# Patient Record
Sex: FEMALE | Race: WHITE | NOT HISPANIC OR LATINO | Employment: OTHER | URBAN - NONMETROPOLITAN AREA
[De-identification: names, ages, dates, MRNs, and addresses within clinical notes are randomized per-mention and may not be internally consistent; named-entity substitution may affect disease eponyms.]

---

## 2021-01-15 ENCOUNTER — OFFICE VISIT (OUTPATIENT)
Dept: CARDIOLOGY | Facility: CLINIC | Age: 76
End: 2021-01-15

## 2021-01-15 VITALS
OXYGEN SATURATION: 97 % | SYSTOLIC BLOOD PRESSURE: 110 MMHG | DIASTOLIC BLOOD PRESSURE: 70 MMHG | WEIGHT: 224 LBS | HEIGHT: 64 IN | BODY MASS INDEX: 38.24 KG/M2 | HEART RATE: 68 BPM | TEMPERATURE: 97.5 F

## 2021-01-15 DIAGNOSIS — I10 ESSENTIAL HYPERTENSION: ICD-10-CM

## 2021-01-15 DIAGNOSIS — E78.5 HYPERLIPIDEMIA LDL GOAL <100: ICD-10-CM

## 2021-01-15 DIAGNOSIS — I48.0 PAROXYSMAL ATRIAL FIBRILLATION (HCC): Primary | ICD-10-CM

## 2021-01-15 PROBLEM — J43.9 COPD WITH EMPHYSEMA: Status: ACTIVE | Noted: 2021-01-15

## 2021-01-15 PROCEDURE — 99214 OFFICE O/P EST MOD 30 MIN: CPT | Performed by: INTERNAL MEDICINE

## 2021-01-15 PROCEDURE — 93000 ELECTROCARDIOGRAM COMPLETE: CPT | Performed by: INTERNAL MEDICINE

## 2021-01-15 NOTE — PROGRESS NOTES
[facility] [place]  [facility] CARDIOLOGY  [address] [place] [address]  Dept: [phone]  Dept Fax: [phone]    Marianela Ngo  1945    Follow Up Office Visit Note    History of Present Illness:  Marianela Ngo is a 75 y.o. female who presents to the clinic for PAF- she seems doing well on Sotalol 80 mg bid and warfarin, EKG today sinus with HR 65% and incomplete RBBB. Will keep same meds    The following portions of the patient's history were reviewed and updated as appropriate: allergies, current medications, past family history, past medical history, past social history, past surgical history and problem list.    Medications:  albuterol  albuterol sulfate HFA  amLODIPine  atorvastatin  fluticasone  levocetirizine  lisinopril-hydrochlorothiazide  sotalol  warfarin    Subjective  No Active Allergies     Past Medical History:   Diagnosis Date   • Allergies    • Bradycardia with 51-60 beats per minute    • Chronic atrial fibrillation (CMS/HCC)    • Current use of long term anticoagulation    • DJD (degenerative joint disease)    • Epistaxis    • Essential hypertension    • High risk medication use    • Mild persistent asthma, uncomplicated    • Mixed hyperlipidemia    • Obstructive sleep apnea on CPAP    • Other fatigue    • Paroxysmal atrial fibrillation (CMS/HCC)    • Pneumonia 08/2015   • Shortness of breath    • Vitamin deficiency        Past Surgical History:   Procedure Laterality Date   • CATARACT EXTRACTION, BILATERAL     • HYSTERECTOMY      PARTIAL   • REPLACEMENT TOTAL KNEE BILATERAL  2013,2014       Family History   Problem Relation Age of Onset   • Cancer Father    • Heart disease Sister    • Cancer Brother    • Diabetes Brother    • Hypertension Other         Social History     Socioeconomic History   • Marital status:      Spouse name: Not on file   • Number of children: Not on file   • Years of education: Not on file   • Highest education level: Not on file  "  Tobacco Use   • Smoking status: Former Smoker   • Smokeless tobacco: Never Used   Substance and Sexual Activity   • Alcohol use: Never     Frequency: Never   • Drug use: Never   • Sexual activity: Defer       Review of Systems   Constitutional: Negative.    HENT: Negative.    Respiratory: Positive for shortness of breath.    Cardiovascular: Negative.    Endocrine: Negative.    Genitourinary: Negative.    Musculoskeletal: Negative.    Skin: Negative.    Allergic/Immunologic: Negative.    Neurological: Negative.    Hematological: Negative.    Psychiatric/Behavioral: Negative.        Cardiovascular Procedures    ECHO/MUGA: 08/2015 Ef normal Mild Tr RVSP 35 Mild aortic sclerosis  STRESS TESTS:   CARDIAC CATH: CARDIAC CATHETERIZATION - SCAN - CARDIAC CATH-10- (01/13/2021)    DEVICES:   HOLTER:   CT/MRI:   VASCULAR:   CARDIOTHORACIC:     Objective  Vitals:    01/15/21 1132   Temp: 97.5 °F (36.4 °C)   TempSrc: Infrared   Weight: 102 kg (224 lb)   Height: 162.6 cm (64\")     Body mass index is 38.45 kg/m².     Physical Exam  Vitals signs reviewed.   Constitutional:       Appearance: Healthy appearance. Not in distress.   Neck:      Vascular: No JVR. JVD normal.   Pulmonary:      Effort: Pulmonary effort is normal.      Breath sounds: Normal breath sounds. No wheezing. No rhonchi. No rales.   Chest:      Chest wall: Not tender to palpatation.   Cardiovascular:      PMI at left midclavicular line. Normal rate. Regular rhythm. Normal S1. Normal S2.      Murmurs: There is no murmur.      No gallop. No click. No rub.   Pulses:     Intact distal pulses.   Edema:     Peripheral edema absent.   Abdominal:      General: Bowel sounds are normal.      Palpations: Abdomen is soft.      Tenderness: There is no abdominal tenderness.   Musculoskeletal: Normal range of motion.         General: No tenderness.   Skin:     General: Skin is warm and dry.   Neurological:      General: No focal deficit present.      Mental Status: Alert " and oriented to person, place and time.          Diagnostic Data    ECG 12 Lead    Date/Time: 1/15/2021 11:59 AM  Performed by: Amos Davis MD  Authorized by: [facility]   Comparison: compared with previous ECG from 1/8/2021  Similar to previous ECG  Rhythm: sinus rhythm  Rate: normal  QRS axis: normal              Assessment and Plan  Diagnoses and all orders for this visit:    Paroxysmal atrial fibrillation (CMS/HCC)= She seems doing well, in sinus rhythm on sotalol 80 mg bid and also Warfarin, HR 65    Essential hypertension- BP is good 125/80  On Amlodipine 5mg, Lisinopril 20.12.5    Hyperlipidemia LDL goal <100- On Lipitor 20 mg         No follow-ups on file.    Amos Davis MD  01/15/2021

## 2021-02-01 DIAGNOSIS — I48.0 PAROXYSMAL ATRIAL FIBRILLATION (HCC): Primary | ICD-10-CM

## 2021-02-01 RX ORDER — WARFARIN SODIUM 5 MG/1
5 TABLET ORAL
Qty: 180 TABLET | Refills: 3 | Status: SHIPPED | OUTPATIENT
Start: 2021-02-01 | End: 2022-01-21 | Stop reason: SDUPTHER

## 2021-02-01 NOTE — TELEPHONE ENCOUNTER
Fax # [phone]  [facility]    Pt is stating that her pharmacy contacted her to let her know that she is out of refills.    Warfarin 5mg.   3 month supply

## 2021-03-19 ENCOUNTER — TELEPHONE (OUTPATIENT)
Dept: CARDIOLOGY | Facility: CLINIC | Age: 76
End: 2021-03-19

## 2021-03-19 NOTE — TELEPHONE ENCOUNTER
PT'S HR HAS BEEN RUNNING 53-58, /82, 120/78. HR HAD BEEN STAYING IN 70'S BEFORE NOW. SHE'S BEEN FEELING LIGHT HEADED, SLUGGISH, DOESN'T FEEL RIGHT. PLEASE CALL [phone]

## 2021-03-19 NOTE — TELEPHONE ENCOUNTER
Called pt and she said she was feeling better, I told her to come Monday morning for a bp & hr check

## 2021-03-22 ENCOUNTER — TELEPHONE (OUTPATIENT)
Dept: CARDIOLOGY | Facility: CLINIC | Age: 76
End: 2021-03-22

## 2021-05-14 ENCOUNTER — TELEPHONE (OUTPATIENT)
Dept: CARDIOLOGY | Facility: CLINIC | Age: 76
End: 2021-05-14

## 2021-05-18 ENCOUNTER — CLINICAL SUPPORT (OUTPATIENT)
Dept: CARDIOLOGY | Facility: CLINIC | Age: 76
End: 2021-05-18

## 2021-05-18 DIAGNOSIS — I48.0 PAROXYSMAL ATRIAL FIBRILLATION (HCC): Primary | ICD-10-CM

## 2021-05-18 LAB — INR PPP: 1.9 (ref 0.9–1.1)

## 2021-05-19 DIAGNOSIS — I48.0 PAROXYSMAL ATRIAL FIBRILLATION (HCC): Primary | ICD-10-CM

## 2021-05-20 ENCOUNTER — APPOINTMENT (OUTPATIENT)
Dept: PHARMACY | Facility: HOSPITAL | Age: 76
End: 2021-05-20

## 2021-05-20 ENCOUNTER — ANTICOAGULATION VISIT (OUTPATIENT)
Dept: PHARMACY | Facility: HOSPITAL | Age: 76
End: 2021-05-20

## 2021-05-20 DIAGNOSIS — I48.0 PAROXYSMAL ATRIAL FIBRILLATION (HCC): Primary | ICD-10-CM

## 2021-05-20 RX ORDER — BUDESONIDE 0.5 MG/2ML
0.5 INHALANT ORAL DAILY
COMMUNITY
Start: 1970-01-01 | End: 2022-10-25

## 2021-05-25 ENCOUNTER — CLINICAL SUPPORT (OUTPATIENT)
Dept: CARDIOLOGY | Facility: CLINIC | Age: 76
End: 2021-05-25

## 2021-05-25 ENCOUNTER — ANTICOAGULATION VISIT (OUTPATIENT)
Dept: PHARMACY | Facility: HOSPITAL | Age: 76
End: 2021-05-25

## 2021-05-25 DIAGNOSIS — I48.0 PAROXYSMAL ATRIAL FIBRILLATION (HCC): ICD-10-CM

## 2021-05-25 DIAGNOSIS — I10 ESSENTIAL HYPERTENSION: Primary | ICD-10-CM

## 2021-05-25 DIAGNOSIS — I48.0 PAROXYSMAL ATRIAL FIBRILLATION (HCC): Primary | ICD-10-CM

## 2021-05-25 LAB — INR PPP: 2.3 (ref 0.9–1.1)

## 2021-05-25 NOTE — PROGRESS NOTES
Anticoagulation Clinic - Remote Progress Note  Remote Lab    Indication: paroxysmal afib  Referring Provider: Susan  Initial Warfarin Start Date: 2020? 2019?  Goal INR: 2.0-3.0  Current Drug Interactions:   [id number]: 4 (Age ?74, Sex, HTN) [estimated 5/20/21]  Bleed Risk: self reports negative history for GI bleed  Other: DOAC too expensive    Diet: peas or green beans 1x/week  5/25/21  Alcohol: none  Tobacco: none  OTC Pain Medication: APAP only    INR History:  Date 5/14 5/18 5/25             Total WeeklyDose  22.5mg 25 mg             INR 3.5 1.9 2.3             Notes [facility] 1x hold; incr GLV                Phone Interview:  Tablet Strength: 5mg tablet  Patient Contact Info: [phone] (home); [phone] (cell)  Estimated OOP cost: [please send to registration if not already done]  Verbal Release Auth: mailed 5/21/21  Lab Contact Info: [facility]    Patient Findings:  Positives:  Change in diet/appetite   Negatives:  Signs/symptoms of thrombosis, Signs/symptoms of bleeding, Laboratory test error suspected, Change in health, Change in alcohol use, Change in activity, Upcoming invasive procedure, Emergency department visit, Upcoming dental procedure, Missed doses, Extra doses, Change in medications, Hospital admission, Bruising, Other complaints   Comments:  She has been babysitting her small grandchildren and was not able to cook any GLV this week. She plans on resuming normal diet. Denies any other changes.     Plan:  1. INR is back WNL today. Instructed Ms. Ngo to continue warfarin 2.5 mg daily except for 5 mg on SunTuesThurs until recheck.  2. Repeat INR in one week to ensure WNL.  3. Verbal information provided over the phone. Marianela Ngo RBV dosing instructions, expresses understanding by teach back, and has no further questions at this time.    Sarah Alejandre CPhT  5/25/2021  11:14 EDT     I, Divya Jaffe, PharmD, have reviewed the note in full and agree with the  assessment and plan.  05/25/21  15:21 EDT

## 2021-06-01 ENCOUNTER — CLINICAL SUPPORT (OUTPATIENT)
Dept: CARDIOLOGY | Facility: CLINIC | Age: 76
End: 2021-06-01

## 2021-06-01 ENCOUNTER — ANTICOAGULATION VISIT (OUTPATIENT)
Dept: PHARMACY | Facility: HOSPITAL | Age: 76
End: 2021-06-01

## 2021-06-01 DIAGNOSIS — I48.0 PAROXYSMAL ATRIAL FIBRILLATION (HCC): Primary | ICD-10-CM

## 2021-06-01 LAB — INR PPP: 2.3 (ref 0.9–1.1)

## 2021-06-01 NOTE — PROGRESS NOTES
Anticoagulation Clinic - Remote Progress Note  Remote Lab    Indication: paroxysmal afib  Referring Provider: Susan  Initial Warfarin Start Date: 2020? 2019?  Goal INR: 2.0-3.0  Current Drug Interactions:   [id number]: 4 (Age ?74, Sex, HTN) [estimated 5/20/21]  Bleed Risk: self reports negative history for GI bleed  Other: DOAC too expensive    Diet: peas or green beans 1x/week  5/25/21  Alcohol: none  Tobacco: none  OTC Pain Medication: APAP only    INR History:  Date 5/14 5/18 5/25 6/1            Total WeeklyDose  22.5mg 25 mg 25mg            INR 3.5 1.9 2.3 2.3            Notes [facility] Cards 1x hold; incr GLV                Phone Interview:  Tablet Strength: 5mg tablet  Patient Contact Info: [phone] (home) preferred; [phone] (cell)  Estimated OOP cost: [please send to registration if not already done]  Verbal Release Auth: May speak w/ Femi Ngo, ; May leave voicemail on home phone  Lab Contact Info: [facility]    Patient Findings:  Negatives:  Signs/symptoms of thrombosis, Signs/symptoms of bleeding, Laboratory test error suspected, Change in health, Change in alcohol use, Change in activity, Upcoming invasive procedure, Emergency department visit, Upcoming dental procedure, Missed doses, Extra doses, Change in medications, Change in diet/appetite, Hospital admission, Bruising, Other complaints   Comments:  Patient reports eating a little bit of gardened lettuce last night. One small serving of peas or green beans per week is her normal. She denies all other findings.     Plan:  1. INR is therapeutic again today at 2.3. Instructed Ms. Ngo to continue warfarin 2.5 mg daily except for 5 mg on SunTuesThurs until recheck.  2. Repeat INR in 2 weeks to ensure WNL, 6/15.  3. Verbal information provided over the phone. Marianela Ngo RBV dosing instructions, expresses understanding by teach back, and has no further questions at this time.    Lu Grimm, PharmD.  Pharmacy  Resident  6/1/2021 12:27 EDT     Verbal release document returned 6/3. Patient rights & responsibilities document not returned. Please ask patient about R&R document with next encounter.    Ina EncarnacionD.  Pharmacy Resident  6/3/2021 12:21 EDT

## 2021-06-15 ENCOUNTER — ANTICOAGULATION VISIT (OUTPATIENT)
Dept: PHARMACY | Facility: HOSPITAL | Age: 76
End: 2021-06-15

## 2021-06-15 ENCOUNTER — CLINICAL SUPPORT (OUTPATIENT)
Dept: CARDIOLOGY | Facility: CLINIC | Age: 76
End: 2021-06-15

## 2021-06-15 DIAGNOSIS — I48.0 PAROXYSMAL ATRIAL FIBRILLATION (HCC): Primary | ICD-10-CM

## 2021-06-15 LAB — INR PPP: 2.8 (ref 0.9–1.1)

## 2021-06-15 PROCEDURE — 85610 PROTHROMBIN TIME: CPT | Performed by: INTERNAL MEDICINE

## 2021-06-15 PROCEDURE — 36416 COLLJ CAPILLARY BLOOD SPEC: CPT | Performed by: INTERNAL MEDICINE

## 2021-06-15 NOTE — PROGRESS NOTES
Anticoagulation Clinic - Remote Progress Note  Remote Lab    Indication: paroxysmal afib  Referring Provider: Susan  Initial Warfarin Start Date: 2020? 2019?  Goal INR: 2.0-3.0  Current Drug Interactions:   [id number]: 4 (Age ?74, Sex, HTN) [estimated 5/20/21]  Bleed Risk: self reports negative history for GI bleed  Other: DOAC too expensive    Diet: peas or green beans 1x/week  5/25/21  Alcohol: none  Tobacco: none  OTC Pain Medication: APAP only    INR History:  Date 5/14 5/18 5/25 6/1 6/15           Total WeeklyDose  22.5mg 25mg 25mg 25mg           INR 3.5 1.9 2.3 2.3 2.8           Notes [facility] Cards 1x hold; incr GLV                Phone Interview:  Tablet Strength: 5mg tablet  Patient Contact Info: [phone] (home) preferred; [phone] (cell)  Estimated OOP cost: [please send to registration if not already done]  Verbal Release Auth: May speak w/ Femi Ngo, ; May leave voicemail on home phone  Lab Contact Info: [facility]      Patient Findings  Negatives:  Signs/symptoms of thrombosis, Signs/symptoms of bleeding, Laboratory test error suspected, Change in health, Change in alcohol use, Change in activity, Upcoming invasive procedure, Emergency department visit, Upcoming dental procedure, Missed doses, Extra doses, Change in medications, Change in diet/appetite, Hospital admission, Bruising, Other complaints     Plan:  1. INR is therapeutic again today at 2.8. Instructed Ms. Ngo to continue warfarin 2.5mg oral daily except for 5mg on SunTuesThurs until recheck.  2. Repeat INR in 3 weeks, 7/6  3. Verbal information provided over the phone. Marianela Ngo RBV dosing instructions, expresses understanding by teach back, and has no further questions at this time.  4. Re-mailed R&R paperwork to patient -- 6/15/21    Saravanan Mccarthy CPhT  6/15/2021  13:57 EDT    I, Maryjane Amezquita, PharmD, have reviewed the note in full and agree with the assessment and plan.  06/15/21  15:03  EDT

## 2021-07-06 ENCOUNTER — ANTICOAGULATION VISIT (OUTPATIENT)
Dept: PHARMACY | Facility: HOSPITAL | Age: 76
End: 2021-07-06

## 2021-07-06 ENCOUNTER — CLINICAL SUPPORT (OUTPATIENT)
Dept: CARDIOLOGY | Facility: CLINIC | Age: 76
End: 2021-07-06

## 2021-07-06 DIAGNOSIS — I48.0 PAROXYSMAL ATRIAL FIBRILLATION (HCC): Primary | ICD-10-CM

## 2021-07-06 LAB — INR PPP: 2.7 (ref 0.9–1.1)

## 2021-07-06 PROCEDURE — 85610 PROTHROMBIN TIME: CPT | Performed by: INTERNAL MEDICINE

## 2021-07-06 PROCEDURE — 36416 COLLJ CAPILLARY BLOOD SPEC: CPT | Performed by: INTERNAL MEDICINE

## 2021-07-06 NOTE — PROGRESS NOTES
Anticoagulation Clinic - Remote Progress Note  Remote Lab    Indication: paroxysmal afib  Referring Provider: Susan  Initial Warfarin Start Date: 2020? 2019?  Goal INR: 2.0-3.0  Current Drug Interactions:   [id number]: 4 (Age ?74, Sex, HTN) [estimated 5/20/21]  Bleed Risk: self reports negative history for GI bleed  Other: DOAC too expensive    Diet: peas or green beans 1x/week  5/25/21  Alcohol: none  Tobacco: none  OTC Pain Medication: APAP only    INR History:  Date 5/14 5/18 5/25 6/1 6/15 7/6          Total WeeklyDose  22.5mg 25mg 25mg 25mg 25 mg          INR 3.5 1.9 2.3 2.3 2.8 2.7          Notes [facility] Cards 1x hold; incr GLV                Phone Interview:  Tablet Strength: 5mg tablet  Patient Contact Info: [phone] (home) preferred; [phone] (cell)  Estimated OOP cost: [please send to registration if not already done]  Verbal Release Auth: May speak w/ Femi Ngo, ; May leave voicemail on home phone  Lab Contact Info: [facility]      Patient Findings:  Positives:  Change in diet/appetite   Negatives:  Signs/symptoms of thrombosis, Signs/symptoms of bleeding, Laboratory test error suspected, Change in health, Change in alcohol use, Change in activity, Upcoming invasive procedure, Emergency department visit, Upcoming dental procedure, Missed doses, Extra doses, Change in medications, Hospital admission, Bruising, Other complaints   Comments:  Patient states that she has not eaten any green vegetables this past week.     Plan:  1. INR is therapeutic today at 2.7. Instructed Ms. Ngo to continue warfarin 2.5mg oral daily except for 5mg on SunTuesThurs until recheck.  2. Repeat INR in 4 weeks, 08/03/2021  3. Verbal information provided over the phone. Marianela Ngo RBV dosing instructions, expresses understanding by teach back, and has no further questions at this time.  4. Re-mailed R&R paperwork to patient -- 6/15/21       Robert Lambert, Pharmacy Intern  07/06/21  11:06  EDT     I, Radha Orlando, PharmD, have reviewed the note in full and agree with the assessment and plan.  07/06/21  16:59 EDT

## 2021-07-12 ENCOUNTER — ANTICOAGULATION VISIT (OUTPATIENT)
Dept: PHARMACY | Facility: HOSPITAL | Age: 76
End: 2021-07-12

## 2021-07-12 DIAGNOSIS — I48.0 PAROXYSMAL ATRIAL FIBRILLATION (HCC): Primary | ICD-10-CM

## 2021-07-12 LAB — INR PPP: 2.1

## 2021-07-12 NOTE — PROGRESS NOTES
Anticoagulation Clinic - Remote Progress Note  mdINR Home Monitor  Testing frequency: weekly    Indication: paroxysmal afib  Referring Provider: Susan  Initial Warfarin Start Date: 2020? 2019?  Goal INR: 2.0-3.0  Current Drug Interactions:   [id number]: 4 (Age ?74, Sex, HTN) [estimated 5/20/21]  Bleed Risk: self reports negative history for GI bleed  Other: DOAC too expensive    Diet: peas or green beans 1x/week  5/25/21  Alcohol: none  Tobacco: none  OTC Pain Medication: APAP only    INR History:  Date 5/14 5/18 5/25 6/1 6/15 7/6 7/12         Total WeeklyDose  22.5mg 25mg 25mg 25mg 25mg 25mg         INR 3.5 1.9 2.3 2.3 2.8 2.7 2.1         Notes [facility] Cards 1x hold; incr GLV     HM           Phone Interview:  Tablet Strength: 5mg tablet  Patient Contact Info: [phone] (home) preferred; [phone] (cell)  Estimated OOP cost: [please send to registration if not already done]  Verbal Release Auth: May speak w/ Femi Ngo, ; May leave voicemail on home phone  Lab Contact Info: [facility]      Patient Findings    Negatives:  Signs/symptoms of thrombosis, Signs/symptoms of bleeding, Laboratory test error suspected, Change in health, Change in alcohol use, Change in activity, Upcoming invasive procedure, Emergency department visit, Upcoming dental procedure, Missed doses, Extra doses, Change in medications, Change in diet/appetite, Hospital admission, Bruising, Other complaints   Comments:  Patient has been avoiding GLV recently. Asked to let us know if that changes.       Plan:  1. INR is therapeutic today at 2.1. Patient self tested on new home monitor. Instructed Ms. Ngo to continue warfarin 2.5mg oral daily except for 5mg on SunTuesThurs until recheck.  2. Repeat INR in 1 week, 7/19, as required by mdINR  3. Verbal information provided over the phone. Marianela Huber RBV dosing instructions, expresses understanding by teach back, and has no further questions at this time.    Thank  you,    Fady You PharmD, KARENA  7/13/2021  10:18 EDT

## 2021-07-16 ENCOUNTER — OFFICE VISIT (OUTPATIENT)
Dept: CARDIOLOGY | Facility: CLINIC | Age: 76
End: 2021-07-16

## 2021-07-16 VITALS
DIASTOLIC BLOOD PRESSURE: 88 MMHG | RESPIRATION RATE: 11 BRPM | BODY MASS INDEX: 36.32 KG/M2 | SYSTOLIC BLOOD PRESSURE: 136 MMHG | TEMPERATURE: 97 F | HEIGHT: 65 IN | HEART RATE: 76 BPM | WEIGHT: 218 LBS | OXYGEN SATURATION: 96 %

## 2021-07-16 DIAGNOSIS — E78.5 HYPERLIPIDEMIA LDL GOAL <100: ICD-10-CM

## 2021-07-16 DIAGNOSIS — I48.0 PAROXYSMAL ATRIAL FIBRILLATION (HCC): Primary | ICD-10-CM

## 2021-07-16 DIAGNOSIS — J43.2 CENTRILOBULAR EMPHYSEMA (HCC): ICD-10-CM

## 2021-07-16 DIAGNOSIS — I10 ESSENTIAL HYPERTENSION: ICD-10-CM

## 2021-07-16 PROCEDURE — 99214 OFFICE O/P EST MOD 30 MIN: CPT | Performed by: INTERNAL MEDICINE

## 2021-07-16 PROCEDURE — 93000 ELECTROCARDIOGRAM COMPLETE: CPT | Performed by: INTERNAL MEDICINE

## 2021-07-16 RX ORDER — NYSTATIN 100000 U/G
CREAM TOPICAL
COMMUNITY
Start: 2021-06-07 | End: 2022-01-21

## 2021-07-16 NOTE — PROGRESS NOTES
[facility]  [facility] CARDIOLOGY  [address] [place] [address]  Dept: [phone]  Dept Fax: [phone]    Marianela Ngo  1945    Follow Up Office Visit Note    History of Present Illness:  Marianela Ngo is a 75 y.o. female who presents to the clinic for Follow-up.PAF- She seems doing well, no complaints, On warfarin and also Sotalol 80 mg bid QT is 490, Hr is 55, will keep same meds     The following portions of the patient's history were reviewed and updated as appropriate: allergies, current medications, past family history, past medical history, past social history, past surgical history and problem list.    Medications:  albuterol  albuterol sulfate HFA  amLODIPine  atorvastatin  budesonide  diphenhydrAMINE-PE-APAP tablet  lisinopril-hydrochlorothiazide  nystatin cream  sotalol  warfarin    Subjective  No Known Allergies     Past Medical History:   Diagnosis Date   • Allergies    • Bradycardia with 51-60 beats per minute    • Chronic atrial fibrillation (CMS/HCC)    • Current use of long term anticoagulation    • DJD (degenerative joint disease)    • Epistaxis    • Essential hypertension    • High risk medication use    • Mild persistent asthma, uncomplicated    • Mixed hyperlipidemia    • Obstructive sleep apnea on CPAP    • Other fatigue    • Paroxysmal atrial fibrillation (CMS/HCC)    • Pneumonia 08/2015   • Shortness of breath    • Vitamin deficiency        Past Surgical History:   Procedure Laterality Date   • CATARACT EXTRACTION, BILATERAL     • HYSTERECTOMY      PARTIAL   • REPLACEMENT TOTAL KNEE BILATERAL  2013,2014       Family History   Problem Relation Age of Onset   • Cancer Father    • Heart disease Sister    • Cancer Brother    • Diabetes Brother    • Hypertension Other         Social History     Socioeconomic History   • Marital status:      Spouse name: Not on file   • Number of children: Not on file   • Years of education: Not on file   •  "Highest education level: Not on file   Tobacco Use   • Smoking status: Former Smoker     Packs/day: 1.00     Types: Cigarettes     Quit date: 1974     Years since quittin.5   • Smokeless tobacco: Never Used   Vaping Use   • Vaping Use: Never used   Substance and Sexual Activity   • Alcohol use: Never   • Drug use: Never   • Sexual activity: Defer       Review of Systems   Constitutional: Negative.    HENT: Negative.    Respiratory: Negative.    Cardiovascular: Negative.    Endocrine: Negative.    Genitourinary: Negative.    Musculoskeletal: Negative.    Skin: Negative.    Allergic/Immunologic: Negative.    Neurological: Negative.    Hematological: Negative.    Psychiatric/Behavioral: Negative.    All other systems reviewed and are negative.      Cardiovascular Procedures    ECHO/MUGA:   STRESS TESTS:   CARDIAC CATH:   DEVICES:   HOLTER:   CT/MRI:   VASCULAR:   CARDIOTHORACIC:     Objective  Vitals:    21 1042   BP: 136/88   BP Location: Right arm   Patient Position: Sitting   Cuff Size: Adult   Pulse: 76   Resp: 11   Temp: 97 °F (36.1 °C)   SpO2: 96%   Weight: 98.9 kg (218 lb)   Height: 165.1 cm (65\")   PainSc: 0-No pain     Body mass index is 36.28 kg/m².     Physical Exam  Constitutional:       Appearance: Healthy appearance. Not in distress.   Neck:      Vascular: No JVR. JVD normal.   Pulmonary:      Effort: Pulmonary effort is normal.      Breath sounds: Normal breath sounds. No wheezing. No rhonchi. No rales.   Chest:      Chest wall: Not tender to palpatation.   Cardiovascular:      PMI at left midclavicular line. Normal rate. Regular rhythm. Normal S1. Normal S2.      Murmurs: There is no murmur.      No gallop. No click. No rub.   Pulses:     Intact distal pulses.   Edema:     Peripheral edema absent.   Abdominal:      General: Bowel sounds are normal.      Palpations: Abdomen is soft.      Tenderness: There is no abdominal tenderness.   Musculoskeletal: Normal range of motion.         General: " No tenderness. Skin:     General: Skin is warm and dry.   Neurological:      General: No focal deficit present.      Mental Status: Alert and oriented to person, place and time.          Diagnostic Data    ECG 12 Lead    Date/Time: 7/16/2021 12:07 PM  Performed by: Amos Davis MD  Authorized by: Amos Davis MD   Comparison: compared with previous ECG   Similar to previous ECG  Rhythm: sinus bradycardia  Rate: normal  BPM: 55  Conduction: right bundle branch block  QRS axis: normal  Other findings: non-specific ST-T wave changes    Clinical impression: abnormal EKG            Assessment and Plan  Diagnoses and all orders for this visit:    Paroxysmal atrial fibrillation (CMS/HCC)-Will keep sotalol and also Warfarin    Essential hypertension- The BP is fine, will keep same meds     Hyperlipidemia LDL goal <100- On Lipitor     Centrilobular emphysema (CMS/HCC)    Other orders  -     nystatin (MYCOSTATIN) 322328 UNIT/GM cream         Return in about 6 months (around 1/16/2022) for Recheck.    Amos Davis MD  07/16/2021

## 2021-07-22 ENCOUNTER — ANTICOAGULATION VISIT (OUTPATIENT)
Dept: PHARMACY | Facility: HOSPITAL | Age: 76
End: 2021-07-22

## 2021-07-22 DIAGNOSIS — I48.0 PAROXYSMAL ATRIAL FIBRILLATION (HCC): Primary | ICD-10-CM

## 2021-07-22 LAB — INR PPP: 2.2

## 2021-07-22 NOTE — PROGRESS NOTES
Anticoagulation Clinic - Remote Progress Note  mdINR Home Monitor  Testing frequency: weekly    Indication: paroxysmal afib  Referring Provider: Susan  Initial Warfarin Start Date: 2020? 2019?  Goal INR: 2.0-3.0  Current Drug Interactions:   [id number]: 4 (Age ?74, Sex, HTN) [estimated 5/20/21]  Bleed Risk: self reports negative history for GI bleed  Other: DOAC too expensive    Diet: peas or green beans 1x/week  7/22/21  Alcohol: none  Tobacco: none  OTC Pain Medication: APAP only    INR History:  Date 5/14 5/18 5/25 6/1 6/15 7/6 7/12 7/22        Total WeeklyDose  22.5mg 25mg 25mg 25mg 25mg 25mg 25 mg        INR 3.5 1.9 2.3 2.3 2.8 2.7 2.1 2.2        Notes [facility] Cards 1x hold; incr GLV     HM           Phone Interview:  Tablet Strength: 5mg tablet  Patient Contact Info: [phone] (home) preferred; [phone] (cell)  Estimated OOP cost: [please send to registration if not already done]  Verbal Release Auth: May speak w/ Femi Ngo, ; May leave voicemail on home phone  Lab Contact Info: [facility]    Patient Findings:  Negatives:  Signs/symptoms of thrombosis, Signs/symptoms of bleeding, Laboratory test error suspected, Change in health, Change in alcohol use, Change in activity, Upcoming invasive procedure, Emergency department visit, Upcoming dental procedure, Missed doses, Extra doses, Change in medications, Change in diet/appetite, Hospital admission, Bruising, Other complaints     Plan:  1. INR is therapeutic today at 2.2. Instructed Ms. Ngo to continue warfarin 2.5mg oral daily except for 5mg on SunTuesThurs until recheck.  2. Repeat INR in one week, 7/29, as required by mdINR.  3. Verbal information provided over the phone. Marianela Nog RBV dosing instructions, expresses understanding by teach back, and has no further questions at this time.    Sarah Alejandre, Daljit  7/22/2021  15:32 EDT     I, Fady You, PharmD, have reviewed the note in full and agree with the  assessment and plan.  07/22/21  16:46 EDT

## 2021-07-23 ENCOUNTER — LAB (OUTPATIENT)
Dept: CARDIOLOGY | Facility: CLINIC | Age: 76
End: 2021-07-23

## 2021-07-23 DIAGNOSIS — I10 ESSENTIAL HYPERTENSION: Primary | ICD-10-CM

## 2021-07-23 DIAGNOSIS — I48.0 PAROXYSMAL ATRIAL FIBRILLATION (HCC): ICD-10-CM

## 2021-07-23 DIAGNOSIS — E78.5 HYPERLIPIDEMIA LDL GOAL <100: ICD-10-CM

## 2021-07-24 LAB
ALBUMIN SERPL-MCNC: 4.2 G/DL (ref 3.7–4.7)
ALBUMIN/GLOB SERPL: 1.6 {RATIO} (ref 1.2–2.2)
ALP SERPL-CCNC: 82 IU/L (ref 48–121)
ALT SERPL-CCNC: 13 IU/L (ref 0–32)
AST SERPL-CCNC: 16 IU/L (ref 0–40)
BASOPHILS # BLD AUTO: 0 X10E3/UL (ref 0–0.2)
BASOPHILS NFR BLD AUTO: 0 %
BILIRUB SERPL-MCNC: 0.5 MG/DL (ref 0–1.2)
BUN SERPL-MCNC: 17 MG/DL (ref 8–27)
BUN/CREAT SERPL: 20 (ref 12–28)
CALCIUM SERPL-MCNC: 9.2 MG/DL (ref 8.7–10.3)
CHLORIDE SERPL-SCNC: 105 MMOL/L (ref 96–106)
CHOLEST SERPL-MCNC: 123 MG/DL (ref 100–199)
CO2 SERPL-SCNC: 24 MMOL/L (ref 20–29)
CREAT SERPL-MCNC: 0.86 MG/DL (ref 0.57–1)
EOSINOPHIL # BLD AUTO: 0.3 X10E3/UL (ref 0–0.4)
EOSINOPHIL NFR BLD AUTO: 5 %
ERYTHROCYTE [DISTWIDTH] IN BLOOD BY AUTOMATED COUNT: 12.8 % (ref 11.7–15.4)
GLOBULIN SER CALC-MCNC: 2.6 G/DL (ref 1.5–4.5)
GLUCOSE SERPL-MCNC: 100 MG/DL (ref 65–99)
HCT VFR BLD AUTO: 39.4 % (ref 34–46.6)
HDLC SERPL-MCNC: 46 MG/DL
HGB BLD-MCNC: 13.5 G/DL (ref 11.1–15.9)
IMM GRANULOCYTES # BLD AUTO: 0 X10E3/UL (ref 0–0.1)
IMM GRANULOCYTES NFR BLD AUTO: 0 %
LDLC SERPL CALC-MCNC: 61 MG/DL (ref 0–99)
LYMPHOCYTES # BLD AUTO: 1.3 X10E3/UL (ref 0.7–3.1)
LYMPHOCYTES NFR BLD AUTO: 27 %
MCH RBC QN AUTO: 30.6 PG (ref 26.6–33)
MCHC RBC AUTO-ENTMCNC: 34.3 G/DL (ref 31.5–35.7)
MCV RBC AUTO: 89 FL (ref 79–97)
MONOCYTES # BLD AUTO: 0.6 X10E3/UL (ref 0.1–0.9)
MONOCYTES NFR BLD AUTO: 11 %
NEUTROPHILS # BLD AUTO: 2.8 X10E3/UL (ref 1.4–7)
NEUTROPHILS NFR BLD AUTO: 57 %
PLATELET # BLD AUTO: 237 X10E3/UL (ref 150–450)
POTASSIUM SERPL-SCNC: 4.3 MMOL/L (ref 3.5–5.2)
PROT SERPL-MCNC: 6.8 G/DL (ref 6–8.5)
RBC # BLD AUTO: 4.41 X10E6/UL (ref 3.77–5.28)
SODIUM SERPL-SCNC: 143 MMOL/L (ref 134–144)
TRIGL SERPL-MCNC: 80 MG/DL (ref 0–149)
VLDLC SERPL CALC-MCNC: 16 MG/DL (ref 5–40)
WBC # BLD AUTO: 4.9 X10E3/UL (ref 3.4–10.8)

## 2021-07-27 ENCOUNTER — TELEPHONE (OUTPATIENT)
Dept: CARDIOLOGY | Facility: CLINIC | Age: 76
End: 2021-07-27

## 2021-07-28 ENCOUNTER — ANTICOAGULATION VISIT (OUTPATIENT)
Dept: PHARMACY | Facility: HOSPITAL | Age: 76
End: 2021-07-28

## 2021-07-28 DIAGNOSIS — I48.0 PAROXYSMAL ATRIAL FIBRILLATION (HCC): Primary | ICD-10-CM

## 2021-07-28 LAB — INR PPP: 1.9

## 2021-07-28 NOTE — PROGRESS NOTES
Anticoagulation Clinic - Remote Progress Note  mdINR Home Monitor  Testing frequency: weekly    Indication: paroxysmal afib  Referring Provider: Susan  Initial Warfarin Start Date: 2020? 2019?  Goal INR: 2.0-3.0  Current Drug Interactions:   [id number]: 4 (Age ?74, Sex, HTN) [estimated 5/20/21]  Bleed Risk: self reports negative history for GI bleed  Other: DOAC too expensive    Diet: peas or green beans 1x/week  7/22/21  Alcohol: none  Tobacco: none  OTC Pain Medication: APAP only    INR History:  Date 5/14 5/18 5/25 6/1 6/15 7/6 7/12 7/22 7/28       Total WeeklyDose  22.5mg 25mg 25mg 25mg 25mg 25mg 25mg 25mg       INR 3.5 1.9 2.3 2.3 2.8 2.7 2.1 2.2 1.9       Notes [facility] 1x hold; incr GLV     HM  incr GLV         Phone Interview:  Tablet Strength: 5mg tablet  Patient Contact Info: [phone] (home) preferred; [phone] (cell)  Estimated OOP cost: [please send to registration if not already done]  Verbal Release Auth: May speak w/ Femi Ngo, ; May leave voicemail on home phone  Lab Contact Info: [facility]    Patient Findings  Positives:  Change in diet/appetite   Negatives:  Signs/symptoms of thrombosis, Signs/symptoms of bleeding, Laboratory test error suspected, Change in health, Change in alcohol use, Change in activity, Upcoming invasive procedure, Emergency department visit, Upcoming dental procedure, Missed doses, Extra doses, Change in medications, Hospital admission, Bruising, Other complaints   Comments:  Patient reports slight increase in GLV intake. She is agreeable to resume usual intake going forward. Otherwise denies findings.      Plan:  1. INR is SUBtherapeutic today at 1.9. Instructed Ms. Ngo to resume usual GLV intake and continue warfarin 2.5mg oral daily except for 5mg on SunTuesThurs until recheck.  2. Repeat INR in one week, 8/4, as required by mdINR.  3. Verbal information provided over the phone. Marianela Ngo RBV dosing instructions, expresses  understanding by teach back, and has no further questions at this time.    Saravanan Mccarthy, [facility]  7/28/2021  14:11 EDT    I, Pablo Taylor, PharmD, have reviewed the note in full and agree with the assessment and plan.   07/28/21  14:52 EDT

## 2021-08-04 ENCOUNTER — ANTICOAGULATION VISIT (OUTPATIENT)
Dept: PHARMACY | Facility: HOSPITAL | Age: 76
End: 2021-08-04

## 2021-08-04 DIAGNOSIS — I48.0 PAROXYSMAL ATRIAL FIBRILLATION (HCC): Primary | ICD-10-CM

## 2021-08-04 LAB — INR PPP: 2

## 2021-08-04 NOTE — PROGRESS NOTES
Anticoagulation Clinic - Remote Progress Note  mdINR Home Monitor  Testing frequency: weekly    Indication: paroxysmal afib  Referring Provider: Susan  Initial Warfarin Start Date: 2020? 2019?  Goal INR: 2.0-3.0  Current Drug Interactions:   [id number]: 4 (Age ?74, Sex, HTN) [estimated 5/20/21]  Bleed Risk: self reports negative history for GI bleed  Other: DOAC too expensive    Diet: peas or green beans 1x/week  7/22/21  Alcohol: none  Tobacco: none  OTC Pain Medication: APAP only    INR History:  Date 5/14 5/18 5/25 6/1 6/15 7/6 7/12 7/22 7/28 8/4      Total WeeklyDose  22.5mg 25mg 25mg 25mg 25mg 25mg 25mg 25mg 25mg      INR 3.5 1.9 2.3 2.3 2.8 2.7 2.1 2.2 1.9 2.0      Notes [place] Cards 1x hold; incr GLV     HM  incr GLV         Phone Interview:  Tablet Strength: 5mg tablet  Patient Contact Info: [phone] (home) preferred; [phone] (cell)  Estimated OOP cost: [please send to registration if not already done]  Verbal Release Auth: signed 5/25/21 -- May speak w/ Femi Ngo, ; May leave voicemail on home phone  Lab Contact Info: [facility]    Patient Findings  Negatives:  Signs/symptoms of thrombosis, Signs/symptoms of bleeding, Laboratory test error suspected, Change in health, Change in alcohol use, Change in activity, Upcoming invasive procedure, Emergency department visit, Upcoming dental procedure, Missed doses, Extra doses, Change in medications, Change in diet/appetite, Hospital admission, Bruising, Other complaints       Plan:  1. INR is therapeutic and back WNL today at 2.0, though this is LLN. Instructed Ms. Ngo to continue warfarin 2.5mg oral daily except for 5mg on SunTuesThurs until recheck.  2. Repeat INR in one week, 8/11, as required by mdINR. May need to consider targeting 27.5mg/week, but this would be a 10% increase from current 25mg/week dosing.  3. Verbal information provided over the phone. Marianela Ngo RBV dosing instructions, expresses understanding by  teach back, and has no further questions at this time.    Saravanan Mccarthy, [facility]  8/4/2021  13:41 EDT    I, Radha Orlando, PharmD, have reviewed the note in full and agree with the assessment and plan.  She may benefit from change in tablet strength to 2.5 mg to allow for smaller dose adjustments.  08/04/21  15:09 EDT

## 2021-08-11 ENCOUNTER — ANTICOAGULATION VISIT (OUTPATIENT)
Dept: PHARMACY | Facility: HOSPITAL | Age: 76
End: 2021-08-11

## 2021-08-11 DIAGNOSIS — I48.0 PAROXYSMAL ATRIAL FIBRILLATION (HCC): Primary | ICD-10-CM

## 2021-08-11 LAB — INR PPP: 1.8

## 2021-08-11 NOTE — PROGRESS NOTES
Anticoagulation Clinic - Remote Progress Note  mdINR Home Monitor  Testing frequency: weekly    Indication: paroxysmal afib  Referring Provider: Susan  Initial Warfarin Start Date: 2020? 2019?  Goal INR: 2.0-3.0  Current Drug Interactions:   [id number]: 4 (Age ?74, Sex, HTN) [estimated 5/20/21]  Bleed Risk: self reports negative history for GI bleed  Other: DOAC too expensive    Diet: green beans, peas, zucchini, or priyanka salads daily  8/11/21  Alcohol: none  Tobacco: none  OTC Pain Medication: APAP only    INR History:  Date 5/14 5/18 5/25 6/1 6/15 7/6 7/12 7/22 7/28 8/4 8/11     Total WeeklyDose  22.5mg 25mg 25mg 25mg 25mg 25mg 25mg 25mg 25mg 25mg     INR 3.5 1.9 2.3 2.3 2.8 2.7 2.1 2.2 1.9 2.0 1.8     Notes [facility] 1x hold; incr GLV     HM  incr GLV  No GLV Inc GLV      Phone Interview:  Tablet Strength: 5mg tablet  Patient Contact Info: [phone] (home) preferred; [phone] (cell)  Estimated OOP cost: [please send to registration if not already done]  Verbal Release Auth: signed 5/25/21 -- May speak w/ Femi Ngo, ; May leave voicemail on home phone  Lab Contact Info: [facility]    Patient Findings  Positives:  Change in diet/appetite   Negatives:  Signs/symptoms of thrombosis, Signs/symptoms of bleeding, Laboratory test error suspected, Change in health, Change in alcohol use, Change in activity, Upcoming invasive procedure, Emergency department visit, Upcoming dental procedure, Missed doses, Extra doses, Change in medications, Hospital admission, Bruising, Other complaints   Comments:  Ms. Ngo reports that she didn't eat GLV. She would like to incorporate her GLV back into diet. She will stay away from broccoli and brussels sprouts and only eat green beans, peas, zucchini, priyanka salads     Plan:  1. INR is slightly SUBtherapeutic today 1.8. Instructed Ms. Ngo to increase dose warfarin 5mg oral daily except for 2.5mg on MonWedFri. If patient becomes  supratherapeutic on 27.5mg/week may need to consider smaller tablet size 2.5mg to allow for smaller dose adjustment.   2. Repeat INR in one week, 8/18, as required by mdINR.   3. Verbal information provided over the phone. aMrianela Ngo RBV dosing instructions, expresses understanding by teach back, and has no further questions at this time.    Maryjane Amezquita, PharmD  8/11/2021  12:20 EDT

## 2021-08-18 ENCOUNTER — ANTICOAGULATION VISIT (OUTPATIENT)
Dept: PHARMACY | Facility: HOSPITAL | Age: 76
End: 2021-08-18

## 2021-08-18 DIAGNOSIS — I48.0 PAROXYSMAL ATRIAL FIBRILLATION (HCC): Primary | ICD-10-CM

## 2021-08-18 LAB — INR PPP: 2.3

## 2021-08-18 NOTE — PROGRESS NOTES
Patient called back to report she has picked up rx for cefdinir 300 mg BID x 10 days. She will start this evening.     Have requested she repeat INR on Monday, 8/23, and continue current maintenance dose. She was agreeable.    Sarah Alejandre CPhT  8/18/2021  16:26 EDT

## 2021-08-18 NOTE — PROGRESS NOTES
Anticoagulation Clinic - Remote Progress Note  mdINR Home Monitor  Testing frequency: weekly    Indication: paroxysmal afib  Referring Provider: Susan  Initial Warfarin Start Date: 2020? 2019?  Goal INR: 2.0-3.0  Current Drug Interactions:   [id number]: 4 (Age ?74, Sex, HTN) [estimated 5/20/21]  Bleed Risk: self reports negative history for GI bleed  Other: DOAC too expensive    Diet: green beans, peas, zucchini, or priyanka salads 2-3x/week (8/18/21)  Alcohol: none  Tobacco: none  OTC Pain Medication: APAP only    INR History:  Date 5/14 5/18 5/25 6/1 6/15 7/6 7/12 7/22 7/28 8/4 8/11 8/18    Total WeeklyDose  22.5mg 25mg 25mg 25mg 25mg 25mg 25mg 25mg 25mg 25mg 27.5mg    INR 3.5 1.9 2.3 2.3 2.8 2.7 2.1 2.2 1.9 2.0 1.8 2.3    Notes [place] Cards 1x hold; incr GLV     HM  incr GLV  no GLV incr GLV      Phone Interview:  Tablet Strength: 5mg tablet  Patient Contact Info: [phone] (home) preferred; [phone] (cell)  Estimated OOP cost: [please send to registration if not already done]  Verbal Release Auth: signed 5/25/21 -- May speak w/ Femi Ngo, ; May leave voicemail on home phone  Lab Contact Info: [facility]    Patient Findings  Positives:  Change in medications, Change in diet/appetite   Negatives:  Signs/symptoms of thrombosis, Signs/symptoms of bleeding, Laboratory test error suspected, Change in health, Change in alcohol use, Change in activity, Upcoming invasive procedure, Emergency department visit, Upcoming dental procedure, Missed doses, Extra doses, Hospital admission, Bruising, Other complaints   Comments:  Patient reports she had tele-medicine encounter with PCP today because she fears she is getting bronchitis. Patient feels PCP may call in abx but she does not know at this time. Patient is agreeable to contact clinic when/if she does receive new medication.     Patient confirms she has increased GLV intake as discussed in previous encounter. She feels she will likely have  2-3x servings of GLV (green beans / peas / salad) going forward. Have discussed the need to keep GLV intake as consistent as possible. She is agreeable. Otherwise denies findings.       Plan:  1. INR is therapeutic and back WNL today at 2.3. Instructed Ms. Ngo to continue recently increased dose of warfarin 5mg oral daily except for 2.5mg on MonWedFri until recheck (27.5mg/week)    ---If patient becomes supratherapeutic on 27.5mg/week may need to consider smaller tablet size 2.5mg to allow for smaller dose adjustment.   2. Repeat INR in one week, 8/25, as required by mdINR. Patient may need to recheck sooner given possible abx RX (see pt findings)  3. Verbal information provided over the phone. Marianela Ngo RBV dosing instructions, expresses understanding by teach back, and has no further questions at this time.    Saravanan Mccarthy, [facility]  8/18/2021  11:49 EDT     I, Fady You, PharmD, have reviewed the note in full and agree with the assessment and plan.  08/18/21  12:22 EDT

## 2021-08-23 ENCOUNTER — ANTICOAGULATION VISIT (OUTPATIENT)
Dept: PHARMACY | Facility: HOSPITAL | Age: 76
End: 2021-08-23

## 2021-08-23 DIAGNOSIS — I48.0 PAROXYSMAL ATRIAL FIBRILLATION (HCC): Primary | ICD-10-CM

## 2021-08-23 LAB — INR PPP: 2.2

## 2021-08-23 RX ORDER — CEFDINIR 300 MG/1
300 CAPSULE ORAL 2 TIMES DAILY
COMMUNITY
Start: 2021-08-18 | End: 2021-08-28

## 2021-08-23 NOTE — PROGRESS NOTES
Anticoagulation Clinic - Remote Progress Note  mdINR Home Monitor  Testing frequency: weekly    Indication: paroxysmal afib  Referring Provider: Susan  Initial Warfarin Start Date: 2020? 2019?  Goal INR: 2.0-3.0  Current Drug Interactions:   [id number]: 4 (Age ?74, Sex, HTN) [estimated 5/20/21]  Bleed Risk: self reports negative history for GI bleed  Other: DOAC too expensive    Diet: green beans, peas, zucchini, or priyanka salads 2-3x/week (8/18/21)  Alcohol: none  Tobacco: none  OTC Pain Medication: APAP only    INR History:  Date 5/14 5/18 5/25 6/1 6/15 7/6 7/12 7/22 7/28 8/4 8/11 8/18 8/23    Total WeeklyDose  22.5mg 25mg 25mg 25mg 25mg 25mg 25mg 25mg 25mg 25mg 27.5mg 27.5mg    INR 3.5 1.9 2.3 2.3 2.8 2.7 2.1 2.2 1.9 2.0 1.8 2.3 2.2    Notes [facility] 1x hold; incr GLV     HM  incr GLV  no GLV incr GLV cefdinir      Phone Interview:  Tablet Strength: 5mg tablet  Patient Contact Info: [phone] (home) preferred; [phone] (cell)  Estimated OOP cost: [please send to registration if not already done]  Verbal Release Auth: signed 5/25/21 -- May speak w/ Femi Ngo, ; May leave voicemail on home phone  Lab Contact Info: [facility]    Patient Findings  Positives:  Change in medications, Change in diet/appetite   Negatives:  Signs/symptoms of thrombosis, Signs/symptoms of bleeding, Laboratory test error suspected, Change in health, Change in alcohol use, Change in activity, Upcoming invasive procedure, Emergency department visit, Upcoming dental procedure, Missed doses, Extra doses, Hospital admission, Bruising, Other complaints   Comments:  Patient started taking Cefdinir 300mg BID x10 days on Wed, 8/18. Should be due to take last dose or about Sat, 8/28. Reports her diet/appetite has been decreased since taking abx but is making herself keep with usual diet / GLV intake. Otherwise denies findings.        Plan:  1. INR is therapeutic today at 2.2 with recent abx init. Instructed Ms.  Huber to continue warfarin 5mg oral daily except for 2.5mg on MonWedFri until recheck (27.5mg/week)  2. Repeat INR on Fri, 8/27, to ensure WNL with recent abx init. Patient prefers to check INR on Wednesdays  3. Verbal information provided over the phone. Marianela Huber RBV dosing instructions, expresses understanding by teach back, and has no further questions at this time.    Saravanan Mccarthy CPhT  8/23/2021  10:36 EDT     I, Fady You, PharmD, have reviewed the note in full and agree with the assessment and plan.  08/23/21  14:47 EDT

## 2021-08-27 ENCOUNTER — ANTICOAGULATION VISIT (OUTPATIENT)
Dept: PHARMACY | Facility: HOSPITAL | Age: 76
End: 2021-08-27

## 2021-08-27 DIAGNOSIS — I48.0 PAROXYSMAL ATRIAL FIBRILLATION (HCC): Primary | ICD-10-CM

## 2021-08-27 LAB — INR PPP: 1.8

## 2021-08-27 NOTE — PROGRESS NOTES
Anticoagulation Clinic - Remote Progress Note  mdINR Home Monitor  Testing frequency: weekly    Indication: paroxysmal afib  Referring Provider: Susan  Initial Warfarin Start Date: 2020? 2019?  Goal INR: 2.0-3.0  Current Drug Interactions:   [id number]: 4 (Age ?74, Sex, HTN) [estimated 5/20/21]  Bleed Risk: self reports negative history for GI bleed  Other: DOAC too expensive    Diet: green beans, peas, zucchini, or priyanka salads 2-3x/week (8/18/21)  Alcohol: none  Tobacco: none  OTC Pain Medication: APAP only    INR History:  Date 5/14 5/18 5/25 6/1 6/15 7/6 7/12 7/22 7/28 8/4 8/11 8/18 8/23 8/27   Total WeeklyDose  22.5mg 25mg 25mg 25mg 25mg 25mg 25mg 25mg 25mg 25mg 27.5mg 27.5mg 27.5mg   INR 3.5 1.9 2.3 2.3 2.8 2.7 2.1 2.2 1.9 2.0 1.8 2.3 2.2 1.8   Notes [place] Cards 1x hold; incr GLV     HM  incr GLV  no GLV incr GLV cefdinir cefdinir     Date               Total WeeklyDose 27.5mg              INR               Notes                   Phone Interview:  Tablet Strength: 5mg tablet  Patient Contact Info: [phone] (home) preferred; [phone] (cell)  Estimated OOP cost: [please send to registration if not already done]  Verbal Release Auth: signed 5/25/21 -- May speak w/ Femi Ngo, ; May leave voicemail on home phone  Lab Contact Info: [facility]    Patient Findings  Negatives:  Signs/symptoms of thrombosis, Signs/symptoms of bleeding, Laboratory test error suspected, Change in health, Change in alcohol use, Change in activity, Upcoming invasive procedure, Emergency department visit, Upcoming dental procedure, Missed doses, Extra doses, Change in medications, Change in diet/appetite, Hospital admission, Bruising, Other complaints   Comments:  Patient continue to take cefdinir as discussed in previous encounter. Confirms she will take last dose tomorrow. Otherwise feels she has been consistent with GLV intake and denies other findings.       Plan:  1. INR is SUBtherapeutic today at  1.8. After consulting with Fady You, PharmD, instructed Ms. Ngo to continue warfarin 5mg oral daily except for 2.5mg on MonWedFri until recheck (27.5mg/week)  2. Repeat INR on Wed, 9/1, to enure WNL Patient prefers to check INR on Wednesdays  3. Verbal information provided over the phone. Marianela Ngo RBV dosing instructions, expresses understanding by teach back, and has no further questions at this time.    Saravanan Mccarthy CPhT  8/27/2021  13:33 EDT     I, Fady You, PharmD, have reviewed the note in full and agree with the assessment and plan.  08/27/21  16:12 EDT

## 2021-08-28 DIAGNOSIS — I10 ESSENTIAL HYPERTENSION: Primary | ICD-10-CM

## 2021-08-30 RX ORDER — AMLODIPINE BESYLATE 5 MG/1
TABLET ORAL
Qty: 90 TABLET | Refills: 1 | Status: SHIPPED | OUTPATIENT
Start: 2021-08-30 | End: 2022-01-21 | Stop reason: SDUPTHER

## 2021-09-01 ENCOUNTER — ANTICOAGULATION VISIT (OUTPATIENT)
Dept: PHARMACY | Facility: HOSPITAL | Age: 76
End: 2021-09-01

## 2021-09-01 DIAGNOSIS — I48.0 PAROXYSMAL ATRIAL FIBRILLATION (HCC): Primary | ICD-10-CM

## 2021-09-01 LAB — INR PPP: 2.1

## 2021-09-01 NOTE — PROGRESS NOTES
Anticoagulation Clinic - Remote Progress Note  mdINR Home Monitor  Testing frequency: weekly    Indication: paroxysmal afib  Referring Provider: Susan  Initial Warfarin Start Date: 2020? 2019?  Goal INR: 2.0-3.0  Current Drug Interactions:   [id number]: 4 (Age ?74, Sex, HTN) [estimated 5/20/21]  Bleed Risk: self reports negative history for GI bleed  Other: DOAC too expensive    Diet: green beans, peas, zucchini, or priyanka salads 2-3x/week (8/18/21)  Alcohol: none  Tobacco: none  OTC Pain Medication: APAP only    INR History:  Date 5/14 5/18 5/25 6/1 6/15 7/6 7/12 7/22 7/28 8/4 8/11 8/18 8/23 8/27   Total WeeklyDose  22.5mg 25mg 25mg 25mg 25mg 25mg 25mg 25mg 25mg 25mg 27.5mg 27.5mg 27.5mg   INR 3.5 1.9 2.3 2.3 2.8 2.7 2.1 2.2 1.9 2.0 1.8 2.3 2.2 1.8   Notes [place] Cards 1x hold; incr GLV     HM  incr GLV  no GLV incr GLV cefdinir cefdinir     Date 9/1              Total WeeklyDose 27.5mg              INR  2.1              Notes  cefdinir                  Phone Interview:  Tablet Strength: 5mg tablet  Patient Contact Info: [phone] (home) preferred; [phone] (cell)  Estimated OOP cost: [please send to registration if not already done]  Verbal Release Auth: signed 5/25/21 -- May speak w/ Femi Ngo, ; May leave voicemail on home phone  Lab Contact Info: [facility]    Patient Findings    Negatives:  Signs/symptoms of thrombosis, Signs/symptoms of bleeding, Laboratory test error suspected, Change in health, Change in alcohol use, Change in activity, Upcoming invasive procedure, Emergency department visit, Upcoming dental procedure, Missed doses, Extra doses, Change in medications, Change in diet/appetite, Hospital admission, Bruising, Other complaints   Comments:  She took her last dose of cefdinir on Saturday morning.   She stated that she bled quite a bid after checking her INR today.   She has been recording her intake of foods that contain vitamin K and working on being consistent.    Otherwise, above findings negative     Plan:  1. INR is therapeutic today at 2.1. Instructed Ms. Ngo to continue warfarin 5mg oral daily except for 2.5mg on MonWedFri until recheck (27.5mg/week)  2. Repeat INR on Wed, 9/8, to enure WNL Patient prefers to check INR on Wednesdays  3. Verbal information provided over the phone. Marianela Ngo RBV dosing instructions, expresses understanding by teach back, and has no further questions at this time.    Radha Orlando, PharmD  9/1/2021  12:20 EDT

## 2021-09-08 ENCOUNTER — ANTICOAGULATION VISIT (OUTPATIENT)
Dept: PHARMACY | Facility: HOSPITAL | Age: 76
End: 2021-09-08

## 2021-09-08 DIAGNOSIS — I48.0 PAROXYSMAL ATRIAL FIBRILLATION (HCC): Primary | ICD-10-CM

## 2021-09-08 LAB — INR PPP: 2.3

## 2021-09-08 NOTE — PROGRESS NOTES
Anticoagulation Clinic - Remote Progress Note  mdINR Home Monitor  Testing frequency: weekly    Indication: paroxysmal afib  Referring Provider: Susan  Initial Warfarin Start Date: 2020? 2019?  Goal INR: 2.0-3.0  Current Drug Interactions:   [id number]: 4 (Age ?74, Sex, HTN) [estimated 5/20/21]  Bleed Risk: self reports negative history for GI bleed  Other: DOAC too expensive    Diet: green beans, peas, zucchini, or priyanka salads 2-3x/week (8/18/21)  Alcohol: none  Tobacco: none  OTC Pain Medication: APAP only    INR History:  Date 5/14 5/18 5/25 6/1 6/15 7/6 7/12 7/22 7/28 8/4 8/11 8/18 8/23 8/27   Total WeeklyDose  22.5mg 25mg 25mg 25mg 25mg 25mg 25mg 25mg 25mg 25mg 27.5mg 27.5mg 27.5mg   INR 3.5 1.9 2.3 2.3 2.8 2.7 2.1 2.2 1.9 2.0 1.8 2.3 2.2 1.8   Notes [place] Cards 1x hold; incr GLV     HM  incr GLV  no GLV incr GLV cefdinir cefdinir     Date 9/1 9/8             Total WeeklyDose 27.5mg              INR 2.1 2.3             Notes cefdinir                  Phone Interview:  Tablet Strength: 5mg tablet  Patient Contact Info: [phone] (home) preferred; [phone] (cell)  Estimated OOP cost: [please send to registration if not already done]  Verbal Release Auth: signed 5/25/21 -- May speak w/ Femi Ngo, ; May leave voicemail on home phone  Lab Contact Info: [facility]    Patient Findings  Negatives:  Signs/symptoms of thrombosis, Signs/symptoms of bleeding, Laboratory test error suspected, Change in health, Change in alcohol use, Change in activity, Upcoming invasive procedure, Emergency department visit, Upcoming dental procedure, Missed doses, Extra doses, Change in medications, Change in diet/appetite, Hospital admission, Bruising, Other complaints     Plan:  1. INR is therapeutic today at 2.3. Instructed Ms. Ngo to continue warfarin 5mg oral daily except for 2.5mg on MonWedFri until recheck (27.5mg/week)  2. Repeat INR in 1 week, 9/15. Patient prefers to check INR on  Wednesdays  3. Verbal information provided over the phone. Marianela Ngo RBV dosing instructions, expresses understanding by teach back, and has no further questions at this time.    Saravanan Mccarthy, [facility]  9/8/2021  13:55 EDT      I, Divya Jaffe, PharmD, have reviewed the note in full and agree with the assessment and plan.  09/08/21  14:37 EDT

## 2021-09-15 ENCOUNTER — ANTICOAGULATION VISIT (OUTPATIENT)
Dept: PHARMACY | Facility: HOSPITAL | Age: 76
End: 2021-09-15

## 2021-09-15 DIAGNOSIS — I48.0 PAROXYSMAL ATRIAL FIBRILLATION (HCC): Primary | ICD-10-CM

## 2021-09-15 LAB — INR PPP: 3.3

## 2021-09-15 NOTE — PROGRESS NOTES
Anticoagulation Clinic - Remote Progress Note  mdINR Home Monitor  Testing frequency: weekly    Indication: paroxysmal afib  Referring Provider: Susan  Initial Warfarin Start Date: 2020? 2019?  Goal INR: 2.0-3.0  Current Drug Interactions:   [id number]: 4 (Age ?74, Sex, HTN) [estimated 5/20/21]  Bleed Risk: self reports negative history for GI bleed  Other: DOAC too expensive    Diet: green beans, peas, zucchini, or priyanka salads 2-3x/week (8/18/21)  Alcohol: none  Tobacco: none  OTC Pain Medication: APAP only    INR History:  Date 5/14 5/18 5/25 6/1 6/15 7/6 7/12 7/22 7/28 8/4 8/11 8/18 8/23 8/27   Total WeeklyDose  22.5mg 25mg 25mg 25mg 25mg 25mg 25mg 25mg 25mg 25mg 27.5mg 27.5mg 27.5mg   INR 3.5 1.9 2.3 2.3 2.8 2.7 2.1 2.2 1.9 2.0 1.8 2.3 2.2 1.8   Notes [facility] 1x hold; incr GLV     HM  incr GLV  no GLV incr GLV cefdinir cefdinir     Date 9/1 9/8 9/15            Total WeeklyDose 27.5mg 27.5mg 27.5mg            INR 2.1 2.3 3.3            Notes cefdinir  decr GLV?                Phone Interview:  Tablet Strength: 5mg tablet  Patient Contact Info: [phone] (home) preferred; [phone] (cell)  Estimated OOP cost: [please send to registration if not already done]  Verbal Release Auth: signed 5/25/21 -- May speak w/ Femi Ngo, ; May leave voicemail on home phone  Lab Contact Info: [facility]    Patient Findings  Positives:  Change in diet/appetite   Negatives:  Signs/symptoms of thrombosis, Signs/symptoms of bleeding, Laboratory test error suspected, Change in health, Change in alcohol use, Change in activity, Upcoming invasive procedure, Emergency department visit, Upcoming dental procedure, Missed doses, Extra doses, Change in medications, Hospital admission, Bruising, Other complaints   Comments:  Patient reports she might have had decrease in GLV intake. She reports only having 1x serving of GLV this week (peas on Sunday) when usual patient averages 2x servings per week. She  is agreeable to have good serving of GLV tonight and then resume usual intake going forward. Otherwise denies findings.        Plan:  1. INR is SUPRAtherapeutic today at 3.3. Significant increase from previous encounter. As patient is due for lower dose of warfarin tonight, instructed Ms. Ngo to have good serving of GLV tonight and then continue warfarin 5mg oral daily except for 2.5mg on MonWedFri until recheck (27.5mg/week). Would consider 1x decreased dose tomorrow, but would be 9.1% decrease.   2. Repeat INR in 1 week, 9/22, to ensure WNL Patient prefers to check INR on Wednesdays  3. Verbal information provided over the phone. Marianela Ngo RBV dosing instructions, expresses understanding by teach back, and has no further questions at this time.    Saravanan Mccarthy, Daljit  9/15/2021  11:42 EDT       Usually @ LLN on this dose, would decrease if still elevated at next encounter  I, Divya Jaffe, PharmD, have reviewed the note in full and agree with the assessment and plan.  09/15/21  11:55 EDT

## 2021-09-22 ENCOUNTER — ANTICOAGULATION VISIT (OUTPATIENT)
Dept: PHARMACY | Facility: HOSPITAL | Age: 76
End: 2021-09-22

## 2021-09-22 DIAGNOSIS — I48.0 PAROXYSMAL ATRIAL FIBRILLATION (HCC): Primary | ICD-10-CM

## 2021-09-22 LAB — INR PPP: 2.3

## 2021-09-22 RX ORDER — CEPHALEXIN 500 MG/1
500 CAPSULE ORAL 3 TIMES DAILY
COMMUNITY
Start: 2021-09-25 | End: 2021-10-04

## 2021-09-22 RX ORDER — HYDROCORTISONE 1 %
SOLUTION, NON-ORAL TOPICAL
COMMUNITY
End: 2022-01-21

## 2021-09-22 NOTE — PROGRESS NOTES
Anticoagulation Clinic - Remote Progress Note  mdINR Home Monitor  Testing frequency: weekly    Indication: paroxysmal afib  Referring Provider: Susan  Initial Warfarin Start Date: 2020? 2019?  Goal INR: 2.0-3.0  Current Drug Interactions:   [id number]: 4 (Age ?74, Sex, HTN) [estimated 5/20/21]  Bleed Risk: self reports negative history for GI bleed  Other: DOAC too expensive    Diet: green beans, peas, zucchini, or priyanka salads 2-3x/week (8/18/21)  Alcohol: none  Tobacco: none  OTC Pain Medication: APAP only    INR History:  Date 5/14 5/18 5/25 6/1 6/15 7/6 7/12 7/22 7/28 8/4 8/11 8/18 8/23 8/27   Total WeeklyDose  22.5mg 25mg 25mg 25mg 25mg 25mg 25mg 25mg 25mg 25mg 27.5mg 27.5mg 27.5mg   INR 3.5 1.9 2.3 2.3 2.8 2.7 2.1 2.2 1.9 2.0 1.8 2.3 2.2 1.8   Notes [facility] Cards 1x hold; incr GLV     HM  incr GLV  no GLV incr GLV cefdinir cefdinir     Date 9/1 9/8 9/15 9/22           Total WeeklyDose 27.5mg 27.5mg 27.5mg 27.5mg 27.5mg          INR 2.1 2.3 3.3 2.3           Notes cefdinir  decr GLV?  keflex              Phone Interview:  Tablet Strength: 5mg tablet  Patient Contact Info: [phone] (home) preferred; [phone] (cell)  Estimated OOP cost: [please send to registration if not already done]  Verbal Release Auth: signed 5/25/21 -- May speak w/ Femi Ngo, ; May leave voicemail on home phone  Lab Contact Info: [facility]    Patient Findings  Positives:  Change in medications   Negatives:  Signs/symptoms of thrombosis, Signs/symptoms of bleeding, Laboratory test error suspected, Change in health, Change in alcohol use, Change in activity, Upcoming invasive procedure, Emergency department visit, Upcoming dental procedure, Missed doses, Extra doses, Change in diet/appetite, Hospital admission, Bruising, Other complaints   Comments:  Patient reports that on Sat, 9/25, she will start taking Keflex 500mg TID x10 days. Should be due to take last dose on or about 10/4. She also reports on  Saturday she should receive in mail / start using Salicylic Acid 3% shampoo for same reason as abx. Have discussed warfarin-cephalexin interaction and s/sx to be aware of. Otherwise feels she has been consistent with GLV intake and denies other findings.        Plan:  1. INR is therapeutic and back WNL today at 2.3. Instructed Ms. Ngo to be consistent with GLV intake and continue warfarin 5mg oral daily except for 2.5mg on MonWedFri until recheck (27.5mg/week)  2. Repeat INR in 1 week, 9/29, to ensure WNL with abx init starting Sat, 9/25  3. Verbal information provided over the phone. Marianela Ngo RBV dosing instructions, expresses understanding by teach back, and has no further questions at this time.    Saravanan Mccarthy, Daljit  9/22/2021  14:52 EDT      I, Divya Jaffe, PharmD, have reviewed the note in full and agree with the assessment and plan.  09/22/21  15:38 EDT

## 2021-09-29 ENCOUNTER — ANTICOAGULATION VISIT (OUTPATIENT)
Dept: PHARMACY | Facility: HOSPITAL | Age: 76
End: 2021-09-29

## 2021-09-29 DIAGNOSIS — I48.0 PAROXYSMAL ATRIAL FIBRILLATION (HCC): Primary | ICD-10-CM

## 2021-09-29 LAB — INR PPP: 2.2

## 2021-09-29 NOTE — PROGRESS NOTES
Anticoagulation Clinic - Remote Progress Note  mdINR Home Monitor  Testing frequency: weekly    Indication: paroxysmal afib  Referring Provider: Susan  Initial Warfarin Start Date: 2020? 2019?  Goal INR: 2.0-3.0  Current Drug Interactions:   [id number]: 4 (Age ?74, Sex, HTN) [estimated 5/20/21]  Bleed Risk: self reports negative history for GI bleed  Other: DOAC too expensive    Diet: green beans, peas, zucchini, or priyanka salads 2-3x/week (8/18/21)  Alcohol: none  Tobacco: none  OTC Pain Medication: APAP only    INR History:  Date 5/14 5/18 5/25 6/1 6/15 7/6 7/12 7/22 7/28 8/4 8/11 8/18 8/23 8/27   Total WeeklyDose  22.5mg 25mg 25mg 25mg 25mg 25mg 25mg 25mg 25mg 25mg 27.5mg 27.5mg 27.5mg   INR 3.5 1.9 2.3 2.3 2.8 2.7 2.1 2.2 1.9 2.0 1.8 2.3 2.2 1.8   Notes [place] Cards 1x hold; incr GLV     HM  incr GLV  no GLV incr GLV cefdinir cefdinir     Date 9/1 9/8 9/15 9/22 9/29          Total WeeklyDose 27.5mg 27.5mg 27.5mg 27.5mg 27.5mg          INR 2.1 2.3 3.3 2.3 2.2          Notes cefdinir  decr GLV?  keflex              Phone Interview:  Tablet Strength: 5mg tablet  Patient Contact Info: [phone] (home) preferred; [phone] (cell)  Estimated OOP cost: [please send to registration if not already done]  Verbal Release Auth: signed 5/25/21 -- May speak w/ Femi Ngo, ; May leave voicemail on home phone  Lab Contact Info: [facility]    Patient Findings  Negatives:  Signs/symptoms of thrombosis, Signs/symptoms of bleeding, Laboratory test error suspected, Change in health, Change in alcohol use, Change in activity, Upcoming invasive procedure, Emergency department visit, Upcoming dental procedure, Missed doses, Extra doses, Change in medications, Change in diet/appetite, Hospital admission, Bruising, Other complaints   Comments:  Patient continues to take Keflex as discussed in previous encounter. Should be due to take last dose on or about 10/4. Otherwise denies findings.      Plan:  1.  INR is therapeutic today at 2.2. Instructed Ms. Ngo to continue warfarin 5mg oral daily except for 2.5mg on MonWedFri until recheck (27.5mg/week)  2. Repeat INR in 1 week, 10/6  3. Verbal information provided over the phone. Marianela Ngo RBV dosing instructions, expresses understanding by teach back, and has no further questions at this time.    Saravanan Mccarthy CPhT  9/29/2021  11:21 EDT    I, Sarahi Contreras, PharmD, have reviewed the note in full and agree with the assessment and plan.  09/29/21  12:02 EDT

## 2021-10-06 ENCOUNTER — ANTICOAGULATION VISIT (OUTPATIENT)
Dept: PHARMACY | Facility: HOSPITAL | Age: 76
End: 2021-10-06

## 2021-10-06 DIAGNOSIS — I48.0 PAROXYSMAL ATRIAL FIBRILLATION (HCC): Primary | ICD-10-CM

## 2021-10-06 LAB — INR PPP: 2

## 2021-10-06 NOTE — PROGRESS NOTES
Anticoagulation Clinic - Remote Progress Note  mdINR Home Monitor  Testing frequency: weekly    Indication: paroxysmal afib  Referring Provider: Susan  Initial Warfarin Start Date: 2020? 2019?  Goal INR: 2.0-3.0  Current Drug Interactions:   [id number]: 4 (Age ?74, Sex, HTN) [estimated 5/20/21]  Bleed Risk: self reports negative history for GI bleed  Other: DOAC too expensive    Diet: green beans, peas, zucchini, or priyanka salads 2-3x/week (8/18/21)  Alcohol: none  Tobacco: none  OTC Pain Medication: APAP only    INR History:  Date 5/14 5/18 5/25 6/1 6/15 7/6 7/12 7/22 7/28 8/4 8/11 8/18 8/23 8/27   Total WeeklyDose  22.5mg 25mg 25mg 25mg 25mg 25mg 25mg 25mg 25mg 25mg 27.5mg 27.5mg 27.5mg   INR 3.5 1.9 2.3 2.3 2.8 2.7 2.1 2.2 1.9 2.0 1.8 2.3 2.2 1.8   Notes [place] Cards 1x hold; incr GLV     HM  incr GLV  no GLV incr GLV cefdinir cefdinir     Date 9/1 9/8 9/15 9/22 9/29 10/6         Total WeeklyDose 27.5mg 27.5mg 27.5mg 27.5mg 27.5mg 27.5mg         INR 2.1 2.3 3.3 2.3 2.2 2.0         Notes cefdinir  decr GLV?  keflex              Phone Interview:  Tablet Strength: 5mg tablet  Patient Contact Info: [phone] (home) preferred; [phone] (cell)  Estimated OOP cost: [please send to registration if not already done]  Verbal Release Auth: signed 5/25/21 -- May speak w/ Femi Ngo, ; May leave voicemail on home phone  Lab Contact Info: [facility]    Patient Findings  Negatives:  Signs/symptoms of thrombosis, Signs/symptoms of bleeding, Laboratory test error suspected, Change in health, Change in alcohol use, Change in activity, Upcoming invasive procedure, Emergency department visit, Upcoming dental procedure, Missed doses, Extra doses, Change in medications, Change in diet/appetite, Hospital admission, Bruising, Other complaints   Comments:  Patient confirms she has finished keflex as discussed in previous encounter. Otherwise denies findings.      Plan:  1. INR is therapeutic today at  2.0, though this is LLN. Instructed Ms. Ngo to continue warfarin 5mg oral daily except for 2.5mg on MonWedFri until recheck (27.5mg/week)  2. Repeat INR in 1 week, 10/13  3. Verbal information provided over the phone. Marianela Ngo RBV dosing instructions, expresses understanding by teach back, and has no further questions at this time.    Saravanan Mccarthy CPhT  10/6/2021  10:38 EDT     I, Jean Paul Chatman, PharmD, have reviewed the note in full and agree with the assessment and plan.  10/06/21  14:36 EDT

## 2021-10-13 ENCOUNTER — ANTICOAGULATION VISIT (OUTPATIENT)
Dept: PHARMACY | Facility: HOSPITAL | Age: 76
End: 2021-10-13

## 2021-10-13 DIAGNOSIS — I48.0 PAROXYSMAL ATRIAL FIBRILLATION (HCC): Primary | ICD-10-CM

## 2021-10-13 LAB — INR PPP: 1.9

## 2021-10-13 NOTE — PROGRESS NOTES
Anticoagulation Clinic - Remote Progress Note  mdINR Home Monitor  Testing frequency: weekly    Indication: paroxysmal afib  Referring Provider: Susan  Initial Warfarin Start Date: 2020? 2019?  Goal INR: 2.0-3.0  Current Drug Interactions:   [id number]: 4 (Age ?74, Sex, HTN) [estimated 5/20/21]  Bleed Risk: self reports negative history for GI bleed  Other: DOAC too expensive    Diet: green beans, peas, zucchini, or priyanka salads 2-3x/week (8/18/21)  Alcohol: none  Tobacco: none  OTC Pain Medication: APAP only    INR History:  Date 5/14 5/18 5/25 6/1 6/15 7/6 7/12 7/22 7/28 8/4 8/11 8/18 8/23 8/27   Total WeeklyDose  22.5mg 25mg 25mg 25mg 25mg 25mg 25mg 25mg 25mg 25mg 27.5mg 27.5mg 27.5mg   INR 3.5 1.9 2.3 2.3 2.8 2.7 2.1 2.2 1.9 2.0 1.8 2.3 2.2 1.8   Notes [place] Cards 1x hold; incr GLV     HM  incr GLV  no GLV incr GLV cefdinir cefdinir     Date 9/1 9/8 9/15 9/22 9/29 10/6 10/13        Total WeeklyDose 27.5mg 27.5mg 27.5mg 27.5mg 27.5mg 27.5mg 27.5mg 30mg       INR 2.1 2.3 3.3 2.3 2.2 2.0 1.9        Notes cefdinir  decr GLV?  keflex   1x incr dose           Phone Interview:  Tablet Strength: 5mg tablet  Patient Contact Info: [phone] (home) preferred; [phone] (cell)  Estimated OOP cost: [please send to registration if not already done]  Verbal Release Auth: signed 5/25/21 -- May speak w/ Femi Ngo, ; May leave voicemail on home phone  Lab Contact Info: [facility]    Patient Findings  Negatives:  Signs/symptoms of thrombosis, Signs/symptoms of bleeding, Laboratory test error suspected, Change in health, Change in alcohol use, Change in activity, Upcoming invasive procedure, Emergency department visit, Upcoming dental procedure, Missed doses, Extra doses, Change in medications, Change in diet/appetite, Hospital admission, Bruising, Other complaints     Plan:  1. INR is SUBtherapeutic today at 1.9. INR had been trending downwards since 9/15/21 encounter. Instructed Ms. Ngo to  INCREASE tonight's dose to 5mg and then continue warfarin 5mg oral daily except for 2.5mg on MonWedFri until recheck   2. Repeat INR in 1 week, 10/20, to ensure WNL  3. Verbal information provided over the phone. Marianela Ngo RBV dosing instructions, expresses understanding by teach back, and has no further questions at this time.    Saravanan Mccarthy CPhT  10/13/2021  16:21 EDT       I, Divya Jaffe, PharmD, have reviewed the note in full and agree with the assessment and plan.  10/14/21  08:52 EDT

## 2021-10-20 ENCOUNTER — ANTICOAGULATION VISIT (OUTPATIENT)
Dept: PHARMACY | Facility: HOSPITAL | Age: 76
End: 2021-10-20

## 2021-10-20 DIAGNOSIS — I48.0 PAROXYSMAL ATRIAL FIBRILLATION (HCC): Primary | ICD-10-CM

## 2021-10-20 LAB — INR PPP: 2.6

## 2021-10-20 NOTE — PROGRESS NOTES
Anticoagulation Clinic - Remote Progress Note  mdINR Home Monitor  Testing frequency: weekly    Indication: paroxysmal afib  Referring Provider: Susan  Initial Warfarin Start Date: 2020? 2019?  Goal INR: 2.0-3.0  Current Drug Interactions:   [id number]: 4 (Age ?74, Sex, HTN) [estimated 5/20/21]  Bleed Risk: self reports negative history for GI bleed  Other: DOAC too expensive    Diet: green beans, peas, zucchini, or priyanka salads 2-3x/week (8/18/21)  Alcohol: none  Tobacco: none  OTC Pain Medication: APAP only    INR History:  Date 5/14 5/18 5/25 6/1 6/15 7/6 7/12 7/22 7/28 8/4 8/11 8/18 8/23 8/27   Total WeeklyDose  22.5mg 25mg 25mg 25mg 25mg 25mg 25mg 25mg 25mg 25mg 27.5mg 27.5mg 27.5mg   INR 3.5 1.9 2.3 2.3 2.8 2.7 2.1 2.2 1.9 2.0 1.8 2.3 2.2 1.8   Notes [place] Cards 1x hold; incr GLV     HM  incr GLV  no GLV incr GLV cefdinir cefdinir     Date 9/1 9/8 9/15 9/22 9/29 10/6 10/13 10/20       Total WeeklyDose 27.5mg 27.5mg 27.5mg 27.5mg 27.5mg 27.5mg 27.5mg 30mg 27.5mg      INR 2.1 2.3 3.3 2.3 2.2 2.0 1.9 2.6       Notes cefdinir  decr GLV?  keflex   1x incr dose           Phone Interview:  Tablet Strength: 5mg tablet  Patient Contact Info: [phone] (home) preferred; [phone] (cell)  Estimated OOP cost: [please send to registration if not already done]  Verbal Release Auth: signed 5/25/21 -- May speak w/ Femi Ngo, ; May leave voicemail on home phone  Lab Contact Info: [facility]    Patient Findings  Negatives:  Signs/symptoms of thrombosis, Signs/symptoms of bleeding, Laboratory test error suspected, Change in health, Change in alcohol use, Change in activity, Upcoming invasive procedure, Emergency department visit, Upcoming dental procedure, Missed doses, Extra doses, Change in medications, Change in diet/appetite, Hospital admission, Bruising, Other complaints     Plan:  1. INR is therapeutic and back WNL today at 2.6. Instructed Ms. Ngo to continue warfarin 5mg oral daily  except for 2.5mg on MonWedFri until recheck   2. Repeat INR in 1 week, 10/27  3. Verbal information provided over the phone. Marianela Ngo RBV dosing instructions, expresses understanding by teach back, and has no further questions at this time.    Saravanan Mccarthy, Daljit  10/20/2021  13:41 EDT     I, Fady You, PharmD, have reviewed the note in full and agree with the assessment and plan.  10/20/21  14:20 EDT

## 2021-10-27 ENCOUNTER — ANTICOAGULATION VISIT (OUTPATIENT)
Dept: PHARMACY | Facility: HOSPITAL | Age: 76
End: 2021-10-27

## 2021-10-27 DIAGNOSIS — I48.0 PAROXYSMAL ATRIAL FIBRILLATION (HCC): Primary | ICD-10-CM

## 2021-10-27 LAB — INR PPP: 3.2

## 2021-10-27 NOTE — PROGRESS NOTES
Anticoagulation Clinic - Remote Progress Note  mdINR Home Monitor  Testing frequency: weekly    Indication: paroxysmal afib  Referring Provider: Susan  Initial Warfarin Start Date: 2020? 2019?  Goal INR: 2.0-3.0  Current Drug Interactions:   [id number]: 4 (Age ?74, Sex, HTN) [estimated 5/20/21]  Bleed Risk: self reports negative history for GI bleed  Other: DOAC too expensive    Diet: green beans, peas, zucchini, or priyanka salads 2-3x/week (8/18/21)  Alcohol: none  Tobacco: none  OTC Pain Medication: APAP only    INR History:  Date 5/14 5/18 5/25 6/1 6/15 7/6 7/12 7/22 7/28 8/4 8/11 8/18 8/23 8/27   Total WeeklyDose  22.5mg 25mg 25mg 25mg 25mg 25mg 25mg 25mg 25mg 25mg 27.5mg 27.5mg 27.5mg   INR 3.5 1.9 2.3 2.3 2.8 2.7 2.1 2.2 1.9 2.0 1.8 2.3 2.2 1.8   Notes [place] Cards 1x hold; incr GLV     HM  incr GLV  no GLV incr GLV cefdinir cefdinir     Date 9/1 9/8 9/15 9/22 9/29 10/6 10/13 10/20 10/27        Total WeeklyDose 27.5mg 27.5mg 27.5mg 27.5mg 27.5mg 27.5mg 27.5mg 30mg 27.5mg        INR 2.1 2.3 3.3 2.3 2.2 2.0 1.9 2.6 3.2        Notes cefdinir  decr GLV?  keflex   1x incr dose             Phone Interview:  Tablet Strength: 5mg tablet  Patient Contact Info: [phone] (home) preferred; [phone] (cell)  Estimated OOP cost: [please send to registration if not already done]  Verbal Release Auth: signed 5/25/21 -- May speak w/ Femi Ngo, ; May leave voicemail on home phone  Lab Contact Info: [facility]    Patient Findings  Negatives:  Signs/symptoms of thrombosis, Signs/symptoms of bleeding, Laboratory test error suspected, Change in health, Change in alcohol use, Change in activity, Upcoming invasive procedure, Emergency department visit, Upcoming dental procedure, Missed doses, Extra doses, Change in medications, Change in diet/appetite, Hospital admission, Bruising, Other complaints   Comments:  Patient denies all findings; feels she has been consistent with GLV intake. She is  agreeable to have good serving of green beans or peas tonight.       Plan:  1. INR is SUPRAtherapeutic today at 3.2. As patient is due for lower dose of warfarin tonight, instructed Ms. Ngo to have a good serving of GLV tonight and then continue warfarin 5mg oral daily except for 2.5mg on MonWedFri until recheck   2. Repeat INR in 1 week, 11/3  3. Verbal information provided over the phone. Marianela Ngo RBV dosing instructions, expresses understanding by teach back, and has no further questions at this time.    Saravanan Mccarthy, Daljit  10/27/2021  14:18 EDT     I, Divya Jaffe, PharmD, have reviewed the note in full and agree with the assessment and plan.  10/27/21  16:15 EDT

## 2021-11-01 RX ORDER — LISINOPRIL AND HYDROCHLOROTHIAZIDE 20; 12.5 MG/1; MG/1
TABLET ORAL
Qty: 90 TABLET | Refills: 0 | Status: SHIPPED | OUTPATIENT
Start: 2021-11-01 | End: 2022-01-21 | Stop reason: SDUPTHER

## 2021-11-01 RX ORDER — ATORVASTATIN CALCIUM 20 MG/1
TABLET, FILM COATED ORAL
Qty: 90 TABLET | Refills: 0 | Status: SHIPPED | OUTPATIENT
Start: 2021-11-01 | End: 2022-01-21 | Stop reason: SDUPTHER

## 2021-11-03 ENCOUNTER — ANTICOAGULATION VISIT (OUTPATIENT)
Dept: PHARMACY | Facility: HOSPITAL | Age: 76
End: 2021-11-03

## 2021-11-03 DIAGNOSIS — I48.0 PAROXYSMAL ATRIAL FIBRILLATION (HCC): Primary | ICD-10-CM

## 2021-11-03 LAB — INR PPP: 2.4

## 2021-11-03 NOTE — PROGRESS NOTES
Anticoagulation Clinic - Remote Progress Note  mdINR Home Monitor  Testing frequency: weekly    Indication: paroxysmal afib  Referring Provider: Susan  Initial Warfarin Start Date: 2020? 2019?  Goal INR: 2.0-3.0  Current Drug Interactions:   [id number]: 4 (Age ?74, Sex, HTN) [estimated 5/20/21]  Bleed Risk: self reports negative history for GI bleed  Other: DOAC too expensive    Diet: green beans, peas, zucchini, or priyanka salads 2-3x/week (8/18/21)  Alcohol: none  Tobacco: none  OTC Pain Medication: APAP only    INR History:  Date 5/14 5/18 5/25 6/1 6/15 7/6 7/12 7/22 7/28 8/4 8/11 8/18 8/23 8/27   Total WeeklyDose  22.5mg 25mg 25mg 25mg 25mg 25mg 25mg 25mg 25mg 25mg 27.5mg 27.5mg 27.5mg   INR 3.5 1.9 2.3 2.3 2.8 2.7 2.1 2.2 1.9 2.0 1.8 2.3 2.2 1.8   Notes [place] Cards 1x hold; incr GLV     HM  incr GLV  no GLV incr GLV cefdinir cefdinir     Date 9/1 9/8 9/15 9/22 9/29 10/6 10/13 10/20 10/27 11/3       Total WeeklyDose 27.5mg 27.5mg 27.5mg 27.5mg 27.5mg 27.5mg 27.5mg 30mg 27.5mg 27.5mg       INR 2.1 2.3 3.3 2.3 2.2 2.0 1.9 2.6 3.2 2.4       Notes cefdinir  decr GLV?  keflex   1x incr dose             Phone Interview:  Tablet Strength: 5mg tablet  Patient Contact Info: [phone] (home) preferred; [phone] (cell)  Estimated OOP cost: [please send to registration if not already done]  Verbal Release Auth: signed 5/25/21 -- May speak w/ Femi Ngo, ; May leave voicemail on home phone  Lab Contact Info: [facility]    Patient Findings  Negatives:  Signs/symptoms of thrombosis, Signs/symptoms of bleeding, Laboratory test error suspected, Change in health, Change in alcohol use, Change in activity, Upcoming invasive procedure, Emergency department visit, Upcoming dental procedure, Missed doses, Extra doses, Change in medications, Change in diet/appetite, Hospital admission, Bruising, Other complaints     Plan:  1. INR is therapeutic and back WNL today at 2.4. Instructed Ms. Ngo to  continue warfarin 5mg oral daily except for 2.5mg on MonWedFri until recheck   2. Repeat INR in 1 week, 11/10  3. Verbal information provided over the phone. Marianela Ngo RBV dosing instructions, expresses understanding by teach back, and has no further questions at this time.    Saravanan Mccarthy, Daljit  11/3/2021  14:08 EDT    I, Pablo Taylor, PharmD, have reviewed the note in full and agree with the assessment and plan.  11/03/21  14:31 EDT

## 2021-11-10 ENCOUNTER — ANTICOAGULATION VISIT (OUTPATIENT)
Dept: PHARMACY | Facility: HOSPITAL | Age: 76
End: 2021-11-10

## 2021-11-10 DIAGNOSIS — I48.0 PAROXYSMAL ATRIAL FIBRILLATION (HCC): Primary | ICD-10-CM

## 2021-11-10 LAB — INR PPP: 3.4

## 2021-11-10 NOTE — PROGRESS NOTES
Anticoagulation Clinic - Remote Progress Note  mdINR Home Monitor  Testing frequency: weekly    Indication: paroxysmal afib  Referring Provider: Susan  Initial Warfarin Start Date: 2020? 2019?  Goal INR: 2.0-3.0  Current Drug Interactions:   [id number]: 4 (Age ?74, Sex, HTN) [estimated 5/20/21]  Bleed Risk: self reports negative history for GI bleed  Other: DOAC too expensive    Diet: green beans, peas, zucchini, or priyanka salads 2-3x/week (8/18/21)  Alcohol: none  Tobacco: none  OTC Pain Medication: APAP only    INR History:  Date 5/14 5/18 5/25 6/1 6/15 7/6 7/12 7/22 7/28 8/4 8/11 8/18 8/23 8/27   Total WeeklyDose  22.5mg 25mg 25mg 25mg 25mg 25mg 25mg 25mg 25mg 25mg 27.5mg 27.5mg 27.5mg   INR 3.5 1.9 2.3 2.3 2.8 2.7 2.1 2.2 1.9 2.0 1.8 2.3 2.2 1.8   Notes [facility] Cards 1x hold; incr GLV     HM  incr GLV  no GLV incr GLV cefdinir cefdinir     Date 9/1 9/8 9/15 9/22 9/29 10/6 10/13 10/20 10/27 11/3 11/10      Total WeeklyDose 27.5mg 27.5mg 27.5mg 27.5mg 27.5mg 27.5mg 27.5mg 30mg 27.5mg 27.5mg 27.5mg      INR 2.1 2.3 3.3 2.3 2.2 2.0 1.9 2.6 3.2 2.4 3.4      Notes cefdinir  decr GLV?  keflex   1x incr dose   Dec GLV?          Phone Interview:  Tablet Strength: 5mg tablet  Patient Contact Info: [phone] (home) preferred; [phone] (cell)  Estimated OOP cost: [please send to registration if not already done]  Verbal Release Auth: signed 5/25/21 -- May speak w/ Femi Ngo, ; May leave voicemail on home phone  Lab Contact Info: [facility]      Patient Findings    Positives:  Change in diet/appetite   Negatives:  Signs/symptoms of thrombosis, Signs/symptoms of bleeding, Laboratory test error suspected, Change in health, Change in alcohol use, Change in activity, Upcoming invasive procedure, Emergency department visit, Upcoming dental procedure, Missed doses, Extra doses, Change in medications, Hospital admission, Bruising, Other complaints   Comments:  Patient says she eats her GLV on Wed,  Sat, and Mon. Says she wasn't at home for a couple of those days and may have at slightly less GLV this week compared to normal. No bleeding/bruising noted and no changes in any medications.        Plan:  1. INR is SUPRAtherapeutic today at 3.4. Instructed Ms. Ngo to decrease dose to warfarin 5mg oral daily except for 2.5mg on MonWedFri and Saturday until recheck in 1 week where we can determine if dose adjustment should be continued.   2. Repeat INR in 1 week, 11/17  3. Verbal information provided over the phone. Marianela Ngo RBV dosing instructions, expresses understanding by teach back, and has no further questions at this time.    Theo Mccann,  PharmD Candidate 2022  11/10/21  14:18 JOSE ANTONIO HAQUE, Divya Jaffe, PharmD, have reviewed the note in full and agree with the assessment and plan.  11/11/21  09:22 EST

## 2021-11-17 ENCOUNTER — ANTICOAGULATION VISIT (OUTPATIENT)
Dept: PHARMACY | Facility: HOSPITAL | Age: 76
End: 2021-11-17

## 2021-11-17 ENCOUNTER — TELEPHONE (OUTPATIENT)
Dept: CARDIOLOGY | Facility: CLINIC | Age: 76
End: 2021-11-17

## 2021-11-17 DIAGNOSIS — I48.0 PAROXYSMAL ATRIAL FIBRILLATION (HCC): ICD-10-CM

## 2021-11-17 DIAGNOSIS — I48.0 PAROXYSMAL ATRIAL FIBRILLATION (HCC): Primary | ICD-10-CM

## 2021-11-17 LAB — INR PPP: 1.6

## 2021-11-17 RX ORDER — SOTALOL HYDROCHLORIDE 80 MG/1
80 TABLET ORAL 2 TIMES DAILY
Qty: 60 TABLET | Refills: 3 | Status: SHIPPED | OUTPATIENT
Start: 2021-11-17 | End: 2021-11-17 | Stop reason: SDUPTHER

## 2021-11-17 RX ORDER — SOTALOL HYDROCHLORIDE 80 MG/1
80 TABLET ORAL 2 TIMES DAILY
Qty: 60 TABLET | Refills: 3 | Status: SHIPPED | OUTPATIENT
Start: 2021-11-17 | End: 2022-01-21 | Stop reason: SDUPTHER

## 2021-11-17 NOTE — PROGRESS NOTES
Anticoagulation Clinic - Remote Progress Note  mdINR Home Monitor  Testing frequency: weekly    Indication: paroxysmal afib  Referring Provider: Susan  Initial Warfarin Start Date: 2020? 2019?  Goal INR: 2.0-3.0  Current Drug Interactions:   [id number]: 4 (Age ?74, Sex, HTN) [estimated 5/20/21]  Bleed Risk: self reports negative history for GI bleed  Other: DOAC too expensive    Diet: green beans, peas, zucchini, or priyanka salads 2-3x/week (8/18/21)  Alcohol: none  Tobacco: none  OTC Pain Medication: APAP only    INR History:  Date 5/14 5/18 5/25 6/1 6/15 7/6 7/12 7/22 7/28 8/4 8/11 8/18 8/23 8/27   Total WeeklyDose  22.5mg 25mg 25mg 25mg 25mg 25mg 25mg 25mg 25mg 25mg 27.5mg 27.5mg 27.5mg   INR 3.5 1.9 2.3 2.3 2.8 2.7 2.1 2.2 1.9 2.0 1.8 2.3 2.2 1.8   Notes [place] Cards 1x hold; incr GLV     HM  incr GLV  no GLV incr GLV cefdinir cefdinir     Date 9/1 9/8 9/15 9/22 9/29 10/6 10/13 10/20 10/27 11/3 11/10 11/17     Total WeeklyDose 27.5mg 27.5mg 27.5mg 27.5mg 27.5mg 27.5mg 27.5mg 30mg 27.5mg 27.5mg 27.5mg 25mg     INR 2.1 2.3 3.3 2.3 2.2 2.0 1.9 2.6 3.2 2.4 3.4 1.6     Notes cefdinir  decr GLV?  keflex   1x incr dose   Dec GLV? Inc GLV?         Phone Interview:  Tablet Strength: 5mg tablet  Patient Contact Info: [phone] (home) preferred; [phone] (cell)  Estimated OOP cost: [please send to registration if not already done]  Verbal Release Auth: signed 5/25/21 -- May speak w/ Femi Ngo, ; May leave voicemail on home phone  Lab Contact Info: [facility]      Patient Findings    Negatives:  Signs/symptoms of thrombosis, Signs/symptoms of bleeding, Laboratory test error suspected, Change in health, Change in alcohol use, Change in activity, Upcoming invasive procedure, Emergency department visit, Upcoming dental procedure, Missed doses, Extra doses, Change in medications, Change in diet/appetite, Hospital admission, Bruising, Other complaints   Comments:   did have one additional salad  this week on saturday         Plan:  1. INR is SUBtherapeutic today at 1.6, significant decrease from previous encounter. Instructed Ms. Ngo to resume warfarin 5mg oral daily except for 2.5mg on MonWedFri  until recheck. Switched today and tomorrow dose so she can get higher dose tonight.   2. Repeat INR in 1 week, 11/23  3. Verbal information provided over the phone. Marianela Ngo RBV dosing instructions, expresses understanding by teach back, and has no further questions at this time.      Divya Jaffe, PharmD.  11/17/21   13:56 EST    '

## 2021-11-17 NOTE — TELEPHONE ENCOUNTER
SHE NEEDS REFILLS ON SOTALOL 80MG BID, [facility]. SHE'S ALMOST OUT. I TOLD HER TO HAVE PHARMACY UPDATE PHONE NUMBERS BECAUSE WE HAVEN'T REC'ED ANYTHING FROM THEM

## 2021-11-24 ENCOUNTER — TELEPHONE (OUTPATIENT)
Dept: CARDIOLOGY | Facility: CLINIC | Age: 76
End: 2021-11-24

## 2021-11-24 ENCOUNTER — ANTICOAGULATION VISIT (OUTPATIENT)
Dept: PHARMACY | Facility: HOSPITAL | Age: 76
End: 2021-11-24

## 2021-11-24 DIAGNOSIS — I48.0 PAROXYSMAL ATRIAL FIBRILLATION (HCC): Primary | ICD-10-CM

## 2021-11-24 LAB — INR PPP: 2.4

## 2021-11-24 NOTE — PROGRESS NOTES
Anticoagulation Clinic - Remote Progress Note  mdINR Home Monitor  Testing frequency: weekly    Indication: paroxysmal afib  Referring Provider: Susan  Initial Warfarin Start Date: 2020? 2019?  Goal INR: 2.0-3.0  Current Drug Interactions:   [id number]: 4 (Age ?74, Sex, HTN) [estimated 5/20/21]  Bleed Risk: self reports negative history for GI bleed  Other: DOAC too expensive    Diet: green beans, peas, zucchini, or priyanka salads 2-3x/week (8/18/21)  Alcohol: none  Tobacco: none  OTC Pain Medication: APAP only    INR History:  Date 5/14 5/18 5/25 6/1 6/15 7/6 7/12 7/22 7/28 8/4 8/11 8/18 8/23 8/27   Total WeeklyDose  22.5mg 25mg 25mg 25mg 25mg 25mg 25mg 25mg 25mg 25mg 27.5mg 27.5mg 27.5mg   INR 3.5 1.9 2.3 2.3 2.8 2.7 2.1 2.2 1.9 2.0 1.8 2.3 2.2 1.8   Notes [place] Cards 1x hold; incr GLV     HM  incr GLV  no GLV incr GLV cefdinir cefdinir     Date 9/1 9/8 9/15 9/22 9/29 10/6 10/13 10/20 10/27 11/3 11/10 11/17 11/24    Total WeeklyDose 27.5mg 27.5mg 27.5mg 27.5mg 27.5mg 27.5mg 27.5mg 30mg 27.5mg 27.5mg 27.5mg 25mg 27.5mg 27.5mg   INR 2.1 2.3 3.3 2.3 2.2 2.0 1.9 2.6 3.2 2.4 3.4 1.6 2.4    Notes cefdinir  decr GLV?  keflex   1x incr dose   decr GLV? 1x decr dose;   incr GLV?         Phone Interview:  Tablet Strength: 5mg tablet  Patient Contact Info: [phone] (home) preferred; [phone] (cell)  Estimated OOP cost: [please send to registration if not already done]  Verbal Release Auth: signed 5/25/21 -- May speak w/ Femi Ngo, ; May leave voicemail on home phone  Lab Contact Info: [facility]    Patient Findings  Negatives:  Signs/symptoms of thrombosis, Signs/symptoms of bleeding, Laboratory test error suspected, Change in health, Change in alcohol use, Change in activity, Upcoming invasive procedure, Emergency department visit, Upcoming dental procedure, Missed doses, Extra doses, Change in medications, Change in diet/appetite, Hospital admission, Bruising, Other complaints          Plan:  1. INR is therapeutic and back WNL today at 2.4. Instructed Ms. Ngo to continue warfarin 5mg oral daily except for 2.5mg on MonWedFri  until recheck.  2. Repeat INR in 1 week, 12/1  3. Verbal information provided over the phone. Marianela Ngo RBV dosing instructions, expresses understanding by teach back, and has no further questions at this time.    Saravanan Mccarthy CPhT  11/24/2021  15:05 EST    I, Saravanan Greenberg, PharmD, have reviewed the note in full and agree with the assessment and plan.  11/24/21  16:05 EST

## 2021-12-01 ENCOUNTER — ANTICOAGULATION VISIT (OUTPATIENT)
Dept: PHARMACY | Facility: HOSPITAL | Age: 76
End: 2021-12-01

## 2021-12-01 DIAGNOSIS — I48.0 PAROXYSMAL ATRIAL FIBRILLATION (HCC): Primary | ICD-10-CM

## 2021-12-01 LAB — INR PPP: 2.3

## 2021-12-01 NOTE — PROGRESS NOTES
Anticoagulation Clinic - Remote Progress Note  mdINR Home Monitor  Testing frequency: weekly    Indication: paroxysmal afib  Referring Provider: Susan  Initial Warfarin Start Date: 2020? 2019?  Goal INR: 2.0-3.0  Current Drug Interactions:   [id number]: 4 (Age ?74, Sex, HTN) [estimated 5/20/21]  Bleed Risk: self reports negative history for GI bleed  Other: DOAC too expensive    Diet: green beans, peas, zucchini, or priyanka salads 2-3x/week (8/18/21)  Alcohol: none  Tobacco: none  OTC Pain Medication: APAP only    INR History:  Date 5/14 5/18 5/25 6/1 6/15 7/6 7/12 7/22 7/28 8/4 8/11 8/18 8/23 8/27   Total WeeklyDose  22.5mg 25mg 25mg 25mg 25mg 25mg 25mg 25mg 25mg 25mg 27.5mg 27.5mg 27.5mg   INR 3.5 1.9 2.3 2.3 2.8 2.7 2.1 2.2 1.9 2.0 1.8 2.3 2.2 1.8   Notes [place] Cards 1x hold; incr GLV     HM  incr GLV  no GLV incr GLV cefdinir cefdinir     Date 9/1 9/8 9/15 9/22 9/29 10/6 10/13 10/20 10/27 11/3 11/10 11/17 11/24 12/1   Total WeeklyDose 27.5mg 27.5mg 27.5mg 27.5mg 27.5mg 27.5mg 27.5mg 30mg 27.5mg 27.5mg 27.5mg 25mg 27.5mg 27.5mg   INR 2.1 2.3 3.3 2.3 2.2 2.0 1.9 2.6 3.2 2.4 3.4 1.6 2.4 2.3   Notes cefdinir  decr GLV?  keflex   1x incr dose   decr GLV? 1x decr dose;   incr GLV?       Date               Total WeeklyDose               INR               Notes                 Phone Interview:  Tablet Strength: 5mg tablet  Patient Contact Info: [phone] (home) preferred; [phone] (cell)  Estimated OOP cost: [please send to registration if not already done]  Verbal Release Auth: signed 5/25/21 -- May speak w/ Femi Ngo, ; May leave voicemail on home phone  Lab Contact Info: [facility]    Patient Findings  Negatives:  Signs/symptoms of thrombosis, Signs/symptoms of bleeding, Laboratory test error suspected, Change in health, Change in alcohol use, Change in activity, Upcoming invasive procedure, Emergency department visit, Upcoming dental procedure, Missed doses, Extra doses, Change in  medications, Change in diet/appetite, Hospital admission, Bruising, Other complaints     Plan:  1. INR is therapeutic today at 2.3. Instructed Ms. Ngo to continue warfarin 5mg oral daily except for 2.5mg on MonWedFri until recheck.  2. Repeat INR in 1 week, 12/8  3. Verbal information provided over the phone. Marianela Ngo RBV dosing instructions, expresses understanding by teach back, and has no further questions at this time.    Saravanan Mccarthy, Daljit  12/1/2021  13:12 JOSE ANTONIO HAQUE, Divya Jaffe, PharmD, have reviewed the note in full and agree with the assessment and plan.  12/01/21  14:41 EST

## 2021-12-09 ENCOUNTER — ANTICOAGULATION VISIT (OUTPATIENT)
Dept: PHARMACY | Facility: HOSPITAL | Age: 76
End: 2021-12-09

## 2021-12-09 DIAGNOSIS — I48.0 PAROXYSMAL ATRIAL FIBRILLATION (HCC): Primary | ICD-10-CM

## 2021-12-09 LAB — INR PPP: 2.5

## 2021-12-09 NOTE — PROGRESS NOTES
Anticoagulation Clinic - Remote Progress Note  mdINR Home Monitor  Testing frequency: weekly    Indication: paroxysmal afib  Referring Provider: Susan  Initial Warfarin Start Date: 2020? 2019?  Goal INR: 2.0-3.0  Current Drug Interactions:   [id number]: 4 (Age ?74, Sex, HTN) [estimated 5/20/21]  Bleed Risk: self reports negative history for GI bleed  Other: DOAC too expensive    Diet: green beans, peas, zucchini, or priyanka salads 2-3x/week (12/9/21)  Alcohol: none  Tobacco: none  OTC Pain Medication: APAP only    INR History:  Date 5/14 5/18 5/25 6/1 6/15 7/6 7/12 7/22 7/28 8/4 8/11 8/18 8/23 8/27   Total WeeklyDose  22.5mg 25mg 25mg 25mg 25mg 25mg 25mg 25mg 25mg 25mg 27.5mg 27.5mg 27.5mg   INR 3.5 1.9 2.3 2.3 2.8 2.7 2.1 2.2 1.9 2.0 1.8 2.3 2.2 1.8   Notes [place] Cards 1x hold; incr GLV     HM  incr GLV  no GLV incr GLV cefdinir cefdinir     Date 9/1 9/8 9/15 9/22 9/29 10/6 10/13 10/20 10/27 11/3 11/10 11/17 11/24 12/1   Total WeeklyDose 27.5mg 27.5mg 27.5mg 27.5mg 27.5mg 27.5mg 27.5mg 30mg 27.5mg 27.5mg 27.5mg 25mg 27.5mg 27.5mg   INR 2.1 2.3 3.3 2.3 2.2 2.0 1.9 2.6 3.2 2.4 3.4 1.6 2.4 2.3   Notes cefdinir  decr GLV?  keflex   1x incr dose   decr GLV? 1x decr dose;   incr GLV?       Date 12/9              Total WeeklyDose 27.5 mg              INR 2.5              Notes call                Phone Interview:  Tablet Strength: 5mg tablet  Patient Contact Info: [phone] (home) preferred; [phone] (cell)  Estimated OOP cost: [please send to registration if not already done]  Verbal Release Auth: signed 5/25/21 -- May speak w/ Femi Ngo, ; May leave voicemail on home phone  Lab Contact Info: [facility]  *Will call once monthly or if INR out of range*    Patient Findings:  Negatives:  Signs/symptoms of thrombosis, Signs/symptoms of bleeding, Laboratory test error suspected, Change in health, Change in alcohol use, Change in activity, Upcoming invasive procedure, Emergency department  visit, Upcoming dental procedure, Missed doses, Extra doses, Change in medications, Change in diet/appetite, Hospital admission, Bruising, Other complaints   Comments:  She usually checks on Wednesdays but was babysitting her grandchildren and forgot. She denies any changes.     Plan:  1. INR is therapeutic today at 2.5. Instructed Ms. Ngo to continue warfarin 5 mg oral daily except for 2.5 mg on MonWedFri until recheck.  2. Repeat INR in one week, 12/15.  3. Verbal information provided over the phone. Marianela Ngo RBV dosing instructions, expresses understanding by teach back, and has no further questions at this time.  4. Marianela Ngo understands the importance of calling the [facility] if she notices any s/sx of bleeding, stroke, or abnormal bruising, if any changes are made to her medications or medication doses (Rx, OTC, herbal), or if any upcoming procedures are scheduled. Marianela Ngo will likewise let us know if any other changes, questions, or concerns arise regarding anticoagulation therapy. she understands the importance of seeking medical attention immediately if she experiences any falls, vehicle accidents, or abnormal bleeding or bruising. Marianela Ngo voiced understanding of this information and confirms that she has the [facility]'s contact information. Otherwise, we will plan to contact the patient once monthly or if her INR is out of range.    Sarah Alejandre, Daljit  12/9/2021  09:45 Divya MORAES, PharmD, have reviewed the note in full and agree with the assessment and plan.  12/09/21  13:22 EST

## 2021-12-15 ENCOUNTER — ANTICOAGULATION VISIT (OUTPATIENT)
Dept: PHARMACY | Facility: HOSPITAL | Age: 76
End: 2021-12-15

## 2021-12-15 DIAGNOSIS — I48.0 PAROXYSMAL ATRIAL FIBRILLATION (HCC): Primary | ICD-10-CM

## 2021-12-15 LAB — INR PPP: 2.4

## 2021-12-15 NOTE — PROGRESS NOTES
Anticoagulation Clinic - Remote Progress Note  mdINR Home Monitor  Testing frequency: weekly    Indication: paroxysmal afib  Referring Provider: Susan  Initial Warfarin Start Date: 2020? 2019?  Goal INR: 2.0-3.0  Current Drug Interactions:   [id number]: 4 (Age ?74, Sex, HTN) [estimated 5/20/21]  Bleed Risk: self reports negative history for GI bleed  Other: DOAC too expensive    Diet: green beans, peas, zucchini, or priyanka salads 2-3x/week (12/9/21)  Alcohol: none  Tobacco: none  OTC Pain Medication: APAP only    INR History:  Date 5/14 5/18 5/25 6/1 6/15 7/6 7/12 7/22 7/28 8/4 8/11 8/18 8/23 8/27   Total WeeklyDose  22.5mg 25mg 25mg 25mg 25mg 25mg 25mg 25mg 25mg 25mg 27.5mg 27.5mg 27.5mg   INR 3.5 1.9 2.3 2.3 2.8 2.7 2.1 2.2 1.9 2.0 1.8 2.3 2.2 1.8   Notes [place] Cards 1x hold; incr GLV     HM  incr GLV  no GLV incr GLV cefdinir cefdinir     Date 9/1 9/8 9/15 9/22 9/29 10/6 10/13 10/20 10/27 11/3 11/10 11/17 11/24 12/1   Total WeeklyDose 27.5mg 27.5mg 27.5mg 27.5mg 27.5mg 27.5mg 27.5mg 30mg 27.5mg 27.5mg 27.5mg 25mg 27.5mg 27.5mg   INR 2.1 2.3 3.3 2.3 2.2 2.0 1.9 2.6 3.2 2.4 3.4 1.6 2.4 2.3   Notes cefdinir  decr GLV?  keflex   1x incr dose   decr GLV? 1x decr dose;   incr GLV?       Date 12/9 12/15             Total WeeklyDose 27.5mg 27.5mg             INR 2.5 2.4             Notes call                Phone Interview:  Tablet Strength: 5mg tablet  Patient Contact Info: [phone] (home) preferred; [phone] (cell)  Estimated OOP cost: [please send to registration if not already done]  Verbal Release Auth: signed 5/25/21 -- May speak w/ Femi Ngo, ; May leave voicemail on home phone  Lab Contact Info: [facility]  *Will call once monthly or if INR out of range*      Patient Findings  Comments:  Patient was not contacted at this encounter       Plan:  1. INR is therapeutic today at 2.4. Ms. Ngo will continue warfarin 5mg oral daily except for 2.5mg on MonWedFri until recheck.  2.  Repeat INR in one week, 12/22  3. Marianela Ngo understands the importance of calling the [facility] if she notices any s/sx of bleeding, stroke, or abnormal bruising, if any changes are made to her medications or medication doses (Rx, OTC, herbal), or if any upcoming procedures are scheduled. Marianela Ngo will likewise let us know if any other changes, questions, or concerns arise regarding anticoagulation therapy. she understands the importance of seeking medical attention immediately if she experiences any falls, vehicle accidents, or abnormal bleeding or bruising. Marianela Ngo voiced understanding of this information and confirms that she has the [facility]'s contact information. Otherwise, we will plan to contact the patient once monthly or if her INR is out of range.    Saravanan Mccarthy, Daljit  12/15/2021  14:41 JOSE ANTONIO HAQUE, Pablo Taylor, PharmD, have reviewed the note in full and agree with the assessment and plan.  12/15/21  16:10 EST

## 2021-12-22 ENCOUNTER — ANTICOAGULATION VISIT (OUTPATIENT)
Dept: PHARMACY | Facility: HOSPITAL | Age: 76
End: 2021-12-22

## 2021-12-22 DIAGNOSIS — I48.0 PAROXYSMAL ATRIAL FIBRILLATION (HCC): Primary | ICD-10-CM

## 2021-12-22 LAB — INR PPP: 2.7

## 2021-12-22 NOTE — PROGRESS NOTES
Anticoagulation Clinic - Remote Progress Note  mdINR Home Monitor  Testing frequency: weekly    Indication: paroxysmal afib  Referring Provider: Susan  Initial Warfarin Start Date: 2020? 2019?  Goal INR: 2.0-3.0  Current Drug Interactions:   [id number]: 4 (Age ?74, Sex, HTN) [estimated 5/20/21]  Bleed Risk: self reports negative history for GI bleed  Other: DOAC too expensive    Diet: green beans, peas, zucchini, or priyanka salads 2-3x/week (12/9/21)  Alcohol: none  Tobacco: none  OTC Pain Medication: APAP only    INR History:  Date 5/14 5/18 5/25 6/1 6/15 7/6 7/12 7/22 7/28 8/4 8/11 8/18 8/23 8/27   Total WeeklyDose  22.5mg 25mg 25mg 25mg 25mg 25mg 25mg 25mg 25mg 25mg 27.5mg 27.5mg 27.5mg   INR 3.5 1.9 2.3 2.3 2.8 2.7 2.1 2.2 1.9 2.0 1.8 2.3 2.2 1.8   Notes [place] Cards 1x hold; incr GLV     HM  incr GLV  no GLV incr GLV cefdinir cefdinir     Date 9/1 9/8 9/15 9/22 9/29 10/6 10/13 10/20 10/27 11/3 11/10 11/17 11/24 12/1   Total WeeklyDose 27.5mg 27.5mg 27.5mg 27.5mg 27.5mg 27.5mg 27.5mg 30mg 27.5mg 27.5mg 27.5mg 25mg 27.5mg 27.5mg   INR 2.1 2.3 3.3 2.3 2.2 2.0 1.9 2.6 3.2 2.4 3.4 1.6 2.4 2.3   Notes cefdinir  decr GLV?  keflex   1x incr dose   decr GLV? 1x decr dose;   incr GLV?       Date 12/9 12/15 12/22            Total WeeklyDose 27.5mg 27.5mg 27.5mg            INR 2.5 2.4 2.7            Notes call                Phone Interview:  Tablet Strength: 5mg tablet  Patient Contact Info: [phone] (home) preferred; [phone] (cell)  Estimated OOP cost: [please send to registration if not already done]  Verbal Release Auth: signed 5/25/21 -- May speak w/ Femi Ngo, ; May leave voicemail on home phone  Lab Contact Info: [facility]  *Will call once monthly or if INR out of range*      Patient Findings  Comments:  Patient was not contacted at this encounter       Plan:  1. INR is therapeutic today at 2.7. Ms. Ngo will continue warfarin 5mg oral daily except for 2.5mg on MonWedFri until  recheck.  2. Repeat INR in one week, 12/29  3. Marianela Ngo understands the importance of calling the [facility] if she notices any s/sx of bleeding, stroke, or abnormal bruising, if any changes are made to her medications or medication doses (Rx, OTC, herbal), or if any upcoming procedures are scheduled. Marianela Ngo will likewise let us know if any other changes, questions, or concerns arise regarding anticoagulation therapy. she understands the importance of seeking medical attention immediately if she experiences any falls, vehicle accidents, or abnormal bleeding or bruising. Marianela Ngo voiced understanding of this information and confirms that she has the [facility]'s contact information. Otherwise, we will plan to contact the patient once monthly or if her INR is out of range.    Saravanan Mccarthy, Daljit  12/22/2021  11:41 Jayla MORAES, PharmD, have reviewed the note in full and agree with the assessment and plan.  12/22/21  12:21 EST

## 2021-12-29 ENCOUNTER — ANTICOAGULATION VISIT (OUTPATIENT)
Dept: PHARMACY | Facility: HOSPITAL | Age: 76
End: 2021-12-29

## 2021-12-29 DIAGNOSIS — I48.0 PAROXYSMAL ATRIAL FIBRILLATION (HCC): Primary | ICD-10-CM

## 2021-12-29 LAB — INR PPP: 2.5

## 2021-12-29 NOTE — PROGRESS NOTES
Anticoagulation Clinic - Remote Progress Note  mdINR Home Monitor  Testing frequency: weekly    Indication: paroxysmal afib  Referring Provider: Susan  Initial Warfarin Start Date: 2020? 2019?  Goal INR: 2.0-3.0  Current Drug Interactions:   [id number]: 4 (Age ?74, Sex, HTN) [estimated 5/20/21]  Bleed Risk: self reports negative history for GI bleed  Other: DOAC too expensive    Diet: green beans, peas, zucchini, or priyanka salads 2-3x/week (12/9/21)  Alcohol: none  Tobacco: none  OTC Pain Medication: APAP only    INR History:  Date 5/14 5/18 5/25 6/1 6/15 7/6 7/12 7/22 7/28 8/4 8/11 8/18 8/23 8/27   Total WeeklyDose  22.5mg 25mg 25mg 25mg 25mg 25mg 25mg 25mg 25mg 25mg 27.5mg 27.5mg 27.5mg   INR 3.5 1.9 2.3 2.3 2.8 2.7 2.1 2.2 1.9 2.0 1.8 2.3 2.2 1.8   Notes [place] Cards 1x hold; incr GLV     HM  incr GLV  no GLV incr GLV cefdinir cefdinir     Date 9/1 9/8 9/15 9/22 9/29 10/6 10/13 10/20 10/27 11/3 11/10 11/17 11/24 12/1   Total WeeklyDose 27.5mg 27.5mg 27.5mg 27.5mg 27.5mg 27.5mg 27.5mg 30mg 27.5mg 27.5mg 27.5mg 25mg 27.5mg 27.5mg   INR 2.1 2.3 3.3 2.3 2.2 2.0 1.9 2.6 3.2 2.4 3.4 1.6 2.4 2.3   Notes cefdinir  decr GLV?  keflex   1x incr dose   decr GLV? 1x decr dose;   incr GLV?       Date 12/9 12/15 12/22 12/29           Total WeeklyDose 27.5mg 27.5mg 27.5mg 27.5mg           INR 2.5 2.4 2.7 2.5           Notes call                Phone Interview:  Tablet Strength: 5mg tablet  Patient Contact Info: [phone] (home) preferred; [phone] (cell)  Estimated OOP cost: [please send to registration if not already done]  Verbal Release Auth: signed 5/25/21 -- May speak w/ Femi Ngo, ; May leave voicemail on home phone  Lab Contact Info: [facility]  *Will call once monthly or if INR out of range*    Patient Findings  Comments:  Patient was not contacted at this encounter     Plan:  1. INR is therapeutic today at 2.5. Ms. Ngo will continue warfarin 5mg oral daily except for 2.5mg on  [place] until recheck.  2. Repeat INR in one week, 1/4/22  3. Marianela Ngo understands the importance of calling the [facility] if she notices any s/sx of bleeding, stroke, or abnormal bruising, if any changes are made to her medications or medication doses (Rx, OTC, herbal), or if any upcoming procedures are scheduled. Marianela Ngo will likewise let us know if any other changes, questions, or concerns arise regarding anticoagulation therapy. she understands the importance of seeking medical attention immediately if she experiences any falls, vehicle accidents, or abnormal bleeding or bruising. Marianela Ngo voiced understanding of this information and confirms that she has the [facility]'s contact information. Otherwise, we will plan to contact the patient once monthly or if her INR is out of range.    Saravanan Mccarthy, Daljit  12/29/2021  13:58 Maryjane MORAES, PharmD, have reviewed the note in full and agree with the assessment and plan.  12/29/21  16:29 EST

## 2022-01-05 ENCOUNTER — ANTICOAGULATION VISIT (OUTPATIENT)
Dept: PHARMACY | Facility: HOSPITAL | Age: 77
End: 2022-01-05

## 2022-01-05 DIAGNOSIS — I48.0 PAROXYSMAL ATRIAL FIBRILLATION: Primary | ICD-10-CM

## 2022-01-05 LAB — INR PPP: 2.6

## 2022-01-05 NOTE — PROGRESS NOTES
Anticoagulation Clinic - Remote Progress Note  mdINR Home Monitor  Testing frequency: weekly    Indication: paroxysmal afib  Referring Provider: Susan  Initial Warfarin Start Date: 2020? 2019?  Goal INR: 2.0-3.0  Current Drug Interactions:   [id number]: 4 (Age ?74, Sex, HTN) [estimated 5/20/21]  Bleed Risk: self reports negative history for GI bleed  Other: DOAC too expensive    Diet: green beans, peas, zucchini, or priyanka salads 2-3x/week (12/9/21)  Alcohol: none  Tobacco: none  OTC Pain Medication: APAP only    INR History:  Date 5/14 5/18 5/25 6/1 6/15 7/6 7/12 7/22 7/28 8/4 8/11 8/18 8/23 8/27   Total WeeklyDose  22.5mg 25mg 25mg 25mg 25mg 25mg 25mg 25mg 25mg 25mg 27.5mg 27.5mg 27.5mg   INR 3.5 1.9 2.3 2.3 2.8 2.7 2.1 2.2 1.9 2.0 1.8 2.3 2.2 1.8   Notes [place] Cards 1x hold; incr GLV     HM  incr GLV  no GLV incr GLV cefdinir cefdinir     Date 9/1 9/8 9/15 9/22 9/29 10/6 10/13 10/20 10/27 11/3 11/10 11/17 11/24 12/1   Total WeeklyDose 27.5mg 27.5mg 27.5mg 27.5mg 27.5mg 27.5mg 27.5mg 30mg 27.5mg 27.5mg 27.5mg 25mg 27.5mg 27.5mg   INR 2.1 2.3 3.3 2.3 2.2 2.0 1.9 2.6 3.2 2.4 3.4 1.6 2.4 2.3   Notes cefdinir  decr GLV?  keflex   1x incr dose   decr GLV? 1x decr dose;   incr GLV?       Date 12/9 12/15 12/22 12/29 1/5/22          Total WeeklyDose 27.5mg 27.5mg 27.5mg 27.5mg 27.5mg          INR 2.5 2.4 2.7 2.5 2.6          Notes call    call            Phone Interview:  Tablet Strength: 5mg (1/5/22)  Patient Contact Info: [phone] (home) preferred; [phone] (cell)  Estimated OOP cost: [please send to registration if not already done]  Verbal Release Auth: signed 5/25/21 -- May speak w/ Femi Ngo, ; May leave voicemail on home phone  Lab Contact Info: [facility]  *Will call once monthly or if INR out of range*    Patient Findings  Negatives:  Signs/symptoms of thrombosis, Signs/symptoms of bleeding, Laboratory test error suspected, Change in health, Change in alcohol use, Change in  activity, Upcoming invasive procedure, Emergency department visit, Upcoming dental procedure, Missed doses, Extra doses, Change in medications, Change in diet/appetite, Hospital admission, Bruising, Other complaints     Plan:  1. INR is therapeutic today at 2.6 (goal 2.0-3.0). Instructed Ms. Ngo to continue warfarin 5mg oral daily except for 2.5mg on MonWedFri until recheck (27.5mg/week)  2. Repeat INR in one week, 1/12  3. Verbal information provided over the phone. Marianela Ngo RBV dosing instructions, expresses understanding by teach back, and has no further questions at this time.  4. Marianela Ngo understands the importance of calling the [facility] if she notices any s/sx of bleeding, stroke, or abnormal bruising, if any changes are made to her medications or medication doses (Rx, OTC, herbal), or if any upcoming procedures are scheduled. Marianela Ngo will likewise let us know if any other changes, questions, or concerns arise regarding anticoagulation therapy. she understands the importance of seeking medical attention immediately if she experiences any falls, vehicle accidents, or abnormal bleeding or bruising. Marianela Ngo voiced understanding of this information and confirms that she has the [facility]'s contact information. Otherwise, we will plan to contact the patient once monthly or if her INR is out of range.    Saravanan Mccarthy, Daljit  1/5/2022  16:04 Divya MORAES, PharmD, have reviewed the note in full and agree with the assessment and plan.  01/06/22  10:22 EST

## 2022-01-12 ENCOUNTER — ANTICOAGULATION VISIT (OUTPATIENT)
Dept: PHARMACY | Facility: HOSPITAL | Age: 77
End: 2022-01-12

## 2022-01-12 DIAGNOSIS — I48.0 PAROXYSMAL ATRIAL FIBRILLATION: Primary | ICD-10-CM

## 2022-01-12 LAB — INR PPP: 2.2

## 2022-01-12 NOTE — PROGRESS NOTES
Anticoagulation Clinic - Remote Progress Note  mdINR Home Monitor  Testing frequency: weekly    Indication: paroxysmal afib  Referring Provider: Susan  Initial Warfarin Start Date: 2020? 2019?  Goal INR: 2.0-3.0  Current Drug Interactions:   [id number]: 4 (Age ?75, Sex, HTN) [estimated 5/20/21]  Bleed Risk: self reports negative history for GI bleed  Other: DOAC too expensive    Diet: green beans, peas, zucchini, or priyanka salads 2-3x/week (12/9/21)  Alcohol: none  Tobacco: none  OTC Pain Medication: APAP only    INR History:  Date 5/14 5/18 5/25 6/1 6/15 7/6 7/12 7/22 7/28 8/4 8/11 8/18 8/23 8/27   Total WeeklyDose  22.5mg 25mg 25mg 25mg 25mg 25mg 25mg 25mg 25mg 25mg 27.5mg 27.5mg 27.5mg   INR 3.5 1.9 2.3 2.3 2.8 2.7 2.1 2.2 1.9 2.0 1.8 2.3 2.2 1.8   Notes [place] Cards 1x hold; incr GLV     HM  incr GLV  no GLV incr GLV cefdinir cefdinir     Date 9/1 9/8 9/15 9/22 9/29 10/6 10/13 10/20 10/27 11/3 11/10 11/17 11/24 12/1   Total WeeklyDose 27.5mg 27.5mg 27.5mg 27.5mg 27.5mg 27.5mg 27.5mg 30mg 27.5mg 27.5mg 27.5mg 25mg 27.5mg 27.5mg   INR 2.1 2.3 3.3 2.3 2.2 2.0 1.9 2.6 3.2 2.4 3.4 1.6 2.4 2.3   Notes cefdinir  decr GLV?  keflex   1x incr dose   decr GLV? 1x decr dose;   incr GLV?       Date 12/9 12/15 12/22 12/29 1/5/22 1/12         Total WeeklyDose 27.5mg 27.5mg 27.5mg 27.5mg 27.5mg 27.5 mg         INR 2.5 2.4 2.7 2.5 2.6 2.2         Notes call    call            Phone Interview:  Tablet Strength: 5mg (1/5/22)  Patient Contact Info: [phone] (home) preferred; [phone] (cell)  Estimated OOP cost: [please send to registration if not already done]  Verbal Release Auth: signed 5/25/21 -- May speak w/ Femi Ngo, ; May leave voicemail on home phone  Lab Contact Info: [facility]  *Will call once monthly or if INR out of range*    Patient Findings:  Comments:  Patient was not contacted at this encounter.     Plan:  1. INR is therapeutic today at 2.2. Instructed MsIvan Ngo to continue  warfarin 5 mg oral daily except for 2.5 mg on MonWedFri until recheck (27.5 mg/week)  2. Repeat INR in one week, 1/19/22.  3. Marianela Ngo understands the importance of calling the [facility] if she notices any s/sx of bleeding, stroke, or abnormal bruising, if any changes are made to her medications or medication doses (Rx, OTC, herbal), or if any upcoming procedures are scheduled. Marianela Ngo will likewise let us know if any other changes, questions, or concerns arise regarding anticoagulation therapy. she understands the importance of seeking medical attention immediately if she experiences any falls, vehicle accidents, or abnormal bleeding or bruising. Marianela Ngo voiced understanding of this information and confirms that she has the [facility]'s contact information. Otherwise, we will plan to contact the patient once monthly or if her INR is out of range.    Sarah Alejandre CPhT  1/12/2022  14:06 EST

## 2022-01-20 ENCOUNTER — ANTICOAGULATION VISIT (OUTPATIENT)
Dept: PHARMACY | Facility: HOSPITAL | Age: 77
End: 2022-01-20

## 2022-01-20 DIAGNOSIS — I48.0 PAROXYSMAL ATRIAL FIBRILLATION: Primary | ICD-10-CM

## 2022-01-20 LAB — INR PPP: 2.5

## 2022-01-20 NOTE — PROGRESS NOTES
Anticoagulation Clinic - Remote Progress Note  mdINR Home Monitor  Testing frequency: weekly    Indication: paroxysmal afib  Referring Provider: Susan  Initial Warfarin Start Date: 2020? 2019?  Goal INR: 2.0-3.0  Current Drug Interactions:   [id number]: 4 (Age ?74, Sex, HTN) [estimated 5/20/21]  Bleed Risk: self reports negative history for GI bleed  Other: DOAC too expensive    Diet: green beans, peas, zucchini, or priyanka salads 2-3x/week (12/9/21)  Alcohol: none  Tobacco: none  OTC Pain Medication: APAP only    INR History:  Date 5/14 5/18 5/25 6/1 6/15 7/6 7/12 7/22 7/28 8/4 8/11 8/18 8/23 8/27   Total WeeklyDose  22.5mg 25mg 25mg 25mg 25mg 25mg 25mg 25mg 25mg 25mg 27.5mg 27.5mg 27.5mg   INR 3.5 1.9 2.3 2.3 2.8 2.7 2.1 2.2 1.9 2.0 1.8 2.3 2.2 1.8   Notes [place] Cards 1x hold; incr GLV     HM  incr GLV  no GLV incr GLV cefdinir cefdinir     Date 9/1 9/8 9/15 9/22 9/29 10/6 10/13 10/20 10/27 11/3 11/10 11/17 11/24 12/1   Total WeeklyDose 27.5mg 27.5mg 27.5mg 27.5mg 27.5mg 27.5mg 27.5mg 30mg 27.5mg 27.5mg 27.5mg 25mg 27.5mg 27.5mg   INR 2.1 2.3 3.3 2.3 2.2 2.0 1.9 2.6 3.2 2.4 3.4 1.6 2.4 2.3   Notes cefdinir  decr GLV?  keflex   1x incr dose   decr GLV? 1x decr dose;   incr GLV?       Date 12/9 12/15 12/22 12/29 1/5/22 1/12 1/20        Total WeeklyDose 27.5mg 27.5mg 27.5mg 27.5mg 27.5mg 27.5 mg 27.5 mg        INR 2.5 2.4 2.7 2.5 2.6 2.2 2.5        Notes call    call            Phone Interview:  Tablet Strength: 5mg (1/5/22)  Patient Contact Info: [phone] (home) preferred; [phone] (cell)  Estimated OOP cost: [please send to registration if not already done]  Verbal Release Auth: signed 5/25/21 -- May speak w/ Femi Huber, ; May leave voicemail on home phone  Lab Contact Info: [facility]  *Will call once monthly or if INR out of range*    Patient Findings:  Comments:  Patient was not contacted at this encounter.     Plan:  1. INR is therapeutic today at 2.5. Instructed Ms. Ngo to  continue warfarin 5 mg oral daily except for 2.5 mg on MonWedFri until recheck (27.5 mg/week)  2. Repeat INR in one week, 1/26/22.  3. Marianela Ngo understands the importance of calling the [facility] if she notices any s/sx of bleeding, stroke, or abnormal bruising, if any changes are made to her medications or medication doses (Rx, OTC, herbal), or if any upcoming procedures are scheduled. Marianela Ngo will likewise let us know if any other changes, questions, or concerns arise regarding anticoagulation therapy. she understands the importance of seeking medical attention immediately if she experiences any falls, vehicle accidents, or abnormal bleeding or bruising. Marianela Ngo voiced understanding of this information and confirms that she has the [facility]'s contact information. Otherwise, we will plan to contact the patient once monthly or if her INR is out of range.    Sarah Alejandre CPhT  1/20/2022  09:34 EST    I, Saravanan Greenberg, PharmD, have reviewed the note in full and agree with the assessment and plan.  01/20/22  15:11 EST

## 2022-01-21 ENCOUNTER — OFFICE VISIT (OUTPATIENT)
Dept: CARDIOLOGY | Facility: CLINIC | Age: 77
End: 2022-01-21

## 2022-01-21 VITALS
HEART RATE: 86 BPM | RESPIRATION RATE: 20 BRPM | OXYGEN SATURATION: 95 % | SYSTOLIC BLOOD PRESSURE: 124 MMHG | BODY MASS INDEX: 35.75 KG/M2 | HEIGHT: 65 IN | WEIGHT: 214.6 LBS | DIASTOLIC BLOOD PRESSURE: 80 MMHG | TEMPERATURE: 96.6 F

## 2022-01-21 DIAGNOSIS — J43.2 CENTRILOBULAR EMPHYSEMA: ICD-10-CM

## 2022-01-21 DIAGNOSIS — I10 ESSENTIAL HYPERTENSION: ICD-10-CM

## 2022-01-21 DIAGNOSIS — I48.0 PAROXYSMAL ATRIAL FIBRILLATION: Primary | ICD-10-CM

## 2022-01-21 DIAGNOSIS — E78.5 HYPERLIPIDEMIA LDL GOAL <100: ICD-10-CM

## 2022-01-21 PROCEDURE — 99214 OFFICE O/P EST MOD 30 MIN: CPT | Performed by: INTERNAL MEDICINE

## 2022-01-21 PROCEDURE — 93000 ELECTROCARDIOGRAM COMPLETE: CPT | Performed by: INTERNAL MEDICINE

## 2022-01-21 RX ORDER — SOTALOL HYDROCHLORIDE 80 MG/1
TABLET ORAL
Qty: 180 TABLET | Refills: 3 | Status: SHIPPED | OUTPATIENT
Start: 2022-01-21 | End: 2022-10-19 | Stop reason: SDUPTHER

## 2022-01-21 RX ORDER — WARFARIN SODIUM 5 MG/1
5 TABLET ORAL
Qty: 180 TABLET | Refills: 3 | Status: SHIPPED | OUTPATIENT
Start: 2022-01-21 | End: 2022-10-19 | Stop reason: SDUPTHER

## 2022-01-21 RX ORDER — ATORVASTATIN CALCIUM 20 MG/1
20 TABLET, FILM COATED ORAL DAILY
Qty: 90 TABLET | Refills: 3 | Status: SHIPPED | OUTPATIENT
Start: 2022-01-21 | End: 2022-10-19 | Stop reason: SDUPTHER

## 2022-01-21 RX ORDER — AMLODIPINE BESYLATE 5 MG/1
5 TABLET ORAL DAILY
Qty: 90 TABLET | Refills: 3 | Status: SHIPPED | OUTPATIENT
Start: 2022-01-21 | End: 2022-10-19 | Stop reason: SDUPTHER

## 2022-01-21 RX ORDER — LISINOPRIL AND HYDROCHLOROTHIAZIDE 20; 12.5 MG/1; MG/1
1 TABLET ORAL DAILY
Qty: 90 TABLET | Refills: 3 | Status: SHIPPED | OUTPATIENT
Start: 2022-01-21 | End: 2022-10-19 | Stop reason: SDUPTHER

## 2022-01-21 NOTE — PROGRESS NOTES
[facility]  [facility]IOLOG[facility]  [address] [place] [address]  Dept: [phone]  Dept Fax: [phone]    Marianela Ngo  1945    Follow Up Office Visit Note    History of Present Illness:  Marianela Ngo is a 76 y.o. female who presents to the clinic for Follow-up. PAF- She seems doing well. No complaints in sinus rhythm HR 53 and  ,. On Sotalol 40.80 and also warfarin INR are good around 2,5     The following portions of the patient's history were reviewed and updated as appropriate: allergies, current medications, past family history, past medical history, past social history, past surgical history and problem list.    Medications:  albuterol  albuterol sulfate HFA  amLODIPine  atorvastatin  budesonide  diphenhydrAMINE-PE-APAP tablet  lisinopril-hydrochlorothiazide  Neutrogena T/Sal shampoo  nystatin cream  sotalol  warfarin    Subjective  No Known Allergies     Past Medical History:   Diagnosis Date   • Allergies    • Bradycardia with 51-60 beats per minute    • Chronic atrial fibrillation (HCC)    • Current use of long term anticoagulation    • DJD (degenerative joint disease)    • Epistaxis    • Essential hypertension    • High risk medication use    • Mild persistent asthma, uncomplicated    • Mixed hyperlipidemia    • Obstructive sleep apnea on CPAP    • Other fatigue    • Paroxysmal atrial fibrillation (HCC)    • Pneumonia 08/2015   • Shortness of breath    • Vitamin deficiency        Past Surgical History:   Procedure Laterality Date   • CATARACT EXTRACTION, BILATERAL     • HYSTERECTOMY      PARTIAL   • REPLACEMENT TOTAL KNEE BILATERAL  2013,2014       Family History   Problem Relation Age of Onset   • Cancer Father    • Heart disease Sister    • Cancer Brother    • Diabetes Brother    • Hypertension Other         Social History     Socioeconomic History   • Marital status:    Tobacco Use   • Smoking status: Former Smoker     Packs/day: 1.00      "Types: Cigarettes     Quit date: 1974     Years since quittin.0   • Smokeless tobacco: Never Used   Vaping Use   • Vaping Use: Never used   Substance and Sexual Activity   • Alcohol use: Never   • Drug use: Never   • Sexual activity: Defer       Review of Systems   Constitutional: Negative.    HENT: Negative.    Respiratory: Negative.    Cardiovascular: Negative.    Endocrine: Negative.    Genitourinary: Negative.    Musculoskeletal: Negative.    Skin: Negative.    Allergic/Immunologic: Negative.    Neurological: Negative.    Hematological: Negative.    Psychiatric/Behavioral: Negative.    All other systems reviewed and are negative.      Cardiovascular Procedures    ECHO/MUGA:   STRESS TESTS:   CARDIAC CATH:   DEVICES:   HOLTER:   CT/MRI:   VASCULAR:   CARDIOTHORACIC:     Objective  Vitals:    22 1026   BP: 124/80   BP Location: Left arm   Patient Position: Lying   Cuff Size: Large Adult   Pulse: 86   Resp: 20   Temp: 96.6 °F (35.9 °C)   TempSrc: Temporal   SpO2: 95%   Weight: 97.3 kg (214 lb 9.6 oz)   Height: 165.1 cm (65\")   PainSc: 0-No pain     Body mass index is 35.71 kg/m².     Physical Exam  Constitutional:       Appearance: Healthy appearance. Not in distress.   Neck:      Vascular: No JVR. JVD normal.   Pulmonary:      Effort: Pulmonary effort is normal.      Breath sounds: Normal breath sounds. No wheezing. No rhonchi. No rales.   Chest:      Chest wall: Not tender to palpatation.   Cardiovascular:      PMI at left midclavicular line. Normal rate. Regular rhythm. Normal S1. Normal S2.      Murmurs: There is no murmur.      No gallop. No click. No rub.   Pulses:     Intact distal pulses.   Edema:     Peripheral edema absent.   Abdominal:      General: Bowel sounds are normal.      Palpations: Abdomen is soft.      Tenderness: There is no abdominal tenderness.   Musculoskeletal: Normal range of motion.         General: No tenderness. Skin:     General: Skin is warm and dry.   Neurological:      " General: No focal deficit present.      Mental Status: Alert and oriented to person, place and time.          Diagnostic Data    ECG 12 Lead    Date/Time: 1/21/2022 10:53 AM  Performed by: Amos Davis MD  Authorized by: Amos Davis MD   Comparison: compared with previous ECG from 7/16/2021  Comparison to previous ECG:  msc  Rhythm: sinus rhythm  Rate: normal  BPM: 53  QRS axis: normal    Clinical impression: normal ECG            Assessment and Plan  Diagnoses and all orders for this visit:    Paroxysmal atrial fibrillation (HCC)-In sinus doing well, will keep Sotalol and warfarin     Essential hypertension- The BP is 140.80 on Amlodipine 5mg and also Lisinopril 201.2.5    Hyperlipidemia LDL goal <100- On Lipitor 20 mg, she will come next week for a Lipid     Centrilobular emphysema (HCC)- Steady         No follow-ups on file.    Amos Davis MD  01/21/2022

## 2022-01-21 NOTE — PROGRESS NOTES
[facility] [place]  [facility]ARDIOLOGY  [address] [place] [address]  Dept: [phone]  Dept Fax: [phone]    Marianela Ngo  1945    Follow Up Office Visit Note    History of Present Illness:  Marianela Ngo is a 76 y.o. female who presents to the clinic for Follow-up.  Paroxysmal atrial fibrillation    The following portions of the patient's history were reviewed and updated as appropriate: {history reviewed:::\"allergies\",\"current medications\",\"past family history\",\"past medical history\",\"past social history\",\"past surgical history\",\"problem list\"}.    Medications:  albuterol  albuterol sulfate HFA  amLODIPine  atorvastatin  budesonide  diphenhydrAMINE-PE-APAP tablet  lisinopril-hydrochlorothiazide  Neutrogena T/Sal shampoo  nystatin cream  sotalol  warfarin    Subjective  No Known Allergies     Past Medical History:   Diagnosis Date   • Allergies    • Bradycardia with 51-60 beats per minute    • Chronic atrial fibrillation (HCC)    • Current use of long term anticoagulation    • DJD (degenerative joint disease)    • Epistaxis    • Essential hypertension    • High risk medication use    • Mild persistent asthma, uncomplicated    • Mixed hyperlipidemia    • Obstructive sleep apnea on CPAP    • Other fatigue    • Paroxysmal atrial fibrillation (HCC)    • Pneumonia 2015   • Shortness of breath    • Vitamin deficiency        Past Surgical History:   Procedure Laterality Date   • CATARACT EXTRACTION, BILATERAL     • HYSTERECTOMY      PARTIAL   • REPLACEMENT TOTAL KNEE BILATERAL  ,       Family History   Problem Relation Age of Onset   • Cancer Father    • Heart disease Sister    • Cancer Brother    • Diabetes Brother    • Hypertension Other         Social History     Socioeconomic History   • Marital status:    Tobacco Use   • Smoking status: Former Smoker     Packs/day: 1.00     Types: Cigarettes     Quit date:      Years since quittin.0   • " "Smokeless tobacco: Never Used   Vaping Use   • Vaping Use: Never used   Substance and Sexual Activity   • Alcohol use: Never   • Drug use: Never   • Sexual activity: Defer       Review of Systems    Cardiovascular Procedures    ECHO/MUGA: ***  STRESS TESTS:   CARDIAC CATH:   DEVICES:   HOLTER:   CT/MRI:   VASCULAR:   CARDIOTHORACIC:     Objective  Vitals:    01/21/22 1026   BP: 124/80   BP Location: Left arm   Patient Position: Lying   Cuff Size: Large Adult   Pulse: 86   Resp: 20   Temp: 96.6 °F (35.9 °C)   TempSrc: Temporal   SpO2: 95%   Weight: 97.3 kg (214 lb 9.6 oz)   Height: 165.1 cm (65\")   PainSc: 0-No pain     Body mass index is 35.71 kg/m².     Physical Exam  Physical Exam     Diagnostic Data  Procedures    Assessment and Plan  Diagnoses and all orders for this visit:    Paroxysmal atrial fibrillation (HCC)    Essential hypertension    Hyperlipidemia LDL goal <100    Centrilobular emphysema (HCC)         No follow-ups on file.    Amos Davis MD  01/21/2022  "

## 2022-01-26 ENCOUNTER — ANTICOAGULATION VISIT (OUTPATIENT)
Dept: PHARMACY | Facility: HOSPITAL | Age: 77
End: 2022-01-26

## 2022-01-26 DIAGNOSIS — I48.0 PAROXYSMAL ATRIAL FIBRILLATION: Primary | ICD-10-CM

## 2022-01-26 LAB — INR PPP: 2.1

## 2022-01-26 RX ORDER — AZITHROMYCIN 250 MG/1
250 TABLET, FILM COATED ORAL DAILY
COMMUNITY
Start: 2022-01-25 | End: 2022-01-29

## 2022-01-26 NOTE — PROGRESS NOTES
Anticoagulation Clinic - Remote Progress Note  mdINR Home Monitor  Testing frequency: weekly    Indication: paroxysmal afib  Referring Provider: Susan  Initial Warfarin Start Date: 2020? 2019?  Goal INR: 2.0-3.0  Current Drug Interactions:   [id number]: 4 (Age ?75, Sex, HTN) [estimated 5/20/21]  Bleed Risk: self reports negative history for GI bleed  Other: DOAC too expensive    Diet: green beans, peas, zucchini, or priyanka salads 2-3x/week (12/9/21)  Alcohol: none  Tobacco: none  OTC Pain Medication: APAP only    INR History:  Date 5/14 5/18 5/25 6/1 6/15 7/6 7/12 7/22 7/28 8/4 8/11 8/18 8/23 8/27   Total WeeklyDose  22.5mg 25mg 25mg 25mg 25mg 25mg 25mg 25mg 25mg 25mg 27.5mg 27.5mg 27.5mg   INR 3.5 1.9 2.3 2.3 2.8 2.7 2.1 2.2 1.9 2.0 1.8 2.3 2.2 1.8   Notes [place] Cards 1x hold; incr GLV     HM  incr GLV  no GLV incr GLV cefdinir cefdinir     Date 9/1 9/8 9/15 9/22 9/29 10/6 10/13 10/20 10/27 11/3 11/10 11/17 11/24 12/1   Total WeeklyDose 27.5mg 27.5mg 27.5mg 27.5mg 27.5mg 27.5mg 27.5mg 30mg 27.5mg 27.5mg 27.5mg 25mg 27.5mg 27.5mg   INR 2.1 2.3 3.3 2.3 2.2 2.0 1.9 2.6 3.2 2.4 3.4 1.6 2.4 2.3   Notes cefdinir  decr GLV?  keflex   1x incr dose   decr GLV? 1x decr dose;   incr GLV?       Date 12/9 12/15 12/22 12/29 1/5/22 1/12 1/20 1/26        Total WeeklyDose 27.5mg 27.5mg 27.5mg 27.5mg 27.5mg 27.5 mg 27.5 mg 27.5 mg        INR 2.5 2.4 2.7 2.5 2.6 2.2 2.5 2.1        Notes call    call             Phone Interview:  Tablet Strength: 5mg (1/5/22)  Patient Contact Info: [phone] (home) preferred; [phone] (cell)  Estimated OOP cost: [please send to registration if not already done]  Verbal Release Auth: signed 5/25/21 -- May speak w/ Femi Ngo, ; May leave voicemail on home phone  Lab Contact Info: [facility]  *Will call once monthly or if INR out of range*    Patient Findings  Positives:  Change in health, Change in medications   Negatives:  Signs/symptoms of thrombosis, Signs/symptoms  of bleeding, Laboratory test error suspected, Change in alcohol use, Change in activity, Upcoming invasive procedure, Emergency department visit, Upcoming dental procedure, Missed doses, Extra doses, Change in diet/appetite, Hospital admission, Bruising, Other complaints   Comments:  Ms. Ngo tested positive for Covid19 today.   She stated that she feels fine though her chest feels tight.  She has asthma and her PCP started her on azithromycin x 5 days and dexamethasone 6 mg oral daily x 7 days.   Reviewed potential DDI with patient - azithromycin: may result in an increased INR (delayed effect).  Dexamethasone: may result in an increased INR or diminished effects of warfarin.  As her INR is close to LLN, will not empirically adjust warfarin dosing.    Follow up with Dr. Davis 1/21/22.  No changes noted  Otherwise, denies changes     Plan:    1. INR is therapeutic today at 2.1. Instructed Ms. Ngo to continue warfarin 5 mg oral daily except for 2.5 mg on MonWedFri until recheck (27.5 mg/week)  2. Repeat INR on Monday, 1/31 to ensure wnl  3. Verbal information provided over the phone. Patient RBV dosing instructions, expresses understanding by teach back, and has no further questions at this time.  4. Marianela Ngo understands the importance of calling the [facility] if she notices any s/sx of bleeding, stroke, or abnormal bruising, if any changes are made to her medications or medication doses (Rx, OTC, herbal), or if any upcoming procedures are scheduled. Marianela Ngo will likewise let us know if any other changes, questions, or concerns arise regarding anticoagulation therapy. she understands the importance of seeking medical attention immediately if she experiences any falls, vehicle accidents, or abnormal bleeding or bruising. Marianela Ngo voiced understanding of this information and confirms that she has the [facility]'s contact information. Otherwise, we will plan to  contact the patient once monthly or if her INR is out of range.    Radha Orlando, PharmD  1/26/2022  12:27 EST

## 2022-01-31 ENCOUNTER — ANTICOAGULATION VISIT (OUTPATIENT)
Dept: PHARMACY | Facility: HOSPITAL | Age: 77
End: 2022-01-31

## 2022-01-31 DIAGNOSIS — I48.0 PAROXYSMAL ATRIAL FIBRILLATION: Primary | ICD-10-CM

## 2022-01-31 LAB — INR PPP: 3.8

## 2022-01-31 NOTE — PROGRESS NOTES
Anticoagulation Clinic - Remote Progress Note  mdINR Home Monitor  Testing frequency: weekly    Indication: paroxysmal afib  Referring Provider: Susan  Initial Warfarin Start Date: 2020? 2019?  Goal INR: 2.0-3.0  Current Drug Interactions:   [id number]: 4 (Age ?74, Sex, HTN) [estimated 5/20/21]  Bleed Risk: self reports negative history for GI bleed  Other: DOAC too expensive    Diet: green beans, peas, zucchini, or priyanka salads 2-3x/week (12/9/21)  Alcohol: none  Tobacco: none  OTC Pain Medication: APAP only    INR History:  Date 5/14 5/18 5/25 6/1 6/15 7/6 7/12 7/22 7/28 8/4 8/11 8/18 8/23 8/27   Total WeeklyDose  22.5mg 25mg 25mg 25mg 25mg 25mg 25mg 25mg 25mg 25mg 27.5mg 27.5mg 27.5mg   INR 3.5 1.9 2.3 2.3 2.8 2.7 2.1 2.2 1.9 2.0 1.8 2.3 2.2 1.8   Notes [place] Cards 1x hold; incr GLV     HM  incr GLV  no GLV incr GLV cefdinir cefdinir     Date 9/1 9/8 9/15 9/22 9/29 10/6 10/13 10/20 10/27 11/3 11/10 11/17 11/24 12/1   Total WeeklyDose 27.5mg 27.5mg 27.5mg 27.5mg 27.5mg 27.5mg 27.5mg 30mg 27.5mg 27.5mg 27.5mg 25mg 27.5mg 27.5mg   INR 2.1 2.3 3.3 2.3 2.2 2.0 1.9 2.6 3.2 2.4 3.4 1.6 2.4 2.3   Notes cefdinir  decr GLV?  keflex   1x incr dose   decr GLV? 1x decr dose;   incr GLV?       Date 12/9 12/15 12/22 12/29 1/5/22 1/12 1/20 1/26 1/31       Total WeeklyDose 27.5mg 27.5mg 27.5mg 27.5mg 27.5mg 27.5 mg 27.5 mg 27.5 mg 27.5mg       INR 2.5 2.4 2.7 2.5 2.6 2.2 2.5 2.1 3.8       Notes call    call   COVID+ Dex/azith         Phone Interview:  Tablet Strength: 5mg (1/5/22)  Patient Contact Info: [phone] (home) preferred; [phone] (cell)  Estimated OOP cost: [please send to registration if not already done]  Verbal Release Auth: signed 5/25/21 -- May speak w/ Femi Ngo, ; May leave voicemail on home phone  Lab Contact Info: [facility]  *Will call once monthly or if INR out of range*    Patient Findings  Positives:  Change in health, Change in medications   Negatives:  Signs/symptoms  of thrombosis, Signs/symptoms of bleeding, Laboratory test error suspected, Change in alcohol use, Change in activity, Upcoming invasive procedure, Emergency department visit, Upcoming dental procedure, Missed doses, Extra doses, Change in diet/appetite, Hospital admission, Bruising, Other complaints   Comments:  Ms. Ngo tested positive for Covid19 1/25   PCP started her on azithromycin x 5 days and dexamethasone 6 mg oral daily x 7 days.   Reviewed potential DDI with patient - azithromycin: may result in an increased INR (delayed effect).  Dexamethasone: may result in an increased INR or diminished effects of warfarin.     Report she still doesn't feel well, fatigue. Eating normally. Finished abx/steroid         Plan:    1. INR is SUPRAtherapeutic today at 3.8. Instructed Ms. Ngo to HOLD tonight's dose then continue warfarin 5 mg oral daily except for 2.5 mg on MonWedFri until recheck (27.5 mg/week)  2. Repeat INR on  Thursday to see if additional decrease needed  3. Verbal information provided over the phone. Patient RBV dosing instructions, expresses understanding by teach back, and has no further questions at this time.  4. Marianela Ngo understands the importance of calling the [facility] if she notices any s/sx of bleeding, stroke, or abnormal bruising, if any changes are made to her medications or medication doses (Rx, OTC, herbal), or if any upcoming procedures are scheduled. Marianela Ngo will likewise let us know if any other changes, questions, or concerns arise regarding anticoagulation therapy. she understands the importance of seeking medical attention immediately if she experiences any falls, vehicle accidents, or abnormal bleeding or bruising. Marianela Ngo voiced understanding of this information and confirms that she has the [facility]'s contact information. Otherwise, we will plan to contact the patient once monthly or if her INR is out of range.      Divya Jaffe, PharmD.  01/31/22   08:54 EST

## 2022-02-03 ENCOUNTER — ANTICOAGULATION VISIT (OUTPATIENT)
Dept: PHARMACY | Facility: HOSPITAL | Age: 77
End: 2022-02-03

## 2022-02-03 DIAGNOSIS — I48.0 PAROXYSMAL ATRIAL FIBRILLATION: Primary | ICD-10-CM

## 2022-02-03 LAB — INR PPP: 2.6

## 2022-02-03 NOTE — PROGRESS NOTES
Anticoagulation Clinic - Remote Progress Note  mdINR Home Monitor  Testing frequency: weekly    Indication: paroxysmal afib  Referring Provider: Susan  Initial Warfarin Start Date: 2020? 2019?  Goal INR: 2.0-3.0  Current Drug Interactions:   [id number]: 4 (Age ?74, Sex, HTN) [estimated 5/20/21]  Bleed Risk: self reports negative history for GI bleed  Other: DOAC too expensive    Diet: green beans, peas, zucchini, or priyanka salads 2-3x/week (12/9/21)  Alcohol: none  Tobacco: none  OTC Pain Medication: APAP only    INR History:  Date 5/14 5/18 5/25 6/1 6/15 7/6 7/12 7/22 7/28 8/4 8/11 8/18 8/23 8/27   Total WeeklyDose  22.5mg 25mg 25mg 25mg 25mg 25mg 25mg 25mg 25mg 25mg 27.5mg 27.5mg 27.5mg   INR 3.5 1.9 2.3 2.3 2.8 2.7 2.1 2.2 1.9 2.0 1.8 2.3 2.2 1.8   Notes [place] Cards 1x hold; incr GLV     HM  incr GLV  no GLV incr GLV cefdinir cefdinir     Date 9/1 9/8 9/15 9/22 9/29 10/6 10/13 10/20 10/27 11/3 11/10 11/17 11/24 12/1   Total WeeklyDose 27.5mg 27.5mg 27.5mg 27.5mg 27.5mg 27.5mg 27.5mg 30mg 27.5mg 27.5mg 27.5mg 25mg 27.5mg 27.5mg   INR 2.1 2.3 3.3 2.3 2.2 2.0 1.9 2.6 3.2 2.4 3.4 1.6 2.4 2.3   Notes cefdinir  decr GLV?  keflex   1x incr dose   decr GLV? 1x decr dose;   incr GLV?       Date 12/9 12/15 12/22 12/29 1/5/22 1/12 1/20 1/26 1/31 2/3      Total WeeklyDose 27.5mg 27.5mg 27.5mg 27.5mg 27.5mg 27.5 mg 27.5 mg 27.5 mg 27.5mg 25mg      INR 2.5 2.4 2.7 2.5 2.6 2.2 2.5 2.1 3.8 2.6      Notes call    call   COVID+ Dex/azith 1x hold        Phone Interview:  Tablet Strength: 5mg (1/5/22)  Patient Contact Info: [phone] (home) preferred; [phone] (cell)  Estimated OOP cost: [please send to registration if not already done]  Verbal Release Auth: signed 5/25/21 -- May speak w/ Femi Ngo, ; May leave voicemail on home phone  Lab Contact Info: [facility]  *Will call once monthly or if INR out of range*    Patient Findings      Negatives:  Signs/symptoms of thrombosis, Signs/symptoms of  bleeding, Laboratory test error suspected, Change in health, Change in alcohol use, Change in activity, Upcoming invasive procedure, Emergency department visit, Upcoming dental procedure, Missed doses, Extra doses, Change in medications, Change in diet/appetite, Hospital admission, Bruising, Other complaints    Might take a dose of Equate allergy/sinus       Plan:    1. INR is therapeutic today at 2.6. Instructed Ms. Ngo to continue warfarin 5 mg oral daily except for 2.5 mg on MonWedFri until recheck (27.5 mg/week)  2. Repeat INR in 1 week  3. Verbal information provided over the phone. Patient RBV dosing instructions, expresses understanding by teach back, and has no further questions at this time.  4. Marianela Ngo understands the importance of calling the [facility] if she notices any s/sx of bleeding, stroke, or abnormal bruising, if any changes are made to her medications or medication doses (Rx, OTC, herbal), or if any upcoming procedures are scheduled. Marianela Ngo will likewise let us know if any other changes, questions, or concerns arise regarding anticoagulation therapy. she understands the importance of seeking medical attention immediately if she experiences any falls, vehicle accidents, or abnormal bleeding or bruising. Marianela Ngo voiced understanding of this information and confirms that she has the [facility]'s contact information. Otherwise, we will plan to contact the patient once monthly or if her INR is out of range.    Divya Jaffe, InaD.  02/03/22   09:08 EST

## 2022-02-07 ENCOUNTER — ANTICOAGULATION VISIT (OUTPATIENT)
Dept: PHARMACY | Facility: HOSPITAL | Age: 77
End: 2022-02-07

## 2022-02-07 DIAGNOSIS — I48.0 PAROXYSMAL ATRIAL FIBRILLATION: Primary | ICD-10-CM

## 2022-02-07 LAB — INR PPP: 4.1

## 2022-02-07 NOTE — PROGRESS NOTES
Anticoagulation Clinic - Remote Progress Note  mdINR Home Monitor  Testing frequency: weekly    Indication: paroxysmal afib  Referring Provider: Susan  Initial Warfarin Start Date: 2020? 2019?  Goal INR: 2.0-3.0  Current Drug Interactions:   [id number]: 4 (Age ?74, Sex, HTN) [estimated 5/20/21]  Bleed Risk: self reports negative history for GI bleed  Other: DOAC too expensive    Diet: green beans, peas, zucchini, or priyanka salads 2-3x/week (12/9/21)  Alcohol: none  Tobacco: none  OTC Pain Medication: APAP only    INR History:  Date 5/14 5/18 5/25 6/1 6/15 7/6 7/12 7/22 7/28 8/4 8/11 8/18 8/23 8/27   Total WeeklyDose  22.5mg 25mg 25mg 25mg 25mg 25mg 25mg 25mg 25mg 25mg 27.5mg 27.5mg 27.5mg   INR 3.5 1.9 2.3 2.3 2.8 2.7 2.1 2.2 1.9 2.0 1.8 2.3 2.2 1.8   Notes [place] Cards 1x hold; incr GLV     HM  incr GLV  no GLV incr GLV cefdinir cefdinir     Date 9/1 9/8 9/15 9/22 9/29 10/6 10/13 10/20 10/27 11/3 11/10 11/17 11/24 12/1   Total WeeklyDose 27.5mg 27.5mg 27.5mg 27.5mg 27.5mg 27.5mg 27.5mg 30mg 27.5mg 27.5mg 27.5mg 25mg 27.5mg 27.5mg   INR 2.1 2.3 3.3 2.3 2.2 2.0 1.9 2.6 3.2 2.4 3.4 1.6 2.4 2.3   Notes cefdinir  decr GLV?  keflex   1x incr dose   decr GLV? 1x decr dose;   incr GLV?       Date 12/9 12/15 12/22 12/29 1/5/22 1/12 1/20 1/26 1/31 2/3 2/7     Total WeeklyDose 27.5mg 27.5mg 27.5mg 27.5mg 27.5mg 27.5 mg 27.5 mg 27.5 mg 27.5mg 25mg 25 mg     INR 2.5 2.4 2.7 2.5 2.6 2.2 2.5 2.1 3.8 2.6 4.1     Notes call    call   COVID+ dex / azith 1x hold pred 20 BID x5d        Phone Interview:  Tablet Strength: 5mg (1/5/22)  Patient Contact Info: [phone] (home) preferred; [phone] (cell)  Estimated OOP cost: [please send to registration if not already done]  Verbal Release Auth: signed 5/25/21 -- May speak w/ Femi Ngo, ; May leave voicemail on home phone  Lab Contact Info: [facility]  *Will call once monthly or if INR out of range*    Patient Findings  Positives:  Change in health,  Emergency department visit, Change in medications   Negatives:  Signs/symptoms of thrombosis, Signs/symptoms of bleeding, Laboratory test error suspected, Change in alcohol use, Change in activity, Upcoming invasive procedure, Upcoming dental procedure, Missed doses, Extra doses, Change in diet/appetite, Hospital admission, Bruising, Other complaints   Comments:  Patient reports [place] [facility] [place] c/o afib and breathing issues. She reports she was given Prednisone 20mg BID x5 days. Should be due to take last dose tomorrow, 2/8. Otherwise feels she has been consistent with GLV intake and denies other findings.     [place] [facility]       Plan:  1. INR is SUPRAtherapeutic today at 4.1. Per Divya Jaffe, PharmD, instructed Ms. Ngo to HOLD tonight's dose, DECREASE tomorrow, 2/8, to 2.5mg, and then continue warfarin 5mg oral daily except for 2.5 mg on MonWedFri until recheck (27.5mg/week). She will continue with usual GLV intake  2. Repeat INR in Thurs, 2/10, to ensure WNL  3. Verbal information provided over the phone. Patient RBV dosing instructions, expresses understanding by teach back, and has no further questions at this time.  4. Marianela Ngo understands the importance of calling the [facility] if she notices any s/sx of bleeding, stroke, or abnormal bruising, if any changes are made to her medications or medication doses (Rx, OTC, herbal), or if any upcoming procedures are scheduled. Marianela Ngo will likewise let us know if any other changes, questions, or concerns arise regarding anticoagulation therapy. she understands the importance of seeking medical attention immediately if she experiences any falls, vehicle accidents, or abnormal bleeding or bruising. Marianela Ngo voiced understanding of this information and confirms that she has the [facility]'s contact information. Otherwise, we will plan to contact the patient once  monthly or if her INR is out of range.    Saravanan Mccarthy, Daljit  2/7/2022  10:59 EST    I, Saravanan Greenberg, PharmD, have reviewed the note in full and agree with the assessment and plan.  02/07/22  14:15 EST

## 2022-02-10 ENCOUNTER — ANTICOAGULATION VISIT (OUTPATIENT)
Dept: PHARMACY | Facility: HOSPITAL | Age: 77
End: 2022-02-10

## 2022-02-10 DIAGNOSIS — I48.0 PAROXYSMAL ATRIAL FIBRILLATION: Primary | ICD-10-CM

## 2022-02-10 LAB — INR PPP: 2.5

## 2022-02-10 NOTE — PROGRESS NOTES
Anticoagulation Clinic - Remote Progress Note  mdINR Home Monitor  Testing frequency: weekly    Indication: paroxysmal afib  Referring Provider: Susan  Initial Warfarin Start Date: 2020? 2019?  Goal INR: 2.0-3.0  Current Drug Interactions:   [id number]: 4 (Age ?74, Sex, HTN) [estimated 5/20/21]  Bleed Risk: self reports negative history for GI bleed  Other: DOAC too expensive    Diet: green beans, peas, zucchini, or priyanka salads 2-3x/week (2/10/22)  Alcohol: none  Tobacco: none  OTC Pain Medication: APAP only    INR History:  Date 5/14 5/18 5/25 6/1 6/15 7/6 7/12 7/22 7/28 8/4 8/11 8/18 8/23 8/27   Total WeeklyDose  22.5mg 25mg 25mg 25mg 25mg 25mg 25mg 25mg 25mg 25mg 27.5mg 27.5mg 27.5mg   INR 3.5 1.9 2.3 2.3 2.8 2.7 2.1 2.2 1.9 2.0 1.8 2.3 2.2 1.8   Notes [place] Cards 1x hold; incr GLV     HM  incr GLV  no GLV incr GLV cefdinir cefdinir     Date 9/1 9/8 9/15 9/22 9/29 10/6 10/13 10/20 10/27 11/3 11/10 11/17 11/24 12/1   Total WeeklyDose 27.5mg 27.5mg 27.5mg 27.5mg 27.5mg 27.5mg 27.5mg 30mg 27.5mg 27.5mg 27.5mg 25mg 27.5mg 27.5mg   INR 2.1 2.3 3.3 2.3 2.2 2.0 1.9 2.6 3.2 2.4 3.4 1.6 2.4 2.3   Notes cefdinir  decr GLV?  keflex   1x incr dose   decr GLV? 1x decr dose;   incr GLV?       Date 12/9 12/15 12/22 12/29 1/5/22 1/12 1/20 1/26 1/31 2/3 2/7 2/10    Total WeeklyDose 27.5mg 27.5mg 27.5mg 27.5mg 27.5mg 27.5 mg 27.5 mg 27.5 mg 27.5mg 25mg 25 mg 22.5 mg    INR 2.5 2.4 2.7 2.5 2.6 2.2 2.5 2.1 3.8 2.6 4.1 2.5    Notes call    call   COVID+ dex / azith 1x hold pred 20 BID x5d        Phone Interview:  Tablet Strength: 5mg (1/5/22)  Patient Contact Info: [phone] (home) preferred; [phone] (cell)  Estimated OOP cost: [please send to registration if not already done]  Verbal Release Auth: signed 5/25/21 -- May speak w/ Femi Ngo, ; May leave voicemail on home phone  Lab Contact Info: [facility]  *Will call once monthly or if INR out of range*    Patient Findings:  Negatives:   Signs/symptoms of thrombosis, Signs/symptoms of bleeding, Laboratory test error suspected, Change in health, Change in alcohol use, Change in activity, Upcoming invasive procedure, Emergency department visit, Upcoming dental procedure, Missed doses, Extra doses, Change in medications, Change in diet/appetite, Hospital admission, Bruising, Other complaints   Comments:  Ms. Ngo verified she has finished prednisone. She denies any other changes. States she is feeling much better compared to last week.     Plan:  1. INR is back WNL today at 2.5. Per Divya Jaffe, PharmD, instructed Ms. Ngo to resume warfarin 5 mg oral daily except for 2.5 mg on MonWedFri until recheck (27.5 mg/week).   2. Repeat INR in one week, 2/17/22.  3. Verbal information provided over the phone. Patient RBV dosing instructions, expresses understanding by teach back, and has no further questions at this time.  4. Marianela Ngo understands the importance of calling the [facility] if she notices any s/sx of bleeding, stroke, or abnormal bruising, if any changes are made to her medications or medication doses (Rx, OTC, herbal), or if any upcoming procedures are scheduled. Marianela Ngo will likewise let us know if any other changes, questions, or concerns arise regarding anticoagulation therapy. she understands the importance of seeking medical attention immediately if she experiences any falls, vehicle accidents, or abnormal bleeding or bruising. Marianela Ngo voiced understanding of this information and confirms that she has the [facility]'s contact information. Otherwise, we will plan to contact the patient once monthly or if her INR is out of range.    Sarah Alejandre CPhT  2/10/2022  10:03 EST     I, Fady You, InaD, have reviewed the note in full and agree with the assessment and plan.  02/10/22  14:31 EST

## 2022-02-16 ENCOUNTER — ANTICOAGULATION VISIT (OUTPATIENT)
Dept: PHARMACY | Facility: HOSPITAL | Age: 77
End: 2022-02-16

## 2022-02-16 DIAGNOSIS — I48.0 PAROXYSMAL ATRIAL FIBRILLATION: Primary | ICD-10-CM

## 2022-02-16 LAB — INR PPP: 2.7

## 2022-02-16 NOTE — PROGRESS NOTES
Anticoagulation Clinic - Remote Progress Note  mdINR Home Monitor  Testing frequency: weekly    Indication: paroxysmal afib  Referring Provider: Susan  Initial Warfarin Start Date: 2020? 2019?  Goal INR: 2.0-3.0  Current Drug Interactions:   [id number]: 4 (Age ?74, Sex, HTN) [estimated 5/20/21]  Bleed Risk: self reports negative history for GI bleed  Other: DOAC too expensive    Diet: green beans, peas, zucchini, or priyanka salads 2-3x/week (2/10/22)  Alcohol: none  Tobacco: none  OTC Pain Medication: APAP only    INR History:  Date 5/14 5/18 5/25 6/1 6/15 7/6 7/12 7/22 7/28 8/4 8/11 8/18 8/23 8/27   Total WeeklyDose  22.5mg 25mg 25mg 25mg 25mg 25mg 25mg 25mg 25mg 25mg 27.5mg 27.5mg 27.5mg   INR 3.5 1.9 2.3 2.3 2.8 2.7 2.1 2.2 1.9 2.0 1.8 2.3 2.2 1.8   Notes [place] Cards 1x hold; incr GLV     HM  incr GLV  no GLV incr GLV cefdinir cefdinir     Date 9/1 9/8 9/15 9/22 9/29 10/6 10/13 10/20 10/27 11/3 11/10 11/17 11/24 12/1   Total WeeklyDose 27.5mg 27.5mg 27.5mg 27.5mg 27.5mg 27.5mg 27.5mg 30mg 27.5mg 27.5mg 27.5mg 25mg 27.5mg 27.5mg   INR 2.1 2.3 3.3 2.3 2.2 2.0 1.9 2.6 3.2 2.4 3.4 1.6 2.4 2.3   Notes cefdinir  decr GLV?  keflex   1x incr dose   decr GLV? 1x decr dose;   incr GLV?       Date 12/9 12/15 12/22 12/29 1/5/22 1/12 1/20 1/26 1/31 2/3 2/7 2/10 2/16    Total WeeklyDose 27.5mg 27.5mg 27.5mg 27.5mg 27.5mg 27.5mg 27.5mg 27.5mg 27.5mg 25mg 25mg 22.5mg 27.5mg 27.5mg   INR 2.5 2.4 2.7 2.5 2.6 2.2 2.5 2.1 3.8 2.6 4.1 2.5 2.7    Notes call    call   COVID+ dex / azith 1x hold pred 20 BID x5d  1x hold; 1x decr dose;   call call call     Phone Interview:  Tablet Strength: 5mg (1/5/22)  Patient Contact Info: [phone] (home) preferred; [phone] (cell)  Estimated OOP cost: [please send to registration if not already done]  Verbal Release Auth: signed 5/25/21 -- May speak w/ Femi Ngo, ; May leave voicemail on home phone  Lab Contact Info: [facility]  *Will call once monthly or if INR  out of range*    Patient Findings  Negatives:  Signs/symptoms of thrombosis, Signs/symptoms of bleeding, Laboratory test error suspected, Change in health, Change in alcohol use, Change in activity, Upcoming invasive procedure, Emergency department visit, Upcoming dental procedure, Missed doses, Extra doses, Change in medications, Change in diet/appetite, Hospital admission, Bruising, Other complaints       Plan:  1. INR is therapeutic today at 2.7. Instructed Ms. Ngo to continue warfarin 5mg oral daily except for 2.5mg on MonWedFri until recheck (27.5mg/week).   2. Repeat INR in one week to ensure WNL  3. Verbal information provided over the phone. Patient RBV dosing instructions, expresses understanding by teach back, and has no further questions at this time.  4. Marianela Ngo understands the importance of calling the [facility] if she notices any s/sx of bleeding, stroke, or abnormal bruising, if any changes are made to her medications or medication doses (Rx, OTC, herbal), or if any upcoming procedures are scheduled. Marianela Ngo will likewise let us know if any other changes, questions, or concerns arise regarding anticoagulation therapy. she understands the importance of seeking medical attention immediately if she experiences any falls, vehicle accidents, or abnormal bleeding or bruising. Marianela Ngo voiced understanding of this information and confirms that she has the [facility]'s contact information. Otherwise, we will plan to contact the patient once monthly or if her INR is out of range.    Saravanan Mccarthy, Daljit  2/16/2022  09:36 EST    Divya HAQUE, PharmD, have reviewed the note in full and agree with the assessment and plan.  02/16/22  15:07 EST

## 2022-02-18 ENCOUNTER — TELEPHONE (OUTPATIENT)
Dept: CARDIOLOGY | Facility: CLINIC | Age: 77
End: 2022-02-18

## 2022-02-22 DIAGNOSIS — I48.0 PAROXYSMAL ATRIAL FIBRILLATION: Primary | ICD-10-CM

## 2022-02-22 DIAGNOSIS — E78.5 HYPERLIPIDEMIA LDL GOAL <100: ICD-10-CM

## 2022-02-23 ENCOUNTER — ANTICOAGULATION VISIT (OUTPATIENT)
Dept: PHARMACY | Facility: HOSPITAL | Age: 77
End: 2022-02-23

## 2022-02-23 DIAGNOSIS — I48.0 PAROXYSMAL ATRIAL FIBRILLATION: Primary | ICD-10-CM

## 2022-02-23 LAB — INR PPP: 3.5

## 2022-02-23 NOTE — PROGRESS NOTES
Anticoagulation Clinic - Remote Progress Note  mdINR Home Monitor  Testing frequency: weekly    Indication: paroxysmal afib  Referring Provider: Susan  Initial Warfarin Start Date: 2020? 2019?  Goal INR: 2.0-3.0  Current Drug Interactions:    [id number]: 4 (Age ?74, Sex, HTN) [estimated 5/20/21]  Bleed Risk: self reports negative history for GI bleed  Other: DOAC too expensive    Diet: green beans, peas, zucchini, or priyanka salads 2-3x/week (2/10/22)  Alcohol: none  Tobacco: none  OTC Pain Medication: APAP only    INR History:  Date 5/14 5/18 5/25 6/1 6/15 7/6 7/12 7/22 7/28 8/4 8/11 8/18 8/23 8/27   Total WeeklyDose  22.5mg 25mg 25mg 25mg 25mg 25mg 25mg 25mg 25mg 25mg 27.5mg 27.5mg 27.5mg   INR 3.5 1.9 2.3 2.3 2.8 2.7 2.1 2.2 1.9 2.0 1.8 2.3 2.2 1.8   Notes [place] Cards 1x hold; incr GLV     HM  incr GLV  no GLV incr GLV cefdinir cefdinir     Date 9/1 9/8 9/15 9/22 9/29 10/6 10/13 10/20 10/27 11/3 11/10 11/17 11/24 12/1   Total WeeklyDose 27.5mg 27.5mg 27.5mg 27.5mg 27.5mg 27.5mg 27.5mg 30mg 27.5mg 27.5mg 27.5mg 25mg 27.5mg 27.5mg   INR 2.1 2.3 3.3 2.3 2.2 2.0 1.9 2.6 3.2 2.4 3.4 1.6 2.4 2.3   Notes cefdinir  decr GLV?  keflex   1x incr dose   decr GLV? 1x decr dose;   incr GLV?       Date 12/9 12/15 12/22 12/29 1/5/22 1/12 1/20 1/26 1/31 2/3 2/7 2/10 2/16 2/23   Total WeeklyDose 27.5mg 27.5mg 27.5mg 27.5mg 27.5mg 27.5mg 27.5mg 27.5mg 27.5mg 25mg 25mg 22.5mg 27.5mg 27.5mg   INR 2.5 2.4 2.7 2.5 2.6 2.2 2.5 2.1 3.8 2.6 4.1 2.5 2.7 3.5   Notes call    call   COVID+ dex / azith 1x hold pred 20 BID x5d  1x hold; 1x decr dose;   call call call     Date               Total WeeklyDose 25mg              INR               Notes 1x decr dose                  Phone Interview:  Tablet Strength: 5mg (1/5/22)  Patient Contact Info: [phone] (home) preferred; [phone] (cell)  Estimated OOP cost: [please send to registration if not already done]  Verbal Release Auth: signed 5/25/21 -- May speak w/ Femi Ngo  ; May leave voicemail on home phone  Lab Contact Info: [facility]  *Will call once monthly or if INR out of range*    Patient Findings  Negatives:  Signs/symptoms of thrombosis, Signs/symptoms of bleeding, Laboratory test error suspected, Change in health, Change in alcohol use, Change in activity, Upcoming invasive procedure, Emergency department visit, Upcoming dental procedure, Missed doses, Extra doses, Change in medications, Change in diet/appetite, Hospital admission, Bruising, Other complaints   Comments:  Unable to determine cause for SUPRAtherapeutic INR. Patient feels she has been consistent with GLV intake and denies other findings. Does not feel its possible she took extra dose of warfarin.     Of note, starting 3/1/22, she will switch from budesonide nebs to Trelegy inhaler. No interaction noted in [employer].     Plan:  1. INR is SUPRAtherapeutic today at 3.5 (goal 2.0-3.0). Significant increase from previous encounter. Instructed Ms. Ngo to eat an additional serving of GLV tonight, DECREASE tomorrow, 2/24, dose to 2.5mg, and then continue warfarin 5mg oral daily except for 2.5mg on MonWedFri until recheck (27.5mg/week).   2. Repeat INR in one week to ensure WNL  3. Verbal information provided over the phone. Patient RBV dosing instructions, expresses understanding by teach back, and has no further questions at this time.  4. Marianela Ngo understands the importance of calling the [facility] if she notices any s/sx of bleeding, stroke, or abnormal bruising, if any changes are made to her medications or medication doses (Rx, OTC, herbal), or if any upcoming procedures are scheduled. Marianela Ngo will likewise let us know if any other changes, questions, or concerns arise regarding anticoagulation therapy. she understands the importance of seeking medical attention immediately if she experiences any falls, vehicle accidents, or abnormal bleeding or bruising. Marianela  Huber voiced understanding of this information and confirms that she has the [facility]'s contact information. Otherwise, we will plan to contact the patient once monthly or if her INR is out of range.    Saravanan Mccarthy, Daljit  2/23/2022  10:36 Jayla MORAES, PharmD, have reviewed the note in full and agree with the assessment and plan.  02/23/22  12:04 EST

## 2022-03-02 ENCOUNTER — ANTICOAGULATION VISIT (OUTPATIENT)
Dept: PHARMACY | Facility: HOSPITAL | Age: 77
End: 2022-03-02

## 2022-03-02 DIAGNOSIS — I48.0 PAROXYSMAL ATRIAL FIBRILLATION: Primary | ICD-10-CM

## 2022-03-02 LAB — INR PPP: 2.5

## 2022-03-02 NOTE — PROGRESS NOTES
Anticoagulation Clinic - Remote Progress Note  mdINR Home Monitor  Testing frequency: weekly    Indication: paroxysmal afib  Referring Provider: Susan  Initial Warfarin Start Date: 2020? 2019?  Goal INR: 2.0-3.0  Current Drug Interactions:    [id number]: 4 (Age ?74, Sex, HTN) [estimated 5/20/21]  Bleed Risk: self reports negative history for GI bleed  Other: DOAC too expensive    Diet: green beans, peas, zucchini, or priyanka salads 2-3x/week (2/10/22)  Alcohol: none  Tobacco: none  OTC Pain Medication: APAP only    INR History:  Date 5/14 5/18 5/25 6/1 6/15 7/6 7/12 7/22 7/28 8/4 8/11 8/18 8/23 8/27   Total WeeklyDose  22.5mg 25mg 25mg 25mg 25mg 25mg 25mg 25mg 25mg 25mg 27.5mg 27.5mg 27.5mg   INR 3.5 1.9 2.3 2.3 2.8 2.7 2.1 2.2 1.9 2.0 1.8 2.3 2.2 1.8   Notes [place] Cards 1x hold; incr GLV     HM  incr GLV  no GLV incr GLV cefdinir cefdinir     Date 9/1 9/8 9/15 9/22 9/29 10/6 10/13 10/20 10/27 11/3 11/10 11/17 11/24 12/1   Total WeeklyDose 27.5mg 27.5mg 27.5mg 27.5mg 27.5mg 27.5mg 27.5mg 30mg 27.5mg 27.5mg 27.5mg 25mg 27.5mg 27.5mg   INR 2.1 2.3 3.3 2.3 2.2 2.0 1.9 2.6 3.2 2.4 3.4 1.6 2.4 2.3   Notes cefdinir  decr GLV?  keflex   1x incr dose   decr GLV? 1x decr dose;   incr GLV?       Date 12/9 12/15 12/22 12/29 1/5/22 1/12 1/20 1/26 1/31 2/3 2/7 2/10 2/16 2/23   Total WeeklyDose 27.5mg 27.5mg 27.5mg 27.5mg 27.5mg 27.5mg 27.5mg 27.5mg 27.5mg 25mg 25mg 22.5mg 27.5mg 27.5mg   INR 2.5 2.4 2.7 2.5 2.6 2.2 2.5 2.1 3.8 2.6 4.1 2.5 2.7 3.5   Notes call    call   COVID+ dex / azith 1x hold pred 20 BID x5d  1x hold; 1x decr dose;   call call call     Date 3/2              Total WeeklyDose 25mg 27.5mg             INR 2.5              Notes 1x decr dose; call call                 Phone Interview:  Tablet Strength: 5mg (1/5/22)  Patient Contact Info: [phone] (home) preferred; [phone] (cell)  Estimated OOP cost: [please send to registration if not already done]  Verbal Release Auth: signed 5/25/21 -- May speak  w/ Femi Ngo, ; May leave voicemail on home phone  Lab Contact Info: [facility]  *Will call once monthly or if INR out of range*    Patient Findings  Negatives:  Signs/symptoms of thrombosis, Signs/symptoms of bleeding, Laboratory test error suspected, Change in health, Change in alcohol use, Change in activity, Upcoming invasive procedure, Emergency department visit, Upcoming dental procedure, Missed doses, Extra doses, Change in medications, Change in diet/appetite, Hospital admission, Bruising, Other complaints     Plan:  1. INR is therapeutic and back WNL today at 2.5 (goal 2.0-3.0). Notable decrease from previous encounter. Instructed Ms. Ngo to continue warfarin 5mg oral daily except for 2.5mg on MonWedFri until recheck (27.5mg/week).   2. Repeat INR in one week, 3/9, to ensure WNL  3. Verbal information provided over the phone. Patient RBV dosing instructions, expresses understanding by teach back, and has no further questions at this time.  4. Marianela Ngo understands the importance of calling the [facility] if she notices any s/sx of bleeding, stroke, or abnormal bruising, if any changes are made to her medications or medication doses (Rx, OTC, herbal), or if any upcoming procedures are scheduled. Marianela Ngo will likewise let us know if any other changes, questions, or concerns arise regarding anticoagulation therapy. she understands the importance of seeking medical attention immediately if she experiences any falls, vehicle accidents, or abnormal bleeding or bruising. Marianela Ngo voiced understanding of this information and confirms that she has the [facility]'s contact information. Otherwise, we will plan to contact the patient once monthly or if her INR is out of range.    Saravanan Mccarthy, Daljit  3/2/2022  12:19 EST     I, Fady You, PharmD, have reviewed the note in full and agree with the assessment and plan.  03/02/22  14:58  EST

## 2022-03-09 ENCOUNTER — ANTICOAGULATION VISIT (OUTPATIENT)
Dept: PHARMACY | Facility: HOSPITAL | Age: 77
End: 2022-03-09

## 2022-03-09 DIAGNOSIS — I48.0 PAROXYSMAL ATRIAL FIBRILLATION: Primary | ICD-10-CM

## 2022-03-09 LAB — INR PPP: 2.9

## 2022-03-09 NOTE — PROGRESS NOTES
Anticoagulation Clinic - Remote Progress Note  mdINR Home Monitor  Testing frequency: weekly    Indication: paroxysmal afib  Referring Provider: Susan  Initial Warfarin Start Date: 2020? 2019?  Goal INR: 2.0-3.0  Current Drug Interactions:    [id number]: 4 (Age ?74, Sex, HTN) [estimated 5/20/21]  Bleed Risk: self reports negative history for GI bleed  Other: DOAC too expensive    Diet: green beans, peas, zucchini, or priyanka salads 2-3x/week (2/10/22)  Alcohol: none  Tobacco: none  OTC Pain Medication: APAP only    INR History:  Date 5/14 5/18 5/25 6/1 6/15 7/6 7/12 7/22 7/28 8/4 8/11 8/18 8/23 8/27   Total WeeklyDose  22.5mg 25mg 25mg 25mg 25mg 25mg 25mg 25mg 25mg 25mg 27.5mg 27.5mg 27.5mg   INR 3.5 1.9 2.3 2.3 2.8 2.7 2.1 2.2 1.9 2.0 1.8 2.3 2.2 1.8   Notes [place] Cards 1x hold; incr GLV     HM  incr GLV  no GLV incr GLV cefdinir cefdinir     Date 9/1 9/8 9/15 9/22 9/29 10/6 10/13 10/20 10/27 11/3 11/10 11/17 11/24 12/1   Total WeeklyDose 27.5mg 27.5mg 27.5mg 27.5mg 27.5mg 27.5mg 27.5mg 30mg 27.5mg 27.5mg 27.5mg 25mg 27.5mg 27.5mg   INR 2.1 2.3 3.3 2.3 2.2 2.0 1.9 2.6 3.2 2.4 3.4 1.6 2.4 2.3   Notes cefdinir  decr GLV?  keflex   1x incr dose   decr GLV? 1x decr dose;   incr GLV?       Date 12/9 12/15 12/22 12/29 1/5/22 1/12 1/20 1/26 1/31 2/3 2/7 2/10 2/16 2/23   Total WeeklyDose 27.5mg 27.5mg 27.5mg 27.5mg 27.5mg 27.5mg 27.5mg 27.5mg 27.5mg 25mg 25mg 22.5mg 27.5mg 27.5mg   INR 2.5 2.4 2.7 2.5 2.6 2.2 2.5 2.1 3.8 2.6 4.1 2.5 2.7 3.5   Notes call    call   COVID+ dex / azith 1x hold pred 20 BID x5d  1x hold; 1x decr dose;   call call call     Date 3/2 3/9             Total WeeklyDose 25mg 27.5mg 27.5mg            INR 2.5 2.9             Notes 1x decr dose; call call                 Phone Interview:  Tablet Strength: 5mg (1/5/22)  Patient Contact Info: [phone] (home) preferred; [phone] (cell)  Estimated OOP cost: [please send to registration if not already done]  Verbal Release Auth: signed 5/25/21  -- May speak w/Femi Ngo, ; May leave voicemail on home phone  Lab Contact Info: [facility]  *Will call once monthly or if INR out of range*    Patient Findings  Negatives:  Signs/symptoms of thrombosis, Signs/symptoms of bleeding, Laboratory test error suspected, Change in health, Change in alcohol use, Change in activity, Upcoming invasive procedure, Emergency department visit, Upcoming dental procedure, Missed doses, Extra doses, Change in medications, Change in diet/appetite, Hospital admission, Bruising, Other complaints     Plan:  1. INR is therapeutic today at 2.9 (goal 2.0-3.0). Instructed Ms. Ngo to continue warfarin 5mg oral daily except for 2.5mg on MonWedFri until recheck (27.5mg/week).   2. Repeat INR in one week, 3/16. Will resume warfnocall protocol if therapeutic at next encounter  3. Verbal information provided over the phone. Patient RBV dosing instructions, expresses understanding by teach back, and has no further questions at this time.  4. Marianela Ngo understands the importance of calling the [facility] if she notices any s/sx of bleeding, stroke, or abnormal bruising, if any changes are made to her medications or medication doses (Rx, OTC, herbal), or if any upcoming procedures are scheduled. Marianela Ngo will likewise let us know if any other changes, questions, or concerns arise regarding anticoagulation therapy. she understands the importance of seeking medical attention immediately if she experiences any falls, vehicle accidents, or abnormal bleeding or bruising. Marianela Ngo voiced understanding of this information and confirms that she has the [facility]'s contact information. Otherwise, we will plan to contact the patient once monthly or if her INR is out of range.    Saravanan Mccarthy, Daljit  3/9/2022  11:22 EST     I, Saravanan Greenberg, PharmD, have reviewed the note in full and agree with the assessment and  plan.  03/10/22  11:38 EST

## 2022-03-16 ENCOUNTER — ANTICOAGULATION VISIT (OUTPATIENT)
Dept: PHARMACY | Facility: HOSPITAL | Age: 77
End: 2022-03-16

## 2022-03-16 DIAGNOSIS — I48.0 PAROXYSMAL ATRIAL FIBRILLATION: Primary | ICD-10-CM

## 2022-03-16 LAB — INR PPP: 2.2

## 2022-03-16 NOTE — PROGRESS NOTES
Anticoagulation Clinic - Remote Progress Note  mdINR Home Monitor  Testing frequency: weekly    Indication: paroxysmal afib  Referring Provider: Susan  Initial Warfarin Start Date: 2020? 2019?  Goal INR: 2.0-3.0  Current Drug Interactions:    [id number]: 4 (Age ?74, Sex, HTN) [estimated 5/20/21]  Bleed Risk: self reports negative history for GI bleed  Other: DOAC too expensive    Diet: green beans, peas, zucchini, or priyanka salads 2-3x/week (2/10/22)  Alcohol: none  Tobacco: none  OTC Pain Medication: APAP only    INR History:  Date 5/14 5/18 5/25 6/1 6/15 7/6 7/12 7/22 7/28 8/4 8/11 8/18 8/23 8/27   Total WeeklyDose  22.5mg 25mg 25mg 25mg 25mg 25mg 25mg 25mg 25mg 25mg 27.5mg 27.5mg 27.5mg   INR 3.5 1.9 2.3 2.3 2.8 2.7 2.1 2.2 1.9 2.0 1.8 2.3 2.2 1.8   Notes [place] Cards 1x hold; incr GLV     HM  incr GLV  no GLV incr GLV cefdinir cefdinir     Date 9/1 9/8 9/15 9/22 9/29 10/6 10/13 10/20 10/27 11/3 11/10 11/17 11/24 12/1   Total WeeklyDose 27.5mg 27.5mg 27.5mg 27.5mg 27.5mg 27.5mg 27.5mg 30mg 27.5mg 27.5mg 27.5mg 25mg 27.5mg 27.5mg   INR 2.1 2.3 3.3 2.3 2.2 2.0 1.9 2.6 3.2 2.4 3.4 1.6 2.4 2.3   Notes cefdinir  decr GLV?  keflex   1x incr dose   decr GLV? 1x decr dose;   incr GLV?       Date 12/9 12/15 12/22 12/29 1/5/22 1/12 1/20 1/26 1/31 2/3 2/7 2/10 2/16 2/23   Total WeeklyDose 27.5mg 27.5mg 27.5mg 27.5mg 27.5mg 27.5mg 27.5mg 27.5mg 27.5mg 25mg 25mg 22.5mg 27.5mg 27.5mg   INR 2.5 2.4 2.7 2.5 2.6 2.2 2.5 2.1 3.8 2.6 4.1 2.5 2.7 3.5   Notes call    call   COVID+ dex / azith 1x hold pred 20 BID x5d  1x hold; 1x decr dose;   call call call     Date 3/2 3/9 3/16            Total WeeklyDose 25mg 27.5mg 27.5mg            INR 2.5 2.9 2.2            Notes 1x decr dose; call call                 Phone Interview:  Tablet Strength: 5mg (1/5/22)  Patient Contact Info: [phone] (home) preferred; [phone] (cell)  Estimated OOP cost: [please send to registration if not already done]  Verbal Release Auth: signed  5/25/21 -- May speak w/Femi Ngo, ; May leave voicemail on home phone  Lab Contact Info: [facility]  *Will call once monthly or if INR out of range*      Patient Findings  Comments:  Patient was not contacted at this encounter         Plan:  1. INR is therapeutic today at 2.2 (goal 2.0-3.0). Ms. Ngo will continue warfarin 5mg oral daily except for 2.5mg on MonWedFri until recheck (27.5mg/week).   2. Repeat INR in one week, 3/23  3. Marianela Ngo understands the importance of calling the [facility] if she notices any s/sx of bleeding, stroke, or abnormal bruising, if any changes are made to her medications or medication doses (Rx, OTC, herbal), or if any upcoming procedures are scheduled. Marianela Ngo will likewise let us know if any other changes, questions, or concerns arise regarding anticoagulation therapy. she understands the importance of seeking medical attention immediately if she experiences any falls, vehicle accidents, or abnormal bleeding or bruising. Marianela Ngo voiced understanding of this information and confirms that she has the [facility]'s contact information. Otherwise, we will plan to contact the patient once monthly or if her INR is out of range.    Saravanan Mccarthy CPhT  3/16/2022  13:22 EDT     I, Jean Paul Chatman, PharmD, have reviewed the note in full and agree with the assessment and plan.  03/16/22  13:22 EDT

## 2022-03-23 ENCOUNTER — ANTICOAGULATION VISIT (OUTPATIENT)
Dept: PHARMACY | Facility: HOSPITAL | Age: 77
End: 2022-03-23

## 2022-03-23 DIAGNOSIS — I48.0 PAROXYSMAL ATRIAL FIBRILLATION: Primary | ICD-10-CM

## 2022-03-23 LAB — INR PPP: 2.8

## 2022-03-23 NOTE — PROGRESS NOTES
Anticoagulation Clinic - Remote Progress Note  mdINR Home Monitor  Testing frequency: weekly    Indication: paroxysmal afib  Referring Provider: Susan  Initial Warfarin Start Date: 2020? 2019?  Goal INR: 2.0-3.0  Current Drug Interactions:    [id number]: 4 (Age ?74, Sex, HTN) [estimated 5/20/21]  Bleed Risk: self reports negative history for GI bleed  Other: DOAC too expensive    Diet: green beans, peas, zucchini, or priyanka salads 2-3x/week (2/10/22)  Alcohol: none  Tobacco: none  OTC Pain Medication: APAP only    INR History:  Date 5/14 5/18 5/25 6/1 6/15 7/6 7/12 7/22 7/28 8/4 8/11 8/18 8/23 8/27   Total WeeklyDose  22.5mg 25mg 25mg 25mg 25mg 25mg 25mg 25mg 25mg 25mg 27.5mg 27.5mg 27.5mg   INR 3.5 1.9 2.3 2.3 2.8 2.7 2.1 2.2 1.9 2.0 1.8 2.3 2.2 1.8   Notes [place] Cards 1x hold; incr GLV     HM  incr GLV  no GLV incr GLV cefdinir cefdinir     Date 9/1 9/8 9/15 9/22 9/29 10/6 10/13 10/20 10/27 11/3 11/10 11/17 11/24 12/1   Total WeeklyDose 27.5mg 27.5mg 27.5mg 27.5mg 27.5mg 27.5mg 27.5mg 30mg 27.5mg 27.5mg 27.5mg 25mg 27.5mg 27.5mg   INR 2.1 2.3 3.3 2.3 2.2 2.0 1.9 2.6 3.2 2.4 3.4 1.6 2.4 2.3   Notes cefdinir  decr GLV?  keflex   1x incr dose   decr GLV? 1x decr dose;   incr GLV?       Date 12/9 12/15 12/22 12/29 1/5/22 1/12 1/20 1/26 1/31 2/3 2/7 2/10 2/16 2/23   Total WeeklyDose 27.5mg 27.5mg 27.5mg 27.5mg 27.5mg 27.5mg 27.5mg 27.5mg 27.5mg 25mg 25mg 22.5mg 27.5mg 27.5mg   INR 2.5 2.4 2.7 2.5 2.6 2.2 2.5 2.1 3.8 2.6 4.1 2.5 2.7 3.5   Notes call    call   COVID+ dex / azith 1x hold pred 20 BID x5d  1x hold; 1x decr dose;   call call call     Date 3/2 3/9 3/16 3/23           Total WeeklyDose 25mg 27.5mg 27.5mg 27.5 mg           INR 2.5 2.9 2.2 2.8           Notes 1x decr dose; call call               Phone Interview:  Tablet Strength: 5mg (1/5/22)  Patient Contact Info: [phone] (home) preferred; [phone] (cell)  Estimated OOP cost: [please send to registration if not already done]  Verbal Release  Auth: signed 5/25/21 -- May speak w/Femi Ngo, ; May leave voicemail on home phone  Lab Contact Info: [facility]  *Will call once monthly or if INR out of range*    Patient Findings:  Comments:  Patient was not contacted at this encounter.      Plan:  1. INR is therapeutic today at 2.8. Ms. Ngo will continue warfarin 5 mg oral daily except for 2.5 mg on MonWedFri until recheck (27.5 mg/week).   2. Repeat INR in one week, 3/30.  3. Marianela Ngo understands the importance of calling the [facility] if she notices any s/sx of bleeding, stroke, or abnormal bruising, if any changes are made to her medications or medication doses (Rx, OTC, herbal), or if any upcoming procedures are scheduled. Marianela Ngo will likewise let us know if any other changes, questions, or concerns arise regarding anticoagulation therapy. she understands the importance of seeking medical attention immediately if she experiences any falls, vehicle accidents, or abnormal bleeding or bruising. Marianela Ngo voiced understanding of this information and confirms that she has the [facility]'s contact information. Otherwise, we will plan to contact the patient once monthly or if her INR is out of range.    Sarah Alejandre CPhT  3/23/2022  12:02 EDT    Jayla HAQUE, PharmD, have reviewed the note in full and agree with the assessment and plan.  03/23/22  13:40 EDT

## 2022-03-30 ENCOUNTER — ANTICOAGULATION VISIT (OUTPATIENT)
Dept: PHARMACY | Facility: HOSPITAL | Age: 77
End: 2022-03-30

## 2022-03-30 DIAGNOSIS — I48.0 PAROXYSMAL ATRIAL FIBRILLATION: Primary | ICD-10-CM

## 2022-03-30 LAB — INR PPP: 2.7

## 2022-03-30 NOTE — PROGRESS NOTES
Anticoagulation Clinic - Remote Progress Note  mdINR Home Monitor  Testing frequency: weekly    Indication: paroxysmal afib  Referring Provider: Susan  Initial Warfarin Start Date: 2020? 2019?  Goal INR: 2.0-3.0  Current Drug Interactions:    [id number]: 4 (Age ?74, Sex, HTN) [estimated 5/20/21]  Bleed Risk: self reports negative history for GI bleed  Other: DOAC too expensive    Diet: green beans, peas, zucchini, or priyanka salads 2-3x/week (2/10/22)  Alcohol: none  Tobacco: none  OTC Pain Medication: APAP only    INR History:  Date 5/14 5/18 5/25 6/1 6/15 7/6 7/12 7/22 7/28 8/4 8/11 8/18 8/23 8/27   Total WeeklyDose  22.5mg 25mg 25mg 25mg 25mg 25mg 25mg 25mg 25mg 25mg 27.5mg 27.5mg 27.5mg   INR 3.5 1.9 2.3 2.3 2.8 2.7 2.1 2.2 1.9 2.0 1.8 2.3 2.2 1.8   Notes [place] Cards 1x hold; incr GLV     HM  incr GLV  no GLV incr GLV cefdinir cefdinir     Date 9/1 9/8 9/15 9/22 9/29 10/6 10/13 10/20 10/27 11/3 11/10 11/17 11/24 12/1   Total WeeklyDose 27.5mg 27.5mg 27.5mg 27.5mg 27.5mg 27.5mg 27.5mg 30mg 27.5mg 27.5mg 27.5mg 25mg 27.5mg 27.5mg   INR 2.1 2.3 3.3 2.3 2.2 2.0 1.9 2.6 3.2 2.4 3.4 1.6 2.4 2.3   Notes cefdinir  decr GLV?  keflex   1x incr dose   decr GLV? 1x decr dose;   incr GLV?       Date 12/9 12/15 12/22 12/29 1/5/22 1/12 1/20 1/26 1/31 2/3 2/7 2/10 2/16 2/23   Total WeeklyDose 27.5mg 27.5mg 27.5mg 27.5mg 27.5mg 27.5mg 27.5mg 27.5mg 27.5mg 25mg 25mg 22.5mg 27.5mg 27.5mg   INR 2.5 2.4 2.7 2.5 2.6 2.2 2.5 2.1 3.8 2.6 4.1 2.5 2.7 3.5   Notes call    call   COVID+ dex / azith 1x hold pred 20 BID x5d  1x hold; 1x decr dose;   call call call     Date 3/2 3/9 3/16 3/23 3/30          Total WeeklyDose 25mg 27.5mg 27.5mg 27.5mg 27.5mg          INR 2.5 2.9 2.2 2.8 2.7          Notes 1x decr  call call    call           Phone Interview:  Tablet Strength: 5mg (1/5/22)  Patient Contact Info: [phone] (home) preferred; [phone] (cell)  Estimated OOP cost: [please send to registration if not already done]  Verbal  Release Auth: signed 5/25/21 -- May speak w/Femi Ngo, ; May leave voicemail on home phone  Lab Contact Info: [facility]  *Will call once monthly or if INR out of range*    Patient Findings  Comments:  Patient was not contacted at this encounter     Plan:  1. INR is therapeutic today at 2.7 (goal 2.0-3.0). Ms. Ngo will continue warfarin 5mg oral daily except for 2.5mg on MonWedFri until recheck (27.5mg/week).   2. Repeat INR in one week, 4/6. Patient will be due for phone call at next encounter.  3. Marianela Ngo understands the importance of calling the [facility] if she notices any s/sx of bleeding, stroke, or abnormal bruising, if any changes are made to her medications or medication doses (Rx, OTC, herbal), or if any upcoming procedures are scheduled. Marianela Ngo will likewise let us know if any other changes, questions, or concerns arise regarding anticoagulation therapy. she understands the importance of seeking medical attention immediately if she experiences any falls, vehicle accidents, or abnormal bleeding or bruising. Marianela Ngo voiced understanding of this information and confirms that she has the [facility]'s contact information. Otherwise, we will plan to contact the patient once monthly or if her INR is out of range.    Saravanan Mccarthy CPhT  3/30/2022  12:00 EDT    I, Radha Orlando, PharmD, have reviewed the note in full and agree with the assessment and plan.  03/30/22  14:27 EDT

## 2022-04-06 ENCOUNTER — ANTICOAGULATION VISIT (OUTPATIENT)
Dept: PHARMACY | Facility: HOSPITAL | Age: 77
End: 2022-04-06

## 2022-04-06 DIAGNOSIS — I48.0 PAROXYSMAL ATRIAL FIBRILLATION: Primary | ICD-10-CM

## 2022-04-06 LAB — INR PPP: 2.4

## 2022-04-06 NOTE — PROGRESS NOTES
Anticoagulation Clinic - Remote Progress Note  mdINR Home Monitor  Testing frequency: weekly    Indication: paroxysmal afib  Referring Provider: Susan  Initial Warfarin Start Date: 2020? 2019?  Goal INR: 2.0-3.0  Current Drug Interactions:    [id number]: 4 (Age ?74, Sex, HTN) [estimated 5/20/21]  Bleed Risk: self reports negative history for GI bleed  Other: DOAC too expensive    Diet: green beans, peas, zucchini, or priyanka salads 2-3x/week (2/10/22)  Alcohol: none  Tobacco: none  OTC Pain Medication: APAP only    INR History:  Date 5/14 5/18 5/25 6/1 6/15 7/6 7/12 7/22 7/28 8/4 8/11 8/18 8/23 8/27   Total WeeklyDose  22.5mg 25mg 25mg 25mg 25mg 25mg 25mg 25mg 25mg 25mg 27.5mg 27.5mg 27.5mg   INR 3.5 1.9 2.3 2.3 2.8 2.7 2.1 2.2 1.9 2.0 1.8 2.3 2.2 1.8   Notes [place] Cards 1x hold; incr GLV     HM  incr GLV  no GLV incr GLV cefdinir cefdinir     Date 9/1 9/8 9/15 9/22 9/29 10/6 10/13 10/20 10/27 11/3 11/10 11/17 11/24 12/1   Total WeeklyDose 27.5mg 27.5mg 27.5mg 27.5mg 27.5mg 27.5mg 27.5mg 30mg 27.5mg 27.5mg 27.5mg 25mg 27.5mg 27.5mg   INR 2.1 2.3 3.3 2.3 2.2 2.0 1.9 2.6 3.2 2.4 3.4 1.6 2.4 2.3   Notes cefdinir  decr GLV?  keflex   1x incr dose   decr GLV? 1x decr dose;   incr GLV?       Date 12/9 12/15 12/22 12/29 1/5/22 1/12 1/20 1/26 1/31 2/3 2/7 2/10 2/16 2/23   Total WeeklyDose 27.5mg 27.5mg 27.5mg 27.5mg 27.5mg 27.5mg 27.5mg 27.5mg 27.5mg 25mg 25mg 22.5mg 27.5mg 27.5mg   INR 2.5 2.4 2.7 2.5 2.6 2.2 2.5 2.1 3.8 2.6 4.1 2.5 2.7 3.5   Notes call    call   COVID+ dex / azith 1x hold pred 20 BID x5d  1x hold; 1x decr dose;   call call call     Date 3/2 3/9 3/16 3/23 3/30 4/5         Total WeeklyDose 25mg 27.5mg 27.5mg 27.5mg 27.5mg 27.5mg 27.5mg        INR 2.5 2.9 2.2 2.8 2.7 2.4         Notes 1x decr  call call    call           Phone Interview:  Tablet Strength: 5mg (1/5/22)  Patient Contact Info: [phone] (home) preferred; [phone] (cell)  Estimated OOP cost: [please send to registration if not  already done]  Verbal Release Auth: signed 5/25/21 -- May speak w/Femi Ngo, ; May leave voicemail on home phone  Lab Contact Info: [facility]  *Will call once monthly or if INR out of range*      Patient Findings  Negatives:  Signs/symptoms of thrombosis, Signs/symptoms of bleeding, Laboratory test error suspected, Change in health, Change in alcohol use, Change in activity, Upcoming invasive procedure, Emergency department visit, Upcoming dental procedure, Missed doses, Extra doses, Change in medications, Change in diet/appetite, Hospital admission, Bruising, Other complaints       Plan:  1. INR is therapeutic today at 2.4 (goal 2.0-3.0). Instructed Ms. Ngo to continue warfarin 5mg oral daily except for 2.5mg on MonWedFri until recheck (27.5mg/week).   2. Repeat INR in one week, 4/12  3. Verbal information provided over the phone. Marianela Ngo RBV dosing instructions, expresses understanding by teach back, and has no further questions at this time.  4. Marianela Ngo understands the importance of calling the [facility] if she notices any s/sx of bleeding, stroke, or abnormal bruising, if any changes are made to her medications or medication doses (Rx, OTC, herbal), or if any upcoming procedures are scheduled. Marianela Ngo will likewise let us know if any other changes, questions, or concerns arise regarding anticoagulation therapy. she understands the importance of seeking medical attention immediately if she experiences any falls, vehicle accidents, or abnormal bleeding or bruising. Marianela Ngo voiced understanding of this information and confirms that she has the [facility]'s contact information. Otherwise, we will plan to contact the patient once monthly or if her INR is out of range.    Saravanan Mccarthy, Daljit  4/6/2022  14:59 EDT    I, Jean Paul Chatman, PharmD, have reviewed the note in full and agree with the assessment and plan.  04/06/22  15:07  EDT

## 2022-04-13 ENCOUNTER — ANTICOAGULATION VISIT (OUTPATIENT)
Dept: PHARMACY | Facility: HOSPITAL | Age: 77
End: 2022-04-13

## 2022-04-13 DIAGNOSIS — I48.0 PAROXYSMAL ATRIAL FIBRILLATION: Primary | ICD-10-CM

## 2022-04-13 LAB — INR PPP: 2.5

## 2022-04-13 NOTE — PROGRESS NOTES
Anticoagulation Clinic - Remote Progress Note  mdINR Home Monitor  Testing frequency: weekly    Indication: paroxysmal afib  Referring Provider: Susan  Initial Warfarin Start Date: 2020? 2019?  Goal INR: 2.0-3.0  Current Drug Interactions:    [id number]: 4 (Age ?74, Sex, HTN) [estimated 5/20/21]  Bleed Risk: self reports negative history for GI bleed  Other: DOAC too expensive    Diet: green beans, peas, zucchini, or priyanka salads 2-3x/week (2/10/22)  Alcohol: none  Tobacco: none  OTC Pain Medication: APAP only    INR History:  Date 5/14 5/18 5/25 6/1 6/15 7/6 7/12 7/22 7/28 8/4 8/11 8/18 8/23 8/27   Total WeeklyDose  22.5mg 25mg 25mg 25mg 25mg 25mg 25mg 25mg 25mg 25mg 27.5mg 27.5mg 27.5mg   INR 3.5 1.9 2.3 2.3 2.8 2.7 2.1 2.2 1.9 2.0 1.8 2.3 2.2 1.8   Notes [place] Cards 1x hold; incr GLV     HM  incr GLV  no GLV incr GLV cefdinir cefdinir     Date 9/1 9/8 9/15 9/22 9/29 10/6 10/13 10/20 10/27 11/3 11/10 11/17 11/24 12/1   Total WeeklyDose 27.5mg 27.5mg 27.5mg 27.5mg 27.5mg 27.5mg 27.5mg 30mg 27.5mg 27.5mg 27.5mg 25mg 27.5mg 27.5mg   INR 2.1 2.3 3.3 2.3 2.2 2.0 1.9 2.6 3.2 2.4 3.4 1.6 2.4 2.3   Notes cefdinir  decr GLV?  keflex   1x incr dose   decr GLV? 1x decr dose;   incr GLV?       Date 12/9 12/15 12/22 12/29 1/5/22 1/12 1/20 1/26 1/31 2/3 2/7 2/10 2/16 2/23   Total WeeklyDose 27.5mg 27.5mg 27.5mg 27.5mg 27.5mg 27.5mg 27.5mg 27.5mg 27.5mg 25mg 25mg 22.5mg 27.5mg 27.5mg   INR 2.5 2.4 2.7 2.5 2.6 2.2 2.5 2.1 3.8 2.6 4.1 2.5 2.7 3.5   Notes call    call   COVID+ dex / azith 1x hold pred 20 BID x5d  1x hold; 1x decr dose;   call call call     Date 3/2 3/9 3/16 3/23 3/30 4/5 4/13        Total WeeklyDose 25mg 27.5mg 27.5mg 27.5mg 27.5mg 27.5mg 27.5 mg        INR 2.5 2.9 2.2 2.8 2.7 2.4 2.5        Notes 1x decr  call call    call           Phone Interview:  Tablet Strength: 5mg (1/5/22)  Patient Contact Info: [phone] (home) preferred; [phone] (cell)  Estimated OOP cost: [please send to registration if  not already done]  Verbal Release Auth: signed 5/25/21 -- May speak w/Femi Ngo, ; May leave voicemail on home phone  Lab Contact Info: [facility]  *Will call once monthly or if INR out of range*    Patient Findings:  Comments:  Patient was not contacted at this encounter.      Plan:  1. INR is therapeutic today at 2.5. Instructed Ms. Ngo to continue warfarin 5 mg oral daily except for 2.5 mg on MonWedFri until recheck (27.5 mg/week).   2. Repeat INR in one week, 4/20/22.  3. Marianela Ngo understands the importance of calling the [facility] if she notices any s/sx of bleeding, stroke, or abnormal bruising, if any changes are made to her medications or medication doses (Rx, OTC, herbal), or if any upcoming procedures are scheduled. Marianela Ngo will likewise let us know if any other changes, questions, or concerns arise regarding anticoagulation therapy. she understands the importance of seeking medical attention immediately if she experiences any falls, vehicle accidents, or abnormal bleeding or bruising. Marianela Ngo voiced understanding of this information and confirms that she has the [facility]'s contact information. Otherwise, we will plan to contact the patient once monthly or if her INR is out of range.    Sarah Alejandre CPhT  4/13/2022  15:48 EDT      I, Jayla Soriano, PharmD, have reviewed the note in full and agree with the assessment and plan.  04/13/22  15:50 EDT

## 2022-04-20 ENCOUNTER — ANTICOAGULATION VISIT (OUTPATIENT)
Dept: PHARMACY | Facility: HOSPITAL | Age: 77
End: 2022-04-20

## 2022-04-20 DIAGNOSIS — I48.0 PAROXYSMAL ATRIAL FIBRILLATION: Primary | ICD-10-CM

## 2022-04-20 LAB — INR PPP: 2.3

## 2022-04-20 NOTE — PROGRESS NOTES
Anticoagulation Clinic - Remote Progress Note  mdINR Home Monitor  Testing frequency: weekly    Indication: paroxysmal afib  Referring Provider: Susan  Initial Warfarin Start Date: 2020? 2019?  Goal INR: 2.0-3.0  Current Drug Interactions:    [id number]: 4 (Age ?74, Sex, HTN) [estimated 5/20/21]  Bleed Risk: self reports negative history for GI bleed  Other: DOAC too expensive    Diet: green beans, peas, zucchini, or priyanka salads 2-3x/week (2/10/22)  Alcohol: none  Tobacco: none  OTC Pain Medication: APAP only    INR History:  Date 5/14 5/18 5/25 6/1 6/15 7/6 7/12 7/22 7/28 8/4 8/11 8/18 8/23 8/27   Total WeeklyDose  22.5mg 25mg 25mg 25mg 25mg 25mg 25mg 25mg 25mg 25mg 27.5mg 27.5mg 27.5mg   INR 3.5 1.9 2.3 2.3 2.8 2.7 2.1 2.2 1.9 2.0 1.8 2.3 2.2 1.8   Notes [place] Cards 1x hold; incr GLV     HM  incr GLV  no GLV incr GLV cefdinir cefdinir     Date 9/1 9/8 9/15 9/22 9/29 10/6 10/13 10/20 10/27 11/3 11/10 11/17 11/24 12/1   Total WeeklyDose 27.5mg 27.5mg 27.5mg 27.5mg 27.5mg 27.5mg 27.5mg 30mg 27.5mg 27.5mg 27.5mg 25mg 27.5mg 27.5mg   INR 2.1 2.3 3.3 2.3 2.2 2.0 1.9 2.6 3.2 2.4 3.4 1.6 2.4 2.3   Notes cefdinir  decr GLV?  keflex   1x incr dose   decr GLV? 1x decr dose;   incr GLV?       Date 12/9 12/15 12/22 12/29 1/5/22 1/12 1/20 1/26 1/31 2/3 2/7 2/10 2/16 2/23   Total WeeklyDose 27.5mg 27.5mg 27.5mg 27.5mg 27.5mg 27.5mg 27.5mg 27.5mg 27.5mg 25mg 25mg 22.5mg 27.5mg 27.5mg   INR 2.5 2.4 2.7 2.5 2.6 2.2 2.5 2.1 3.8 2.6 4.1 2.5 2.7 3.5   Notes call    call   COVID+ dex / azith 1x hold pred 20 BID x5d  1x hold; 1x decr dose;   call call call     Date 3/2 3/9 3/16 3/23 3/30 4/5 4/13 4/20       Total WeeklyDose 25mg 27.5mg 27.5mg 27.5mg 27.5mg 27.5mg 27.5 mg 27.5mg       INR 2.5 2.9 2.2 2.8 2.7 2.4 2.5 2.3       Notes 1x decr  call call    call           Phone Interview:  Tablet Strength: 5mg (1/5/22)  Patient Contact Info: [phone] (home) preferred; [phone] (cell)  Estimated OOP cost: [please send to  registration if not already done]  Verbal Release Auth: signed 5/25/21 -- May speak w/Femi Ngo, ; May leave voicemail on home phone  Lab Contact Info: [facility]  *Will call once monthly or if INR out of range*    Patient Findings:  Comments:  Patient was not contacted at this encounter.      Plan:  1. INR is therapeutic today at 2.3. Instructed Ms. Ngo to continue warfarin 5 mg oral daily except for 2.5 mg on MonWedFri until recheck (27.5 mg/week).   2. Repeat INR in one week, 4/27/22.  3. Marianela Ngo understands the importance of calling the [facility] if she notices any s/sx of bleeding, stroke, or abnormal bruising, if any changes are made to her medications or medication doses (Rx, OTC, herbal), or if any upcoming procedures are scheduled. Marianela Ngo will likewise let us know if any other changes, questions, or concerns arise regarding anticoagulation therapy. she understands the importance of seeking medical attention immediately if she experiences any falls, vehicle accidents, or abnormal bleeding or bruising. Marianela Ngo voiced understanding of this information and confirms that she has the [facility]'s contact information. Otherwise, we will plan to contact the patient once monthly or if her INR is out of range.    Divya Jaffe, InaD.  04/20/22   10:20 EDT

## 2022-04-27 ENCOUNTER — ANTICOAGULATION VISIT (OUTPATIENT)
Dept: PHARMACY | Facility: HOSPITAL | Age: 77
End: 2022-04-27

## 2022-04-27 DIAGNOSIS — I48.0 PAROXYSMAL ATRIAL FIBRILLATION: Primary | ICD-10-CM

## 2022-04-27 LAB — INR PPP: 2.5

## 2022-04-27 NOTE — PROGRESS NOTES
Anticoagulation Clinic - Remote Progress Note  mdINR Home Monitor  Testing frequency: weekly    Indication: paroxysmal afib  Referring Provider: Susan  Initial Warfarin Start Date: 2020? 2019?  Goal INR: 2.0-3.0  Current Drug Interactions:    [id number]: 4 (Age ?74, Sex, HTN) [estimated 5/20/21]  Bleed Risk: self reports negative history for GI bleed  Other: DOAC too expensive    Diet: green beans, peas, zucchini, or priyanka salads 2-3x/week (2/10/22)  Alcohol: none  Tobacco: none  OTC Pain Medication: APAP only    INR History:  Date 5/14 5/18 5/25 6/1 6/15 7/6 7/12 7/22 7/28 8/4 8/11 8/18 8/23 8/27   Total WeeklyDose  22.5mg 25mg 25mg 25mg 25mg 25mg 25mg 25mg 25mg 25mg 27.5mg 27.5mg 27.5mg   INR 3.5 1.9 2.3 2.3 2.8 2.7 2.1 2.2 1.9 2.0 1.8 2.3 2.2 1.8   Notes [place] Cards 1x hold; incr GLV     HM  incr GLV  no GLV incr GLV cefdinir cefdinir     Date 9/1 9/8 9/15 9/22 9/29 10/6 10/13 10/20 10/27 11/3 11/10 11/17 11/24 12/1   Total WeeklyDose 27.5mg 27.5mg 27.5mg 27.5mg 27.5mg 27.5mg 27.5mg 30mg 27.5mg 27.5mg 27.5mg 25mg 27.5mg 27.5mg   INR 2.1 2.3 3.3 2.3 2.2 2.0 1.9 2.6 3.2 2.4 3.4 1.6 2.4 2.3   Notes cefdinir  decr GLV?  keflex   1x incr dose   decr GLV? 1x decr dose;   incr GLV?       Date 12/9 12/15 12/22 12/29 1/5/22 1/12 1/20 1/26 1/31 2/3 2/7 2/10 2/16 2/23   Total WeeklyDose 27.5mg 27.5mg 27.5mg 27.5mg 27.5mg 27.5mg 27.5mg 27.5mg 27.5mg 25mg 25mg 22.5mg 27.5mg 27.5mg   INR 2.5 2.4 2.7 2.5 2.6 2.2 2.5 2.1 3.8 2.6 4.1 2.5 2.7 3.5   Notes call    call   COVID+ dex / azith 1x hold pred 20 BID x5d  1x hold; 1x decr dose;   call call call     Date 3/2 3/9 3/16 3/23 3/30 4/5 4/13 4/20 4/27      Total WeeklyDose 25mg 27.5mg 27.5mg 27.5mg 27.5mg 27.5mg 27.5 mg 27.5mg 27.5 mg      INR 2.5 2.9 2.2 2.8 2.7 2.4 2.5 2.3 2.5      Notes 1x decr  call call    call           Phone Interview:  Tablet Strength: 5mg (1/5/22)  Patient Contact Info: [phone] (home) preferred; [phone] (cell)  Estimated OOP cost:  [please send to registration if not already done]  Verbal Release Auth: signed 5/25/21 -- May speak w/Femi Ngo, ; May leave voicemail on home phone  Lab Contact Info: [facility]  *Will call once monthly or if INR out of range*    Patient Findings:  Comments:  Patient was not contacted at this encounter.      Plan:  1. INR is therapeutic today at 2.5. Instructed Ms. Ngo to continue warfarin 5 mg oral daily except for 2.5 mg on MonWedFri until recheck (27.5 mg/week).   2. Repeat INR in one week, 5/4/22.  3. Marianela Ngo understands the importance of calling the [facility] if she notices any s/sx of bleeding, stroke, or abnormal bruising, if any changes are made to her medications or medication doses (Rx, OTC, herbal), or if any upcoming procedures are scheduled. Marianela Ngo will likewise let us know if any other changes, questions, or concerns arise regarding anticoagulation therapy. she understands the importance of seeking medical attention immediately if she experiences any falls, vehicle accidents, or abnormal bleeding or bruising. Marianela Ngo voiced understanding of this information and confirms that she has the [facility]'s contact information. Otherwise, we will plan to contact the patient once monthly or if her INR is out of range.    Sarah Alejandre CPhT  4/27/2022  11:43 EDT    I, Divya Jaffe, PharmD, have reviewed the note in full and agree with the assessment and plan.  04/27/22  12:08 EDT

## 2022-05-04 ENCOUNTER — ANTICOAGULATION VISIT (OUTPATIENT)
Dept: PHARMACY | Facility: HOSPITAL | Age: 77
End: 2022-05-04

## 2022-05-04 DIAGNOSIS — I48.0 PAROXYSMAL ATRIAL FIBRILLATION: Primary | ICD-10-CM

## 2022-05-04 LAB — INR PPP: 2.8

## 2022-05-04 NOTE — PROGRESS NOTES
Anticoagulation Clinic - Remote Progress Note  mdINR Home Monitor  Testing frequency: weekly    Indication: paroxysmal afib  Referring Provider: Susan  Initial Warfarin Start Date: 2020? 2019?  Goal INR: 2.0-3.0  Current Drug Interactions:    [id number]: 4 (Age ?74, Sex, HTN) [estimated 5/20/21]  Bleed Risk: self reports negative history for GI bleed  Other: DOAC too expensive    Diet: green beans, peas, zucchini, or priyanka salads 2-3x/week (2/10/22)  Alcohol: none  Tobacco: none  OTC Pain Medication: APAP only    INR History:  Date 5/14 5/18 5/25 6/1 6/15 7/6 7/12 7/22 7/28 8/4 8/11 8/18 8/23 8/27   Total WeeklyDose  22.5mg 25mg 25mg 25mg 25mg 25mg 25mg 25mg 25mg 25mg 27.5mg 27.5mg 27.5mg   INR 3.5 1.9 2.3 2.3 2.8 2.7 2.1 2.2 1.9 2.0 1.8 2.3 2.2 1.8   Notes [place] Cards 1x hold; incr GLV     HM  incr GLV  no GLV incr GLV cefdinir cefdinir     Date 9/1 9/8 9/15 9/22 9/29 10/6 10/13 10/20 10/27 11/3 11/10 11/17 11/24 12/1   Total WeeklyDose 27.5mg 27.5mg 27.5mg 27.5mg 27.5mg 27.5mg 27.5mg 30mg 27.5mg 27.5mg 27.5mg 25mg 27.5mg 27.5mg   INR 2.1 2.3 3.3 2.3 2.2 2.0 1.9 2.6 3.2 2.4 3.4 1.6 2.4 2.3   Notes cefdinir  decr GLV?  keflex   1x incr dose   decr GLV? 1x decr dose;   incr GLV?       Date 12/9 12/15 12/22 12/29 1/5/22 1/12 1/20 1/26 1/31 2/3 2/7 2/10 2/16 2/23   Total WeeklyDose 27.5mg 27.5mg 27.5mg 27.5mg 27.5mg 27.5mg 27.5mg 27.5mg 27.5mg 25mg 25mg 22.5mg 27.5mg 27.5mg   INR 2.5 2.4 2.7 2.5 2.6 2.2 2.5 2.1 3.8 2.6 4.1 2.5 2.7 3.5   Notes call    call   COVID+ dex / azith 1x hold pred 20 BID x5d  1x hold; 1x decr dose;   call call call     Date 3/2 3/9 3/16 3/23 3/30 4/5 4/13 4/20 4/27 5/4     Total WeeklyDose 25mg 27.5mg 27.5mg 27.5mg 27.5mg 27.5mg 27.5 mg 27.5mg 27.5 mg 27.5 mg     INR 2.5 2.9 2.2 2.8 2.7 2.4 2.5 2.3 2.5 2.8     Notes 1x decr  call call    call    call       Phone Interview:  Tablet Strength: 5mg (1/5/22)  Patient Contact Info: [phone] (home) preferred; [phone]  (cell)  Estimated OOP cost: [please send to registration if not already done]  Verbal Release Auth: signed 5/25/21 -- May speak w/Femi Ngo, ; May leave voicemail on home phone  Lab Contact Info: [facility]  *Will call once monthly or if INR out of range*    Patient Findings:  Negatives:  Signs/symptoms of thrombosis, Signs/symptoms of bleeding, Laboratory test error suspected, Change in health, Change in alcohol use, Change in activity, Upcoming invasive procedure, Emergency department visit, Upcoming dental procedure, Missed doses, Extra doses, Change in medications, Change in diet/appetite, Hospital admission, Bruising, Other complaints   Comments:  All findings negative per patient. She plans on eating a fried green tomato for dinner tonight.     Plan:  1. INR is therapeutic this morning at 2.8. Instructed Ms. Ngo to continue warfarin 5 mg oral daily except for 2.5 mg on MonWedFri until recheck (27.5 mg/week).   2. Repeat INR in one week, 5/11/22.  3. Verbal information provided over the phone. Patient RBV dosing instructions, expresses understanding by teach back, and has no further questions at this time.  4. Marianela Ngo understands the importance of calling the [facility] if she notices any s/sx of bleeding, stroke, or abnormal bruising, if any changes are made to her medications or medication doses (Rx, OTC, herbal), or if any upcoming procedures are scheduled. Marianela Ngo will likewise let us know if any other changes, questions, or concerns arise regarding anticoagulation therapy. she understands the importance of seeking medical attention immediately if she experiences any falls, vehicle accidents, or abnormal bleeding or bruising. Marianela Ngo voiced understanding of this information and confirms that she has the [facility]'s contact information. Otherwise, we will plan to contact the patient once monthly or if her INR is out of  range.    Sarah Alejandre, ANGELITO  5/4/2022  10:50 EDT    I, Saravanan Greenberg, John, have reviewed the note in full and agree with the assessment and plan.  05/04/22  11:39 EDT

## 2022-05-11 ENCOUNTER — ANTICOAGULATION VISIT (OUTPATIENT)
Dept: PHARMACY | Facility: HOSPITAL | Age: 77
End: 2022-05-11

## 2022-05-11 DIAGNOSIS — I48.0 PAROXYSMAL ATRIAL FIBRILLATION: Primary | ICD-10-CM

## 2022-05-11 LAB — INR PPP: 3.1

## 2022-05-11 NOTE — PROGRESS NOTES
Anticoagulation Clinic - Remote Progress Note  mdINR Home Monitor  Testing frequency: weekly    Indication: paroxysmal afib  Referring Provider: Susan  Initial Warfarin Start Date: 2020? 2019?  Goal INR: 2.0-3.0  Current Drug Interactions:    [id number]: 4 (Age ?74, Sex, HTN) [estimated 5/20/21]  Bleed Risk: self reports negative history for GI bleed  Other: DOAC too expensive    Diet: green beans, peas, zucchini, or priyanka salads 2-3x/week (2/10/22)  Alcohol: none  Tobacco: none  OTC Pain Medication: APAP only    INR History:  Date 5/14 5/18 5/25 6/1 6/15 7/6 7/12 7/22 7/28 8/4 8/11 8/18 8/23 8/27   Total WeeklyDose  22.5mg 25mg 25mg 25mg 25mg 25mg 25mg 25mg 25mg 25mg 27.5mg 27.5mg 27.5mg   INR 3.5 1.9 2.3 2.3 2.8 2.7 2.1 2.2 1.9 2.0 1.8 2.3 2.2 1.8   Notes [place] Cards 1x hold; incr GLV     HM  incr GLV  no GLV incr GLV cefdinir cefdinir     Date 9/1 9/8 9/15 9/22 9/29 10/6 10/13 10/20 10/27 11/3 11/10 11/17 11/24 12/1   Total WeeklyDose 27.5mg 27.5mg 27.5mg 27.5mg 27.5mg 27.5mg 27.5mg 30mg 27.5mg 27.5mg 27.5mg 25mg 27.5mg 27.5mg   INR 2.1 2.3 3.3 2.3 2.2 2.0 1.9 2.6 3.2 2.4 3.4 1.6 2.4 2.3   Notes cefdinir  decr GLV?  keflex   1x incr dose   decr GLV? 1x decr dose;   incr GLV?       Date 12/9 12/15 12/22 12/29 1/5/22 1/12 1/20 1/26 1/31 2/3 2/7 2/10 2/16 2/23   Total WeeklyDose 27.5mg 27.5mg 27.5mg 27.5mg 27.5mg 27.5mg 27.5mg 27.5mg 27.5mg 25mg 25mg 22.5mg 27.5mg 27.5mg   INR 2.5 2.4 2.7 2.5 2.6 2.2 2.5 2.1 3.8 2.6 4.1 2.5 2.7 3.5   Notes call    call   COVID+ dex / azith 1x hold pred 20 BID x5d  1x hold; 1x decr dose;   call call call     Date 3/2 3/9 3/16 3/23 3/30 4/5 4/13 4/20 4/27 5/4 5/11    Total WeeklyDose 25mg 27.5mg 27.5mg 27.5mg 27.5mg 27.5mg 27.5 mg 27.5mg 27.5 mg 27.5 mg 27.5 mg    INR 2.5 2.9 2.2 2.8 2.7 2.4 2.5 2.3 2.5 2.8 3.1    Notes 1x decr  call call    call    call call      Phone Interview:  Tablet Strength: 5mg (1/5/22)  Patient Contact Info: [phone] (home) preferred;  [phone] (cell)  Estimated OOP cost: [please send to registration if not already done]  Verbal Release Auth: signed 5/25/21 -- May speak w/Femi Ngo, ; May leave voicemail on home phone  Lab Contact Info: [facility]  *Will call once monthly or if INR out of range*    Patient Findings:  Positives:  Change in medications, Change in diet/appetite   Negatives:  Signs/symptoms of thrombosis, Signs/symptoms of bleeding, Laboratory test error suspected, Change in health, Change in alcohol use, Change in activity, Upcoming invasive procedure, Emergency department visit, Upcoming dental procedure, Missed doses, Extra doses, Hospital admission, Bruising, Other complaints   Comments:  She has been eating extra GLV this week, mostly green beans. Has been taking OTC sinus relief medication:     Acetaminophen   Diphenhydramine   Phenylephrine     Takes one tablet daily. All other findings negative.     Plan:  1. INR is slightly supra therapeutic today at 3.1. Instructed Ms. Ngo to continue warfarin 5 mg oral daily except for 2.5 mg on MonWedFri until recheck (27.5 mg/week). She will continue higher intake of GLV this week.  2. Repeat INR in one week, 5/18/22.  3. Verbal information provided over the phone. Patient RBV dosing instructions, expresses understanding by teach back, and has no further questions at this time.  4. Marianela Ngo understands the importance of calling the [facility] if she notices any s/sx of bleeding, stroke, or abnormal bruising, if any changes are made to her medications or medication doses (Rx, OTC, herbal), or if any upcoming procedures are scheduled. Marianela Ngo will likewise let us know if any other changes, questions, or concerns arise regarding anticoagulation therapy. she understands the importance of seeking medical attention immediately if she experiences any falls, vehicle accidents, or abnormal bleeding or bruising. Marianela Ngo voiced  understanding of this information and confirms that she has the [facility]'s contact information. Otherwise, we will plan to contact the patient once monthly or if her INR is out of range.    Sarah Alejandre CPhT  5/11/2022  13:27 EDT    I, Saravanan Greenberg, PharmD, have reviewed the note in full and agree with the assessment and plan.  05/12/22  08:01 EDT

## 2022-05-19 ENCOUNTER — ANTICOAGULATION VISIT (OUTPATIENT)
Dept: PHARMACY | Facility: HOSPITAL | Age: 77
End: 2022-05-19

## 2022-05-19 DIAGNOSIS — I48.0 PAROXYSMAL ATRIAL FIBRILLATION: Primary | ICD-10-CM

## 2022-05-19 LAB — INR PPP: 2.6

## 2022-05-19 NOTE — PROGRESS NOTES
Anticoagulation Clinic - Remote Progress Note  mdINR Home Monitor  Testing frequency: weekly    Indication: paroxysmal afib  Referring Provider: Susan  Initial Warfarin Start Date: 2020? 2019?  Goal INR: 2.0-3.0  Current Drug Interactions:    [id number]: 4 (Age ?74, Sex, HTN) [estimated 5/20/21]  Bleed Risk: self reports negative history for GI bleed  Other: DOAC too expensive    Diet: green beans, peas, zucchini, or [place] salads 2-3x/week (2/10/22)  Alcohol: none  Tobacco: none  OTC Pain Medication: APAP only    INR History:  Date 5/14 5/18 5/25 6/1 6/15 7/6 7/12 7/22 7/28 8/4 8/11 8/18 8/23 8/27   Total WeeklyDose  22.5mg 25mg 25mg 25mg 25mg 25mg 25mg 25mg 25mg 25mg 27.5mg 27.5mg 27.5mg   INR 3.5 1.9 2.3 2.3 2.8 2.7 2.1 2.2 1.9 2.0 1.8 2.3 2.2 1.8   Notes [place] Cards 1x hold; incr GLV     HM  incr GLV  no GLV incr GLV cefdinir cefdinir     Date 9/1 9/8 9/15 9/22 9/29 10/6 10/13 10/20 10/27 11/3 11/10 11/17 11/24 12/1   Total WeeklyDose 27.5mg 27.5mg 27.5mg 27.5mg 27.5mg 27.5mg 27.5mg 30mg 27.5mg 27.5mg 27.5mg 25mg 27.5mg 27.5mg   INR 2.1 2.3 3.3 2.3 2.2 2.0 1.9 2.6 3.2 2.4 3.4 1.6 2.4 2.3   Notes cefdinir  decr GLV?  keflex   1x incr dose   decr GLV? 1x decr dose;   incr GLV?       Date 12/9 12/15 12/22 12/29 1/5/22 1/12 1/20 1/26 1/31 2/3 2/7 2/10 2/16 2/23   Total WeeklyDose 27.5mg 27.5mg 27.5mg 27.5mg 27.5mg 27.5mg 27.5mg 27.5mg 27.5mg 25mg 25mg 22.5mg 27.5mg 27.5mg   INR 2.5 2.4 2.7 2.5 2.6 2.2 2.5 2.1 3.8 2.6 4.1 2.5 2.7 3.5   Notes call    call   COVID+ dex / azith 1x hold pred 20 BID x5d  1x hold; 1x decr dose;   call call call     Date 3/2 3/9 3/16 3/23 3/30 4/5 4/13 4/20 4/27 5/4 5/11 5/18     Total WeeklyDose 25mg 27.5mg 27.5mg 27.5mg 27.5mg 27.5mg 27.5 mg 27.5mg 27.5 mg 27.5 mg 27.5 mg 27.5 mg     INR 2.5 2.9 2.2 2.8 2.7 2.4 2.5 2.3 2.5 2.8 3.1 2.8     Notes 1x decr  call call    call    call Call  apap rec 5/19       Phone Interview:  Tablet Strength: 5mg (1/5/22)  Patient Contact Info:  [phone] (home) preferred; [phone] (cell)  Estimated OOP cost: [please send to registration if not already done]  Verbal Release Auth: signed 5/25/21 -- May speak w/Femi Ngo, ; May leave voicemail on home phone  Lab Contact Info: [facility]  *Will call once monthly or if INR out of range*    Patient Findings  Comments:  Patient not contacted this encounter     Plan:    1. INR is therapeutic today at 2.6.  Ms. Ngo will continue warfarin 5 mg oral daily except for 2.5 mg on MonWedFri until recheck (27.5 mg/week). She will continue GLV intake.  2. Repeat INR in one week, 5/25/22.  3. Marianela Ngo understands the importance of calling the [facility] if she notices any s/sx of bleeding, stroke, or abnormal bruising, if any changes are made to her medications or medication doses (Rx, OTC, herbal), or if any upcoming procedures are scheduled. Marianela Ngo will likewise let us know if any other changes, questions, or concerns arise regarding anticoagulation therapy. she understands the importance of seeking medical attention immediately if she experiences any falls, vehicle accidents, or abnormal bleeding or bruising. Marianela Ngo voiced understanding of this information and confirms that she has the [facility]'s contact information. Otherwise, we will plan to contact the patient once monthly or if her INR is out of range.    Radha Orlando, PharmD  5/19/2022  11:58 EDT

## 2022-05-25 ENCOUNTER — ANTICOAGULATION VISIT (OUTPATIENT)
Dept: PHARMACY | Facility: HOSPITAL | Age: 77
End: 2022-05-25

## 2022-05-25 DIAGNOSIS — I48.0 PAROXYSMAL ATRIAL FIBRILLATION: Primary | ICD-10-CM

## 2022-05-25 LAB — INR PPP: 2.5

## 2022-05-25 NOTE — PROGRESS NOTES
Anticoagulation Clinic - Remote Progress Note  mdINR Home Monitor  Testing frequency: weekly    Indication: paroxysmal afib  Referring Provider: Susan  Initial Warfarin Start Date: 2020? 2019?  Goal INR: 2.0-3.0  Current Drug Interactions:    [id number]: 4 (Age ?74, Sex, HTN) [estimated 5/20/21]  Bleed Risk: self reports negative history for GI bleed  Other: DOAC too expensive    Diet: green beans, peas, zucchini, or priyanka salads 2-3x/week (2/10/22)  Alcohol: none  Tobacco: none  OTC Pain Medication: APAP only    INR History:  Date 5/14 5/18 5/25 6/1 6/15 7/6 7/12 7/22 7/28 8/4 8/11 8/18 8/23 8/27   Total WeeklyDose  22.5mg 25mg 25mg 25mg 25mg 25mg 25mg 25mg 25mg 25mg 27.5mg 27.5mg 27.5mg   INR 3.5 1.9 2.3 2.3 2.8 2.7 2.1 2.2 1.9 2.0 1.8 2.3 2.2 1.8   Notes [place] Cards 1x hold; incr GLV     HM  incr GLV  no GLV incr GLV cefdinir cefdinir     Date 9/1 9/8 9/15 9/22 9/29 10/6 10/13 10/20 10/27 11/3 11/10 11/17 11/24 12/1   Total WeeklyDose 27.5mg 27.5mg 27.5mg 27.5mg 27.5mg 27.5mg 27.5mg 30mg 27.5mg 27.5mg 27.5mg 25mg 27.5mg 27.5mg   INR 2.1 2.3 3.3 2.3 2.2 2.0 1.9 2.6 3.2 2.4 3.4 1.6 2.4 2.3   Notes cefdinir  decr GLV?  keflex   1x incr dose   decr GLV? 1x decr dose;   incr GLV?       Date 12/9 12/15 12/22 12/29 1/5/22 1/12 1/20 1/26 1/31 2/3 2/7 2/10 2/16 2/23   Total WeeklyDose 27.5mg 27.5mg 27.5mg 27.5mg 27.5mg 27.5mg 27.5mg 27.5mg 27.5mg 25mg 25mg 22.5mg 27.5mg 27.5mg   INR 2.5 2.4 2.7 2.5 2.6 2.2 2.5 2.1 3.8 2.6 4.1 2.5 2.7 3.5   Notes call    call   COVID+ dex / azith 1x hold pred 20 BID x5d  1x hold; 1x decr dose;   call call call     Date 3/2 3/9 3/16 3/23 3/30 4/5 4/13 4/20 4/27 5/4 5/11 5/18 5/25    Total WeeklyDose 25mg 27.5mg 27.5mg 27.5mg 27.5mg 27.5mg 27.5 mg 27.5mg 27.5 mg 27.5 mg 27.5 mg 27.5 mg 27.5 mg    INR 2.5 2.9 2.2 2.8 2.7 2.4 2.5 2.3 2.5 2.8 3.1 2.6 2.5    Notes 1x decr  call call    call    call Call  apap rec 5/19       Phone Interview:  Tablet Strength: 5mg (1/5/22)  Patient  Contact Info: [phone] (home) preferred; [phone] (cell)  Estimated OOP cost: [please send to registration if not already done]  Verbal Release Auth: signed 5/25/21 -- May speak w/Femi Ngo, ; May leave voicemail on home phone  Lab Contact Info: [facility]  *Will call once monthly or if INR out of range*    Patient Findings  Comments:  Patient not contacted this encounter     Plan:  1. INR is therapeutic today at 2.5. Ms. Ngo will continue warfarin 5 mg oral daily except for 2.5 mg on MonWedFri until recheck (27.5 mg/week).   2. Repeat INR in one week, 6/1/22.  3. Marianela Ngo understands the importance of calling the [facility] if she notices any s/sx of bleeding, stroke, or abnormal bruising, if any changes are made to her medications or medication doses (Rx, OTC, herbal), or if any upcoming procedures are scheduled. Marianela Ngo will likewise let us know if any other changes, questions, or concerns arise regarding anticoagulation therapy. she understands the importance of seeking medical attention immediately if she experiences any falls, vehicle accidents, or abnormal bleeding or bruising. Marianela Ngo voiced understanding of this information and confirms that she has the [facility]'s contact information. Otherwise, we will plan to contact the patient once monthly or if her INR is out of range.    Sarah Alejandre, Pharmacy Technician  5/25/2022  12:17 EDT    I, Jayla Soriano, PharmD, have reviewed the note in full and agree with the assessment and plan.  05/25/22  13:08 EDT

## 2022-06-01 ENCOUNTER — ANTICOAGULATION VISIT (OUTPATIENT)
Dept: PHARMACY | Facility: HOSPITAL | Age: 77
End: 2022-06-01

## 2022-06-01 DIAGNOSIS — I48.0 PAROXYSMAL ATRIAL FIBRILLATION: Primary | ICD-10-CM

## 2022-06-01 LAB — INR PPP: 2.3

## 2022-06-01 NOTE — PROGRESS NOTES
Anticoagulation Clinic - Remote Progress Note  mdINR Home Monitor  Testing frequency: weekly    Indication: paroxysmal afib  Referring Provider: Susan  Initial Warfarin Start Date: 2020? 2019?  Goal INR: 2.0-3.0  Current Drug Interactions:    [id number]: 4 (Age ?74, Sex, HTN) [estimated 5/20/21]  Bleed Risk: self reports negative history for GI bleed  Other: DOAC too expensive    Diet: green beans, peas, zucchini, or priyanka salads 2-3x/week (2/10/22)  Alcohol: none  Tobacco: none  OTC Pain Medication: APAP only    INR History:  Date 5/14 5/18 5/25 6/1 6/15 7/6 7/12 7/22 7/28 8/4 8/11 8/18 8/23 8/27   Total WeeklyDose  22.5mg 25mg 25mg 25mg 25mg 25mg 25mg 25mg 25mg 25mg 27.5mg 27.5mg 27.5mg   INR 3.5 1.9 2.3 2.3 2.8 2.7 2.1 2.2 1.9 2.0 1.8 2.3 2.2 1.8   Notes [place] Cards 1x hold; incr GLV     HM  incr GLV  no GLV incr GLV cefdinir cefdinir     Date 9/1 9/8 9/15 9/22 9/29 10/6 10/13 10/20 10/27 11/3 11/10 11/17 11/24 12/1   Total WeeklyDose 27.5mg 27.5mg 27.5mg 27.5mg 27.5mg 27.5mg 27.5mg 30mg 27.5mg 27.5mg 27.5mg 25mg 27.5mg 27.5mg   INR 2.1 2.3 3.3 2.3 2.2 2.0 1.9 2.6 3.2 2.4 3.4 1.6 2.4 2.3   Notes cefdinir  decr GLV?  keflex   1x incr dose   decr GLV? 1x decr dose;   incr GLV?       Date 12/9 12/15 12/22 12/29 1/5/22 1/12 1/20 1/26 1/31 2/3 2/7 2/10 2/16 2/23   Total WeeklyDose 27.5mg 27.5mg 27.5mg 27.5mg 27.5mg 27.5mg 27.5mg 27.5mg 27.5mg 25mg 25mg 22.5mg 27.5mg 27.5mg   INR 2.5 2.4 2.7 2.5 2.6 2.2 2.5 2.1 3.8 2.6 4.1 2.5 2.7 3.5   Notes call    call   COVID+ dex / azith 1x hold pred 20 BID x5d  1x hold; 1x decr dose;   call call call     Date 3/2 3/9 3/16 3/23 3/30 4/5 4/13 4/20 4/27 5/4 5/11 5/18 5/25 6/1   Total WeeklyDose 25mg 27.5mg 27.5mg 27.5mg 27.5mg 27.5mg 27.5 mg 27.5mg 27.5 mg 27.5 mg 27.5 mg 27.5 mg 27.5 mg 27.5mg   INR 2.5 2.9 2.2 2.8 2.7 2.4 2.5 2.3 2.5 2.8 3.1 2.6 2.5 2.3   Notes 1x decr  call call    call    call Call  apap rec 5/19       Phone Interview:  Tablet Strength: 5mg  (1/5/22)  Patient Contact Info: [phone] (home) preferred; [phone] (cell)  Estimated OOP cost: [please send to registration if not already done]  Verbal Release Auth: signed 5/25/21 -- May speak w/Femi Ngo, ; May leave voicemail on home phone  Lab Contact Info: [facility]  *Will call once monthly or if INR out of range*    Patient Findings    Comments:  Patient not contacted at this encounter.      Plan:  1. INR is therapeutic today at 2.3. Ms. Ngo will continue warfarin 5 mg oral daily except for 2.5 mg on MonWedFri until recheck (27.5 mg/week).   2. Repeat INR in one week, 6/8/22.  3. Marianela Ngo understands the importance of calling the [facility] if she notices any s/sx of bleeding, stroke, or abnormal bruising, if any changes are made to her medications or medication doses (Rx, OTC, herbal), or if any upcoming procedures are scheduled. Marianela Ngo will likewise let us know if any other changes, questions, or concerns arise regarding anticoagulation therapy. she understands the importance of seeking medical attention immediately if she experiences any falls, vehicle accidents, or abnormal bleeding or bruising. Marianela Ngo voiced understanding of this information and confirms that she has the [facility]'s contact information. Otherwise, we will plan to contact the patient once monthly or if her INR is out of range.    Scooby Garcia CPhT  6/1/2022  15:52 EDT     I, Jean Paul Chatman, PharmD, have reviewed the note in full and agree with the assessment and plan.  06/01/22  16:10 EDT

## 2022-06-08 ENCOUNTER — ANTICOAGULATION VISIT (OUTPATIENT)
Dept: PHARMACY | Facility: HOSPITAL | Age: 77
End: 2022-06-08

## 2022-06-08 DIAGNOSIS — I48.0 PAROXYSMAL ATRIAL FIBRILLATION: Primary | ICD-10-CM

## 2022-06-08 LAB — INR PPP: 2.5

## 2022-06-08 NOTE — PROGRESS NOTES
Anticoagulation Clinic - Remote Progress Note  mdINR Home Monitor  Testing frequency: weekly    Indication: paroxysmal afib  Referring Provider: Susan  Initial Warfarin Start Date: 2020? 2019?  Goal INR: 2.0-3.0  Current Drug Interactions:    [id number]: 4 (Age ?74, Sex, HTN) [estimated 5/20/21]  Bleed Risk: self reports negative history for GI bleed  Other: DOAC too expensive    Diet: green beans, peas, zucchini, or priyanka salads 2-3x/week (6/8/22)  Alcohol: none  Tobacco: none  OTC Pain Medication: APAP only    INR History:  Date 5/14 5/18 5/25 6/1 6/15 7/6 7/12 7/22 7/28 8/4 8/11 8/18 8/23 8/27   Total WeeklyDose  22.5mg 25mg 25mg 25mg 25mg 25mg 25mg 25mg 25mg 25mg 27.5mg 27.5mg 27.5mg   INR 3.5 1.9 2.3 2.3 2.8 2.7 2.1 2.2 1.9 2.0 1.8 2.3 2.2 1.8   Notes [place] Cards 1x hold; incr GLV     HM  incr GLV  no GLV incr GLV cefdinir cefdinir     Date 9/1 9/8 9/15 9/22 9/29 10/6 10/13 10/20 10/27 11/3 11/10 11/17 11/24 12/1   Total WeeklyDose 27.5mg 27.5mg 27.5mg 27.5mg 27.5mg 27.5mg 27.5mg 30mg 27.5mg 27.5mg 27.5mg 25mg 27.5mg 27.5mg   INR 2.1 2.3 3.3 2.3 2.2 2.0 1.9 2.6 3.2 2.4 3.4 1.6 2.4 2.3   Notes cefdinir  decr GLV?  keflex   1x incr dose   decr GLV? 1x decr dose;   incr GLV?       Date 12/9 12/15 12/22 12/29 1/5/22 1/12 1/20 1/26 1/31 2/3 2/7 2/10 2/16 2/23   Total WeeklyDose 27.5mg 27.5mg 27.5mg 27.5mg 27.5mg 27.5mg 27.5mg 27.5mg 27.5mg 25mg 25mg 22.5mg 27.5mg 27.5mg   INR 2.5 2.4 2.7 2.5 2.6 2.2 2.5 2.1 3.8 2.6 4.1 2.5 2.7 3.5   Notes call    call   COVID+ dex / azith 1x hold pred 20 BID x5d  1x hold; 1x decr dose;   call call call     Date 3/2 3/9 3/16 3/23 3/30 4/5 4/13 4/20 4/27 5/4 5/11 5/18 5/25 6/1   Total WeeklyDose 25mg 27.5mg 27.5mg 27.5mg 27.5mg 27.5mg 27.5 mg 27.5mg 27.5 mg 27.5 mg 27.5 mg 27.5 mg 27.5 mg 27.5mg   INR 2.5 2.9 2.2 2.8 2.7 2.4 2.5 2.3 2.5 2.8 3.1 2.6 2.5 2.3   Notes 1x decr  call call    call    call Call  apap rec 5/19       Date 6/8       Total Weekly Dose 27.5 mg       INR  2.5       Notes call         Phone Interview:  Tablet Strength: 5mg (1/5/22)  Patient Contact Info: [phone] (home) preferred; [phone] (cell)  Estimated OOP cost: [please send to registration if not already done]  Verbal Release Auth: signed 5/25/21 -- May speak w/Femi Ngo, ; May leave voicemail on home phone  Lab Contact Info: [facility]  *Will call once monthly or if INR out of range*    Patient Findings:  Negatives:  Signs/symptoms of thrombosis, Signs/symptoms of bleeding, Laboratory test error suspected, Change in health, Change in alcohol use, Change in activity, Upcoming invasive procedure, Emergency department visit, Upcoming dental procedure, Missed doses, Extra doses, Change in medications, Change in diet/appetite, Hospital admission, Bruising, Other complaints   Comments:  All findings negative per patient.      Plan:  1. INR is therapeutic today at 2.5. Ms. Ngo will continue warfarin 5 mg oral daily except for 2.5 mg on MonWedFri until recheck (27.5 mg/week).   2. Repeat INR in one week, 6/15/22.  3. Verbal information provided over the phone. Patient RBV dosing instructions, expresses understanding by teach back, and has no further questions at this time.  4. Marianela Ngo understands the importance of calling the [facility] if she notices any s/sx of bleeding, stroke, or abnormal bruising, if any changes are made to her medications or medication doses (Rx, OTC, herbal), or if any upcoming procedures are scheduled. Marianela Ngo will likewise let us know if any other changes, questions, or concerns arise regarding anticoagulation therapy. she understands the importance of seeking medical attention immediately if she experiences any falls, vehicle accidents, or abnormal bleeding or bruising. Marianela Ngo voiced understanding of this information and confirms that she has the [facility]'s contact information. Otherwise, we will plan to  contact the patient once monthly or if her INR is out of range.    Sarah Alejandre CPhT  6/8/2022  13:24 EDT    IRandy, InaD, have reviewed the note in full and agree with the assessment and plan.  6/8/2022  15:58 EDT

## 2022-06-15 ENCOUNTER — ANTICOAGULATION VISIT (OUTPATIENT)
Dept: PHARMACY | Facility: HOSPITAL | Age: 77
End: 2022-06-15

## 2022-06-15 DIAGNOSIS — I48.0 PAROXYSMAL ATRIAL FIBRILLATION: Primary | ICD-10-CM

## 2022-06-15 LAB — INR PPP: 2.5

## 2022-06-15 NOTE — PROGRESS NOTES
Anticoagulation Clinic - Remote Progress Note  mdINR Home Monitor  Testing frequency: weekly    Indication: paroxysmal afib  Referring Provider: Susan  Initial Warfarin Start Date: 2020? 2019?  Goal INR: 2.0-3.0  Current Drug Interactions:    [id number]: 4 (Age ?74, Sex, HTN) [estimated 5/20/21]  Bleed Risk: self reports negative history for GI bleed  Other: DOAC too expensive    Diet: green beans, peas, zucchini, or priyanka salads 2-3x/week (6/8/22)  Alcohol: none  Tobacco: none  OTC Pain Medication: APAP only    INR History:  Date 5/14 5/18 5/25 6/1 6/15 7/6 7/12 7/22 7/28 8/4 8/11 8/18 8/23 8/27   Total WeeklyDose  22.5mg 25mg 25mg 25mg 25mg 25mg 25mg 25mg 25mg 25mg 27.5mg 27.5mg 27.5mg   INR 3.5 1.9 2.3 2.3 2.8 2.7 2.1 2.2 1.9 2.0 1.8 2.3 2.2 1.8   Notes [place] Cards 1x hold; incr GLV     HM  incr GLV  no GLV incr GLV cefdinir cefdinir     Date 9/1 9/8 9/15 9/22 9/29 10/6 10/13 10/20 10/27 11/3 11/10 11/17 11/24 12/1   Total WeeklyDose 27.5mg 27.5mg 27.5mg 27.5mg 27.5mg 27.5mg 27.5mg 30mg 27.5mg 27.5mg 27.5mg 25mg 27.5mg 27.5mg   INR 2.1 2.3 3.3 2.3 2.2 2.0 1.9 2.6 3.2 2.4 3.4 1.6 2.4 2.3   Notes cefdinir  decr GLV?  keflex   1x incr dose   decr GLV? 1x decr dose;   incr GLV?       Date 12/9 12/15 12/22 12/29 1/5/22 1/12 1/20 1/26 1/31 2/3 2/7 2/10 2/16 2/23   Total WeeklyDose 27.5mg 27.5mg 27.5mg 27.5mg 27.5mg 27.5mg 27.5mg 27.5mg 27.5mg 25mg 25mg 22.5mg 27.5mg 27.5mg   INR 2.5 2.4 2.7 2.5 2.6 2.2 2.5 2.1 3.8 2.6 4.1 2.5 2.7 3.5   Notes call    call   COVID+ dex / azith 1x hold pred 20 BID x5d  1x hold; 1x decr dose;   call call call     Date 3/2 3/9 3/16 3/23 3/30 4/5 4/13 4/20 4/27 5/4 5/11 5/18 5/25 6/1   Total WeeklyDose 25mg 27.5mg 27.5mg 27.5mg 27.5mg 27.5mg 27.5 mg 27.5mg 27.5 mg 27.5 mg 27.5 mg 27.5 mg 27.5 mg 27.5mg   INR 2.5 2.9 2.2 2.8 2.7 2.4 2.5 2.3 2.5 2.8 3.1 2.6 2.5 2.3   Notes 1x decr  call call    call    call Call  apap rec 5/19       Date 6/8 6/15               Total Weekly Dose 27.5  mg 27.5 mg               INR 2.5 2.5               Notes call                  Phone Interview:  Tablet Strength: 5mg (1/5/22)  Patient Contact Info: [phone] (home) preferred; [phone] (cell)  Estimated OOP cost: [please send to registration if not already done]  Verbal Release Auth: signed 5/25/21 -- May speak w/Femi Ngo, ; May leave voicemail on home phone  Lab Contact Info: [facility]  *Will call once monthly or if INR out of range*    Patient Findings    Comments:  Patient not contacted at this encounter.      Plan:  1. INR is therapeutic today at 2.5. Ms. Ngo will continue warfarin 5 mg oral daily except for 2.5 mg on MonWedFri until recheck (27.5 mg/week).   2. Repeat INR in one week, 6/22/22.  3. Verbal information provided over the phone. Patient RBV dosing instructions, expresses understanding by teach back, and has no further questions at this time.  4. Marianela Ngo understands the importance of calling the [facility] if she notices any s/sx of bleeding, stroke, or abnormal bruising, if any changes are made to her medications or medication doses (Rx, OTC, herbal), or if any upcoming procedures are scheduled. Marianela Ngo will likewise let us know if any other changes, questions, or concerns arise regarding anticoagulation therapy. she understands the importance of seeking medical attention immediately if she experiences any falls, vehicle accidents, or abnormal bleeding or bruising. Marianela Ngo voiced understanding of this information and confirms that she has the [facility]'s contact information. Otherwise, we will plan to contact the patient once monthly or if her INR is out of range.    Scooby Garcia CPhT  6/15/2022  13:32 EDT     I, Jean Paul Chatman, PharmD, have reviewed the note in full and agree with the assessment and plan.  06/15/22  14:17 EDT

## 2022-06-22 ENCOUNTER — ANTICOAGULATION VISIT (OUTPATIENT)
Dept: PHARMACY | Facility: HOSPITAL | Age: 77
End: 2022-06-22

## 2022-06-22 DIAGNOSIS — I48.0 PAROXYSMAL ATRIAL FIBRILLATION: Primary | ICD-10-CM

## 2022-06-22 LAB — INR PPP: 2.7

## 2022-06-22 NOTE — PROGRESS NOTES
Anticoagulation Clinic - Remote Progress Note  mdINR Home Monitor  Testing frequency: weekly    Indication: paroxysmal afib  Referring Provider: Susan  Initial Warfarin Start Date: 2020? 2019?  Goal INR: 2.0-3.0  Current Drug Interactions:    [id number]: 4 (Age ?74, Sex, HTN) [estimated 5/20/21]  Bleed Risk: self reports negative history for GI bleed  Other: DOAC too expensive    Diet: green beans, peas, zucchini, or priyanka salads 2-3x/week (6/8/22)  Alcohol: none  Tobacco: none  OTC Pain Medication: APAP only    INR History:  Date 5/14 5/18 5/25 6/1 6/15 7/6 7/12 7/22 7/28 8/4 8/11 8/18 8/23 8/27   Total WeeklyDose  22.5mg 25mg 25mg 25mg 25mg 25mg 25mg 25mg 25mg 25mg 27.5mg 27.5mg 27.5mg   INR 3.5 1.9 2.3 2.3 2.8 2.7 2.1 2.2 1.9 2.0 1.8 2.3 2.2 1.8   Notes [place] Cards 1x hold; incr GLV     HM  incr GLV  no GLV incr GLV cefdinir cefdinir     Date 9/1 9/8 9/15 9/22 9/29 10/6 10/13 10/20 10/27 11/3 11/10 11/17 11/24 12/1   Total WeeklyDose 27.5mg 27.5mg 27.5mg 27.5mg 27.5mg 27.5mg 27.5mg 30mg 27.5mg 27.5mg 27.5mg 25mg 27.5mg 27.5mg   INR 2.1 2.3 3.3 2.3 2.2 2.0 1.9 2.6 3.2 2.4 3.4 1.6 2.4 2.3   Notes cefdinir  decr GLV?  keflex   1x incr dose   decr GLV? 1x decr dose;   incr GLV?       Date 12/9 12/15 12/22 12/29 1/5/22 1/12 1/20 1/26 1/31 2/3 2/7 2/10 2/16 2/23   Total WeeklyDose 27.5mg 27.5mg 27.5mg 27.5mg 27.5mg 27.5mg 27.5mg 27.5mg 27.5mg 25mg 25mg 22.5mg 27.5mg 27.5mg   INR 2.5 2.4 2.7 2.5 2.6 2.2 2.5 2.1 3.8 2.6 4.1 2.5 2.7 3.5   Notes call    call   COVID+ dex / azith 1x hold pred 20 BID x5d  1x hold; 1x decr dose;   call call call     Date 3/2 3/9 3/16 3/23 3/30 4/5 4/13 4/20 4/27 5/4 5/11 5/18 5/25 6/1   Total WeeklyDose 25mg 27.5mg 27.5mg 27.5mg 27.5mg 27.5mg 27.5 mg 27.5mg 27.5 mg 27.5 mg 27.5 mg 27.5 mg 27.5 mg 27.5mg   INR 2.5 2.9 2.2 2.8 2.7 2.4 2.5 2.3 2.5 2.8 3.1 2.6 2.5 2.3   Notes 1x decr  call call    call    call Call  apap rec 5/19       Date 6/8 6/15 6/22              Total Weekly Dose  27.5 mg 27.5 mg 27.5 mg              INR 2.5 2.5 2.7              Notes call                  Phone Interview:  Tablet Strength: 5mg (1/5/22)  Patient Contact Info: [phone] (home) preferred; [phone] (cell)  Estimated OOP cost: [please send to registration if not already done]  Verbal Release Auth: signed 5/25/21 -- May speak w/Femi Ngo, ; May leave voicemail on home phone  Lab Contact Info: [facility]  *Will call once monthly or if INR out of range*    Patient Findings    Comments:  Patient not contacted at this encounter.      Plan:  1. INR is therapeutic today at 2.7. Ms. Ngo will continue warfarin 5 mg oral daily except for 2.5 mg on MonWedFri until recheck (27.5 mg/week).   2. Repeat INR in one week, 6/29/22.  3. Verbal information provided over the phone. Patient RBV dosing instructions, expresses understanding by teach back, and has no further questions at this time.  4. Marianela Ngo understands the importance of calling the [facility] if she notices any s/sx of bleeding, stroke, or abnormal bruising, if any changes are made to her medications or medication doses (Rx, OTC, herbal), or if any upcoming procedures are scheduled. Marianela Ngo will likewise let us know if any other changes, questions, or concerns arise regarding anticoagulation therapy. she understands the importance of seeking medical attention immediately if she experiences any falls, vehicle accidents, or abnormal bleeding or bruising. Marianela Ngo voiced understanding of this information and confirms that she has the [facility]'s contact information. Otherwise, we will plan to contact the patient once monthly or if her INR is out of range.    Scooby Garcia CPhT  6/22/2022  13:22 EDT     I, Lu Trevino, PharmD, have reviewed the note in full and agree with the assessment and plan.  06/22/22  13:45 EDT

## 2022-06-29 ENCOUNTER — ANTICOAGULATION VISIT (OUTPATIENT)
Dept: PHARMACY | Facility: HOSPITAL | Age: 77
End: 2022-06-29

## 2022-06-29 DIAGNOSIS — I48.0 PAROXYSMAL ATRIAL FIBRILLATION: Primary | ICD-10-CM

## 2022-06-29 LAB — INR PPP: 2.5

## 2022-06-29 NOTE — PROGRESS NOTES
Anticoagulation Clinic - Remote Progress Note  mdINR Home Monitor  Testing frequency: weekly    Indication: paroxysmal afib  Referring Provider: Susan  Initial Warfarin Start Date: 2020? 2019?  Goal INR: 2.0-3.0  Current Drug Interactions:    [id number]: 4 (Age ?74, Sex, HTN) [estimated 5/20/21]  Bleed Risk: self reports negative history for GI bleed  Other: DOAC too expensive    Diet: green beans, peas, zucchini, or priyanka salads 2-3x/week (6/8/22)  Alcohol: none  Tobacco: none  OTC Pain Medication: APAP only    INR History:  Date 5/14 5/18 5/25 6/1 6/15 7/6 7/12 7/22 7/28 8/4 8/11 8/18 8/23 8/27   Total WeeklyDose  22.5mg 25mg 25mg 25mg 25mg 25mg 25mg 25mg 25mg 25mg 27.5mg 27.5mg 27.5mg   INR 3.5 1.9 2.3 2.3 2.8 2.7 2.1 2.2 1.9 2.0 1.8 2.3 2.2 1.8   Notes [place] Cards 1x hold; incr GLV     HM  incr GLV  no GLV incr GLV cefdinir cefdinir     Date 9/1 9/8 9/15 9/22 9/29 10/6 10/13 10/20 10/27 11/3 11/10 11/17 11/24 12/1   Total WeeklyDose 27.5mg 27.5mg 27.5mg 27.5mg 27.5mg 27.5mg 27.5mg 30mg 27.5mg 27.5mg 27.5mg 25mg 27.5mg 27.5mg   INR 2.1 2.3 3.3 2.3 2.2 2.0 1.9 2.6 3.2 2.4 3.4 1.6 2.4 2.3   Notes cefdinir  decr GLV?  keflex   1x incr dose   decr GLV? 1x decr dose;   incr GLV?       Date 12/9 12/15 12/22 12/29 1/5/22 1/12 1/20 1/26 1/31 2/3 2/7 2/10 2/16 2/23   Total WeeklyDose 27.5mg 27.5mg 27.5mg 27.5mg 27.5mg 27.5mg 27.5mg 27.5mg 27.5mg 25mg 25mg 22.5mg 27.5mg 27.5mg   INR 2.5 2.4 2.7 2.5 2.6 2.2 2.5 2.1 3.8 2.6 4.1 2.5 2.7 3.5   Notes call    call   COVID+ dex / azith 1x hold pred 20 BID x5d  1x hold; 1x decr dose;   call call call     Date 3/2 3/9 3/16 3/23 3/30 4/5 4/13 4/20 4/27 5/4 5/11 5/18 5/25 6/1   Total WeeklyDose 25mg 27.5mg 27.5mg 27.5mg 27.5mg 27.5mg 27.5 mg 27.5mg 27.5 mg 27.5 mg 27.5 mg 27.5 mg 27.5 mg 27.5mg   INR 2.5 2.9 2.2 2.8 2.7 2.4 2.5 2.3 2.5 2.8 3.1 2.6 2.5 2.3   Notes 1x decr  call call    call    call Call  apap rec 5/19       Date 6/8 6/15 6/22 6/29             Total Weekly  Dose 27.5 mg 27.5 mg 27.5 mg 27.5 mg             INR 2.5 2.5 2.7 2.5             Notes call                  Phone Interview:  Tablet Strength: 5mg (1/5/22)  Patient Contact Info: [phone] (home) preferred; [phone] (cell)  Estimated OOP cost: [please send to registration if not already done]  Verbal Release Auth: signed 5/25/21 -- May speak w/Femi Ngo, ; May leave voicemail on home phone  Lab Contact Info: [facility]  *Will call once monthly or if INR out of range*    Patient Findings:  Comments:  Patient was not contacted at this encounter.      Plan:  1. INR is therapeutic today at 2.5. Ms. Ngo will continue warfarin 5 mg oral daily except for 2.5 mg on MonWedFri until recheck (27.5 mg/week).   2. Repeat INR in one week, 7/6/22.  3. Marianela Ngo understands the importance of calling the [facility] if she notices any s/sx of bleeding, stroke, or abnormal bruising, if any changes are made to her medications or medication doses (Rx, OTC, herbal), or if any upcoming procedures are scheduled. Marianela Ngo will likewise let us know if any other changes, questions, or concerns arise regarding anticoagulation therapy. she understands the importance of seeking medical attention immediately if she experiences any falls, vehicle accidents, or abnormal bleeding or bruising. Marianela Ngo voiced understanding of this information and confirms that she has the [facility]'s contact information. Otherwise, we will plan to contact the patient once monthly or if her INR is out of range.    Sarah Alejandre CPhT  6/29/2022  14:38 EDT    I, Radha Orlando, PharmD, have reviewed the note in full and agree with the assessment and plan.  06/30/22  15:50 EDT

## 2022-07-06 ENCOUNTER — ANTICOAGULATION VISIT (OUTPATIENT)
Dept: PHARMACY | Facility: HOSPITAL | Age: 77
End: 2022-07-06

## 2022-07-06 DIAGNOSIS — I48.0 PAROXYSMAL ATRIAL FIBRILLATION: Primary | ICD-10-CM

## 2022-07-06 LAB — INR PPP: 2.9

## 2022-07-06 NOTE — PROGRESS NOTES
Anticoagulation Clinic - Remote Progress Note  mdINR Home Monitor  Testing frequency: weekly    Indication: paroxysmal afib  Referring Provider: Susan  Initial Warfarin Start Date: 2020? 2019?  Goal INR: 2.0-3.0  Current Drug Interactions:    [id number]: 4 (Age ?74, Sex, HTN) [estimated 5/20/21]  Bleed Risk: self reports negative history for GI bleed  Other: DOAC too expensive    Diet: green beans, peas, zucchini, or priyanka salads 2-3x/week (7/6/22)  Alcohol: none  Tobacco: none  OTC Pain Medication: APAP only    INR History:  Date 5/14 5/18 5/25 6/1 6/15 7/6 7/12 7/22 7/28 8/4 8/11 8/18 8/23 8/27   Total WeeklyDose  22.5mg 25mg 25mg 25mg 25mg 25mg 25mg 25mg 25mg 25mg 27.5mg 27.5mg 27.5mg   INR 3.5 1.9 2.3 2.3 2.8 2.7 2.1 2.2 1.9 2.0 1.8 2.3 2.2 1.8   Notes [place] Cards 1x hold; incr GLV     HM  incr GLV  no GLV incr GLV cefdinir cefdinir     Date 9/1 9/8 9/15 9/22 9/29 10/6 10/13 10/20 10/27 11/3 11/10 11/17 11/24 12/1   Total WeeklyDose 27.5mg 27.5mg 27.5mg 27.5mg 27.5mg 27.5mg 27.5mg 30mg 27.5mg 27.5mg 27.5mg 25mg 27.5mg 27.5mg   INR 2.1 2.3 3.3 2.3 2.2 2.0 1.9 2.6 3.2 2.4 3.4 1.6 2.4 2.3   Notes cefdinir  decr GLV?  keflex   1x incr dose   decr GLV? 1x decr dose;   incr GLV?       Date 12/9 12/15 12/22 12/29 1/5/22 1/12 1/20 1/26 1/31 2/3 2/7 2/10 2/16 2/23   Total WeeklyDose 27.5mg 27.5mg 27.5mg 27.5mg 27.5mg 27.5mg 27.5mg 27.5mg 27.5mg 25mg 25mg 22.5mg 27.5mg 27.5mg   INR 2.5 2.4 2.7 2.5 2.6 2.2 2.5 2.1 3.8 2.6 4.1 2.5 2.7 3.5   Notes call    call   COVID+ dex / azith 1x hold pred 20 BID x5d  1x hold; 1x decr dose;   call call call     Date 3/2 3/9 3/16 3/23 3/30 4/5 4/13 4/20 4/27 5/4 5/11 5/18 5/25 6/1   Total WeeklyDose 25mg 27.5mg 27.5mg 27.5mg 27.5mg 27.5mg 27.5 mg 27.5mg 27.5 mg 27.5 mg 27.5 mg 27.5 mg 27.5 mg 27.5mg   INR 2.5 2.9 2.2 2.8 2.7 2.4 2.5 2.3 2.5 2.8 3.1 2.6 2.5 2.3   Notes 1x decr  call call    call    call Call  apap rec 5/19       Date 6/8 6/15 6/22 6/29 7/6            Total Weekly  Dose 27.5 mg 27.5 mg 27.5 mg 27.5 mg 27.5 mg            INR 2.5 2.5 2.7 2.5 2.9            Notes call    call              Phone Interview:  Tablet Strength: 5 mg (1/5/22)  Patient Contact Info: [phone] (home) preferred; [phone] (cell)  Estimated OOP cost: [please send to registration if not already done]  Verbal Release Auth: signed 5/25/21 -- May speak w/Femi Ngo, ; May leave voicemail on home phone  Lab Contact Info: [facility]  *Will call once monthly or if INR out of range*    Patient Findings:  Negatives:  Signs/symptoms of thrombosis, Signs/symptoms of bleeding, Laboratory test error suspected, Change in health, Change in alcohol use, Change in activity, Upcoming invasive procedure, Emergency department visit, Upcoming dental procedure, Missed doses, Extra doses, Change in medications, Change in diet/appetite, Hospital admission, Bruising, Other complaints   Comments:  All findings negative per patient.      Plan:  1. INR is therapeutic today at 2.9. Ms. Ngo will continue warfarin 5 mg oral daily except for 2.5 mg on MonWedFri until recheck (27.5 mg/week).   2. Repeat INR in one week, 7/13/22.  3. Verbal information provided over the phone. Patient RBV dosing instructions, expresses understanding by teach back, and has no further questions at this time.  4. Marianela Ngo understands the importance of calling the [facility] if she notices any s/sx of bleeding, stroke, or abnormal bruising, if any changes are made to her medications or medication doses (Rx, OTC, herbal), or if any upcoming procedures are scheduled. Marianela Ngo will likewise let us know if any other changes, questions, or concerns arise regarding anticoagulation therapy. she understands the importance of seeking medical attention immediately if she experiences any falls, vehicle accidents, or abnormal bleeding or bruising. Marianela Ngo voiced understanding of this information and confirms  that she has the [facility]'s contact information. Otherwise, we will plan to contact the patient once monthly or if her INR is out of range.    Sarah Alejandre CPhT  7/6/2022  13:27 EDT    I, Pablo Taylor, PharmD, have reviewed the note in full and agree with the assessment and plan.  07/06/22  14:58 EDT

## 2022-07-11 ENCOUNTER — OFFICE VISIT (OUTPATIENT)
Dept: FAMILY MEDICINE CLINIC | Facility: CLINIC | Age: 77
End: 2022-07-11

## 2022-07-11 VITALS
HEIGHT: 65 IN | SYSTOLIC BLOOD PRESSURE: 118 MMHG | HEART RATE: 55 BPM | OXYGEN SATURATION: 97 % | BODY MASS INDEX: 34.99 KG/M2 | DIASTOLIC BLOOD PRESSURE: 82 MMHG | WEIGHT: 210 LBS

## 2022-07-11 DIAGNOSIS — M53.3 COCCYX PAIN: Primary | ICD-10-CM

## 2022-07-11 PROCEDURE — 99213 OFFICE O/P EST LOW 20 MIN: CPT | Performed by: PHYSICIAN ASSISTANT

## 2022-07-11 NOTE — PROGRESS NOTES
"Chief Complaint  Tailbone Pain    Subjective          Marianela Ngo presents to [facility]  Patient in today to discuss some pain that she has had to her tailbone area for the past 2 months.  She states last several days has not bothered her but was bad last week.  Denies any known injury.  States off-and-on prior, would have some pain to her lower back.  Denies any pain into her legs.  Denies any numbness into legs.  Denies any changes with bowel habits.    Back Pain  The quality of the pain is described as aching. Pertinent negatives include no abdominal pain, bowel incontinence, fever, numbness, paresthesias, perianal numbness or weakness.       Objective   Vital Signs:   /82   Pulse 55   Ht 165.1 cm (65\")   Wt 95.3 kg (210 lb)   SpO2 97%   BMI 34.95 kg/m²     Body mass index is 34.95 kg/m².    Review of Systems   Constitutional: Negative for fever.   Gastrointestinal: Negative for abdominal pain and bowel incontinence.   Musculoskeletal: Positive for back pain.   Neurological: Negative for weakness, numbness and paresthesias.       Past History:  Medical History: has a past medical history of Allergies, Bradycardia with 51-60 beats per minute, Chronic atrial fibrillation (HCC), Current use of long term anticoagulation, DJD (degenerative joint disease), Epistaxis, Essential hypertension, High risk medication use, Mild persistent asthma, uncomplicated, Mixed hyperlipidemia, Obstructive sleep apnea on CPAP, Other fatigue, Paroxysmal atrial fibrillation (HCC), Pneumonia (08/2015), Shortness of breath, and Vitamin deficiency.   Surgical History: has a past surgical history that includes Hysterectomy; Replacement total knee bilateral (2013,2014); and Cataract extraction, bilateral.   Family History: family history includes Cancer in her brother and father; Diabetes in her brother; Heart disease in her sister; Hypertension in an other family member.   Social History: reports " that she quit smoking about 48 years ago. Her smoking use included cigarettes. She smoked 1.00 pack per day. She has never used smokeless tobacco. She reports that she does not drink alcohol and does not use drugs.      Current Outpatient Medications:   •  albuterol (PROVENTIL) (2.5 MG/3ML) 0.083% nebulizer solution, Take 2.5 mg by nebulization 4 (Four) Times a Day., Disp: , Rfl:   •  amLODIPine (NORVASC) 5 MG tablet, Take 1 tablet by mouth Daily., Disp: 90 tablet, Rfl: 3  •  lisinopril-hydrochlorothiazide (PRINZIDE,ZESTORETIC) 20-12.5 MG per tablet, Take 1 tablet by mouth Daily., Disp: 90 tablet, Rfl: 3  •  sotalol (BETAPACE) 80 MG tablet, Take 1/2 in am and 1 whole tab in the pm, Disp: 180 tablet, Rfl: 3  •  warfarin (COUMADIN) 5 MG tablet, Take 1 tablet by mouth Daily., Disp: 180 tablet, Rfl: 3  •  atorvastatin (LIPITOR) 20 MG tablet, Take 1 tablet by mouth Daily., Disp: 90 tablet, Rfl: 3  •  budesonide (PULMICORT) 0.5 MG/2ML nebulizer solution, Take 0.5 mg by nebulization Daily., Disp: , Rfl:   Allergies: Patient has no known allergies.    Physical Exam  Constitutional:       Appearance: Normal appearance.   Musculoskeletal:      Lumbar back: No swelling. Normal range of motion.      Comments: Tender to palpate tailbone area. FROM of back.    Neurological:      Mental Status: She is alert.             Assessment and Plan   Diagnoses and all orders for this visit:    1. Coccyx pain (Primary)  -     XR Sacrum & Coccyx (In Office)    With recurrent pain to this area,  will check xray for further evaluation.  RTC if not improving         Follow Up   No follow-ups on file.  Patient was given instructions and counseling regarding her condition or for health maintenance advice. Please see specific information pulled into the AVS if appropriate.     Marietta Archibald PA-C

## 2022-07-14 ENCOUNTER — ANTICOAGULATION VISIT (OUTPATIENT)
Dept: PHARMACY | Facility: HOSPITAL | Age: 77
End: 2022-07-14

## 2022-07-14 DIAGNOSIS — I48.0 PAROXYSMAL ATRIAL FIBRILLATION: Primary | ICD-10-CM

## 2022-07-14 LAB — INR PPP: 2.4

## 2022-07-14 NOTE — PROGRESS NOTES
Anticoagulation Clinic - Remote Progress Note  mdINR Home Monitor  Testing frequency: weekly    Indication: paroxysmal afib  Referring Provider: Susan  Initial Warfarin Start Date: 2020? 2019?  Goal INR: 2.0-3.0  Current Drug Interactions:    [id number]: 4 (Age ?75, Sex, HTN) [estimated 5/20/21]  Bleed Risk: self reports negative history for GI bleed  Other: DOAC too expensive    Diet: green beans, peas, zucchini, or priyanka salads 2-3x/week (7/6/22)  Alcohol: none  Tobacco: none  OTC Pain Medication: APAP only    INR History:  Date 5/14 5/18 5/25 6/1 6/15 7/6 7/12 7/22 7/28 8/4 8/11 8/18 8/23 8/27   Total WeeklyDose  22.5mg 25mg 25mg 25mg 25mg 25mg 25mg 25mg 25mg 25mg 27.5mg 27.5mg 27.5mg   INR 3.5 1.9 2.3 2.3 2.8 2.7 2.1 2.2 1.9 2.0 1.8 2.3 2.2 1.8   Notes [place] Cards 1x hold; incr GLV     HM  incr GLV  no GLV incr GLV cefdinir cefdinir     Date 9/1 9/8 9/15 9/22 9/29 10/6 10/13 10/20 10/27 11/3 11/10 11/17 11/24 12/1   Total WeeklyDose 27.5mg 27.5mg 27.5mg 27.5mg 27.5mg 27.5mg 27.5mg 30mg 27.5mg 27.5mg 27.5mg 25mg 27.5mg 27.5mg   INR 2.1 2.3 3.3 2.3 2.2 2.0 1.9 2.6 3.2 2.4 3.4 1.6 2.4 2.3   Notes cefdinir  decr GLV?  keflex   1x incr dose   decr GLV? 1x decr dose;   incr GLV?       Date 12/9 12/15 12/22 12/29 1/5/22 1/12 1/20 1/26 1/31 2/3 2/7 2/10 2/16 2/23   Total WeeklyDose 27.5mg 27.5mg 27.5mg 27.5mg 27.5mg 27.5mg 27.5mg 27.5mg 27.5mg 25mg 25mg 22.5mg 27.5mg 27.5mg   INR 2.5 2.4 2.7 2.5 2.6 2.2 2.5 2.1 3.8 2.6 4.1 2.5 2.7 3.5   Notes call    call   COVID+ dex / azith 1x hold pred 20 BID x5d  1x hold; 1x decr dose;   call call call     Date 3/2 3/9 3/16 3/23 3/30 4/5 4/13 4/20 4/27 5/4 5/11 5/18 5/25 6/1   Total WeeklyDose 25mg 27.5mg 27.5mg 27.5mg 27.5mg 27.5mg 27.5 mg 27.5mg 27.5 mg 27.5 mg 27.5 mg 27.5 mg 27.5 mg 27.5mg   INR 2.5 2.9 2.2 2.8 2.7 2.4 2.5 2.3 2.5 2.8 3.1 2.6 2.5 2.3   Notes 1x decr  call call    call    call Call  apap rec 5/19       Date 6/8 6/15 6/22 6/29 7/6 7/14           Total  Weekly Dose 27.5 mg 27.5 mg 27.5 mg 27.5 mg 27.5 mg 27.5 mg           INR 2.5 2.5 2.7 2.5 2.9 2.4           Notes call    call              Phone Interview:  Tablet Strength: 5 mg (1/5/22)  Patient Contact Info: [phone] (home) preferred; [phone] (cell)  Estimated OOP cost: [please send to registration if not already done]  Verbal Release Auth: signed 5/25/21 -- May speak w/Femi Ngo, ; May leave voicemail on home phone  Lab Contact Info: [facility]  *Will call once monthly or if INR out of range*    Patient Findings  Comments:  Patient not contacted at this encounter.      Plan:  1. INR is therapeutic today at 2.4. Ms. Ngo will continue warfarin 5 mg oral daily except for 2.5 mg on MonWedFri until recheck (27.5 mg/week).   2. Repeat INR in one week, 7/21/22.  3. Verbal information provided over the phone. Patient RBV dosing instructions, expresses understanding by teach back, and has no further questions at this time.  4. Marianela Ngo understands the importance of calling the [facility] if she notices any s/sx of bleeding, stroke, or abnormal bruising, if any changes are made to her medications or medication doses (Rx, OTC, herbal), or if any upcoming procedures are scheduled. Marianela Ngo will likewise let us know if any other changes, questions, or concerns arise regarding anticoagulation therapy. she understands the importance of seeking medical attention immediately if she experiences any falls, vehicle accidents, or abnormal bleeding or bruising. Marianela Ngo voiced understanding of this information and confirms that she has the [facility]'s contact information. Otherwise, we will plan to contact the patient once monthly or if her INR is out of range.    Scooby Garcia CPhT  7/14/2022  13:43 EDT     I, Maryjane Amezquita, PharmD, have reviewed the note in full and agree with the assessment and plan.  07/14/22  16:01 EDT

## 2022-07-18 ENCOUNTER — TELEPHONE (OUTPATIENT)
Dept: FAMILY MEDICINE CLINIC | Facility: CLINIC | Age: 77
End: 2022-07-18

## 2022-07-18 NOTE — TELEPHONE ENCOUNTER
Pt called, said that she is still in a lot of pain. Would like a referral to [facility] in [place] since she has seen them several times in the past and they know her history. Asked for a callback to let her know. Said to just leave her a message as she isn't always in the house to . [phone]

## 2022-07-19 DIAGNOSIS — M53.3 COCCYX PAIN: ICD-10-CM

## 2022-07-19 DIAGNOSIS — M54.50 ACUTE BILATERAL LOW BACK PAIN WITHOUT SCIATICA: Primary | ICD-10-CM

## 2022-07-21 ENCOUNTER — ANTICOAGULATION VISIT (OUTPATIENT)
Dept: PHARMACY | Facility: HOSPITAL | Age: 77
End: 2022-07-21

## 2022-07-21 DIAGNOSIS — I48.0 PAROXYSMAL ATRIAL FIBRILLATION: Primary | ICD-10-CM

## 2022-07-21 LAB — INR PPP: 1.8

## 2022-07-21 NOTE — PROGRESS NOTES
Anticoagulation Clinic - Remote Progress Note  mdINR Home Monitor  Testing frequency: weekly    Indication: paroxysmal afib  Referring Provider: Susan  Initial Warfarin Start Date: 2020? 2019?  Goal INR: 2.0-3.0  Current Drug Interactions:    [id number]: 4 (Age ?75, Sex, HTN) [estimated 5/20/21]  Bleed Risk: self reports negative history for GI bleed  Other: DOAC too expensive    Diet: green beans, peas, zucchini, or priyanka salads 2-3x/week (7/6/22)  Alcohol: none  Tobacco: none  OTC Pain Medication: APAP only    INR History:  Date 5/14 5/18 5/25 6/1 6/15 7/6 7/12 7/22 7/28 8/4 8/11 8/18 8/23 8/27   Total WeeklyDose  22.5mg 25mg 25mg 25mg 25mg 25mg 25mg 25mg 25mg 25mg 27.5mg 27.5mg 27.5mg   INR 3.5 1.9 2.3 2.3 2.8 2.7 2.1 2.2 1.9 2.0 1.8 2.3 2.2 1.8   Notes [place] Cards 1x hold; incr GLV     HM  incr GLV  no GLV incr GLV cefdinir cefdinir     Date 9/1 9/8 9/15 9/22 9/29 10/6 10/13 10/20 10/27 11/3 11/10 11/17 11/24 12/1   Total WeeklyDose 27.5mg 27.5mg 27.5mg 27.5mg 27.5mg 27.5mg 27.5mg 30mg 27.5mg 27.5mg 27.5mg 25mg 27.5mg 27.5mg   INR 2.1 2.3 3.3 2.3 2.2 2.0 1.9 2.6 3.2 2.4 3.4 1.6 2.4 2.3   Notes cefdinir  decr GLV?  keflex   1x incr dose   decr GLV? 1x decr dose;   incr GLV?       Date 12/9 12/15 12/22 12/29 1/5/22 1/12 1/20 1/26 1/31 2/3 2/7 2/10 2/16 2/23   Total WeeklyDose 27.5mg 27.5mg 27.5mg 27.5mg 27.5mg 27.5mg 27.5mg 27.5mg 27.5mg 25mg 25mg 22.5mg 27.5mg 27.5mg   INR 2.5 2.4 2.7 2.5 2.6 2.2 2.5 2.1 3.8 2.6 4.1 2.5 2.7 3.5   Notes call    call   COVID+ dex / azith 1x hold pred 20 BID x5d  1x hold; 1x decr dose;   call call call     Date 3/2 3/9 3/16 3/23 3/30 4/5 4/13 4/20 4/27 5/4 5/11 5/18 5/25 6/1   Total WeeklyDose 25mg 27.5mg 27.5mg 27.5mg 27.5mg 27.5mg 27.5 mg 27.5mg 27.5 mg 27.5 mg 27.5 mg 27.5 mg 27.5 mg 27.5mg   INR 2.5 2.9 2.2 2.8 2.7 2.4 2.5 2.3 2.5 2.8 3.1 2.6 2.5 2.3   Notes 1x decr  call call    call    call Call  apap rec 5/19       Date 6/8 6/15 6/22 6/29 7/6 7/14 7/20          Total  Weekly Dose 27.5 mg 27.5 mg 27.5 mg 27.5 mg 27.5 mg 27.5 mg 27.5 mg          INR 2.5 2.5 2.7 2.5 2.9 2.4 1.8          Notes call    call  call 1x boost           Phone Interview:  Tablet Strength: 5 mg (1/5/22)  Patient Contact Info: [phone] (home) preferred; [phone] (cell)  Estimated OOP cost: [please send to registration if not already done]  Verbal Release Auth: signed 5/25/21 -- May speak w/Femi Ngo, ; May leave voicemail on home phone  Lab Contact Info: [facility]  *Will call once monthly or if INR out of range*    Patient Findings  Positives:  Change in diet/appetite   Negatives:  Signs/symptoms of thrombosis, Signs/symptoms of bleeding, Laboratory test error suspected, Change in health, Change in alcohol use, Change in activity, Upcoming invasive procedure, Emergency department visit, Upcoming dental procedure, Missed doses, Extra doses, Change in medications, Hospital admission, Bruising, Other complaints   Comments:  Had more green beans than usual. All other findings negative.      Plan:  1. INR is SUBtherapeutic today at 1.8 (goal 2.0-3.0). Per Divya Jaffe, PharmD, instructed Ms. Ngo to boost tomorrows dose to warfarin 5 mg then continue warfarin 5 mg oral daily except for 2.5 mg on MonWedFri until recheck.   2. Repeat INR in one week, 7/27/22.  3. Verbal information provided over the phone. Patient RBV dosing instructions, expresses understanding by teach back, and has no further questions at this time.  4. Marianela Ngo understands the importance of calling the [facility] if she notices any s/sx of bleeding, stroke, or abnormal bruising, if any changes are made to her medications or medication doses (Rx, OTC, herbal), or if any upcoming procedures are scheduled. Marianela Ngo will likewise let us know if any other changes, questions, or concerns arise regarding anticoagulation therapy. she understands the importance of seeking medical attention  immediately if she experiences any falls, vehicle accidents, or abnormal bleeding or bruising. Marianela Ngo voiced understanding of this information and confirms that she has the [facility]'s contact information. Otherwise, we will plan to contact the patient once monthly or if her INR is out of range.    Scooby Garcia CPhT  7/21/2022  10:29 EDT     I, Maryjane Amezquita, PharmD, have reviewed the note in full and agree with the assessment and plan.  07/21/22  15:34 EDT

## 2022-07-22 ENCOUNTER — TELEPHONE (OUTPATIENT)
Dept: FAMILY MEDICINE CLINIC | Facility: CLINIC | Age: 77
End: 2022-07-22

## 2022-07-22 ENCOUNTER — OFFICE VISIT (OUTPATIENT)
Dept: CARDIOLOGY | Facility: CLINIC | Age: 77
End: 2022-07-22

## 2022-07-22 VITALS
HEART RATE: 57 BPM | WEIGHT: 205 LBS | HEIGHT: 65 IN | SYSTOLIC BLOOD PRESSURE: 140 MMHG | RESPIRATION RATE: 18 BRPM | TEMPERATURE: 98 F | BODY MASS INDEX: 34.16 KG/M2 | DIASTOLIC BLOOD PRESSURE: 74 MMHG | OXYGEN SATURATION: 99 %

## 2022-07-22 DIAGNOSIS — E78.5 HYPERLIPIDEMIA LDL GOAL <100: ICD-10-CM

## 2022-07-22 DIAGNOSIS — I48.0 PAROXYSMAL ATRIAL FIBRILLATION: Primary | ICD-10-CM

## 2022-07-22 DIAGNOSIS — I10 ESSENTIAL HYPERTENSION: ICD-10-CM

## 2022-07-22 DIAGNOSIS — J43.2 CENTRILOBULAR EMPHYSEMA: ICD-10-CM

## 2022-07-22 PROCEDURE — 93000 ELECTROCARDIOGRAM COMPLETE: CPT | Performed by: INTERNAL MEDICINE

## 2022-07-22 PROCEDURE — 99214 OFFICE O/P EST MOD 30 MIN: CPT | Performed by: INTERNAL MEDICINE

## 2022-07-22 NOTE — PROGRESS NOTES
[facility] [place]  [facility]  [address] [place] [address]  Dept: [phone]  Dept Fax: [phone]    Marianela Ngo  1945    Follow Up Office Visit Note    History of Present Illness:  Marianela Ngo is a 76 y.o. female who presents to the clinic for Follow-up. PAF- She seems doing well, on sotalol 40.80 daily her Hr is slower today 46 from 55. We di lower the sotalol later, but I am afraid will start having AF, will advised to keep same meds, to check HR daily and let us know if the Hr drops to lower 40,     The following portions of the patient's history were reviewed and updated as appropriate: allergies, current medications, past family history, past medical history, past social history, past surgical history and problem list.    Medications:  albuterol  amLODIPine  atorvastatin  lisinopril-hydrochlorothiazide  sotalol  warfarin    Subjective  No Known Allergies     Past Medical History:   Diagnosis Date   • Allergies    • Bradycardia with 51-60 beats per minute    • Chronic atrial fibrillation (HCC)    • Current use of long term anticoagulation    • DJD (degenerative joint disease)    • Epistaxis    • Essential hypertension    • High risk medication use    • Mild persistent asthma, uncomplicated    • Mixed hyperlipidemia    • Obstructive sleep apnea on CPAP    • Other fatigue    • Paroxysmal atrial fibrillation (HCC)    • Pneumonia 08/2015   • Shortness of breath    • Vitamin deficiency        Past Surgical History:   Procedure Laterality Date   • CATARACT EXTRACTION, BILATERAL     • HYSTERECTOMY      PARTIAL   • REPLACEMENT TOTAL KNEE BILATERAL  2013,2014       Family History   Problem Relation Age of Onset   • Cancer Father    • Heart disease Sister    • Cancer Brother    • Diabetes Brother    • Hypertension Other         Social History     Socioeconomic History   • Marital status:    Tobacco Use   • Smoking status: Former Smoker     Packs/day: 1.00  "    Types: Cigarettes     Quit date:      Years since quittin.5   • Smokeless tobacco: Never Used   Vaping Use   • Vaping Use: Never used   Substance and Sexual Activity   • Alcohol use: Never   • Drug use: Never   • Sexual activity: Defer       Review of Systems   Constitutional: Negative.    HENT: Negative.    Respiratory: Negative.    Cardiovascular: Negative.    Endocrine: Negative.    Genitourinary: Negative.    Musculoskeletal: Negative.    Skin: Negative.    Allergic/Immunologic: Negative.    Neurological: Negative.    Hematological: Negative.    Psychiatric/Behavioral: Negative.        Cardiovascular Procedures    ECHO/MUGA:   STRESS TESTS:   CARDIAC CATH:   DEVICES:   HOLTER:   CT/MRI:   VASCULAR:   CARDIOTHORACIC:     Objective  Vitals:    22 1017   BP: 140/74   BP Location: Left arm   Patient Position: Lying   Cuff Size: Adult   Pulse: 57   Resp: 18   Temp: 98 °F (36.7 °C)   TempSrc: Infrared   SpO2: 99%   Weight: 93 kg (205 lb)   Height: 165.1 cm (65\")   PainSc: 0-No pain     Body mass index is 34.11 kg/m².     Physical Exam  Constitutional:       Appearance: Healthy appearance. Not in distress.   Neck:      Vascular: No JVR. JVD normal.   Pulmonary:      Effort: Pulmonary effort is normal.      Breath sounds: Normal breath sounds. No wheezing. No rhonchi. No rales.   Chest:      Chest wall: Not tender to palpatation.   Cardiovascular:      PMI at left midclavicular line. Normal rate. Regular rhythm. Normal S1. Normal S2.      Murmurs: There is no murmur.      No gallop. No click. No rub.   Pulses:     Intact distal pulses.   Edema:     Peripheral edema absent.   Abdominal:      General: Bowel sounds are normal.      Palpations: Abdomen is soft.      Tenderness: There is no abdominal tenderness.   Musculoskeletal: Normal range of motion.         General: No tenderness. Skin:     General: Skin is warm and dry.   Neurological:      General: No focal deficit present.      Mental Status: Alert " and oriented to person, place and time.          Diagnostic Data    ECG 12 Lead    Date/Time: 7/22/2022 12:38 PM  Performed by: Amos Davis MD  Authorized by: Amos Davis MD   Comparison: compared with previous ECG from 1/21/2021  Similar to previous ECG  Rhythm: sinus rhythm and sinus bradycardia  Rate: bradycardic  BPM: 47  QRS axis: normal    Clinical impression: abnormal EKG            Assessment and Plan  Diagnoses and all orders for this visit:    Paroxysmal atrial fibrillation (HCC)- in sinus on sotalol 80.40 although Hr is slower 47. Has first degree AV block, will observe, carefully check Hr daily    Essential hypertension- The BP is 130.80 on Amlodipine 5 mg and also Lisinopril 20.12.5     Hyperlipidemia LDL goal <100- On On Lipitor .Lipids are good 75    Centrilobular emphysema (HCC)         Return in about 6 months (around 1/22/2023) for Recheck.    Amos Davis MD  07/22/2022

## 2022-07-27 ENCOUNTER — ANTICOAGULATION VISIT (OUTPATIENT)
Dept: PHARMACY | Facility: HOSPITAL | Age: 77
End: 2022-07-27

## 2022-07-27 DIAGNOSIS — I48.0 PAROXYSMAL ATRIAL FIBRILLATION: Primary | ICD-10-CM

## 2022-07-27 LAB — INR PPP: 2.7

## 2022-07-27 NOTE — PROGRESS NOTES
Anticoagulation Clinic - Remote Progress Note  mdINR Home Monitor  Testing frequency: weekly    Indication: paroxysmal afib  Referring Provider: Susan  Initial Warfarin Start Date: 2020? 2019?  Goal INR: 2.0-3.0  Current Drug Interactions:    [id number]: 4 (Age ?75, Sex, HTN) [estimated 5/20/21]  Bleed Risk: self reports negative history for GI bleed  Other: DOAC too expensive    Diet: green beans, peas, zucchini, or priyanka salads 2-3x/week (7/6/22)  Alcohol: none  Tobacco: none  OTC Pain Medication: APAP only    INR History:  Date 5/14 5/18 5/25 6/1 6/15 7/6 7/12 7/22 7/28 8/4 8/11 8/18 8/23 8/27   Total WeeklyDose  22.5mg 25mg 25mg 25mg 25mg 25mg 25mg 25mg 25mg 25mg 27.5mg 27.5mg 27.5mg   INR 3.5 1.9 2.3 2.3 2.8 2.7 2.1 2.2 1.9 2.0 1.8 2.3 2.2 1.8   Notes [place] Cards 1x hold; incr GLV     HM  incr GLV  no GLV incr GLV cefdinir cefdinir     Date 9/1 9/8 9/15 9/22 9/29 10/6 10/13 10/20 10/27 11/3 11/10 11/17 11/24 12/1   Total WeeklyDose 27.5mg 27.5mg 27.5mg 27.5mg 27.5mg 27.5mg 27.5mg 30mg 27.5mg 27.5mg 27.5mg 25mg 27.5mg 27.5mg   INR 2.1 2.3 3.3 2.3 2.2 2.0 1.9 2.6 3.2 2.4 3.4 1.6 2.4 2.3   Notes cefdinir  decr GLV?  keflex   1x incr dose   decr GLV? 1x decr dose;   incr GLV?       Date 12/9 12/15 12/22 12/29 1/5/22 1/12 1/20 1/26 1/31 2/3 2/7 2/10 2/16 2/23   Total WeeklyDose 27.5mg 27.5mg 27.5mg 27.5mg 27.5mg 27.5mg 27.5mg 27.5mg 27.5mg 25mg 25mg 22.5mg 27.5mg 27.5mg   INR 2.5 2.4 2.7 2.5 2.6 2.2 2.5 2.1 3.8 2.6 4.1 2.5 2.7 3.5   Notes call    call   COVID+ dex / azith 1x hold pred 20 BID x5d  1x hold; 1x decr dose;   call call call     Date 3/2 3/9 3/16 3/23 3/30 4/5 4/13 4/20 4/27 5/4 5/11 5/18 5/25 6/1   Total WeeklyDose 25mg 27.5mg 27.5mg 27.5mg 27.5mg 27.5mg 27.5 mg 27.5mg 27.5 mg 27.5 mg 27.5 mg 27.5 mg 27.5 mg 27.5mg   INR 2.5 2.9 2.2 2.8 2.7 2.4 2.5 2.3 2.5 2.8 3.1 2.6 2.5 2.3   Notes 1x decr  call call    call    call Call  apap rec 5/19       Date 6/8 6/15 6/22 6/29 7/6 7/14 7/20 7/27          Total Weekly Dose 27.5 mg 27.5 mg 27.5 mg 27.5 mg 27.5 mg 27.5 mg 27.5 mg 30 mg         INR 2.5 2.5 2.7 2.5 2.9 2.4 1.8 2.7         Notes call    call  call 1x boost           Phone Interview:  Tablet Strength: 5 mg (1/5/22)  Patient Contact Info: [phone] (home) preferred; [phone] (cell)  Estimated OOP cost: [please send to registration if not already done]  Verbal Release Auth: signed 5/25/21 -- May speak w/Femi Ngo, ; May leave voicemail on home phone  Lab Contact Info: [facility]  *Will call once monthly or if INR out of range*    Patient Findings  Negatives:  Signs/symptoms of thrombosis, Signs/symptoms of bleeding, Laboratory test error suspected, Change in health, Change in alcohol use, Change in activity, Upcoming invasive procedure, Emergency department visit, Upcoming dental procedure, Missed doses, Extra doses, Change in medications, Change in diet/appetite, Hospital admission, Bruising, Other complaints   Comments:  Ms Ngo confirmed her dosing from last week (including boosted dose) and will resume her usual dose this coming week.   Otherwise, denies changes      Plan:    1. INR is therapeutic today at 2.7 (goal 2.0-3.0). Ms. Ngo will continue warfarin 5 mg oral daily except for 2.5 mg on MonWedFri until recheck.       Contacted patient today to ensure aware to resume usual regimen.  She verbalized understanding.  sdg  2. Repeat INR in one week, 8/3/22.  3. Verbal information provided over the phone. Patient RBV dosing instructions, expresses understanding by teach back, and has no further questions at this time.  4. Marianela Ngo understands the importance of calling the [facility] if she notices any s/sx of bleeding, stroke, or abnormal bruising, if any changes are made to her medications or medication doses (Rx, OTC, herbal), or if any upcoming procedures are scheduled. Marianela Ngo will likewise let us know if any other changes, questions, or  concerns arise regarding anticoagulation therapy. she understands the importance of seeking medical attention immediately if she experiences any falls, vehicle accidents, or abnormal bleeding or bruising. Marianela Ngo voiced understanding of this information and confirms that she has the [facility]'s contact information. Otherwise, we will plan to contact the patient once monthly or if her INR is out of range.    Radha Orlando, John  7/27/2022  15:21 EDT

## 2022-08-03 ENCOUNTER — ANTICOAGULATION VISIT (OUTPATIENT)
Dept: PHARMACY | Facility: HOSPITAL | Age: 77
End: 2022-08-03

## 2022-08-03 DIAGNOSIS — I48.0 PAROXYSMAL ATRIAL FIBRILLATION: Primary | ICD-10-CM

## 2022-08-03 LAB — INR PPP: 2.8

## 2022-08-03 NOTE — PROGRESS NOTES
Anticoagulation Clinic - Remote Progress Note  mdINR Home Monitor  Testing frequency: weekly    Indication: paroxysmal afib  Referring Provider: Susan  Initial Warfarin Start Date: 2020? 2019?  Goal INR: 2.0-3.0  Current Drug Interactions:    [id number]: 4 (Age ?74, Sex, HTN) [estimated 5/20/21]  Bleed Risk: self reports negative history for GI bleed  Other: DOAC too expensive    Diet: green beans, peas, zucchini, or priyanka salads 2-3x/week (7/6/22)  Alcohol: none  Tobacco: none  OTC Pain Medication: APAP only    INR History:  Date 5/14 5/18 5/25 6/1 6/15 7/6 7/12 7/22 7/28 8/4 8/11 8/18 8/23 8/27   Total WeeklyDose  22.5mg 25mg 25mg 25mg 25mg 25mg 25mg 25mg 25mg 25mg 27.5mg 27.5mg 27.5mg   INR 3.5 1.9 2.3 2.3 2.8 2.7 2.1 2.2 1.9 2.0 1.8 2.3 2.2 1.8   Notes [place] Cards 1x hold; incr GLV     HM  incr GLV  no GLV incr GLV cefdinir cefdinir     Date 9/1 9/8 9/15 9/22 9/29 10/6 10/13 10/20 10/27 11/3 11/10 11/17 11/24 12/1   Total WeeklyDose 27.5mg 27.5mg 27.5mg 27.5mg 27.5mg 27.5mg 27.5mg 30mg 27.5mg 27.5mg 27.5mg 25mg 27.5mg 27.5mg   INR 2.1 2.3 3.3 2.3 2.2 2.0 1.9 2.6 3.2 2.4 3.4 1.6 2.4 2.3   Notes cefdinir  decr GLV?  keflex   1x incr dose   decr GLV? 1x decr dose;   incr GLV?       Date 12/9 12/15 12/22 12/29 1/5/22 1/12 1/20 1/26 1/31 2/3 2/7 2/10 2/16 2/23   Total WeeklyDose 27.5mg 27.5mg 27.5mg 27.5mg 27.5mg 27.5mg 27.5mg 27.5mg 27.5mg 25mg 25mg 22.5mg 27.5mg 27.5mg   INR 2.5 2.4 2.7 2.5 2.6 2.2 2.5 2.1 3.8 2.6 4.1 2.5 2.7 3.5   Notes call    call   COVID+ dex / azith 1x hold pred 20 BID x5d  1x hold; 1x decr dose;   call call call     Date 3/2 3/9 3/16 3/23 3/30 4/5 4/13 4/20 4/27 5/4 5/11 5/18 5/25 6/1   Total WeeklyDose 25mg 27.5mg 27.5mg 27.5mg 27.5mg 27.5mg 27.5 mg 27.5mg 27.5 mg 27.5 mg 27.5 mg 27.5 mg 27.5 mg 27.5mg   INR 2.5 2.9 2.2 2.8 2.7 2.4 2.5 2.3 2.5 2.8 3.1 2.6 2.5 2.3   Notes 1x decr  call call    call    call Call  apap rec 5/19       Date 6/8 6/15 6/22 6/29 7/6 7/14 7/20 7/27 8/3         Total Weekly Dose 27.5 mg 27.5 mg 27.5 mg 27.5 mg 27.5 mg 27.5 mg 27.5 mg 30 mg 27.5 mg        INR 2.5 2.5 2.7 2.5 2.9 2.4 1.8 2.7 2.8        Notes call    call  call 1x boost           Phone Interview:  Tablet Strength: 5 mg (1/5/22)  Patient Contact Info: [phone] (home) preferred; [phone] (cell)  Estimated OOP cost: [please send to registration if not already done]  Verbal Release Auth: signed 5/25/21 -- May speak w/Femi Ngo, ; May leave voicemail on home phone  Lab Contact Info: [facility]  *Will call once monthly or if INR out of range*    Patient Findings    Comments:  Patient not contacted at this encounter.      Plan:    1. INR is therapeutic today at 2.8 (goal 2.0-3.0). Ms. Ngo will continue warfarin 5 mg oral daily except for 2.5 mg on MonWedFri until recheck.    2. Repeat INR in one week, 8/10/22.  3. Verbal information provided over the phone. Patient RBV dosing instructions, expresses understanding by teach back, and has no further questions at this time.  4. Marianela Ngo understands the importance of calling the [facility] if she notices any s/sx of bleeding, stroke, or abnormal bruising, if any changes are made to her medications or medication doses (Rx, OTC, herbal), or if any upcoming procedures are scheduled. Marianela Ngo will likewise let us know if any other changes, questions, or concerns arise regarding anticoagulation therapy. she understands the importance of seeking medical attention immediately if she experiences any falls, vehicle accidents, or abnormal bleeding or bruising. Marianela Ngo voiced understanding of this information and confirms that she has the [facility]'s contact information. Otherwise, we will plan to contact the patient once monthly or if her INR is out of range.    Scooby Garcia CPhT  8/3/2022  12:26 EDT     I, Divya Jaffe, PharmD, have reviewed the note in full and agree with the assessment  and plan.  08/03/22  15:05 EDT

## 2022-08-10 ENCOUNTER — ANTICOAGULATION VISIT (OUTPATIENT)
Dept: PHARMACY | Facility: HOSPITAL | Age: 77
End: 2022-08-10

## 2022-08-10 DIAGNOSIS — I48.0 PAROXYSMAL ATRIAL FIBRILLATION: Primary | ICD-10-CM

## 2022-08-10 LAB — INR PPP: 2.8

## 2022-08-10 NOTE — PROGRESS NOTES
Anticoagulation Clinic - Remote Progress Note  mdINR Home Monitor  Testing frequency: weekly    Indication: paroxysmal afib  Referring Provider: Susan  Initial Warfarin Start Date: 2020? 2019?  Goal INR: 2.0-3.0  Current Drug Interactions:    [id number]: 4 (Age ?74, Sex, HTN) [estimated 5/20/21]  Bleed Risk: self reports negative history for GI bleed  Other: DOAC too expensive    Diet: green beans, peas, zucchini, or priyanka salads 2-3x/week (7/6/22)  Alcohol: none  Tobacco: none  OTC Pain Medication: APAP only    INR History:  Date 5/14 5/18 5/25 6/1 6/15 7/6 7/12 7/22 7/28 8/4 8/11 8/18 8/23 8/27   Total WeeklyDose  22.5mg 25mg 25mg 25mg 25mg 25mg 25mg 25mg 25mg 25mg 27.5mg 27.5mg 27.5mg   INR 3.5 1.9 2.3 2.3 2.8 2.7 2.1 2.2 1.9 2.0 1.8 2.3 2.2 1.8   Notes [place] Cards 1x hold; incr GLV     HM  incr GLV  no GLV incr GLV cefdinir cefdinir     Date 9/1 9/8 9/15 9/22 9/29 10/6 10/13 10/20 10/27 11/3 11/10 11/17 11/24 12/1   Total WeeklyDose 27.5mg 27.5mg 27.5mg 27.5mg 27.5mg 27.5mg 27.5mg 30mg 27.5mg 27.5mg 27.5mg 25mg 27.5mg 27.5mg   INR 2.1 2.3 3.3 2.3 2.2 2.0 1.9 2.6 3.2 2.4 3.4 1.6 2.4 2.3   Notes cefdinir  decr GLV?  keflex   1x incr dose   decr GLV? 1x decr dose;   incr GLV?       Date 12/9 12/15 12/22 12/29 1/5/22 1/12 1/20 1/26 1/31 2/3 2/7 2/10 2/16 2/23   Total WeeklyDose 27.5mg 27.5mg 27.5mg 27.5mg 27.5mg 27.5mg 27.5mg 27.5mg 27.5mg 25mg 25mg 22.5mg 27.5mg 27.5mg   INR 2.5 2.4 2.7 2.5 2.6 2.2 2.5 2.1 3.8 2.6 4.1 2.5 2.7 3.5   Notes call    call   COVID+ dex / azith 1x hold pred 20 BID x5d  1x hold; 1x decr dose;   call call call     Date 3/2 3/9 3/16 3/23 3/30 4/5 4/13 4/20 4/27 5/4 5/11 5/18 5/25 6/1   Total WeeklyDose 25mg 27.5mg 27.5mg 27.5mg 27.5mg 27.5mg 27.5 mg 27.5mg 27.5 mg 27.5 mg 27.5 mg 27.5 mg 27.5 mg 27.5mg   INR 2.5 2.9 2.2 2.8 2.7 2.4 2.5 2.3 2.5 2.8 3.1 2.6 2.5 2.3   Notes 1x decr  call call    call    call Call  apap rec 5/19       Date 6/8 6/15 6/22 6/29 7/6 7/14 7/20 7/27 8/3 8/10        Total Weekly Dose 27.5 mg 27.5 mg 27.5 mg 27.5 mg 27.5 mg 27.5 mg 27.5 mg 30 mg 27.5 mg 27.5mg       INR 2.5 2.5 2.7 2.5 2.9 2.4 1.8 2.7 2.8 2.8       Notes call    call  call 1x boost           Phone Interview:  Tablet Strength: 5 mg (1/5/22)  Patient Contact Info: [phone] (home) preferred; [phone] (cell)  Estimated OOP cost: [please send to registration if not already done]  Verbal Release Auth: signed 5/25/21 -- May speak w/Femi Ngo, ; May leave voicemail on home phone  Lab Contact Info: [facility]  *Will call once monthly or if INR out of range*    Patient Findings:  Comments:  Patient not contacted at this encounter.      Plan:  1. INR is therapeutic today at 2.8 (goal 2.0-3.0). Ms. Ngo will continue warfarin 5 mg oral daily except for 2.5 mg on MonWedFri until recheck.    2. Repeat INR in one week, 8/17/22.  3. Verbal information provided over the phone. Patient RBV dosing instructions, expresses understanding by teach back, and has no further questions at this time.  4. Marianela Ngo understands the importance of calling the [facility] if she notices any s/sx of bleeding, stroke, or abnormal bruising, if any changes are made to her medications or medication doses (Rx, OTC, herbal), or if any upcoming procedures are scheduled. Marianela Ngo will likewise let us know if any other changes, questions, or concerns arise regarding anticoagulation therapy. she understands the importance of seeking medical attention immediately if she experiences any falls, vehicle accidents, or abnormal bleeding or bruising. Marianela Ngo voiced understanding of this information and confirms that she has the [facility]'s contact information. Otherwise, we will plan to contact the patient once monthly or if her INR is out of range.    Scooby Garcia CPhT  8/10/2022  12:15 EDT     I, Evette Pantoja, PharmD, have reviewed the note in full and agree with the  assessment and plan.  08/10/22  14:23 EDT

## 2022-08-16 RX ORDER — BUDESONIDE 0.5 MG/2ML
INHALANT ORAL
Refills: 11 | OUTPATIENT
Start: 2022-08-16

## 2022-08-16 RX ORDER — ALBUTEROL SULFATE 2.5 MG/3ML
SOLUTION RESPIRATORY (INHALATION)
Refills: 11 | OUTPATIENT
Start: 2022-08-16

## 2022-08-16 NOTE — TELEPHONE ENCOUNTER
Has not seen crystal in over a year. Rejected refill request, I dont even see where the requested medications were prescribed.

## 2022-08-17 ENCOUNTER — ANTICOAGULATION VISIT (OUTPATIENT)
Dept: PHARMACY | Facility: HOSPITAL | Age: 77
End: 2022-08-17

## 2022-08-17 DIAGNOSIS — I48.0 PAROXYSMAL ATRIAL FIBRILLATION: Primary | ICD-10-CM

## 2022-08-17 LAB — INR PPP: 2.8

## 2022-08-17 NOTE — PROGRESS NOTES
Anticoagulation Clinic - Remote Progress Note  mdINR Home Monitor  Testing frequency: weekly    Indication: paroxysmal afib  Referring Provider: Susan  Initial Warfarin Start Date: 2020? 2019?  Goal INR: 2.0-3.0  Current Drug Interactions:    [id number]: 4 (Age ?74, Sex, HTN) [estimated 5/20/21]  Bleed Risk: self reports negative history for GI bleed  Other: DOAC too expensive    Diet: green beans, peas, zucchini, or priyanka salads 2-3x/week (7/6/22)  Alcohol: none  Tobacco: none  OTC Pain Medication: APAP only    INR History:  Date 5/14 5/18 5/25 6/1 6/15 7/6 7/12 7/22 7/28 8/4 8/11 8/18 8/23 8/27   Total WeeklyDose  22.5mg 25mg 25mg 25mg 25mg 25mg 25mg 25mg 25mg 25mg 27.5mg 27.5mg 27.5mg   INR 3.5 1.9 2.3 2.3 2.8 2.7 2.1 2.2 1.9 2.0 1.8 2.3 2.2 1.8   Notes [place] Cards 1x hold; incr GLV     HM  incr GLV  no GLV incr GLV cefdinir cefdinir     Date 9/1 9/8 9/15 9/22 9/29 10/6 10/13 10/20 10/27 11/3 11/10 11/17 11/24 12/1   Total WeeklyDose 27.5mg 27.5mg 27.5mg 27.5mg 27.5mg 27.5mg 27.5mg 30mg 27.5mg 27.5mg 27.5mg 25mg 27.5mg 27.5mg   INR 2.1 2.3 3.3 2.3 2.2 2.0 1.9 2.6 3.2 2.4 3.4 1.6 2.4 2.3   Notes cefdinir  decr GLV?  keflex   1x incr dose   decr GLV? 1x decr dose;   incr GLV?       Date 12/9 12/15 12/22 12/29 1/5/22 1/12 1/20 1/26 1/31 2/3 2/7 2/10 2/16 2/23   Total WeeklyDose 27.5mg 27.5mg 27.5mg 27.5mg 27.5mg 27.5mg 27.5mg 27.5mg 27.5mg 25mg 25mg 22.5mg 27.5mg 27.5mg   INR 2.5 2.4 2.7 2.5 2.6 2.2 2.5 2.1 3.8 2.6 4.1 2.5 2.7 3.5   Notes call    call   COVID+ dex / azith 1x hold pred 20 BID x5d  1x hold; 1x decr dose;   call call call     Date 3/2 3/9 3/16 3/23 3/30 4/5 4/13 4/20 4/27 5/4 5/11 5/18 5/25 6/1   Total WeeklyDose 25mg 27.5mg 27.5mg 27.5mg 27.5mg 27.5mg 27.5 mg 27.5mg 27.5 mg 27.5 mg 27.5 mg 27.5 mg 27.5 mg 27.5mg   INR 2.5 2.9 2.2 2.8 2.7 2.4 2.5 2.3 2.5 2.8 3.1 2.6 2.5 2.3   Notes 1x decr  call call    call    call Call  apap rec 5/19       Date 6/8 6/15 6/22 6/29 7/6 7/14 7/20 7/27 8/3 8/10  8/17      Total Weekly Dose 27.5 mg 27.5 mg 27.5 mg 27.5 mg 27.5 mg 27.5 mg 27.5 mg 30 mg 27.5 mg 27.5mg 27.5 mg      INR 2.5 2.5 2.7 2.5 2.9 2.4 1.8 2.7 2.8 2.8 2.8      Notes call    call  call 1x boost   call        Phone Interview:  Tablet Strength: 5 mg (1/5/22)  Patient Contact Info: [phone] (home) preferred; [phone] (cell)  Estimated OOP cost: [please send to registration if not already done]  Verbal Release Auth: signed 5/25/21 -- May speak w/Femi Ngo, ; May leave voicemail on home phone  Lab Contact Info: [facility]  *Will call once monthly or if INR out of range*    Patient Findings    Negatives:  Signs/symptoms of thrombosis, Signs/symptoms of bleeding, Laboratory test error suspected, Change in health, Change in alcohol use, Change in activity, Upcoming invasive procedure, Emergency department visit, Upcoming dental procedure, Missed doses, Extra doses, Change in medications, Change in diet/appetite, Hospital admission, Bruising, Other complaints   Comments:  All findings negative per pt.      Plan:  1. INR is therapeutic today at 2.8 (goal 2.0-3.0). Ms. Ngo will continue warfarin 5 mg oral daily except for 2.5 mg on MonWedFri until recheck.    2. Repeat INR in one week, 8/24/22.  3. Verbal information provided over the phone. Patient RBV dosing instructions, expresses understanding by teach back, and has no further questions at this time.  4. Marianela Ngo understands the importance of calling the [facility] if she notices any s/sx of bleeding, stroke, or abnormal bruising, if any changes are made to her medications or medication doses (Rx, OTC, herbal), or if any upcoming procedures are scheduled. Marianela Ngo will likewise let us know if any other changes, questions, or concerns arise regarding anticoagulation therapy. she understands the importance of seeking medical attention immediately if she experiences any falls, vehicle accidents, or  abnormal bleeding or bruising. Marianela Ngo voiced understanding of this information and confirms that she has the [facility]'s contact information. Otherwise, we will plan to contact the patient once monthly or if her INR is out of range.    Scooby Garcia CPhT  8/17/2022  12:15 EDT     I, Saravanan Greenberg, PharmD, have reviewed the note in full and agree with the assessment and plan.  08/17/22  12:27 EDT

## 2022-08-25 ENCOUNTER — ANTICOAGULATION VISIT (OUTPATIENT)
Dept: PHARMACY | Facility: HOSPITAL | Age: 77
End: 2022-08-25

## 2022-08-25 DIAGNOSIS — I48.0 PAROXYSMAL ATRIAL FIBRILLATION: Primary | ICD-10-CM

## 2022-08-25 LAB — INR PPP: 3.1

## 2022-08-25 NOTE — PROGRESS NOTES
Anticoagulation Clinic - Remote Progress Note  mdINR Home Monitor  Testing frequency: weekly    Indication: paroxysmal afib  Referring Provider: Susan  Initial Warfarin Start Date: 2020? 2019?  Goal INR: 2.0-3.0  Current Drug Interactions:    [id number]: 4 (Age ?74, Sex, HTN) [estimated 5/20/21]  Bleed Risk: self reports negative history for GI bleed  Other: DOAC too expensive    Diet: green beans, peas, zucchini, or priyanka salads 2-3x/week (7/6/22)  Alcohol: none  Tobacco: none  OTC Pain Medication: APAP only    INR History:  Date 5/14 5/18 5/25 6/1 6/15 7/6 7/12 7/22 7/28 8/4 8/11 8/18 8/23 8/27   Total WeeklyDose  22.5mg 25mg 25mg 25mg 25mg 25mg 25mg 25mg 25mg 25mg 27.5mg 27.5mg 27.5mg   INR 3.5 1.9 2.3 2.3 2.8 2.7 2.1 2.2 1.9 2.0 1.8 2.3 2.2 1.8   Notes [place] Cards 1x hold; incr GLV     HM  incr GLV  no GLV incr GLV cefdinir cefdinir     Date 9/1 9/8 9/15 9/22 9/29 10/6 10/13 10/20 10/27 11/3 11/10 11/17 11/24 12/1   Total WeeklyDose 27.5mg 27.5mg 27.5mg 27.5mg 27.5mg 27.5mg 27.5mg 30mg 27.5mg 27.5mg 27.5mg 25mg 27.5mg 27.5mg   INR 2.1 2.3 3.3 2.3 2.2 2.0 1.9 2.6 3.2 2.4 3.4 1.6 2.4 2.3   Notes cefdinir  decr GLV?  keflex   1x incr dose   decr GLV? 1x decr dose;   incr GLV?       Date 12/9 12/15 12/22 12/29 1/5/22 1/12 1/20 1/26 1/31 2/3 2/7 2/10 2/16 2/23   Total WeeklyDose 27.5mg 27.5mg 27.5mg 27.5mg 27.5mg 27.5mg 27.5mg 27.5mg 27.5mg 25mg 25mg 22.5mg 27.5mg 27.5mg   INR 2.5 2.4 2.7 2.5 2.6 2.2 2.5 2.1 3.8 2.6 4.1 2.5 2.7 3.5   Notes call    call   COVID+ dex / azith 1x hold pred 20 BID x5d  1x hold; 1x decr dose;   call call call     Date 3/2 3/9 3/16 3/23 3/30 4/5 4/13 4/20 4/27 5/4 5/11 5/18 5/25 6/1   Total WeeklyDose 25mg 27.5mg 27.5mg 27.5mg 27.5mg 27.5mg 27.5 mg 27.5mg 27.5 mg 27.5 mg 27.5 mg 27.5 mg 27.5 mg 27.5mg   INR 2.5 2.9 2.2 2.8 2.7 2.4 2.5 2.3 2.5 2.8 3.1 2.6 2.5 2.3   Notes 1x decr  call call    call    call Call  apap rec 5/19       Date 6/8 6/15 6/22 6/29 7/6 7/14 7/20 7/27 8/3 8/10  8/17 8/25     Total Weekly Dose 27.5 mg 27.5 mg 27.5 mg 27.5 mg 27.5 mg 27.5 mg 27.5 mg 30 mg 27.5 mg 27.5mg 27.5 mg 27.5 mg     INR 2.5 2.5 2.7 2.5 2.9 2.4 1.8 2.7 2.8 2.8 2.8 3.1     Notes call    call  call 1x boost   call Call  Dec GLV       Phone Interview:  Tablet Strength: 5 mg (1/5/22)  Patient Contact Info: [phone] (home) preferred; [phone] (cell)  Estimated OOP cost: [please send to registration if not already done]  Verbal Release Auth: signed 5/25/21 -- May speak w/Femi Ngo, ; May leave voicemail on home phone  Lab Contact Info: [facility]  *Will call once monthly or if INR out of range*    Patient Findings  Positives:  Change in diet/appetite   Negatives:  Signs/symptoms of thrombosis, Signs/symptoms of bleeding, Laboratory test error suspected, Change in health, Change in alcohol use, Change in activity, Upcoming invasive procedure, Emergency department visit, Upcoming dental procedure, Missed doses, Extra doses, Change in medications, Hospital admission, Bruising, Other complaints   Comments:  Pt reports watching her grandchildren over last week so her diet has been off (dec GLV.) All other findings negative.      Plan:  1. INR is slightly SUPRAtherapeutic today at 3.1 (goal 2.0-3.0). Ms. Ngo will continue warfarin 5 mg oral daily except for 2.5 mg on MonWedFri until recheck. Advised pt to eat a serving of GLV tonight and try to resume normal diet.   2. Repeat INR in one week, 8/31/22.  3. Verbal information provided over the phone. Patient RBV dosing instructions, expresses understanding by teach back, and has no further questions at this time.  4. Marianela Ngo understands the importance of calling the [facility] if she notices any s/sx of bleeding, stroke, or abnormal bruising, if any changes are made to her medications or medication doses (Rx, OTC, herbal), or if any upcoming procedures are scheduled. Marianela Ngo will likewise let us know if  any other changes, questions, or concerns arise regarding anticoagulation therapy. she understands the importance of seeking medical attention immediately if she experiences any falls, vehicle accidents, or abnormal bleeding or bruising. Marianela Ngo voiced understanding of this information and confirms that she has the [facility]'s contact information. Otherwise, we will plan to contact the patient once monthly or if her INR is out of range.    Scooby Garcia CPhT  8/25/2022  11:17 EDT     I, Radha Orlando, PharmD, have reviewed the note in full and agree with the assessment and plan.  08/25/22  15:13 EDT

## 2022-08-31 ENCOUNTER — ANTICOAGULATION VISIT (OUTPATIENT)
Dept: PHARMACY | Facility: HOSPITAL | Age: 77
End: 2022-08-31

## 2022-08-31 DIAGNOSIS — I48.0 PAROXYSMAL ATRIAL FIBRILLATION: Primary | ICD-10-CM

## 2022-08-31 LAB — INR PPP: 2.6

## 2022-09-07 ENCOUNTER — ANTICOAGULATION VISIT (OUTPATIENT)
Dept: PHARMACY | Facility: HOSPITAL | Age: 77
End: 2022-09-07

## 2022-09-07 DIAGNOSIS — I48.0 PAROXYSMAL ATRIAL FIBRILLATION: Primary | ICD-10-CM

## 2022-09-07 LAB — INR PPP: 3.5

## 2022-09-07 NOTE — PROGRESS NOTES
Anticoagulation Clinic - Remote Progress Note  mdINR Home Monitor  Testing frequency: weekly    Indication: paroxysmal afib  Referring Provider: Susan  Initial Warfarin Start Date: 2020? 2019?  Goal INR: 2.0-3.0  Current Drug Interactions:    [id number]: 4 (Age ?74, Sex, HTN) [estimated 5/20/21]  Bleed Risk: self reports negative history for GI bleed  Other: DOAC too expensive    Diet: green beans, peas, zucchini, or priyanka salads 2-3x/week (7/6/22)  Alcohol: none  Tobacco: none  OTC Pain Medication: APAP only    INR History:  Date 5/14 5/18 5/25 6/1 6/15 7/6 7/12 7/22 7/28 8/4 8/11 8/18 8/23 8/27   Total WeeklyDose  22.5mg 25mg 25mg 25mg 25mg 25mg 25mg 25mg 25mg 25mg 27.5mg 27.5mg 27.5mg   INR 3.5 1.9 2.3 2.3 2.8 2.7 2.1 2.2 1.9 2.0 1.8 2.3 2.2 1.8   Notes [place] Cards 1x hold; incr GLV     HM  incr GLV  no GLV incr GLV cefdinir cefdinir     Date 9/1 9/8 9/15 9/22 9/29 10/6 10/13 10/20 10/27 11/3 11/10 11/17 11/24 12/1   Total WeeklyDose 27.5mg 27.5mg 27.5mg 27.5mg 27.5mg 27.5mg 27.5mg 30mg 27.5mg 27.5mg 27.5mg 25mg 27.5mg 27.5mg   INR 2.1 2.3 3.3 2.3 2.2 2.0 1.9 2.6 3.2 2.4 3.4 1.6 2.4 2.3   Notes cefdinir  decr GLV?  keflex   1x incr dose   decr GLV? 1x decr dose;   incr GLV?       Date 12/9 12/15 12/22 12/29 1/5/22 1/12 1/20 1/26 1/31 2/3 2/7 2/10 2/16 2/23   Total WeeklyDose 27.5mg 27.5mg 27.5mg 27.5mg 27.5mg 27.5mg 27.5mg 27.5mg 27.5mg 25mg 25mg 22.5mg 27.5mg 27.5mg   INR 2.5 2.4 2.7 2.5 2.6 2.2 2.5 2.1 3.8 2.6 4.1 2.5 2.7 3.5   Notes call    call   COVID+ dex / azith 1x hold pred 20 BID x5d  1x hold; 1x decr dose;   call call call     Date 3/2 3/9 3/16 3/23 3/30 4/5 4/13 4/20 4/27 5/4 5/11 5/18 5/25 6/1   Total WeeklyDose 25mg 27.5mg 27.5mg 27.5mg 27.5mg 27.5mg 27.5 mg 27.5mg 27.5 mg 27.5 mg 27.5 mg 27.5 mg 27.5 mg 27.5mg   INR 2.5 2.9 2.2 2.8 2.7 2.4 2.5 2.3 2.5 2.8 3.1 2.6 2.5 2.3   Notes 1x decr  call call    call    call Call  apap rec 5/19       Date 6/8 6/15 6/22 6/29 7/6 7/14 7/20 7/27 8/3 8/10  8/17 8/25 8/31 9/7   Total Weekly Dose 27.5 mg 27.5 mg 27.5 mg 27.5 mg 27.5 mg 27.5 mg 27.5 mg 30 mg 27.5 mg 27.5mg 27.5 mg 27.5 mg 27.5 mg 27.5 mg   INR 2.5 2.5 2.7 2.5 2.9 2.4 1.8 2.7 2.8 2.8 2.8 3.1 2.6 3.5   Notes call    call  call 1x boost   call Call  Dec GLV  call     Phone Interview:  Tablet Strength: 5 mg (1/5/22)  Patient Contact Info: [phone] (home) preferred; [phone] (cell)  Estimated OOP cost: [please send to registration if not already done]  Verbal Release Auth: signed 5/25/21 -- May speak w/Femi Ngo, ; May leave voicemail on home phone  Lab Contact Info: [facility]  *Will call once monthly or if INR out of range*    Patient Findings  Negatives:  Signs/symptoms of thrombosis, Signs/symptoms of bleeding, Laboratory test error suspected, Change in health, Change in alcohol use, Change in activity, Upcoming invasive procedure, Emergency department visit, Upcoming dental procedure, Missed doses, Extra doses, Change in medications, Change in diet/appetite, Hospital admission, Bruising, Other complaints   Comments:  She was surprised it was high because she has had a serving of GLV (green beans and tossed salad) this week, but thinks her serving sizes may not be big enough.       Plan:  1. INR is SUPRAtherapeutic today at 3.5 (goal 2.0-3.0). Ms. Ngo will HOLD tonight's dose then continue warfarin 5 mg oral daily except for 2.5 mg on MonWedFri until recheck.   2. Repeat INR in one week, 9/14/22.  3. Verbal information provided over the phone. Patient RBV dosing instructions, expresses understanding by teach back, and has no further questions at this time.  4. Marianela Ngo understands the importance of calling the [facility] if she notices any s/sx of bleeding, stroke, or abnormal bruising, if any changes are made to her medications or medication doses (Rx, OTC, herbal), or if any upcoming procedures are scheduled. Marianela Ngo will likewise let us know  if any other changes, questions, or concerns arise regarding anticoagulation therapy. she understands the importance of seeking medical attention immediately if she experiences any falls, vehicle accidents, or abnormal bleeding or bruising. Marianela Huber voiced understanding of this information and confirms that she has the [facility]'s contact information. Otherwise, we will plan to contact the patient once monthly or if her INR is out of range.    Jayla Soriano, PharmD, BCPS   9/7/2022  14:29 EDT

## 2022-09-14 ENCOUNTER — ANTICOAGULATION VISIT (OUTPATIENT)
Dept: PHARMACY | Facility: HOSPITAL | Age: 77
End: 2022-09-14

## 2022-09-14 DIAGNOSIS — I48.0 PAROXYSMAL ATRIAL FIBRILLATION: Primary | ICD-10-CM

## 2022-09-14 LAB — INR PPP: 3.1

## 2022-09-14 NOTE — PROGRESS NOTES
Anticoagulation Clinic - Remote Progress Note  mdINR Home Monitor  Testing frequency: weekly    Indication: paroxysmal afib  Referring Provider: Susan  Initial Warfarin Start Date: 2020? 2019?  Goal INR: 2.0-3.0  Current Drug Interactions:    [id number]: 4 (Age ?74, Sex, HTN) [estimated 5/20/21]  Bleed Risk: self reports negative history for GI bleed  Other: DOAC too expensive    Diet: green beans, peas, zucchini, or priyanka salads 2-3x/week (7/6/22)  Alcohol: none  Tobacco: none  OTC Pain Medication: APAP only    INR History:  Date 5/14 5/18 5/25 6/1 6/15 7/6 7/12 7/22 7/28 8/4 8/11 8/18 8/23 8/27   Total WeeklyDose  22.5mg 25mg 25mg 25mg 25mg 25mg 25mg 25mg 25mg 25mg 27.5mg 27.5mg 27.5mg   INR 3.5 1.9 2.3 2.3 2.8 2.7 2.1 2.2 1.9 2.0 1.8 2.3 2.2 1.8   Notes [place] Cards 1x hold; incr GLV     HM  incr GLV  no GLV incr GLV cefdinir cefdinir     Date 9/1 9/8 9/15 9/22 9/29 10/6 10/13 10/20 10/27 11/3 11/10 11/17 11/24 12/1   Total WeeklyDose 27.5mg 27.5mg 27.5mg 27.5mg 27.5mg 27.5mg 27.5mg 30mg 27.5mg 27.5mg 27.5mg 25mg 27.5mg 27.5mg   INR 2.1 2.3 3.3 2.3 2.2 2.0 1.9 2.6 3.2 2.4 3.4 1.6 2.4 2.3   Notes cefdinir  decr GLV?  keflex   1x incr dose   decr GLV? 1x decr dose;   incr GLV?       Date 12/9 12/15 12/22 12/29 1/5/22 1/12 1/20 1/26 1/31 2/3 2/7 2/10 2/16 2/23   Total WeeklyDose 27.5mg 27.5mg 27.5mg 27.5mg 27.5mg 27.5mg 27.5mg 27.5mg 27.5mg 25mg 25mg 22.5mg 27.5mg 27.5mg   INR 2.5 2.4 2.7 2.5 2.6 2.2 2.5 2.1 3.8 2.6 4.1 2.5 2.7 3.5   Notes call    call   COVID+ dex / azith 1x hold pred 20 BID x5d  1x hold; 1x decr dose;   call call call     Date 3/2 3/9 3/16 3/23 3/30 4/5 4/13 4/20 4/27 5/4 5/11 5/18 5/25 6/1   Total WeeklyDose 25mg 27.5mg 27.5mg 27.5mg 27.5mg 27.5mg 27.5 mg 27.5mg 27.5 mg 27.5 mg 27.5 mg 27.5 mg 27.5 mg 27.5mg   INR 2.5 2.9 2.2 2.8 2.7 2.4 2.5 2.3 2.5 2.8 3.1 2.6 2.5 2.3   Notes 1x decr  call call    call    call Call  apap rec 5/19       Date 6/8 6/15 6/22 6/29 7/6 7/14 7/20 7/27 8/3 8/10  8/17 8/25 8/31 9/7   Total Weekly Dose 27.5 mg 27.5 mg 27.5 mg 27.5 mg 27.5 mg 27.5 mg 27.5 mg 30 mg 27.5 mg 27.5mg 27.5 mg 27.5 mg 27.5 mg 27.5 mg   INR 2.5 2.5 2.7 2.5 2.9 2.4 1.8 2.7 2.8 2.8 2.8 3.1 2.6 3.5   Notes call    call  call 1x boost   call Call  Dec GLV  call     Date 9/14              Total WeeklyDose 27.5mg              INR 3.1              Notes 1x hold inc GLV                Phone Interview:  Tablet Strength: 5 mg (1/5/22)  Patient Contact Info: [phone] (home) preferred; [phone] (cell)  Estimated OOP cost: [please send to registration if not already done]  Verbal Release Auth: signed 5/25/21 -- May speak w/Femi Ngo, ; May leave voicemail on home phone  Lab Contact Info: [facility]  *Will call once monthly or if INR out of range*    Patient Findings  Positives:  Change in diet/appetite   Negatives:  Signs/symptoms of thrombosis, Signs/symptoms of bleeding, Laboratory test error suspected, Change in health, Change in alcohol use, Change in activity, Upcoming invasive procedure, Emergency department visit, Upcoming dental procedure, Missed doses, Extra doses, Change in medications, Hospital admission, Bruising, Other complaints   Comments:  Increased GLV this week (green beans, peas, salads)     Plan:  1. INR is SUPRAtherapeutic today at 3.1 (goal 2.0-3.0) following held dose. Ms. Ngo will begin reduced dose of warfarin 5 mg oral daily except for 2.5 mg on MonWedFriSAT until recheck.   2. Repeat INR in one week, 9/21/22.  3. Verbal information provided over the phone. Patient RBV dosing instructions, expresses understanding by teach back, and has no further questions at this time.  4. Marianela Ngo understands the importance of calling the [facility] if she notices any s/sx of bleeding, stroke, or abnormal bruising, if any changes are made to her medications or medication doses (Rx, OTC, herbal), or if any upcoming procedures are scheduled. Marianela  Huber will likewise let us know if any other changes, questions, or concerns arise regarding anticoagulation therapy. she understands the importance of seeking medical attention immediately if she experiences any falls, vehicle accidents, or abnormal bleeding or bruising. Marianela Ngo voiced understanding of this information and confirms that she has the [facility]'s contact information. Otherwise, we will plan to contact the patient once monthly or if her INR is out of range.      Ina BessD.  09/14/22   14:25 EDT

## 2022-09-21 ENCOUNTER — ANTICOAGULATION VISIT (OUTPATIENT)
Dept: PHARMACY | Facility: HOSPITAL | Age: 77
End: 2022-09-21

## 2022-09-21 DIAGNOSIS — I48.0 PAROXYSMAL ATRIAL FIBRILLATION: Primary | ICD-10-CM

## 2022-09-21 LAB — INR PPP: 2.9

## 2022-09-21 NOTE — PROGRESS NOTES
Anticoagulation Clinic - Remote Progress Note  mdINR Home Monitor  Testing frequency: weekly    Indication: paroxysmal afib  Referring Provider: Susan  Initial Warfarin Start Date: 2020? 2019?  Goal INR: 2.0-3.0  Current Drug Interactions:    [id number]: 4 (Age ?74, Sex, HTN) [estimated 5/20/21]  Bleed Risk: self reports negative history for GI bleed  Other: DOAC too expensive    Diet: green beans, peas, zucchini, or priyanka salads 2-3x/week (7/6/22)  Alcohol: none  Tobacco: none  OTC Pain Medication: APAP only    INR History:  Date 5/14 5/18 5/25 6/1 6/15 7/6 7/12 7/22 7/28 8/4 8/11 8/18 8/23 8/27   Total WeeklyDose  22.5mg 25mg 25mg 25mg 25mg 25mg 25mg 25mg 25mg 25mg 27.5mg 27.5mg 27.5mg   INR 3.5 1.9 2.3 2.3 2.8 2.7 2.1 2.2 1.9 2.0 1.8 2.3 2.2 1.8   Notes [place] Cards 1x hold; incr GLV     HM  incr GLV  no GLV incr GLV cefdinir cefdinir     Date 9/1 9/8 9/15 9/22 9/29 10/6 10/13 10/20 10/27 11/3 11/10 11/17 11/24 12/1   Total WeeklyDose 27.5mg 27.5mg 27.5mg 27.5mg 27.5mg 27.5mg 27.5mg 30mg 27.5mg 27.5mg 27.5mg 25mg 27.5mg 27.5mg   INR 2.1 2.3 3.3 2.3 2.2 2.0 1.9 2.6 3.2 2.4 3.4 1.6 2.4 2.3   Notes cefdinir  decr GLV?  keflex   1x incr dose   decr GLV? 1x decr dose;   incr GLV?       Date 12/9 12/15 12/22 12/29 1/5/22 1/12 1/20 1/26 1/31 2/3 2/7 2/10 2/16 2/23   Total WeeklyDose 27.5mg 27.5mg 27.5mg 27.5mg 27.5mg 27.5mg 27.5mg 27.5mg 27.5mg 25mg 25mg 22.5mg 27.5mg 27.5mg   INR 2.5 2.4 2.7 2.5 2.6 2.2 2.5 2.1 3.8 2.6 4.1 2.5 2.7 3.5   Notes call    call   COVID+ dex / azith 1x hold pred 20 BID x5d  1x hold; 1x decr dose;   call call call     Date 3/2 3/9 3/16 3/23 3/30 4/5 4/13 4/20 4/27 5/4 5/11 5/18 5/25 6/1   Total WeeklyDose 25mg 27.5mg 27.5mg 27.5mg 27.5mg 27.5mg 27.5 mg 27.5mg 27.5 mg 27.5 mg 27.5 mg 27.5 mg 27.5 mg 27.5mg   INR 2.5 2.9 2.2 2.8 2.7 2.4 2.5 2.3 2.5 2.8 3.1 2.6 2.5 2.3   Notes 1x decr  call call    call    call Call  apap rec 5/19       Date 6/8 6/15 6/22 6/29 7/6 7/14 7/20 7/27 8/3 8/10  8/17 8/25 8/31 9/7   Total Weekly Dose 27.5 mg 27.5 mg 27.5 mg 27.5 mg 27.5 mg 27.5 mg 27.5 mg 30 mg 27.5 mg 27.5mg 27.5 mg 27.5 mg 27.5 mg 27.5 mg   INR 2.5 2.5 2.7 2.5 2.9 2.4 1.8 2.7 2.8 2.8 2.8 3.1 2.6 3.5   Notes call    call  call 1x boost   call Call  Dec GLV  call     Date 9/14 9/21             Total WeeklyDose 27.5mg 25 mg             INR 3.1 2.9             Notes 1x hold inc GLV call               Phone Interview:  Tablet Strength: 5 mg (1/5/22)  Patient Contact Info: [phone] (home) preferred; [phone] (cell)  Estimated OOP cost: [please send to registration if not already done]  Verbal Release Auth: signed 5/25/21 -- May speak w/Femi Ngo, ; May leave voicemail on home phone  Lab Contact Info: [facility]  *Will call once monthly or if INR out of range*    Patient Findings  Negatives:  Signs/symptoms of thrombosis, Signs/symptoms of bleeding, Laboratory test error suspected, Change in health, Change in alcohol use, Change in activity, Upcoming invasive procedure, Emergency department visit, Upcoming dental procedure, Missed doses, Extra doses, Change in medications, Change in diet/appetite, Hospital admission, Bruising, Other complaints   Comments:  All findings negative per pt.      Plan:  1. INR is therapeutic today at 2.9 (goal 2.0-3.0) Instructed Ms. Ngo to continue recently reduced regimen of warfarin 2.5 mg oral daily except warfarin 5 mg on SunTueThur until recheck.  2. Repeat INR in one week, 9/28/22.  3. Verbal information provided over the phone. Patient RBV dosing instructions, expresses understanding by teach back, and has no further questions at this time.  4. Marianela Ngo understands the importance of calling the [facility] if she notices any s/sx of bleeding, stroke, or abnormal bruising, if any changes are made to her medications or medication doses (Rx, OTC, herbal), or if any upcoming procedures are scheduled. Marianela Ngo will likewise  let us know if any other changes, questions, or concerns arise regarding anticoagulation therapy. she understands the importance of seeking medical attention immediately if she experiences any falls, vehicle accidents, or abnormal bleeding or bruising. Marianela Ngo voiced understanding of this information and confirms that she has the [facility]'s contact information. Otherwise, we will plan to contact the patient once monthly or if her INR is out of range.    Scooby Garcia CPhT  9/21/2022  13:43 EDT     I, Radha Orlando, PharmD, have reviewed the note in full and agree with the assessment and plan.  09/21/22  14:19 EDT

## 2022-09-28 ENCOUNTER — ANTICOAGULATION VISIT (OUTPATIENT)
Dept: PHARMACY | Facility: HOSPITAL | Age: 77
End: 2022-09-28

## 2022-09-28 DIAGNOSIS — I48.0 PAROXYSMAL ATRIAL FIBRILLATION: Primary | ICD-10-CM

## 2022-09-28 LAB — INR PPP: 2.5

## 2022-09-28 NOTE — PROGRESS NOTES
Anticoagulation Clinic - Remote Progress Note  mdINR Home Monitor  Testing frequency: weekly    Indication: paroxysmal afib  Referring Provider: Susan  Initial Warfarin Start Date: 2020? 2019?  Goal INR: 2.0-3.0  Current Drug Interactions:    [id number]: 4 (Age ?74, Sex, HTN) [estimated 5/20/21]  Bleed Risk: self reports negative history for GI bleed  Other: DOAC too expensive    Diet: green beans, peas, zucchini, or priyanka salads 2-3x/week (7/6/22)  Alcohol: none  Tobacco: none  OTC Pain Medication: APAP only    INR History:  Date 5/14 5/18 5/25 6/1 6/15 7/6 7/12 7/22 7/28 8/4 8/11 8/18 8/23 8/27   Total WeeklyDose  22.5mg 25mg 25mg 25mg 25mg 25mg 25mg 25mg 25mg 25mg 27.5mg 27.5mg 27.5mg   INR 3.5 1.9 2.3 2.3 2.8 2.7 2.1 2.2 1.9 2.0 1.8 2.3 2.2 1.8   Notes [place] Cards 1x hold; incr GLV     HM  incr GLV  no GLV incr GLV cefdinir cefdinir     Date 9/1 9/8 9/15 9/22 9/29 10/6 10/13 10/20 10/27 11/3 11/10 11/17 11/24 12/1   Total WeeklyDose 27.5mg 27.5mg 27.5mg 27.5mg 27.5mg 27.5mg 27.5mg 30mg 27.5mg 27.5mg 27.5mg 25mg 27.5mg 27.5mg   INR 2.1 2.3 3.3 2.3 2.2 2.0 1.9 2.6 3.2 2.4 3.4 1.6 2.4 2.3   Notes cefdinir  decr GLV?  keflex   1x incr dose   decr GLV? 1x decr dose;   incr GLV?       Date 12/9 12/15 12/22 12/29 1/5/22 1/12 1/20 1/26 1/31 2/3 2/7 2/10 2/16 2/23   Total WeeklyDose 27.5mg 27.5mg 27.5mg 27.5mg 27.5mg 27.5mg 27.5mg 27.5mg 27.5mg 25mg 25mg 22.5mg 27.5mg 27.5mg   INR 2.5 2.4 2.7 2.5 2.6 2.2 2.5 2.1 3.8 2.6 4.1 2.5 2.7 3.5   Notes call    call   COVID+ dex / azith 1x hold pred 20 BID x5d  1x hold; 1x decr dose;   call call call     Date 3/2 3/9 3/16 3/23 3/30 4/5 4/13 4/20 4/27 5/4 5/11 5/18 5/25 6/1   Total WeeklyDose 25mg 27.5mg 27.5mg 27.5mg 27.5mg 27.5mg 27.5 mg 27.5mg 27.5 mg 27.5 mg 27.5 mg 27.5 mg 27.5 mg 27.5mg   INR 2.5 2.9 2.2 2.8 2.7 2.4 2.5 2.3 2.5 2.8 3.1 2.6 2.5 2.3   Notes 1x decr  call call    call    call Call  apap rec 5/19       Date 6/8 6/15 6/22 6/29 7/6 7/14 7/20 7/27 8/3 8/10  8/17 8/25 8/31 9/7   Total Weekly Dose 27.5 mg 27.5 mg 27.5 mg 27.5 mg 27.5 mg 27.5 mg 27.5 mg 30 mg 27.5 mg 27.5mg 27.5 mg 27.5 mg 27.5 mg 27.5 mg   INR 2.5 2.5 2.7 2.5 2.9 2.4 1.8 2.7 2.8 2.8 2.8 3.1 2.6 3.5   Notes call    call  call 1x boost   call Call  Dec GLV  call     Date 9/14 9/21 9/28            Total WeeklyDose 27.5mg 25 mg 25 mg            INR 3.1 2.9 2.5            Notes 1x hold inc GLV call               Phone Interview:  Tablet Strength: 5 mg (1/5/22)  Patient Contact Info: [phone] (home) preferred; [phone] (cell)  Estimated OOP cost: [please send to registration if not already done]  Verbal Release Auth: signed 5/25/21 -- May speak w/Femi Ngo, ; May leave voicemail on home phone  Lab Contact Info: [facility]  *Will call once monthly or if INR out of range*    Patient Findings  Negatives:  Signs/symptoms of thrombosis, Signs/symptoms of bleeding, Laboratory test error suspected, Change in health, Change in alcohol use, Change in activity, Upcoming invasive procedure, Emergency department visit, Upcoming dental procedure, Missed doses, Extra doses, Change in medications, Change in diet/appetite, Hospital admission, Bruising, Other complaints   Comments:  All findings negative per pt.      Plan:  1. INR is therapeutic today at 2.5 (goal 2.0-3.0) Ms. Ngo will continue recently reduced regimen of warfarin 2.5 mg oral daily except warfarin 5 mg on SunTueThur until recheck.  2. Repeat INR in one week, 10/5/22.  3. Verbal information provided over the phone. Patient RBV dosing instructions, expresses understanding by teach back, and has no further questions at this time.  4. Marianela Ngo understands the importance of calling the [facility] if she notices any s/sx of bleeding, stroke, or abnormal bruising, if any changes are made to her medications or medication doses (Rx, OTC, herbal), or if any upcoming procedures are scheduled. Marianela Ngo will  likewise let us know if any other changes, questions, or concerns arise regarding anticoagulation therapy. she understands the importance of seeking medical attention immediately if she experiences any falls, vehicle accidents, or abnormal bleeding or bruising. Marianela Ngo voiced understanding of this information and confirms that she has the [facility]'s contact information. Otherwise, we will plan to contact the patient once monthly or if her INR is out of range.    Scooby Garcia CPhT  9/28/2022  13:52 EDT     I, Saravanan Greenberg, PharmD, have reviewed the note in full and agree with the assessment and plan.  09/28/22  16:07 EDT

## 2022-10-05 ENCOUNTER — ANTICOAGULATION VISIT (OUTPATIENT)
Dept: PHARMACY | Facility: HOSPITAL | Age: 77
End: 2022-10-05

## 2022-10-05 DIAGNOSIS — I48.0 PAROXYSMAL ATRIAL FIBRILLATION: Primary | ICD-10-CM

## 2022-10-05 LAB — INR PPP: 2.9

## 2022-10-05 NOTE — PROGRESS NOTES
Anticoagulation Clinic - Remote Progress Note  mdINR Home Monitor  Testing frequency: weekly    Indication: paroxysmal afib  Referring Provider: Susan  Initial Warfarin Start Date: 2020? 2019?  Goal INR: 2.0-3.0  Current Drug Interactions:    [id number]: 4 (Age ?74, Sex, HTN) [estimated 5/20/21]  Bleed Risk: self reports negative history for GI bleed  Other: DOAC too expensive    Diet: green beans, peas, zucchini, or priyanka salads 2-3x/week (7/6/22)  Alcohol: none  Tobacco: none  OTC Pain Medication: APAP only    INR History:  Date 5/14 5/18 5/25 6/1 6/15 7/6 7/12 7/22 7/28 8/4 8/11 8/18 8/23 8/27   Total WeeklyDose  22.5mg 25mg 25mg 25mg 25mg 25mg 25mg 25mg 25mg 25mg 27.5mg 27.5mg 27.5mg   INR 3.5 1.9 2.3 2.3 2.8 2.7 2.1 2.2 1.9 2.0 1.8 2.3 2.2 1.8   Notes [place] Cards 1x hold; incr GLV     HM  incr GLV  no GLV incr GLV cefdinir cefdinir     Date 9/1 9/8 9/15 9/22 9/29 10/6 10/13 10/20 10/27 11/3 11/10 11/17 11/24 12/1   Total WeeklyDose 27.5mg 27.5mg 27.5mg 27.5mg 27.5mg 27.5mg 27.5mg 30mg 27.5mg 27.5mg 27.5mg 25mg 27.5mg 27.5mg   INR 2.1 2.3 3.3 2.3 2.2 2.0 1.9 2.6 3.2 2.4 3.4 1.6 2.4 2.3   Notes cefdinir  decr GLV?  keflex   1x incr dose   decr GLV? 1x decr dose;   incr GLV?       Date 12/9 12/15 12/22 12/29 1/5/22 1/12 1/20 1/26 1/31 2/3 2/7 2/10 2/16 2/23   Total WeeklyDose 27.5mg 27.5mg 27.5mg 27.5mg 27.5mg 27.5mg 27.5mg 27.5mg 27.5mg 25mg 25mg 22.5mg 27.5mg 27.5mg   INR 2.5 2.4 2.7 2.5 2.6 2.2 2.5 2.1 3.8 2.6 4.1 2.5 2.7 3.5   Notes call    call   COVID+ dex / azith 1x hold pred 20 BID x5d  1x hold; 1x decr dose;   call call call     Date 3/2 3/9 3/16 3/23 3/30 4/5 4/13 4/20 4/27 5/4 5/11 5/18 5/25 6/1   Total WeeklyDose 25mg 27.5mg 27.5mg 27.5mg 27.5mg 27.5mg 27.5 mg 27.5mg 27.5 mg 27.5 mg 27.5 mg 27.5 mg 27.5 mg 27.5mg   INR 2.5 2.9 2.2 2.8 2.7 2.4 2.5 2.3 2.5 2.8 3.1 2.6 2.5 2.3   Notes 1x decr  call call    call    call Call  apap rec 5/19       Date 6/8 6/15 6/22 6/29 7/6 7/14 7/20 7/27 8/3 8/10  8/17 8/25 8/31 9/7   Total Weekly Dose 27.5 mg 27.5 mg 27.5 mg 27.5 mg 27.5 mg 27.5 mg 27.5 mg 30 mg 27.5 mg 27.5mg 27.5 mg 27.5 mg 27.5 mg 27.5 mg   INR 2.5 2.5 2.7 2.5 2.9 2.4 1.8 2.7 2.8 2.8 2.8 3.1 2.6 3.5   Notes call    call  call 1x boost   call Call  Dec GLV  call     Date 9/14 9/21 9/28 10/5            Total WeeklyDose 27.5mg 25 mg 25 mg 25 mg            INR 3.1 2.9 2.5 2.9            Notes 1x hold inc GLV call                Phone Interview:  Tablet Strength: 5 mg (1/5/22)  Patient Contact Info: [phone] (home) preferred; [phone] (cell)  Estimated OOP cost: [please send to registration if not already done]  Verbal Release Auth: signed 5/25/21 -- May speak w/Femi Ngo, ; May leave voicemail on home phone  Lab Contact Info: [facility]  *Will call once monthly or if INR out of range*    Patient Findings  Comments:  Patient not contacted at this encounter.      Plan:  1. INR is therapeutic today at 2.9 (goal 2.0-3.0) Ms. Ngo will continue recently reduced regimen of warfarin 2.5 mg oral daily except warfarin 5 mg on SunTueThur until recheck.  2. Repeat INR in one week, 10/12/22.  3. Marianela Ngo understands the importance of calling the [facility] if she notices any s/sx of bleeding, stroke, or abnormal bruising, if any changes are made to her medications or medication doses (Rx, OTC, herbal), or if any upcoming procedures are scheduled. Marianela Ngo will likewise let us know if any other changes, questions, or concerns arise regarding anticoagulation therapy. she understands the importance of seeking medical attention immediately if she experiences any falls, vehicle accidents, or abnormal bleeding or bruising. Marianela Ngo voiced understanding of this information and confirms that she has the [facility]'s contact information. Otherwise, we will plan to contact the patient once monthly or if her INR is out of range.    Scooby Garcia,  CPhT  10/5/2022  10:39 EDT     IRadha, PharmD, have reviewed the note in full and agree with the assessment and plan.  10/05/22  14:11 EDT

## 2022-10-12 ENCOUNTER — TELEPHONE (OUTPATIENT)
Dept: FAMILY MEDICINE CLINIC | Facility: CLINIC | Age: 77
End: 2022-10-12

## 2022-10-12 ENCOUNTER — ANTICOAGULATION VISIT (OUTPATIENT)
Dept: PHARMACY | Facility: HOSPITAL | Age: 77
End: 2022-10-12

## 2022-10-12 DIAGNOSIS — I48.0 PAROXYSMAL ATRIAL FIBRILLATION: Primary | ICD-10-CM

## 2022-10-12 LAB — INR PPP: 2.2

## 2022-10-12 NOTE — TELEPHONE ENCOUNTER
PT STATES THAT HER NEBULIZER STOPPED WORKING AND THAT [facility] NEEDS A NEW ORDER FOR ANOTHER ONE. THIS CAN BE FAXED [phone] OR CALLED IN TO [facility] [phone].

## 2022-10-12 NOTE — PROGRESS NOTES
Anticoagulation Clinic - Remote Progress Note  mdINR Home Monitor  Testing frequency: weekly    Indication: paroxysmal afib  Referring Provider: Susan  Initial Warfarin Start Date: 2020? 2019?  Goal INR: 2.0-3.0  Current Drug Interactions:    [id number]: 4 (Age ?74, Sex, HTN) [estimated 5/20/21]  Bleed Risk: self reports negative history for GI bleed  Other: DOAC too expensive    Diet: green beans, peas, zucchini, or priyanka salads 2-3x/week (7/6/22)  Alcohol: none  Tobacco: none  OTC Pain Medication: APAP only    INR History:  Date 5/14 5/18 5/25 6/1 6/15 7/6 7/12 7/22 7/28 8/4 8/11 8/18 8/23 8/27   Total WeeklyDose  22.5mg 25mg 25mg 25mg 25mg 25mg 25mg 25mg 25mg 25mg 27.5mg 27.5mg 27.5mg   INR 3.5 1.9 2.3 2.3 2.8 2.7 2.1 2.2 1.9 2.0 1.8 2.3 2.2 1.8   Notes [place] Cards 1x hold; incr GLV     HM  incr GLV  no GLV incr GLV cefdinir cefdinir     Date 9/1 9/8 9/15 9/22 9/29 10/6 10/13 10/20 10/27 11/3 11/10 11/17 11/24 12/1   Total WeeklyDose 27.5mg 27.5mg 27.5mg 27.5mg 27.5mg 27.5mg 27.5mg 30mg 27.5mg 27.5mg 27.5mg 25mg 27.5mg 27.5mg   INR 2.1 2.3 3.3 2.3 2.2 2.0 1.9 2.6 3.2 2.4 3.4 1.6 2.4 2.3   Notes cefdinir  decr GLV?  keflex   1x incr dose   decr GLV? 1x decr dose;   incr GLV?       Date 12/9 12/15 12/22 12/29 1/5/22 1/12 1/20 1/26 1/31 2/3 2/7 2/10 2/16 2/23   Total WeeklyDose 27.5mg 27.5mg 27.5mg 27.5mg 27.5mg 27.5mg 27.5mg 27.5mg 27.5mg 25mg 25mg 22.5mg 27.5mg 27.5mg   INR 2.5 2.4 2.7 2.5 2.6 2.2 2.5 2.1 3.8 2.6 4.1 2.5 2.7 3.5   Notes call    call   COVID+ dex / azith 1x hold pred 20 BID x5d  1x hold; 1x decr dose;   call call call     Date 3/2 3/9 3/16 3/23 3/30 4/5 4/13 4/20 4/27 5/4 5/11 5/18 5/25 6/1   Total WeeklyDose 25mg 27.5mg 27.5mg 27.5mg 27.5mg 27.5mg 27.5 mg 27.5mg 27.5 mg 27.5 mg 27.5 mg 27.5 mg 27.5 mg 27.5mg   INR 2.5 2.9 2.2 2.8 2.7 2.4 2.5 2.3 2.5 2.8 3.1 2.6 2.5 2.3   Notes 1x decr  call call    call    call Call  apap rec 5/19       Date 6/8 6/15 6/22 6/29 7/6 7/14 7/20 7/27 8/3 8/10  8/17 8/25 8/31 9/7   Total Weekly Dose 27.5 mg 27.5 mg 27.5 mg 27.5 mg 27.5 mg 27.5 mg 27.5 mg 30 mg 27.5 mg 27.5mg 27.5 mg 27.5 mg 27.5 mg 27.5 mg   INR 2.5 2.5 2.7 2.5 2.9 2.4 1.8 2.7 2.8 2.8 2.8 3.1 2.6 3.5   Notes call    call  call 1x boost   call Call  Dec GLV  call     Date 9/14 9/21 9/28 10/5 10/12           Total WeeklyDose 27.5mg 25 mg 25 mg 25 mg 25 mg            INR 3.1 2.9 2.5 2.9 2.2           Notes 1x hold inc GLV call   Call              Phone Interview:  Tablet Strength: 5 mg (1/5/22)  Patient Contact Info: [phone] (home) preferred; [phone] (cell)  Estimated OOP cost: [please send to registration if not already done]  Verbal Release Auth: signed 5/25/21 -- May speak w/Femi Ngo, ; May leave voicemail on home phone  Lab Contact Info: [facility]  *Will call once monthly or if INR out of range*    Patient Findings  Negatives:  Signs/symptoms of thrombosis, Signs/symptoms of bleeding, Laboratory test error suspected, Change in health, Change in alcohol use, Change in activity, Upcoming invasive procedure, Emergency department visit, Upcoming dental procedure, Missed doses, Extra doses, Change in medications, Change in diet/appetite, Hospital admission, Bruising, Other complaints   Comments:  All findings negative per pt.      Plan:  1. INR is therapeutic today at 2.2 (goal 2.0-3.0) Ms. Ngo will continue recently reduced regimen of warfarin 2.5 mg oral daily except warfarin 5 mg on SunTueThur until recheck.  2. Repeat INR in one week, 10/19/22.  3. Marianela Ngo understands the importance of calling the [facility] if she notices any s/sx of bleeding, stroke, or abnormal bruising, if any changes are made to her medications or medication doses (Rx, OTC, herbal), or if any upcoming procedures are scheduled. Marianela Ngo will likewise let us know if any other changes, questions, or concerns arise regarding anticoagulation therapy. she understands the  importance of seeking medical attention immediately if she experiences any falls, vehicle accidents, or abnormal bleeding or bruising. Marianela Ngo voiced understanding of this information and confirms that she has the [facility]'s contact information. Otherwise, we will plan to contact the patient once monthly or if her INR is out of range.    Chicho Shine, Pharmacy Technician  10/12/2022  14:16 EDT    I, Saravanan Greenberg, PharmD, have reviewed the note in full and agree with the assessment and plan.  10/13/22  08:18 EDT

## 2022-10-14 ENCOUNTER — TELEPHONE (OUTPATIENT)
Dept: FAMILY MEDICINE CLINIC | Facility: CLINIC | Age: 77
End: 2022-10-14

## 2022-10-14 NOTE — TELEPHONE ENCOUNTER
Caller: BJORN    Relationship: Other    Best call back number: [phone]    What form or medical record are you requesting: CHART NOTES WITH DIAGNOSIS  DEMOGRAPHIC INFORMATION AND SIGNED AND DATED ORDER     Who is requesting this form or medical record from you: BJORN     How would you like to receive the form or medical records (pick-up, mail, fax): [phone]    Timeframe paperwork needed: AS SOON AS POSSIBLE     Additional notes: THE CALLER STATES THAT THEY RECEIVED A FAX FROM [facility]UR[facility]RELL[facility] REQUESTING A NEBULIZER FOR THE PATIENT BUT THEY NEED ADDITIONAL INFORMATION BEFORE THEY CAN PROCESS THE ORDER

## 2022-10-19 ENCOUNTER — ANTICOAGULATION VISIT (OUTPATIENT)
Dept: PHARMACY | Facility: HOSPITAL | Age: 77
End: 2022-10-19

## 2022-10-19 ENCOUNTER — OFFICE VISIT (OUTPATIENT)
Dept: CARDIOLOGY | Facility: CLINIC | Age: 77
End: 2022-10-19

## 2022-10-19 VITALS
BODY MASS INDEX: 33.99 KG/M2 | RESPIRATION RATE: 17 BRPM | HEART RATE: 54 BPM | DIASTOLIC BLOOD PRESSURE: 80 MMHG | OXYGEN SATURATION: 99 % | HEIGHT: 65 IN | WEIGHT: 204 LBS | SYSTOLIC BLOOD PRESSURE: 138 MMHG | TEMPERATURE: 98 F

## 2022-10-19 DIAGNOSIS — I10 ESSENTIAL HYPERTENSION: Primary | ICD-10-CM

## 2022-10-19 DIAGNOSIS — I48.0 PAROXYSMAL ATRIAL FIBRILLATION: ICD-10-CM

## 2022-10-19 DIAGNOSIS — E78.5 HYPERLIPIDEMIA LDL GOAL <100: ICD-10-CM

## 2022-10-19 DIAGNOSIS — I48.0 PAROXYSMAL ATRIAL FIBRILLATION: Primary | ICD-10-CM

## 2022-10-19 DIAGNOSIS — J43.2 CENTRILOBULAR EMPHYSEMA: ICD-10-CM

## 2022-10-19 LAB — INR PPP: 2.4

## 2022-10-19 PROCEDURE — 99214 OFFICE O/P EST MOD 30 MIN: CPT | Performed by: INTERNAL MEDICINE

## 2022-10-19 PROCEDURE — 93000 ELECTROCARDIOGRAM COMPLETE: CPT | Performed by: INTERNAL MEDICINE

## 2022-10-19 RX ORDER — WARFARIN SODIUM 5 MG/1
5 TABLET ORAL
Qty: 180 TABLET | Refills: 3 | Status: SHIPPED | OUTPATIENT
Start: 2022-10-19

## 2022-10-19 RX ORDER — ATORVASTATIN CALCIUM 20 MG/1
20 TABLET, FILM COATED ORAL DAILY
Qty: 90 TABLET | Refills: 3 | Status: SHIPPED | OUTPATIENT
Start: 2022-10-19

## 2022-10-19 RX ORDER — AMLODIPINE BESYLATE 5 MG/1
5 TABLET ORAL DAILY
Qty: 90 TABLET | Refills: 3 | Status: SHIPPED | OUTPATIENT
Start: 2022-10-19

## 2022-10-19 RX ORDER — SOTALOL HYDROCHLORIDE 80 MG/1
TABLET ORAL
Qty: 180 TABLET | Refills: 3 | Status: SHIPPED | OUTPATIENT
Start: 2022-10-19

## 2022-10-19 RX ORDER — LISINOPRIL AND HYDROCHLOROTHIAZIDE 20; 12.5 MG/1; MG/1
1 TABLET ORAL DAILY
Qty: 90 TABLET | Refills: 3 | Status: SHIPPED | OUTPATIENT
Start: 2022-10-19

## 2022-10-19 NOTE — PROGRESS NOTES
Anticoagulation Clinic - Remote Progress Note  mdINR Home Monitor  Testing frequency: weekly    Indication: paroxysmal afib  Referring Provider: Susan  Initial Warfarin Start Date: 2020? 2019?  Goal INR: 2.0-3.0  Current Drug Interactions:    [id number]: 4 (Age ?75, Sex, HTN) [estimated 5/20/21]  Bleed Risk: self reports negative history for GI bleed  Other: DOAC too expensive    Diet: green beans, peas, zucchini, or priyanka salads 2-3x/week (7/6/22)  Alcohol: none  Tobacco: none  OTC Pain Medication: APAP only    INR History:  Date 5/14 5/18 5/25 6/1 6/15 7/6 7/12 7/22 7/28 8/4 8/11 8/18 8/23 8/27   Total WeeklyDose  22.5mg 25mg 25mg 25mg 25mg 25mg 25mg 25mg 25mg 25mg 27.5mg 27.5mg 27.5mg   INR 3.5 1.9 2.3 2.3 2.8 2.7 2.1 2.2 1.9 2.0 1.8 2.3 2.2 1.8   Notes [place] Cards 1x hold; incr GLV     HM  incr GLV  no GLV incr GLV cefdinir cefdinir     Date 9/1 9/8 9/15 9/22 9/29 10/6 10/13 10/20 10/27 11/3 11/10 11/17 11/24 12/1   Total WeeklyDose 27.5mg 27.5mg 27.5mg 27.5mg 27.5mg 27.5mg 27.5mg 30mg 27.5mg 27.5mg 27.5mg 25mg 27.5mg 27.5mg   INR 2.1 2.3 3.3 2.3 2.2 2.0 1.9 2.6 3.2 2.4 3.4 1.6 2.4 2.3   Notes cefdinir  decr GLV?  keflex   1x incr dose   decr GLV? 1x decr dose;   incr GLV?       Date 12/9 12/15 12/22 12/29 1/5/22 1/12 1/20 1/26 1/31 2/3 2/7 2/10 2/16 2/23   Total WeeklyDose 27.5mg 27.5mg 27.5mg 27.5mg 27.5mg 27.5mg 27.5mg 27.5mg 27.5mg 25mg 25mg 22.5mg 27.5mg 27.5mg   INR 2.5 2.4 2.7 2.5 2.6 2.2 2.5 2.1 3.8 2.6 4.1 2.5 2.7 3.5   Notes call    call   COVID+ dex / azith 1x hold pred 20 BID x5d  1x hold; 1x decr dose;   call call call     Date 3/2 3/9 3/16 3/23 3/30 4/5 4/13 4/20 4/27 5/4 5/11 5/18 5/25 6/1   Total WeeklyDose 25mg 27.5mg 27.5mg 27.5mg 27.5mg 27.5mg 27.5 mg 27.5mg 27.5 mg 27.5 mg 27.5 mg 27.5 mg 27.5 mg 27.5mg   INR 2.5 2.9 2.2 2.8 2.7 2.4 2.5 2.3 2.5 2.8 3.1 2.6 2.5 2.3   Notes 1x decr  call call    call    call Call  apap rec 5/19       Date 6/8 6/15 6/22 6/29 7/6 7/14 7/20 7/27 8/3 8/10  8/17 8/25 8/31 9/7   Total Weekly Dose 27.5 mg 27.5 mg 27.5 mg 27.5 mg 27.5 mg 27.5 mg 27.5 mg 30 mg 27.5 mg 27.5mg 27.5 mg 27.5 mg 27.5 mg 27.5 mg   INR 2.5 2.5 2.7 2.5 2.9 2.4 1.8 2.7 2.8 2.8 2.8 3.1 2.6 3.5   Notes call    call  call 1x boost   call Call  Dec GLV  call     Date 9/14 9/21 9/28 10/5 10/12 10/19          Total WeeklyDose 27.5mg 25 mg 25 mg 25 mg 25 mg  25 mg          INR 3.1 2.9 2.5 2.9 2.2 2.4          Notes 1x hold inc GLV call   Call              Phone Interview:  Tablet Strength: 5 mg (1/5/22)  Patient Contact Info: [phone] (home) preferred; [phone] (cell)  Estimated OOP cost: [please send to registration if not already done]  Verbal Release Auth: signed 5/25/21 -- May speak w/Femi Ngo, ; May leave voicemail on home phone  Lab Contact Info: [facility]  *Will call once monthly or if INR out of range*    Patient Findings    Comments:  Patient not contacted at this encounter.      Plan:  1. INR is therapeutic today at 2.4 (goal 2.0-3.0) Ms. Ngo will continue recently reduced regimen of warfarin 2.5 mg oral daily except warfarin 5 mg on SunTueThur until recheck.  2. Repeat INR in one week, 10/26/22.  3. Marianela Ngo understands the importance of calling the [facility] if she notices any s/sx of bleeding, stroke, or abnormal bruising, if any changes are made to her medications or medication doses (Rx, OTC, herbal), or if any upcoming procedures are scheduled. Marianela Ngo will likewise let us know if any other changes, questions, or concerns arise regarding anticoagulation therapy. she understands the importance of seeking medical attention immediately if she experiences any falls, vehicle accidents, or abnormal bleeding or bruising. Marianela Ngo voiced understanding of this information and confirms that she has the [facility]'s contact information. Otherwise, we will plan to contact the patient once monthly or if her INR is out  of range.    Scooby Garcia CPhT  10/19/2022  12:13 EDT     I, Jean Paul Chatman, PharmD, have reviewed the note in full and agree with the assessment and plan.  10/19/22  15:21 EDT

## 2022-10-19 NOTE — PROGRESS NOTES
[facility] [place]  [facility]IOLOG[facility]  [address] [place] [address]  Dept: [phone]  Dept Fax: [phone]    Marianela Ngo  1945    Follow Up Office Visit Note    History of Present Illness:  Marianela Ngo is a 77 y.o. female who presents to the clinic for . PAF-=She seems doing well, no complaints HR is better 50, on EKG sinus on Sotalol 80.40 and also warfarin, will keep same meds    The following portions of the patient's history were reviewed and updated as appropriate: allergies, current medications, past family history, past medical history, past social history, past surgical history and problem list.    Medications:  albuterol  amLODIPine  atorvastatin  lisinopril-hydrochlorothiazide  sotalol  warfarin    Subjective  No Known Allergies     Past Medical History:   Diagnosis Date   • Allergies    • Bradycardia with 51-60 beats per minute    • Chronic atrial fibrillation (HCC)    • Current use of long term anticoagulation    • DJD (degenerative joint disease)    • Epistaxis    • Essential hypertension    • High risk medication use    • Mild persistent asthma, uncomplicated    • Mixed hyperlipidemia    • Obstructive sleep apnea on CPAP    • Other fatigue    • Paroxysmal atrial fibrillation (HCC)    • Pneumonia 2015   • Shortness of breath    • Vitamin deficiency        Past Surgical History:   Procedure Laterality Date   • CATARACT EXTRACTION, BILATERAL     • HYSTERECTOMY      PARTIAL   • REPLACEMENT TOTAL KNEE BILATERAL  ,       Family History   Problem Relation Age of Onset   • Cancer Father    • Heart disease Sister    • Cancer Brother    • Diabetes Brother    • Hypertension Other         Social History     Socioeconomic History   • Marital status:    Tobacco Use   • Smoking status: Former     Packs/day: 1.00     Types: Cigarettes     Quit date:      Years since quittin.8   • Smokeless tobacco: Never   Vaping Use   • Vaping Use: Never  "used   Substance and Sexual Activity   • Alcohol use: Never   • Drug use: Never   • Sexual activity: Defer       Review of Systems   Constitutional: Negative.    HENT: Negative.    Respiratory: Negative.    Cardiovascular: Negative.    Endocrine: Negative.    Genitourinary: Negative.    Musculoskeletal: Negative.    Skin: Negative.    Allergic/Immunologic: Negative.    Neurological: Negative.    Hematological: Negative.    Psychiatric/Behavioral: Negative.        Cardiovascular Procedures    ECHO/MUGA:   STRESS TESTS:   CARDIAC CATH:   DEVICES:   HOLTER:   CT/MRI:   VASCULAR:   CARDIOTHORACIC:     Objective  Vitals:    10/19/22 0809   BP: 138/80   BP Location: Right arm   Patient Position: Lying   Cuff Size: Adult   Pulse: 54   Resp: 17   Temp: 98 °F (36.7 °C)   TempSrc: Infrared   SpO2: 99%   Weight: 92.5 kg (204 lb)   Height: 165.1 cm (65\")   PainSc: 0-No pain     Body mass index is 33.95 kg/m².     Physical Exam  Constitutional:       Appearance: Healthy appearance. Not in distress.   Neck:      Vascular: No JVR. JVD normal.   Pulmonary:      Effort: Pulmonary effort is normal.      Breath sounds: Normal breath sounds. No wheezing. No rhonchi. No rales.   Chest:      Chest wall: Not tender to palpatation.   Cardiovascular:      PMI at left midclavicular line. Normal rate. Regular rhythm. Normal S1. Normal S2.      Murmurs: There is no murmur.      No gallop. No click. No rub.   Pulses:     Intact distal pulses.   Edema:     Peripheral edema absent.   Abdominal:      General: Bowel sounds are normal.      Palpations: Abdomen is soft.      Tenderness: There is no abdominal tenderness.   Musculoskeletal: Normal range of motion.         General: No tenderness. Skin:     General: Skin is warm and dry.   Neurological:      General: No focal deficit present.      Mental Status: Alert and oriented to person, place and time.          Diagnostic Data    ECG 12 Lead    Date/Time: 10/19/2022 8:55 AM  Performed by: Susan" Amos Lee MD  Authorized by: Amos Davis MD   Comparison: compared with previous ECG from 7/20/2022  Similar to previous ECG  Rhythm: sinus rhythm and sinus bradycardia  Rate: bradycardic  BPM: 50  Conduction: 1st degree AV block and non-specific intraventricular conduction delay  QRS axis: normal    Clinical impression: abnormal EKG            Assessment and Plan  Diagnoses and all orders for this visit:    Essential hypertension- The BP is 130.80 will keep Amlodipine 5mg and also Lisinopril 20.,12.5  -     amLODIPine (NORVASC) 5 MG tablet; Take 1 tablet by mouth Daily.    Hyperlipidemia LDL goal <100- On Lipitor 20 mg, will get lipid   -     Lipid Panel  -     CK    Paroxysmal atrial fibrillation (HCC)- In sinus on Sotalol 80.40 and also warfarin HR 50, better asymptomatic, will keep an eye with HR  -     CBC & Differential  -     Comprehensive Metabolic Panel  -     sotalol (BETAPACE) 80 MG tablet; Take 1/2 in am and 1 whole tab in the pm  -     warfarin (COUMADIN) 5 MG tablet; Take 1 tablet by mouth Daily.    Centrilobular emphysema (HCC)- stable    Other orders  -     atorvastatin (LIPITOR) 20 MG tablet; Take 1 tablet by mouth Daily.  -     lisinopril-hydrochlorothiazide (PRINZIDE,ZESTORETIC) 20-12.5 MG per tablet; Take 1 tablet by mouth Daily.         Return in about 6 months (around 4/19/2023) for Recheck.    Amos Davis MD  10/19/2022

## 2022-10-20 ENCOUNTER — TELEPHONE (OUTPATIENT)
Dept: CARDIOLOGY | Facility: CLINIC | Age: 77
End: 2022-10-20

## 2022-10-20 LAB
ALBUMIN SERPL-MCNC: 4.4 G/DL (ref 3.7–4.7)
ALBUMIN/GLOB SERPL: 1.6 {RATIO} (ref 1.2–2.2)
ALP SERPL-CCNC: 88 IU/L (ref 44–121)
ALT SERPL-CCNC: 14 IU/L (ref 0–32)
AST SERPL-CCNC: 16 IU/L (ref 0–40)
BASOPHILS # BLD AUTO: 0 X10E3/UL (ref 0–0.2)
BASOPHILS NFR BLD AUTO: 0 %
BILIRUB SERPL-MCNC: 0.5 MG/DL (ref 0–1.2)
BUN SERPL-MCNC: 17 MG/DL (ref 8–27)
BUN/CREAT SERPL: 20 (ref 12–28)
CALCIUM SERPL-MCNC: 9.4 MG/DL (ref 8.7–10.3)
CHLORIDE SERPL-SCNC: 105 MMOL/L (ref 96–106)
CHOLEST SERPL-MCNC: 130 MG/DL (ref 100–199)
CK SERPL-CCNC: 62 U/L (ref 32–182)
CO2 SERPL-SCNC: 27 MMOL/L (ref 20–29)
CREAT SERPL-MCNC: 0.84 MG/DL (ref 0.57–1)
EGFRCR SERPLBLD CKD-EPI 2021: 71 ML/MIN/1.73
EOSINOPHIL # BLD AUTO: 0.2 X10E3/UL (ref 0–0.4)
EOSINOPHIL NFR BLD AUTO: 5 %
ERYTHROCYTE [DISTWIDTH] IN BLOOD BY AUTOMATED COUNT: 13.1 % (ref 11.7–15.4)
GLOBULIN SER CALC-MCNC: 2.8 G/DL (ref 1.5–4.5)
GLUCOSE SERPL-MCNC: 104 MG/DL (ref 70–99)
HCT VFR BLD AUTO: 39.2 % (ref 34–46.6)
HDLC SERPL-MCNC: 50 MG/DL
HGB BLD-MCNC: 13.5 G/DL (ref 11.1–15.9)
IMM GRANULOCYTES # BLD AUTO: 0 X10E3/UL (ref 0–0.1)
IMM GRANULOCYTES NFR BLD AUTO: 0 %
LDLC SERPL CALC-MCNC: 65 MG/DL (ref 0–99)
LYMPHOCYTES # BLD AUTO: 1.3 X10E3/UL (ref 0.7–3.1)
LYMPHOCYTES NFR BLD AUTO: 29 %
MCH RBC QN AUTO: 31.3 PG (ref 26.6–33)
MCHC RBC AUTO-ENTMCNC: 34.4 G/DL (ref 31.5–35.7)
MCV RBC AUTO: 91 FL (ref 79–97)
MONOCYTES # BLD AUTO: 0.5 X10E3/UL (ref 0.1–0.9)
MONOCYTES NFR BLD AUTO: 11 %
NEUTROPHILS # BLD AUTO: 2.5 X10E3/UL (ref 1.4–7)
NEUTROPHILS NFR BLD AUTO: 55 %
PLATELET # BLD AUTO: 239 X10E3/UL (ref 150–450)
POTASSIUM SERPL-SCNC: 4.4 MMOL/L (ref 3.5–5.2)
PROT SERPL-MCNC: 7.2 G/DL (ref 6–8.5)
RBC # BLD AUTO: 4.31 X10E6/UL (ref 3.77–5.28)
SODIUM SERPL-SCNC: 144 MMOL/L (ref 134–144)
TRIGL SERPL-MCNC: 76 MG/DL (ref 0–149)
VLDLC SERPL CALC-MCNC: 15 MG/DL (ref 5–40)
WBC # BLD AUTO: 4.5 X10E3/UL (ref 3.4–10.8)

## 2022-10-20 NOTE — TELEPHONE ENCOUNTER
----- Message from Amos Davis MD sent at 10/20/2022 12:46 PM EDT -----  Lab are good including lipids, liver, kidneys

## 2022-10-25 RX ORDER — BUDESONIDE 0.5 MG/2ML
INHALANT ORAL
Qty: 30 EACH | Refills: 11 | Status: SHIPPED | OUTPATIENT
Start: 2022-10-25

## 2022-10-25 RX ORDER — ALBUTEROL SULFATE 2.5 MG/3ML
SOLUTION RESPIRATORY (INHALATION)
Qty: 180 EACH | Refills: 11 | Status: SHIPPED | OUTPATIENT
Start: 2022-10-25

## 2022-10-26 ENCOUNTER — ANTICOAGULATION VISIT (OUTPATIENT)
Dept: PHARMACY | Facility: HOSPITAL | Age: 77
End: 2022-10-26

## 2022-10-26 DIAGNOSIS — I48.0 PAROXYSMAL ATRIAL FIBRILLATION: Primary | ICD-10-CM

## 2022-10-26 LAB — INR PPP: 2.3

## 2022-10-26 NOTE — PROGRESS NOTES
Anticoagulation Clinic - Remote Progress Note  mdINR Home Monitor  Testing frequency: weekly    Indication: paroxysmal afib  Referring Provider: Susan [next 4/19/23]  Initial Warfarin Start Date: 2020? 2019?  Goal INR: 2.0-3.0  Current Drug Interactions:    [id number]: 4 (Age ?75, Sex, HTN) [estimated 5/20/21]  Bleed Risk: self reports negative history for GI bleed  Other: DOAC too expensive    Diet: green beans, peas, zucchini, or priyanka salads 2-3x/week (7/6/22)  Alcohol: none  Tobacco: none  OTC Pain Medication: APAP only    INR History:  Date 5/14 5/18 5/25 6/1 6/15 7/6 7/12 7/22 7/28 8/4 8/11 8/18 8/23 8/27   Total WeeklyDose  22.5mg 25mg 25mg 25mg 25mg 25mg 25mg 25mg 25mg 25mg 27.5mg 27.5mg 27.5mg   INR 3.5 1.9 2.3 2.3 2.8 2.7 2.1 2.2 1.9 2.0 1.8 2.3 2.2 1.8   Notes [place] Cards 1x hold; incr GLV     HM  incr GLV  no GLV incr GLV cefdinir cefdinir     Date 9/1 9/8 9/15 9/22 9/29 10/6 10/13 10/20 10/27 11/3 11/10 11/17 11/24 12/1   Total WeeklyDose 27.5mg 27.5mg 27.5mg 27.5mg 27.5mg 27.5mg 27.5mg 30mg 27.5mg 27.5mg 27.5mg 25mg 27.5mg 27.5mg   INR 2.1 2.3 3.3 2.3 2.2 2.0 1.9 2.6 3.2 2.4 3.4 1.6 2.4 2.3   Notes cefdinir  decr GLV?  keflex   1x incr dose   decr GLV? 1x decr dose;   incr GLV?       Date 12/9 12/15 12/22 12/29 1/5/22 1/12 1/20 1/26 1/31 2/3 2/7 2/10 2/16 2/23   Total WeeklyDose 27.5mg 27.5mg 27.5mg 27.5mg 27.5mg 27.5mg 27.5mg 27.5mg 27.5mg 25mg 25mg 22.5mg 27.5mg 27.5mg   INR 2.5 2.4 2.7 2.5 2.6 2.2 2.5 2.1 3.8 2.6 4.1 2.5 2.7 3.5   Notes call    call   COVID+ dex / azith 1x hold pred 20 BID x5d  1x hold; 1x decr dose;   call call call     Date 3/2 3/9 3/16 3/23 3/30 4/5 4/13 4/20 4/27 5/4 5/11 5/18 5/25 6/1   Total WeeklyDose 25mg 27.5mg 27.5mg 27.5mg 27.5mg 27.5mg 27.5 mg 27.5mg 27.5 mg 27.5 mg 27.5 mg 27.5 mg 27.5 mg 27.5mg   INR 2.5 2.9 2.2 2.8 2.7 2.4 2.5 2.3 2.5 2.8 3.1 2.6 2.5 2.3   Notes 1x decr  call call    call    call Call  apap rec 5/19       Date 6/8 6/15 6/22 6/29 7/6 7/14 7/20  7/27 8/3 8/10 8/17 8/25 8/31 9/7   Total Weekly Dose 27.5 mg 27.5 mg 27.5 mg 27.5 mg 27.5 mg 27.5 mg 27.5 mg 30 mg 27.5 mg 27.5mg 27.5 mg 27.5 mg 27.5 mg 27.5 mg   INR 2.5 2.5 2.7 2.5 2.9 2.4 1.8 2.7 2.8 2.8 2.8 3.1 2.6 3.5   Notes call    call  call 1x boost   call Call  Dec GLV  call     Date 9/14 9/21 9/28 10/5 10/12 10/19 10/26         Total WeeklyDose 27.5mg 25 mg 25 mg 25 mg 25 mg  25 mg 25 mg         INR 3.1 2.9 2.5 2.9 2.2 2.4 2.3         Notes 1x hold inc GLV call   Call              Phone Interview:  Tablet Strength: 5 mg (1/5/22)  Patient Contact Info: [phone] (home) preferred; [phone] (cell)  Estimated OOP cost: [please send to registration if not already done]  Verbal Release Auth: signed 5/25/21 -- May speak w/Femi Ngo, ; May leave voicemail on home phone  Lab Contact Info: [facility]  *Will call once monthly or if INR out of range*    Patient Findings  Comments:  Patient not contacted at this encounter.        Plan:  1. INR is therapeutic today at 2.3 (goal 2.0-3.0) Ms. Ngo will continue recently reduced regimen of warfarin 2.5 mg oral daily except warfarin 5 mg on SunTueThur until recheck.  2. Repeat INR in one week, 11/2/22.  3. Marianela Ngo understands the importance of calling the [facility] if she notices any s/sx of bleeding, stroke, or abnormal bruising, if any changes are made to her medications or medication doses (Rx, OTC, herbal), or if any upcoming procedures are scheduled. Marianela Ngo will likewise let us know if any other changes, questions, or concerns arise regarding anticoagulation therapy. she understands the importance of seeking medical attention immediately if she experiences any falls, vehicle accidents, or abnormal bleeding or bruising. Marianela Ngo voiced understanding of this information and confirms that she has the [facility]'s contact information. Otherwise, we will plan to contact the patient once  monthly or if her INR is out of range.    Chicho Shine, Pharmacy Technician  10/26/2022  14:49 EDT    I, Radha Orlando, PharmD, have reviewed the note in full and agree with the assessment and plan.  10/26/22  15:35 EDT

## 2022-10-31 ENCOUNTER — OFFICE VISIT (OUTPATIENT)
Dept: FAMILY MEDICINE CLINIC | Facility: CLINIC | Age: 77
End: 2022-10-31

## 2022-10-31 VITALS
WEIGHT: 204 LBS | DIASTOLIC BLOOD PRESSURE: 88 MMHG | HEIGHT: 65 IN | OXYGEN SATURATION: 96 % | BODY MASS INDEX: 33.99 KG/M2 | HEART RATE: 56 BPM | SYSTOLIC BLOOD PRESSURE: 144 MMHG

## 2022-10-31 DIAGNOSIS — M54.42 ACUTE BILATERAL LOW BACK PAIN WITH LEFT-SIDED SCIATICA: Primary | ICD-10-CM

## 2022-10-31 PROCEDURE — 99213 OFFICE O/P EST LOW 20 MIN: CPT | Performed by: PHYSICIAN ASSISTANT

## 2022-10-31 NOTE — PROGRESS NOTES
"Chief Complaint  leg pain (Left hip pain that goes down to leg started a week ago )    Subjective          Marianela Ngo presents to [facility]  History of Present Illness  Patient in  today for evaluation on lower back pain that is causing pain into her left hip and down into her leg.  She denies any changes to bowel or urine habits.  She saw Ortho several months ago for lower back pain and was recommended to come back for shots if physical therapy did not help.  She did do a round of physical therapy and her lower back symptoms improved.  This returned about a week ago without any new injury.  Back Pain  This is a new problem. The quality of the pain is described as aching. Associated symptoms include leg pain. Pertinent negatives include no abdominal pain, bladder incontinence, bowel incontinence or paresthesias.       Objective   Vital Signs:   /88 (BP Location: Left arm, Patient Position: Sitting, Cuff Size: Large Adult)   Pulse 56   Ht 165.1 cm (65\")   Wt 92.5 kg (204 lb)   SpO2 96%   BMI 33.95 kg/m²     Body mass index is 33.95 kg/m².    Review of Systems   Constitutional: Negative for fatigue.   HENT: Negative for congestion.    Gastrointestinal: Negative for abdominal pain and bowel incontinence.   Genitourinary: Negative for urinary incontinence.   Musculoskeletal: Positive for back pain.   Neurological: Negative for dizziness, headache and paresthesias.       Past History:  Medical History: has a past medical history of Allergies, Bradycardia with 51-60 beats per minute, Chronic atrial fibrillation (HCC), Current use of long term anticoagulation, DJD (degenerative joint disease), Epistaxis, Essential hypertension, High risk medication use, Mild persistent asthma, uncomplicated, Mixed hyperlipidemia, Obstructive sleep apnea on CPAP, Other fatigue, Paroxysmal atrial fibrillation (HCC), Pneumonia (08/2015), Shortness of breath, and Vitamin deficiency.   Surgical " History: has a past surgical history that includes Hysterectomy; Replacement total knee bilateral (2013,2014); and Cataract extraction, bilateral.   Family History: family history includes Cancer in her brother and father; Diabetes in her brother; Heart disease in her sister; Hypertension in an other family member.   Social History: reports that she quit smoking about 48 years ago. Her smoking use included cigarettes. She smoked an average of 1 pack per day. She has never used smokeless tobacco. She reports that she does not drink alcohol and does not use drugs.      Current Outpatient Medications:   •  albuterol (PROVENTIL) (2.5 MG/3ML) 0.083% nebulizer solution, USE 1 VIAL IN NEBULIZER EVERY 4 TO 6 HOURS, Disp: 180 each, Rfl: 11  •  amLODIPine (NORVASC) 5 MG tablet, Take 1 tablet by mouth Daily., Disp: 90 tablet, Rfl: 3  •  atorvastatin (LIPITOR) 20 MG tablet, Take 1 tablet by mouth Daily., Disp: 90 tablet, Rfl: 3  •  budesonide (PULMICORT) 0.5 MG/2ML nebulizer solution, USE 1 VIAL IN NEBULIZER DAILY - Rinse Mouth After Use, Disp: 30 each, Rfl: 11  •  lisinopril-hydrochlorothiazide (PRINZIDE,ZESTORETIC) 20-12.5 MG per tablet, Take 1 tablet by mouth Daily., Disp: 90 tablet, Rfl: 3  •  sotalol (BETAPACE) 80 MG tablet, Take 1/2 in am and 1 whole tab in the pm, Disp: 180 tablet, Rfl: 3  •  warfarin (COUMADIN) 5 MG tablet, Take 1 tablet by mouth Daily., Disp: 180 tablet, Rfl: 3  Allergies: Patient has no known allergies.    Physical Exam  Constitutional:       Appearance: Normal appearance.   Musculoskeletal:      Lumbar back: Tenderness present.      Comments: Decreased flexion of back due to discomfort. Tender to palpate musculature bilateral lumbar spine. Walks with nml gait.    Neurological:      Mental Status: She is alert.             Assessment and Plan   Diagnoses and all orders for this visit:    1. Acute bilateral low back pain with left-sided sciatica (Primary)    Will try physical therapy for sciatic pain.  Discussed however if her symptoms are not improving would recommend to get back in with ortho further evaluation. Continue heat/ice and stretching as needed.         Follow Up   No follow-ups on file.  Patient was given instructions and counseling regarding her condition or for health maintenance advice. Please see specific information pulled into the AVS if appropriate.     Marietta Archibald PA-C

## 2022-11-02 ENCOUNTER — ANTICOAGULATION VISIT (OUTPATIENT)
Dept: PHARMACY | Facility: HOSPITAL | Age: 77
End: 2022-11-02

## 2022-11-02 DIAGNOSIS — I48.0 PAROXYSMAL ATRIAL FIBRILLATION: Primary | ICD-10-CM

## 2022-11-02 LAB — INR PPP: 2.1

## 2022-11-02 NOTE — PROGRESS NOTES
Anticoagulation Clinic - Remote Progress Note  mdINR Home Monitor  Testing frequency: weekly    Indication: paroxysmal afib  Referring Provider: Susan [next 4/19/23]  Initial Warfarin Start Date: 2020? 2019?  Goal INR: 2.0-3.0  Current Drug Interactions:    [id number]: 4 (Age ?74, Sex, HTN) [estimated 5/20/21]  Bleed Risk: self reports negative history for GI bleed  Other: DOAC too expensive    Diet: green beans, peas, zucchini, or priyanka salads 2-3x/week (7/6/22)  Alcohol: none  Tobacco: none  OTC Pain Medication: APAP only    INR History:  Date 5/14 5/18 5/25 6/1 6/15 7/6 7/12 7/22 7/28 8/4 8/11 8/18 8/23 8/27   Total WeeklyDose  22.5mg 25mg 25mg 25mg 25mg 25mg 25mg 25mg 25mg 25mg 27.5mg 27.5mg 27.5mg   INR 3.5 1.9 2.3 2.3 2.8 2.7 2.1 2.2 1.9 2.0 1.8 2.3 2.2 1.8   Notes [place] Cards 1x hold; incr GLV     HM  incr GLV  no GLV incr GLV cefdinir cefdinir     Date 9/1 9/8 9/15 9/22 9/29 10/6 10/13 10/20 10/27 11/3 11/10 11/17 11/24 12/1   Total WeeklyDose 27.5mg 27.5mg 27.5mg 27.5mg 27.5mg 27.5mg 27.5mg 30mg 27.5mg 27.5mg 27.5mg 25mg 27.5mg 27.5mg   INR 2.1 2.3 3.3 2.3 2.2 2.0 1.9 2.6 3.2 2.4 3.4 1.6 2.4 2.3   Notes cefdinir  decr GLV?  keflex   1x incr dose   decr GLV? 1x decr dose;   incr GLV?       Date 12/9 12/15 12/22 12/29 1/5/22 1/12 1/20 1/26 1/31 2/3 2/7 2/10 2/16 2/23   Total WeeklyDose 27.5mg 27.5mg 27.5mg 27.5mg 27.5mg 27.5mg 27.5mg 27.5mg 27.5mg 25mg 25mg 22.5mg 27.5mg 27.5mg   INR 2.5 2.4 2.7 2.5 2.6 2.2 2.5 2.1 3.8 2.6 4.1 2.5 2.7 3.5   Notes call    call   COVID+ dex / azith 1x hold pred 20 BID x5d  1x hold; 1x decr dose;   call call call     Date 3/2 3/9 3/16 3/23 3/30 4/5 4/13 4/20 4/27 5/4 5/11 5/18 5/25 6/1   Total WeeklyDose 25mg 27.5mg 27.5mg 27.5mg 27.5mg 27.5mg 27.5 mg 27.5mg 27.5 mg 27.5 mg 27.5 mg 27.5 mg 27.5 mg 27.5mg   INR 2.5 2.9 2.2 2.8 2.7 2.4 2.5 2.3 2.5 2.8 3.1 2.6 2.5 2.3   Notes 1x decr  call call    call    call Call  apap rec 5/19       Date 6/8 6/15 6/22 6/29 7/6 7/14 7/20  7/27 8/3 8/10 8/17 8/25 8/31 9/7   Total Weekly Dose 27.5 mg 27.5 mg 27.5 mg 27.5 mg 27.5 mg 27.5 mg 27.5 mg 30 mg 27.5 mg 27.5mg 27.5 mg 27.5 mg 27.5 mg 27.5 mg   INR 2.5 2.5 2.7 2.5 2.9 2.4 1.8 2.7 2.8 2.8 2.8 3.1 2.6 3.5   Notes call    call  call 1x boost   call Call  Dec GLV  call     Date 9/14 9/21 9/28 10/5 10/12 10/19 10/26 11/2        Total WeeklyDose 27.5mg 25 mg 25 mg 25 mg 25 mg  25 mg 25 mg 25 mg        INR 3.1 2.9 2.5 2.9 2.2 2.4 2.3 2.1        Notes 1x hold inc GLV call   Call              Phone Interview:  Tablet Strength: 5 mg (1/5/22)  Patient Contact Info: [phone] (home) preferred; [phone] (cell)  Estimated OOP cost: [please send to registration if not already done]  Verbal Release Auth: signed 5/25/21 -- May speak w/Femi Ngo, ; May leave voicemail on home phone  Lab Contact Info: [facility]  *Will call once monthly or if INR out of range*    Patient Findings  Comments:  Patient not contacted at this encounter.        Plan:  1. INR is therapeutic today at 2.1(goal 2.0-3.0) Ms. Ngo will continue recently reduced regimen of warfarin 2.5 mg oral daily except warfarin 5 mg on SunTueThur until recheck.  2. Repeat INR in one week, 11/9/22.  3. Marianela Ngo understands the importance of calling the [facility] if she notices any s/sx of bleeding, stroke, or abnormal bruising, if any changes are made to her medications or medication doses (Rx, OTC, herbal), or if any upcoming procedures are scheduled. Marianela Ngo will likewise let us know if any other changes, questions, or concerns arise regarding anticoagulation therapy. she understands the importance of seeking medical attention immediately if she experiences any falls, vehicle accidents, or abnormal bleeding or bruising. Marianela Ngo voiced understanding of this information and confirms that she has the [facility]'s contact information. Otherwise, we will plan to contact the  patient once monthly or if her INR is out of range.    Scooby Garcia CPhT  11/2/2022  14:38 EDT     I, Saravanan Greenberg, PharmD, have reviewed the note in full and agree with the assessment and plan.  11/02/22  15:27 EDT

## 2022-11-09 ENCOUNTER — ANTICOAGULATION VISIT (OUTPATIENT)
Dept: PHARMACY | Facility: HOSPITAL | Age: 77
End: 2022-11-09

## 2022-11-09 DIAGNOSIS — I48.0 PAROXYSMAL ATRIAL FIBRILLATION: Primary | ICD-10-CM

## 2022-11-09 LAB — INR PPP: 2.3

## 2022-11-09 NOTE — PROGRESS NOTES
Anticoagulation Clinic - Remote Progress Note  mdINR Home Monitor  Testing frequency: weekly    Indication: paroxysmal afib  Referring Provider: Susan [next 4/19/23]  Initial Warfarin Start Date: 2020? 2019?  Goal INR: 2.0-3.0  Current Drug Interactions:    [id number]: 4 (Age ?74, Sex, HTN) [estimated 5/20/21]  Bleed Risk: self reports negative history for GI bleed  Other: DOAC too expensive    Diet: green beans, peas, zucchini, or priyanka salads 2-3x/week (7/6/22)  Alcohol: none  Tobacco: none  OTC Pain Medication: APAP only    INR History:  Date 5/14 5/18 5/25 6/1 6/15 7/6 7/12 7/22 7/28 8/4 8/11 8/18 8/23 8/27   Total WeeklyDose  22.5mg 25mg 25mg 25mg 25mg 25mg 25mg 25mg 25mg 25mg 27.5mg 27.5mg 27.5mg   INR 3.5 1.9 2.3 2.3 2.8 2.7 2.1 2.2 1.9 2.0 1.8 2.3 2.2 1.8   Notes [place] Cards 1x hold; incr GLV     HM  incr GLV  no GLV incr GLV cefdinir cefdinir     Date 9/1 9/8 9/15 9/22 9/29 10/6 10/13 10/20 10/27 11/3 11/10 11/17 11/24 12/1   Total WeeklyDose 27.5mg 27.5mg 27.5mg 27.5mg 27.5mg 27.5mg 27.5mg 30mg 27.5mg 27.5mg 27.5mg 25mg 27.5mg 27.5mg   INR 2.1 2.3 3.3 2.3 2.2 2.0 1.9 2.6 3.2 2.4 3.4 1.6 2.4 2.3   Notes cefdinir  decr GLV?  keflex   1x incr dose   decr GLV? 1x decr dose;   incr GLV?       Date 12/9 12/15 12/22 12/29 1/5/22 1/12 1/20 1/26 1/31 2/3 2/7 2/10 2/16 2/23   Total WeeklyDose 27.5mg 27.5mg 27.5mg 27.5mg 27.5mg 27.5mg 27.5mg 27.5mg 27.5mg 25mg 25mg 22.5mg 27.5mg 27.5mg   INR 2.5 2.4 2.7 2.5 2.6 2.2 2.5 2.1 3.8 2.6 4.1 2.5 2.7 3.5   Notes call    call   COVID+ dex / azith 1x hold pred 20 BID x5d  1x hold; 1x decr dose;   call call call     Date 3/2 3/9 3/16 3/23 3/30 4/5 4/13 4/20 4/27 5/4 5/11 5/18 5/25 6/1   Total WeeklyDose 25mg 27.5mg 27.5mg 27.5mg 27.5mg 27.5mg 27.5 mg 27.5mg 27.5 mg 27.5 mg 27.5 mg 27.5 mg 27.5 mg 27.5mg   INR 2.5 2.9 2.2 2.8 2.7 2.4 2.5 2.3 2.5 2.8 3.1 2.6 2.5 2.3   Notes 1x decr  call call    call    call Call  apap rec 5/19       Date 6/8 6/15 6/22 6/29 7/6 7/14 7/20  7/27 8/3 8/10 8/17 8/25 8/31 9/7   Total Weekly Dose 27.5 mg 27.5 mg 27.5 mg 27.5 mg 27.5 mg 27.5 mg 27.5 mg 30 mg 27.5 mg 27.5mg 27.5 mg 27.5 mg 27.5 mg 27.5 mg   INR 2.5 2.5 2.7 2.5 2.9 2.4 1.8 2.7 2.8 2.8 2.8 3.1 2.6 3.5   Notes call    call  call 1x boost   call Call  Dec GLV  call     Date 9/14 9/21 9/28 10/5 10/12 10/19 10/26 11/2 11/9       Total WeeklyDose 27.5mg 25 mg 25 mg 25 mg 25 mg  25 mg 25 mg 25 mg 25 mg       INR 3.1 2.9 2.5 2.9 2.2 2.4 2.3 2.1 2.3       Notes 1x hold inc GLV call   Call     call         Phone Interview:  Tablet Strength: 5 mg (1/5/22)  Patient Contact Info: [phone] (home) preferred; [phone] (cell)  Estimated OOP cost: [please send to registration if not already done]  Verbal Release Auth: signed 5/25/21 -- May speak w/Femi Ngo, ; May leave voicemail on home phone  Lab Contact Info: [facility]  *Will call once monthly or if INR out of range*      Patient Findings  Negatives:  Signs/symptoms of thrombosis, Signs/symptoms of bleeding, Laboratory test error suspected, Change in health, Change in alcohol use, Change in activity, Upcoming invasive procedure, Emergency department visit, Upcoming dental procedure, Missed doses, Extra doses, Change in medications, Change in diet/appetite, Hospital admission, Bruising, Other complaints   Comments:  All findings negative per patient.       Plan:  1. INR is therapeutic today at 2.3 (goal 2.0-3.0) Instructed Ms. Ngo to continue warfarin 2.5 mg oral daily except warfarin 5 mg on SunTueThur until recheck.  2. Repeat INR in one week, 11/16/22.  3. Verbal information provided over the phone. Patient RBV dosing instructions, expresses understanding by teach back, and has no further questions at this time.  4. Marianela Ngo understands the importance of calling the [facility] if she notices any s/sx of bleeding, stroke, or abnormal bruising, if any changes are made to her medications or medication  doses (Rx, OTC, herbal), or if any upcoming procedures are scheduled. Marianela Ngo will likewise let us know if any other changes, questions, or concerns arise regarding anticoagulation therapy. she understands the importance of seeking medical attention immediately if she experiences any falls, vehicle accidents, or abnormal bleeding or bruising. Marianela Ngo voiced understanding of this information and confirms that she has the [facility]'s contact information. Otherwise, we will plan to contact the patient once monthly or if her INR is out of range.    Chicho Shine, Pharmacy Technician  11/9/2022  16:29 EST    I, Radha Orlando, PharmD, have reviewed the note in full and agree with the assessment and plan.  11/10/22  15:49 EST

## 2022-11-16 ENCOUNTER — ANTICOAGULATION VISIT (OUTPATIENT)
Dept: PHARMACY | Facility: HOSPITAL | Age: 77
End: 2022-11-16

## 2022-11-16 DIAGNOSIS — I48.0 PAROXYSMAL ATRIAL FIBRILLATION: Primary | ICD-10-CM

## 2022-11-16 LAB — INR PPP: 2.3

## 2022-11-16 NOTE — PROGRESS NOTES
Anticoagulation Clinic - Remote Progress Note  mdINR Home Monitor  Testing frequency: weekly    Indication: paroxysmal afib  Referring Provider: Susan [next 4/19/23]  Initial Warfarin Start Date: 2020? 2019?  Goal INR: 2.0-3.0  Current Drug Interactions:    [id number]: 4 (Age ?74, Sex, HTN) [estimated 5/20/21]  Bleed Risk: self reports negative history for GI bleed  Other: DOAC too expensive    Diet: green beans, peas, zucchini, or priyanka salads 2-3x/week (7/6/22)  Alcohol: none  Tobacco: none  OTC Pain Medication: APAP only    INR History:  Date 5/14 5/18 5/25 6/1 6/15 7/6 7/12 7/22 7/28 8/4 8/11 8/18 8/23 8/27   Total WeeklyDose  22.5mg 25mg 25mg 25mg 25mg 25mg 25mg 25mg 25mg 25mg 27.5mg 27.5mg 27.5mg   INR 3.5 1.9 2.3 2.3 2.8 2.7 2.1 2.2 1.9 2.0 1.8 2.3 2.2 1.8   Notes [place] Cards 1x hold; incr GLV     HM  incr GLV  no GLV incr GLV cefdinir cefdinir     Date 9/1 9/8 9/15 9/22 9/29 10/6 10/13 10/20 10/27 11/3 11/10 11/17 11/24 12/1   Total WeeklyDose 27.5mg 27.5mg 27.5mg 27.5mg 27.5mg 27.5mg 27.5mg 30mg 27.5mg 27.5mg 27.5mg 25mg 27.5mg 27.5mg   INR 2.1 2.3 3.3 2.3 2.2 2.0 1.9 2.6 3.2 2.4 3.4 1.6 2.4 2.3   Notes cefdinir  decr GLV?  keflex   1x incr dose   decr GLV? 1x decr dose;   incr GLV?       Date 12/9 12/15 12/22 12/29 1/5/22 1/12 1/20 1/26 1/31 2/3 2/7 2/10 2/16 2/23   Total WeeklyDose 27.5mg 27.5mg 27.5mg 27.5mg 27.5mg 27.5mg 27.5mg 27.5mg 27.5mg 25mg 25mg 22.5mg 27.5mg 27.5mg   INR 2.5 2.4 2.7 2.5 2.6 2.2 2.5 2.1 3.8 2.6 4.1 2.5 2.7 3.5   Notes call    call   COVID+ dex / azith 1x hold pred 20 BID x5d  1x hold; 1x decr dose;   call call call     Date 3/2 3/9 3/16 3/23 3/30 4/5 4/13 4/20 4/27 5/4 5/11 5/18 5/25 6/1   Total WeeklyDose 25mg 27.5mg 27.5mg 27.5mg 27.5mg 27.5mg 27.5 mg 27.5mg 27.5 mg 27.5 mg 27.5 mg 27.5 mg 27.5 mg 27.5mg   INR 2.5 2.9 2.2 2.8 2.7 2.4 2.5 2.3 2.5 2.8 3.1 2.6 2.5 2.3   Notes 1x decr  call call    call    call Call  apap rec 5/19       Date 6/8 6/15 6/22 6/29 7/6 7/14 7/20  7/27 8/3 8/10 8/17 8/25 8/31 9/7   Total Weekly Dose 27.5 mg 27.5 mg 27.5 mg 27.5 mg 27.5 mg 27.5 mg 27.5 mg 30 mg 27.5 mg 27.5mg 27.5 mg 27.5 mg 27.5 mg 27.5 mg   INR 2.5 2.5 2.7 2.5 2.9 2.4 1.8 2.7 2.8 2.8 2.8 3.1 2.6 3.5   Notes call    call  call 1x boost   call Call  Dec GLV  call     Date 9/14 9/21 9/28 10/5 10/12 10/19 10/26 11/2 11/9 11/16      Total WeeklyDose 27.5mg 25 mg 25 mg 25 mg 25 mg  25 mg 25 mg 25 mg 25 mg 25 mg      INR 3.1 2.9 2.5 2.9 2.2 2.4 2.3 2.1 2.3 2.3      Notes 1x hold inc GLV call   Call     call         Phone Interview:  Tablet Strength: 5 mg (1/5/22)  Patient Contact Info: [phone] (home) preferred; [phone] (cell)  Estimated OOP cost: [please send to registration if not already done]  Verbal Release Auth: signed 5/25/21 -- May speak w/Femi Ngo, ; May leave voicemail on home phone  Lab Contact Info: [facility]  *Will call once monthly or if INR out of range*      Patient Findings  Negatives:  Signs/symptoms of thrombosis, Signs/symptoms of bleeding, Laboratory test error suspected, Change in health, Change in alcohol use, Change in activity, Upcoming invasive procedure, Emergency department visit, Upcoming dental procedure, Missed doses, Extra doses, Change in medications, Change in diet/appetite, Hospital admission, Bruising, Other complaints   Comments:  All findings negative per patient.       Plan:  1. INR is therapeutic today at 2.3 (goal 2.0-3.0) Instructed Ms. Ngo to continue warfarin 2.5 mg oral daily except warfarin 5 mg on SunTueThur until recheck.  2. Repeat INR in one week, 11/23/22.  3. Verbal information provided over the phone. Patient RBV dosing instructions, expresses understanding by teach back, and has no further questions at this time.  4. Marianela Huber understands the importance of calling the [facility] if she notices any s/sx of bleeding, stroke, or abnormal bruising, if any changes are made to her medications or  medication doses (Rx, OTC, herbal), or if any upcoming procedures are scheduled. Marianela Ngo will likewise let us know if any other changes, questions, or concerns arise regarding anticoagulation therapy. she understands the importance of seeking medical attention immediately if she experiences any falls, vehicle accidents, or abnormal bleeding or bruising. Marianela Ngo voiced understanding of this information and confirms that she has the [facility]'s contact information. Otherwise, we will plan to contact the patient once monthly or if her INR is out of range.    Scooby Garcia CPhT  11/16/2022  13:55 EST       GARLAND, Alisia Cunningham, [facility], have reviewed the note in full and agree with the assessment and plan.  11/16/22  15:17 EST

## 2022-11-23 ENCOUNTER — ANTICOAGULATION VISIT (OUTPATIENT)
Dept: PHARMACY | Facility: HOSPITAL | Age: 77
End: 2022-11-23

## 2022-11-23 DIAGNOSIS — I48.0 PAROXYSMAL ATRIAL FIBRILLATION: Primary | ICD-10-CM

## 2022-11-23 LAB — INR PPP: 2.1

## 2022-11-23 NOTE — PROGRESS NOTES
Anticoagulation Clinic - Remote Progress Note  mdINR Home Monitor  Testing frequency: weekly    Indication: paroxysmal afib  Referring Provider: Susan [next 4/19/23]  Initial Warfarin Start Date: 2020? 2019?  Goal INR: 2.0-3.0  Current Drug Interactions:    [id number]: 4 (Age ?74, Sex, HTN) [estimated 5/20/21]  Bleed Risk: self reports negative history for GI bleed  Other: DOAC too expensive    Diet: green beans, peas, zucchini, or priyanka salads 2-3x/week (7/6/22)  Alcohol: none  Tobacco: none  OTC Pain Medication: APAP only    INR History:  Date 5/14 5/18 5/25 6/1 6/15 7/6 7/12 7/22 7/28 8/4 8/11 8/18 8/23 8/27   Total WeeklyDose  22.5mg 25mg 25mg 25mg 25mg 25mg 25mg 25mg 25mg 25mg 27.5mg 27.5mg 27.5mg   INR 3.5 1.9 2.3 2.3 2.8 2.7 2.1 2.2 1.9 2.0 1.8 2.3 2.2 1.8   Notes [place] Cards 1x hold; incr GLV     HM  incr GLV  no GLV incr GLV cefdinir cefdinir     Date 9/1 9/8 9/15 9/22 9/29 10/6 10/13 10/20 10/27 11/3 11/10 11/17 11/24 12/1   Total WeeklyDose 27.5mg 27.5mg 27.5mg 27.5mg 27.5mg 27.5mg 27.5mg 30mg 27.5mg 27.5mg 27.5mg 25mg 27.5mg 27.5mg   INR 2.1 2.3 3.3 2.3 2.2 2.0 1.9 2.6 3.2 2.4 3.4 1.6 2.4 2.3   Notes cefdinir  decr GLV?  keflex   1x incr dose   decr GLV? 1x decr dose;   incr GLV?       Date 12/9 12/15 12/22 12/29 1/5/22 1/12 1/20 1/26 1/31 2/3 2/7 2/10 2/16 2/23   Total WeeklyDose 27.5mg 27.5mg 27.5mg 27.5mg 27.5mg 27.5mg 27.5mg 27.5mg 27.5mg 25mg 25mg 22.5mg 27.5mg 27.5mg   INR 2.5 2.4 2.7 2.5 2.6 2.2 2.5 2.1 3.8 2.6 4.1 2.5 2.7 3.5   Notes call    call   COVID+ dex / azith 1x hold pred 20 BID x5d  1x hold; 1x decr dose;   call call call     Date 3/2 3/9 3/16 3/23 3/30 4/5 4/13 4/20 4/27 5/4 5/11 5/18 5/25 6/1   Total WeeklyDose 25mg 27.5mg 27.5mg 27.5mg 27.5mg 27.5mg 27.5 mg 27.5mg 27.5 mg 27.5 mg 27.5 mg 27.5 mg 27.5 mg 27.5mg   INR 2.5 2.9 2.2 2.8 2.7 2.4 2.5 2.3 2.5 2.8 3.1 2.6 2.5 2.3   Notes 1x decr  call call    call    call Call  apap rec 5/19       Date 6/8 6/15 6/22 6/29 7/6 7/14 7/20  7/27 8/3 8/10 8/17 8/25 8/31 9/7   Total Weekly Dose 27.5 mg 27.5 mg 27.5 mg 27.5 mg 27.5 mg 27.5 mg 27.5 mg 30 mg 27.5 mg 27.5mg 27.5 mg 27.5 mg 27.5 mg 27.5 mg   INR 2.5 2.5 2.7 2.5 2.9 2.4 1.8 2.7 2.8 2.8 2.8 3.1 2.6 3.5   Notes call    call  call 1x boost   call Call  Dec GLV  call     Date 9/14 9/21 9/28 10/5 10/12 10/19 10/26 11/2 11/9 11/16 11/23     Total WeeklyDose 27.5mg 25 mg 25 mg 25 mg 25 mg  25 mg 25 mg 25 mg 25 mg 25 mg 25 mg     INR 3.1 2.9 2.5 2.9 2.2 2.4 2.3 2.1 2.3 2.3 2.1     Notes 1x hold inc GLV call   Call     call         Phone Interview:  Tablet Strength: 5 mg (1/5/22)  Patient Contact Info: [phone] (home) preferred; [phone] (cell)  Estimated OOP cost: [please send to registration if not already done]  Verbal Release Auth: signed 5/25/21 -- May speak w/Femi Ngo, ; May leave voicemail on home phone  Lab Contact Info: [facility]  *Will call once monthly or if INR out of range*      Patient Findings  Comments:  Patient not contacted at this encounter        Plan:  1. INR is therapeutic today at 2.3 (goal 2.0-3.0) Instructed Ms. Ngo to continue warfarin 2.5 mg oral daily except warfarin 5 mg on SunTueThur until recheck.  2. Repeat INR in one week, 11/30/22.  3. Verbal information provided over the phone. Patient RBV dosing instructions, expresses understanding by teach back, and has no further questions at this time.  4. Marianela Ngo understands the importance of calling the [facility] if she notices any s/sx of bleeding, stroke, or abnormal bruising, if any changes are made to her medications or medication doses (Rx, OTC, herbal), or if any upcoming procedures are scheduled. Marianela Ngo will likewise let us know if any other changes, questions, or concerns arise regarding anticoagulation therapy. she understands the importance of seeking medical attention immediately if she experiences any falls, vehicle accidents, or abnormal bleeding or  bruising. Marianela Ngo voiced understanding of this information and confirms that she has the [facility]'s contact information. Otherwise, we will plan to contact the patient once monthly or if her INR is out of range.    Chicho Shine, Pharmacy Technician  11/23/2022  16:05 Jayla MORAES, PharmD, have reviewed the note in full and agree with the assessment and plan.  11/23/22  16:20 EST

## 2022-11-30 ENCOUNTER — ANTICOAGULATION VISIT (OUTPATIENT)
Dept: PHARMACY | Facility: HOSPITAL | Age: 77
End: 2022-11-30

## 2022-11-30 DIAGNOSIS — I48.0 PAROXYSMAL ATRIAL FIBRILLATION: Primary | ICD-10-CM

## 2022-11-30 LAB — INR PPP: 2.2

## 2022-11-30 NOTE — PROGRESS NOTES
Anticoagulation Clinic - Remote Progress Note  mdINR Home Monitor  Testing frequency: weekly    Indication: paroxysmal afib  Referring Provider: Susan [next 4/19/23]  Initial Warfarin Start Date: 2020? 2019?  Goal INR: 2.0-3.0  Current Drug Interactions:    [id number]: 4 (Age ?74, Sex, HTN) [estimated 5/20/21]  Bleed Risk: self reports negative history for GI bleed  Other: DOAC too expensive    Diet: green beans, peas, zucchini, or priyanka salads 2-3x/week (7/6/22)  Alcohol: none  Tobacco: none  OTC Pain Medication: APAP only    INR History:  Date 5/14 5/18 5/25 6/1 6/15 7/6 7/12 7/22 7/28 8/4 8/11 8/18 8/23 8/27   Total WeeklyDose  22.5mg 25mg 25mg 25mg 25mg 25mg 25mg 25mg 25mg 25mg 27.5mg 27.5mg 27.5mg   INR 3.5 1.9 2.3 2.3 2.8 2.7 2.1 2.2 1.9 2.0 1.8 2.3 2.2 1.8   Notes [place] Cards 1x hold; incr GLV     HM  incr GLV  no GLV incr GLV cefdinir cefdinir     Date 9/1 9/8 9/15 9/22 9/29 10/6 10/13 10/20 10/27 11/3 11/10 11/17 11/24 12/1   Total WeeklyDose 27.5mg 27.5mg 27.5mg 27.5mg 27.5mg 27.5mg 27.5mg 30mg 27.5mg 27.5mg 27.5mg 25mg 27.5mg 27.5mg   INR 2.1 2.3 3.3 2.3 2.2 2.0 1.9 2.6 3.2 2.4 3.4 1.6 2.4 2.3   Notes cefdinir  decr GLV?  keflex   1x incr dose   decr GLV? 1x decr dose;   incr GLV?       Date 12/9 12/15 12/22 12/29 1/5/22 1/12 1/20 1/26 1/31 2/3 2/7 2/10 2/16 2/23   Total WeeklyDose 27.5mg 27.5mg 27.5mg 27.5mg 27.5mg 27.5mg 27.5mg 27.5mg 27.5mg 25mg 25mg 22.5mg 27.5mg 27.5mg   INR 2.5 2.4 2.7 2.5 2.6 2.2 2.5 2.1 3.8 2.6 4.1 2.5 2.7 3.5   Notes call    call   COVID+ dex / azith 1x hold pred 20 BID x5d  1x hold; 1x decr dose;   call call call     Date 3/2 3/9 3/16 3/23 3/30 4/5 4/13 4/20 4/27 5/4 5/11 5/18 5/25 6/1   Total WeeklyDose 25mg 27.5mg 27.5mg 27.5mg 27.5mg 27.5mg 27.5 mg 27.5mg 27.5 mg 27.5 mg 27.5 mg 27.5 mg 27.5 mg 27.5mg   INR 2.5 2.9 2.2 2.8 2.7 2.4 2.5 2.3 2.5 2.8 3.1 2.6 2.5 2.3   Notes 1x decr  call call    call    call Call  apap rec 5/19       Date 6/8 6/15 6/22 6/29 7/6 7/14 7/20  7/27 8/3 8/10 8/17 8/25 8/31 9/7   Total Weekly Dose 27.5 mg 27.5 mg 27.5 mg 27.5 mg 27.5 mg 27.5 mg 27.5 mg 30 mg 27.5 mg 27.5mg 27.5 mg 27.5 mg 27.5 mg 27.5 mg   INR 2.5 2.5 2.7 2.5 2.9 2.4 1.8 2.7 2.8 2.8 2.8 3.1 2.6 3.5   Notes call    call  call 1x boost   call Call  Dec GLV  call     Date 9/14 9/21 9/28 10/5 10/12 10/19 10/26 11/2 11/9 11/16 11/23 11/30    Total WeeklyDose 27.5mg 25 mg 25 mg 25 mg 25 mg  25 mg 25 mg 25 mg 25 mg 25 mg 25 mg 25 mg    INR 3.1 2.9 2.5 2.9 2.2 2.4 2.3 2.1 2.3 2.3 2.1 2.2    Notes 1x hold inc GLV call   Call     call    call     Phone Interview:  Tablet Strength: 5 mg (1/5/22)  Patient Contact Info: [phone] (home) preferred; [phone] (cell)  Estimated OOP cost: [please send to registration if not already done]  Verbal Release Auth: signed 5/25/21 -- May speak w/Femi Ngo, ; May leave voicemail on home phone  Lab Contact Info: [facility]  *Will call once monthly or if INR out of range*    Patient Findings  Comments:  Patient not contacted at this encounter      Plan:  1. INR is therapeutic today at 2.2 (goal 2.0-3.0)  Ms. Ngo to continue warfarin 2.5 mg oral daily except warfarin 5 mg on SunTueThur until recheck.  2. Repeat INR in one week, 11/30/22.  3. Marianela Ngo understands the importance of calling the [facility] if she notices any s/sx of bleeding, stroke, or abnormal bruising, if any changes are made to her medications or medication doses (Rx, OTC, herbal), or if any upcoming procedures are scheduled. Marianela Ngo will likewise let us know if any other changes, questions, or concerns arise regarding anticoagulation therapy. she understands the importance of seeking medical attention immediately if she experiences any falls, vehicle accidents, or abnormal bleeding or bruising. Marianela Huber voiced understanding of this information and confirms that she has the [facility]'s contact information. Otherwise, we  will plan to contact the patient once monthly or if her INR is out of range.    Radha Orlando, PharmD  11/30/2022  14:00 EST

## 2022-12-07 ENCOUNTER — ANTICOAGULATION VISIT (OUTPATIENT)
Dept: PHARMACY | Facility: HOSPITAL | Age: 77
End: 2022-12-07

## 2022-12-07 DIAGNOSIS — I48.0 PAROXYSMAL ATRIAL FIBRILLATION: Primary | ICD-10-CM

## 2022-12-07 LAB — INR PPP: 1.9

## 2022-12-07 NOTE — PROGRESS NOTES
Anticoagulation Clinic - Remote Progress Note  mdINR Home Monitor  Testing frequency: weekly    Indication: paroxysmal afib  Referring Provider: Susan [next 4/19/23]  Initial Warfarin Start Date: 2020? 2019?  Goal INR: 2.0-3.0  Current Drug Interactions:    [id number]: 4 (Age ?74, Sex, HTN) [estimated 5/20/21]  Bleed Risk: self reports negative history for GI bleed  Other: DOAC too expensive    Diet: green beans, peas, zucchini, or priyanka salads 2-3x/week (7/6/22)  Alcohol: none  Tobacco: none  OTC Pain Medication: APAP only    INR History:  Date 5/14 5/18 5/25 6/1 6/15 7/6 7/12 7/22 7/28 8/4 8/11 8/18 8/23 8/27   Total WeeklyDose  22.5mg 25mg 25mg 25mg 25mg 25mg 25mg 25mg 25mg 25mg 27.5mg 27.5mg 27.5mg   INR 3.5 1.9 2.3 2.3 2.8 2.7 2.1 2.2 1.9 2.0 1.8 2.3 2.2 1.8   Notes [place] Cards 1x hold; incr GLV     HM  incr GLV  no GLV incr GLV cefdinir cefdinir     Date 9/1 9/8 9/15 9/22 9/29 10/6 10/13 10/20 10/27 11/3 11/10 11/17 11/24 12/1   Total WeeklyDose 27.5mg 27.5mg 27.5mg 27.5mg 27.5mg 27.5mg 27.5mg 30mg 27.5mg 27.5mg 27.5mg 25mg 27.5mg 27.5mg   INR 2.1 2.3 3.3 2.3 2.2 2.0 1.9 2.6 3.2 2.4 3.4 1.6 2.4 2.3   Notes cefdinir  decr GLV?  keflex   1x incr dose   decr GLV? 1x decr dose;   incr GLV?       Date 12/9 12/15 12/22 12/29 1/5/22 1/12 1/20 1/26 1/31 2/3 2/7 2/10 2/16 2/23   Total WeeklyDose 27.5mg 27.5mg 27.5mg 27.5mg 27.5mg 27.5mg 27.5mg 27.5mg 27.5mg 25mg 25mg 22.5mg 27.5mg 27.5mg   INR 2.5 2.4 2.7 2.5 2.6 2.2 2.5 2.1 3.8 2.6 4.1 2.5 2.7 3.5   Notes call    call   COVID+ dex / azith 1x hold pred 20 BID x5d  1x hold; 1x decr dose;   call call call     Date 3/2 3/9 3/16 3/23 3/30 4/5 4/13 4/20 4/27 5/4 5/11 5/18 5/25 6/1   Total WeeklyDose 25mg 27.5mg 27.5mg 27.5mg 27.5mg 27.5mg 27.5 mg 27.5mg 27.5 mg 27.5 mg 27.5 mg 27.5 mg 27.5 mg 27.5mg   INR 2.5 2.9 2.2 2.8 2.7 2.4 2.5 2.3 2.5 2.8 3.1 2.6 2.5 2.3   Notes 1x decr  call call    call    call Call  apap rec 5/19       Date 6/8 6/15 6/22 6/29 7/6 7/14 7/20  7/27 8/3 8/10 8/17 8/25 8/31 9/7   Total Weekly Dose 27.5 mg 27.5 mg 27.5 mg 27.5 mg 27.5 mg 27.5 mg 27.5 mg 30 mg 27.5 mg 27.5mg 27.5 mg 27.5 mg 27.5 mg 27.5 mg   INR 2.5 2.5 2.7 2.5 2.9 2.4 1.8 2.7 2.8 2.8 2.8 3.1 2.6 3.5   Notes call    call  call 12/71x boost   call Call  Dec GLV  call     Date 9/14 9/21 9/28 10/5 10/12 10/19 10/26 11/2 11/9 11/16 11/23 11/30 12/7   Total WeeklyDose 27.5mg 25 mg 25 mg 25 mg 25 mg  25 mg 25 mg 25 mg 25 mg 25 mg 25 mg 25 mg 25 mg   INR 3.1 2.9 2.5 2.9 2.2 2.4 2.3 2.1 2.3 2.3 2.1 2.2 1.9   Notes 1x hold inc GLV call   Call     call    Call  INC GLV     Phone Interview:  Tablet Strength: 5 mg (1/5/22)  Patient Contact Info: [phone] (home) preferred; [phone] (cell)  Estimated OOP cost: [please send to registration if not already done]  Verbal Release Auth: signed 5/25/21 -- May speak w/Femi Ngo, ; May leave voicemail on home phone  Lab Contact Info: [facility]  *Will call once monthly or if INR out of range*     Patient Findings  Positives:  Change in diet/appetite   Negatives:  Signs/symptoms of thrombosis, Signs/symptoms of bleeding, Laboratory test error suspected, Change in health, Change in alcohol use, Change in activity, Upcoming invasive procedure, Emergency department visit, Upcoming dental procedure, Missed doses, Extra doses, Change in medications, Hospital admission, Bruising, Other complaints   Comments:  Patient reports an increase of GLV in her diet, the past two nights she has eaten salad for dinner.     All other findings negative per patient.       Plan:  1. INR is slightly SUBtherapeutic today at 1.9 (goal 2.0-3.0)  instructed Ms. Ngo to boost dose to 5mg then continue warfarin 2.5 mg oral daily except warfarin 5 mg on SunTueThur until recheck.  2. Repeat INR in one week, 12/14/22.  3. Marianela Ngo understands the importance of calling the [facility] if she notices any s/sx of bleeding, stroke, or abnormal  bruising, if any changes are made to her medications or medication doses (Rx, OTC, herbal), or if any upcoming procedures are scheduled. Marianela Ngo will likewise let us know if any other changes, questions, or concerns arise regarding anticoagulation therapy. she understands the importance of seeking medical attention immediately if she experiences any falls, vehicle accidents, or abnormal bleeding or bruising. Marianela Ngo voiced understanding of this information and confirms that she has the [facility]'s contact information. Otherwise, we will plan to contact the patient once monthly or if her INR is out of range.    Chicho Shine, Pharmacy Technician  12/7/2022  13:46 EST    Given sub therapeutic result, will boost dose tonight to 5mg. Updated note above.  I, Maryjane Amezquita, PharmD, have reviewed the note in full and agree with the assessment and plan.  12/07/22  14:21 EST

## 2022-12-15 ENCOUNTER — ANTICOAGULATION VISIT (OUTPATIENT)
Dept: PHARMACY | Facility: HOSPITAL | Age: 77
End: 2022-12-15

## 2022-12-15 DIAGNOSIS — I48.0 PAROXYSMAL ATRIAL FIBRILLATION: Primary | ICD-10-CM

## 2022-12-15 LAB — INR PPP: 2.8

## 2022-12-15 NOTE — PROGRESS NOTES
Anticoagulation Clinic - Remote Progress Note  mdINR Home Monitor  Testing frequency: weekly    Indication: paroxysmal afib  Referring Provider: Susan [next 4/19/23]  Initial Warfarin Start Date: 2020? 2019?  Goal INR: 2.0-3.0  Current Drug Interactions:    [id number]: 4 (Age ?74, Sex, HTN) [estimated 5/20/21]  Bleed Risk: self reports negative history for GI bleed  Other: DOAC too expensive    Diet: green beans, peas, zucchini, or priyanka salads 2-3x/week (7/6/22)  Alcohol: none  Tobacco: none  OTC Pain Medication: APAP only    INR History:  Date 5/14 5/18 5/25 6/1 6/15 7/6 7/12 7/22 7/28 8/4 8/11 8/18 8/23 8/27   Total WeeklyDose  22.5mg 25mg 25mg 25mg 25mg 25mg 25mg 25mg 25mg 25mg 27.5mg 27.5mg 27.5mg   INR 3.5 1.9 2.3 2.3 2.8 2.7 2.1 2.2 1.9 2.0 1.8 2.3 2.2 1.8   Notes [place] Cards 1x hold; incr GLV     HM  incr GLV  no GLV incr GLV cefdinir cefdinir     Date 9/1 9/8 9/15 9/22 9/29 10/6 10/13 10/20 10/27 11/3 11/10 11/17 11/24 12/1   Total WeeklyDose 27.5mg 27.5mg 27.5mg 27.5mg 27.5mg 27.5mg 27.5mg 30mg 27.5mg 27.5mg 27.5mg 25mg 27.5mg 27.5mg   INR 2.1 2.3 3.3 2.3 2.2 2.0 1.9 2.6 3.2 2.4 3.4 1.6 2.4 2.3   Notes cefdinir  decr GLV?  keflex   1x incr dose   decr GLV? 1x decr dose;   incr GLV?       Date 12/9 12/15 12/22 12/29 1/5/22 1/12 1/20 1/26 1/31 2/3 2/7 2/10 2/16 2/23   Total WeeklyDose 27.5mg 27.5mg 27.5mg 27.5mg 27.5mg 27.5mg 27.5mg 27.5mg 27.5mg 25mg 25mg 22.5mg 27.5mg 27.5mg   INR 2.5 2.4 2.7 2.5 2.6 2.2 2.5 2.1 3.8 2.6 4.1 2.5 2.7 3.5   Notes call    call   COVID+ dex / azith 1x hold pred 20 BID x5d  1x hold; 1x decr dose;   call call call     Date 3/2 3/9 3/16 3/23 3/30 4/5 4/13 4/20 4/27 5/4 5/11 5/18 5/25 6/1   Total WeeklyDose 25mg 27.5mg 27.5mg 27.5mg 27.5mg 27.5mg 27.5 mg 27.5mg 27.5 mg 27.5 mg 27.5 mg 27.5 mg 27.5 mg 27.5mg   INR 2.5 2.9 2.2 2.8 2.7 2.4 2.5 2.3 2.5 2.8 3.1 2.6 2.5 2.3   Notes 1x decr  call call    call    call Call  apap rec 5/19       Date 6/8 6/15 6/22 6/29 7/6 7/14 7/20  7/27 8/3 8/10 8/17 8/25 8/31 9/7   Total Weekly Dose 27.5 mg 27.5 mg 27.5 mg 27.5 mg 27.5 mg 27.5 mg 27.5 mg 30 mg 27.5 mg 27.5mg 27.5 mg 27.5 mg 27.5 mg 27.5 mg   INR 2.5 2.5 2.7 2.5 2.9 2.4 1.8 2.7 2.8 2.8 2.8 3.1 2.6 3.5   Notes call    call  call 12/71x boost   call Call  Dec GLV  call     Date 9/14 9/21 9/28 10/5 10/12 10/19 10/26 11/2 11/9 11/16 11/23 11/30 12/7   Total WeeklyDose 27.5mg 25 mg 25 mg 25 mg 25 mg  25 mg 25 mg 25 mg 25 mg 25 mg 25 mg 25 mg 25 mg   INR 3.1 2.9 2.5 2.9 2.2 2.4 2.3 2.1 2.3 2.3 2.1 2.2 1.9   Notes 1x hold inc GLV call   Call     call    Call  INC GLV     Date 12/14              Total WeeklyDose 27.5 mg              INR 2.8              Notes 1x boost  rec 12/15                  Phone Interview:  Tablet Strength: 5 mg (1/5/22)  Patient Contact Info: [phone] (home) preferred; [phone] (cell)  Estimated OOP cost: [please send to registration if not already done]  Verbal Release Auth: signed 5/25/21 -- May speak w/Femi Ngo, ; May leave voicemail on home phone  Lab Contact Info: [facility]  *Will call once monthly or if INR out of range*     Patient Findings    Comments:  Patient not contacted at this encounter.      Plan:  1. INR was therapeutic yesterday at 2.8 (goal 2.0-3.0)  Ms. Ngo will continue warfarin 2.5 mg oral daily except warfarin 5 mg on SunTueThur until recheck.  2. Repeat INR in one week, 12/21/22.  3. Marianela Ngo understands the importance of calling the [facility] if she notices any s/sx of bleeding, stroke, or abnormal bruising, if any changes are made to her medications or medication doses (Rx, OTC, herbal), or if any upcoming procedures are scheduled. Marianela Huber will likewise let us know if any other changes, questions, or concerns arise regarding anticoagulation therapy. she understands the importance of seeking medical attention immediately if she experiences any falls, vehicle accidents, or abnormal bleeding  or bruising. Marianela Ngo voiced understanding of this information and confirms that she has the [facility]'s contact information. Otherwise, we will plan to contact the patient once monthly or if her INR is out of range.    Scooby Garcia CPhT  12/15/2022  10:15 Jean Paul MORAES, PharmD, have reviewed the note in full and agree with the assessment and plan.  12/15/22  11:00 EST

## 2022-12-22 ENCOUNTER — ANTICOAGULATION VISIT (OUTPATIENT)
Dept: PHARMACY | Facility: HOSPITAL | Age: 77
End: 2022-12-22

## 2022-12-22 DIAGNOSIS — I48.0 PAROXYSMAL ATRIAL FIBRILLATION: Primary | ICD-10-CM

## 2022-12-22 LAB — INR PPP: 2.1

## 2022-12-22 NOTE — PROGRESS NOTES
Anticoagulation Clinic - Remote Progress Note  mdINR Home Monitor  Testing frequency: weekly    Indication: paroxysmal afib  Referring Provider: Susan [next 4/19/23]  Initial Warfarin Start Date: 2020? 2019?  Goal INR: 2.0-3.0  Current Drug Interactions:    [id number]: 4 (Age ?74, Sex, HTN) [estimated 5/20/21]  Bleed Risk: self reports negative history for GI bleed  Other: DOAC too expensive    Diet: green beans, peas, zucchini, or priyanka salads 2-3x/week (7/6/22)  Alcohol: none  Tobacco: none  OTC Pain Medication: APAP only    INR History:  Date 5/14 5/18 5/25 6/1 6/15 7/6 7/12 7/22 7/28 8/4 8/11 8/18 8/23 8/27   Total WeeklyDose  22.5mg 25mg 25mg 25mg 25mg 25mg 25mg 25mg 25mg 25mg 27.5mg 27.5mg 27.5mg   INR 3.5 1.9 2.3 2.3 2.8 2.7 2.1 2.2 1.9 2.0 1.8 2.3 2.2 1.8   Notes [place] Cards 1x hold; incr GLV     HM  incr GLV  no GLV incr GLV cefdinir cefdinir     Date 9/1 9/8 9/15 9/22 9/29 10/6 10/13 10/20 10/27 11/3 11/10 11/17 11/24 12/1   Total WeeklyDose 27.5mg 27.5mg 27.5mg 27.5mg 27.5mg 27.5mg 27.5mg 30mg 27.5mg 27.5mg 27.5mg 25mg 27.5mg 27.5mg   INR 2.1 2.3 3.3 2.3 2.2 2.0 1.9 2.6 3.2 2.4 3.4 1.6 2.4 2.3   Notes cefdinir  decr GLV?  keflex   1x incr dose   decr GLV? 1x decr dose;   incr GLV?       Date 12/9 12/15 12/22 12/29 1/5/22 1/12 1/20 1/26 1/31 2/3 2/7 2/10 2/16 2/23   Total WeeklyDose 27.5mg 27.5mg 27.5mg 27.5mg 27.5mg 27.5mg 27.5mg 27.5mg 27.5mg 25mg 25mg 22.5mg 27.5mg 27.5mg   INR 2.5 2.4 2.7 2.5 2.6 2.2 2.5 2.1 3.8 2.6 4.1 2.5 2.7 3.5   Notes call    call   COVID+ dex / azith 1x hold pred 20 BID x5d  1x hold; 1x decr dose;   call call call     Date 3/2 3/9 3/16 3/23 3/30 4/5 4/13 4/20 4/27 5/4 5/11 5/18 5/25 6/1   Total WeeklyDose 25mg 27.5mg 27.5mg 27.5mg 27.5mg 27.5mg 27.5 mg 27.5mg 27.5 mg 27.5 mg 27.5 mg 27.5 mg 27.5 mg 27.5mg   INR 2.5 2.9 2.2 2.8 2.7 2.4 2.5 2.3 2.5 2.8 3.1 2.6 2.5 2.3   Notes 1x decr  call call    call    call Call  apap rec 5/19       Date 6/8 6/15 6/22 6/29 7/6 7/14 7/20  7/27 8/3 8/10 8/17 8/25 8/31 9/7   Total Weekly Dose 27.5 mg 27.5 mg 27.5 mg 27.5 mg 27.5 mg 27.5 mg 27.5 mg 30 mg 27.5 mg 27.5mg 27.5 mg 27.5 mg 27.5 mg 27.5 mg   INR 2.5 2.5 2.7 2.5 2.9 2.4 1.8 2.7 2.8 2.8 2.8 3.1 2.6 3.5   Notes call    call  call 12/71x boost   call Call  Dec GLV  call     Date 9/14 9/21 9/28 10/5 10/12 10/19 10/26 11/2 11/9 11/16 11/23 11/30 12/7   Total WeeklyDose 27.5mg 25 mg 25 mg 25 mg 25 mg  25 mg 25 mg 25 mg 25 mg 25 mg 25 mg 25 mg 25 mg   INR 3.1 2.9 2.5 2.9 2.2 2.4 2.3 2.1 2.3 2.3 2.1 2.2 1.9   Notes 1x hold inc GLV call   Call     call    Call  INC GLV     Date 12/14 12/22             Total WeeklyDose 27.5 mg 25 mg              INR 2.8 2.1             Notes 1x boost  rec 12/15 Call                Phone Interview:  Tablet Strength: 5 mg (1/5/22)  Patient Contact Info: [phone] (home) preferred; [phone] (cell)  Estimated OOP cost: [please send to registration if not already done]  Verbal Release Auth: signed 5/25/21 -- May speak w/Femi Ngo, ; May leave voicemail on home phone  Lab Contact Info: [facility]  *Will call once monthly or if INR out of range*     Patient Findings  Negatives:  Signs/symptoms of thrombosis, Signs/symptoms of bleeding, Laboratory test error suspected, Change in health, Change in alcohol use, Change in activity, Upcoming invasive procedure, Emergency department visit, Upcoming dental procedure, Missed doses, Extra doses, Change in medications, Change in diet/appetite, Hospital admission, Bruising, Other complaints   Comments:  All findings negative per patient      Plan:  1. INR was therapeutic today at 2.1 (goal 2.0-3.0)  Ms. Ngo will continue warfarin 2.5 mg oral daily except warfarin 5 mg on SunTueThur until recheck.  2. Repeat INR in one week, 12/29/22.  3. Marianela Ngo understands the importance of calling the [facility] if she notices any s/sx of bleeding, stroke, or abnormal bruising, if any changes are  made to her medications or medication doses (Rx, OTC, herbal), or if any upcoming procedures are scheduled. Marianela Ngo will likewise let us know if any other changes, questions, or concerns arise regarding anticoagulation therapy. she understands the importance of seeking medical attention immediately if she experiences any falls, vehicle accidents, or abnormal bleeding or bruising. Marianela Ngo voiced understanding of this information and confirms that she has the [facility]'s contact information. Otherwise, we will plan to contact the patient once monthly or if her INR is out of range.    Chicho Shine, Pharmacy Technician  12/22/2022  10:51 Maryjane MORAES, PharmD, have reviewed the note in full and agree with the assessment and plan.  12/23/22  10:04 EST

## 2022-12-28 ENCOUNTER — ANTICOAGULATION VISIT (OUTPATIENT)
Dept: PHARMACY | Facility: HOSPITAL | Age: 77
End: 2022-12-28

## 2022-12-28 DIAGNOSIS — I48.0 PAROXYSMAL ATRIAL FIBRILLATION: Primary | ICD-10-CM

## 2022-12-28 LAB — INR PPP: 1.9

## 2022-12-28 NOTE — PROGRESS NOTES
Anticoagulation Clinic - Remote Progress Note  mdINR Home Monitor  Testing frequency: weekly    Indication: paroxysmal afib  Referring Provider: Susan [next 4/19/23]  Initial Warfarin Start Date: 2020? 2019?  Goal INR: 2.0-3.0  Current Drug Interactions:    [id number]: 4 (Age ?74, Sex, HTN) [estimated 5/20/21]  Bleed Risk: self reports negative history for GI bleed  Other: DOAC too expensive    Diet: green beans, peas, zucchini, or priyanka salads 2-3x/week (7/6/22)  Alcohol: none  Tobacco: none  OTC Pain Medication: APAP only    INR History:  Date 5/14 5/18 5/25 6/1 6/15 7/6 7/12 7/22 7/28 8/4 8/11 8/18 8/23 8/27   Total WeeklyDose  22.5mg 25mg 25mg 25mg 25mg 25mg 25mg 25mg 25mg 25mg 27.5mg 27.5mg 27.5mg   INR 3.5 1.9 2.3 2.3 2.8 2.7 2.1 2.2 1.9 2.0 1.8 2.3 2.2 1.8   Notes [place] Cards 1x hold; incr GLV     HM  incr GLV  no GLV incr GLV cefdinir cefdinir     Date 9/1 9/8 9/15 9/22 9/29 10/6 10/13 10/20 10/27 11/3 11/10 11/17 11/24 12/1   Total WeeklyDose 27.5mg 27.5mg 27.5mg 27.5mg 27.5mg 27.5mg 27.5mg 30mg 27.5mg 27.5mg 27.5mg 25mg 27.5mg 27.5mg   INR 2.1 2.3 3.3 2.3 2.2 2.0 1.9 2.6 3.2 2.4 3.4 1.6 2.4 2.3   Notes cefdinir  decr GLV?  keflex   1x incr dose   decr GLV? 1x decr dose;   incr GLV?       Date 12/9 12/15 12/22 12/29 1/5/22 1/12 1/20 1/26 1/31 2/3 2/7 2/10 2/16 2/23   Total WeeklyDose 27.5mg 27.5mg 27.5mg 27.5mg 27.5mg 27.5mg 27.5mg 27.5mg 27.5mg 25mg 25mg 22.5mg 27.5mg 27.5mg   INR 2.5 2.4 2.7 2.5 2.6 2.2 2.5 2.1 3.8 2.6 4.1 2.5 2.7 3.5   Notes call    call   COVID+ dex / azith 1x hold pred 20 BID x5d  1x hold; 1x decr dose;   call call call     Date 3/2 3/9 3/16 3/23 3/30 4/5 4/13 4/20 4/27 5/4 5/11 5/18 5/25 6/1   Total WeeklyDose 25mg 27.5mg 27.5mg 27.5mg 27.5mg 27.5mg 27.5 mg 27.5mg 27.5 mg 27.5 mg 27.5 mg 27.5 mg 27.5 mg 27.5mg   INR 2.5 2.9 2.2 2.8 2.7 2.4 2.5 2.3 2.5 2.8 3.1 2.6 2.5 2.3   Notes 1x decr  call call    call    call Call  apap rec 5/19       Date 6/8 6/15 6/22 6/29 7/6 7/14 7/20  7/27 8/3 8/10 8/17 8/25 8/31 9/7   Total Weekly Dose 27.5 mg 27.5 mg 27.5 mg 27.5 mg 27.5 mg 27.5 mg 27.5 mg 30 mg 27.5 mg 27.5mg 27.5 mg 27.5 mg 27.5 mg 27.5 mg   INR 2.5 2.5 2.7 2.5 2.9 2.4 1.8 2.7 2.8 2.8 2.8 3.1 2.6 3.5   Notes call    call  call 12/71x boost   call Call  Dec GLV  call     Date 9/14 9/21 9/28 10/5 10/12 10/19 10/26 11/2 11/9 11/16 11/23 11/30 12/7   Total WeeklyDose 27.5mg 25 mg 25 mg 25 mg 25 mg  25 mg 25 mg 25 mg 25 mg 25 mg 25 mg 25 mg 25 mg   INR 3.1 2.9 2.5 2.9 2.2 2.4 2.3 2.1 2.3 2.3 2.1 2.2 1.9   Notes 1x hold inc GLV call   Call     call    Call  INC GLV     Date 12/14 12/22 12/28            Total WeeklyDose 27.5 mg 25 mg  20 mg            INR 2.8 2.1 1.9            Notes 1x boost  rec 12/15 Call  Call 1x miss              Phone Interview:  Tablet Strength: 5 mg (1/5/22)  Patient Contact Info: [phone] (home) preferred; [phone] (cell)  Estimated OOP cost: [please send to registration if not already done]  Verbal Release Auth: signed 5/25/21 -- May speak w/Femi Ngo, ; May leave voicemail on home phone  Lab Contact Info: [facility]  *Will call once monthly or if INR out of range*     Patient Findings    Positives:  Missed doses   Negatives:  Signs/symptoms of thrombosis, Signs/symptoms of bleeding, Laboratory test error suspected, Change in health, Change in alcohol use, Change in activity, Upcoming invasive procedure, Emergency department visit, Upcoming dental procedure, Extra doses, Change in medications, Change in diet/appetite, Hospital admission, Bruising, Other complaints   Comments:  Missed warfarin dose yesterday. All other findings negative.      Plan:  1. INR is SUBtherapeutic today at 1.9 (goal 2.0-3.0)  Per Divya Jaffe, PharmD, instructed Ms. Ngo to boost tonights warfarin dose to 5 mg then continue warfarin 2.5 mg oral daily except warfarin 5 mg on ThurSunTue until recheck.  2. Repeat INR in one week, 1/4/23.  3. Marianela Ngo  understands the importance of calling the [facility] if she notices any s/sx of bleeding, stroke, or abnormal bruising, if any changes are made to her medications or medication doses (Rx, OTC, herbal), or if any upcoming procedures are scheduled. Marianela Ngo will likewise let us know if any other changes, questions, or concerns arise regarding anticoagulation therapy. she understands the importance of seeking medical attention immediately if she experiences any falls, vehicle accidents, or abnormal bleeding or bruising. Marianela Ngo voiced understanding of this information and confirms that she has the [facility]'s contact information. Otherwise, we will plan to contact the patient once monthly or if her INR is out of range.    Scooby Garcia CPhT  12/28/2022  15:30 EST       Divya HAQUE, PharmD, have reviewed the note in full and agree with the assessment and plan.  12/29/22  09:37 EST

## 2023-01-04 ENCOUNTER — ANTICOAGULATION VISIT (OUTPATIENT)
Dept: PHARMACY | Facility: HOSPITAL | Age: 78
End: 2023-01-04
Payer: MEDICARE

## 2023-01-04 DIAGNOSIS — I48.0 PAROXYSMAL ATRIAL FIBRILLATION: Primary | ICD-10-CM

## 2023-01-04 LAB — INR PPP: 1.9

## 2023-01-04 NOTE — PROGRESS NOTES
Anticoagulation Clinic - Remote Progress Note  mdINR Home Monitor  Testing frequency: weekly    Indication: paroxysmal afib  Referring Provider: Susan [next 4/19/23]  Initial Warfarin Start Date: 2020? 2019?  Goal INR: 2.0-3.0  Current Drug Interactions:    [id number]: 4 (Age ?74, Sex, HTN) [estimated 5/20/21]  Bleed Risk: self reports negative history for GI bleed  Other: DOAC too expensive    Diet: green beans, peas, zucchini, or priyanka salads 2-3x/week (7/6/22)  Alcohol: none  Tobacco: none  OTC Pain Medication: APAP only    INR History:  Date 5/14 5/18 5/25 6/1 6/15 7/6 7/12 7/22 7/28 8/4 8/11 8/18 8/23 8/27   Total WeeklyDose  22.5mg 25mg 25mg 25mg 25mg 25mg 25mg 25mg 25mg 25mg 27.5mg 27.5mg 27.5mg   INR 3.5 1.9 2.3 2.3 2.8 2.7 2.1 2.2 1.9 2.0 1.8 2.3 2.2 1.8   Notes [place] Cards 1x hold; incr GLV     HM  incr GLV  no GLV incr GLV cefdinir cefdinir     Date 9/1 9/8 9/15 9/22 9/29 10/6 10/13 10/20 10/27 11/3 11/10 11/17 11/24 12/1   Total WeeklyDose 27.5mg 27.5mg 27.5mg 27.5mg 27.5mg 27.5mg 27.5mg 30mg 27.5mg 27.5mg 27.5mg 25mg 27.5mg 27.5mg   INR 2.1 2.3 3.3 2.3 2.2 2.0 1.9 2.6 3.2 2.4 3.4 1.6 2.4 2.3   Notes cefdinir  decr GLV?  keflex   1x incr dose   decr GLV? 1x decr dose;   incr GLV?       Date 12/9 12/15 12/22 12/29 1/5/22 1/12 1/20 1/26 1/31 2/3 2/7 2/10 2/16 2/23   Total WeeklyDose 27.5mg 27.5mg 27.5mg 27.5mg 27.5mg 27.5mg 27.5mg 27.5mg 27.5mg 25mg 25mg 22.5mg 27.5mg 27.5mg   INR 2.5 2.4 2.7 2.5 2.6 2.2 2.5 2.1 3.8 2.6 4.1 2.5 2.7 3.5   Notes call    call   COVID+ dex / azith 1x hold pred 20 BID x5d  1x hold; 1x decr dose;   call call call     Date 3/2 3/9 3/16 3/23 3/30 4/5 4/13 4/20 4/27 5/4 5/11 5/18 5/25 6/1   Total WeeklyDose 25mg 27.5mg 27.5mg 27.5mg 27.5mg 27.5mg 27.5 mg 27.5mg 27.5 mg 27.5 mg 27.5 mg 27.5 mg 27.5 mg 27.5mg   INR 2.5 2.9 2.2 2.8 2.7 2.4 2.5 2.3 2.5 2.8 3.1 2.6 2.5 2.3   Notes 1x decr  call call    call    call Call  apap rec 5/19       Date 6/8 6/15 6/22 6/29 7/6 7/14 7/20  7/27 8/3 8/10 8/17 8/25 8/31 9/7   Total Weekly Dose 27.5 mg 27.5 mg 27.5 mg 27.5 mg 27.5 mg 27.5 mg 27.5 mg 30 mg 27.5 mg 27.5mg 27.5 mg 27.5 mg 27.5 mg 27.5 mg   INR 2.5 2.5 2.7 2.5 2.9 2.4 1.8 2.7 2.8 2.8 2.8 3.1 2.6 3.5   Notes call    call  call 12/71x boost   call Call  Dec GLV  call     Date 9/14 9/21 9/28 10/5 10/12 10/19 10/26 11/2 11/9 11/16 11/23 11/30 12/7   Total WeeklyDose 27.5mg 25 mg 25 mg 25 mg 25 mg  25 mg 25 mg 25 mg 25 mg 25 mg 25 mg 25 mg 25 mg   INR 3.1 2.9 2.5 2.9 2.2 2.4 2.3 2.1 2.3 2.3 2.1 2.2 1.9   Notes 1x hold inc GLV call   Call     call    Call  INC GLV     Date 12/14 12/22 12/28 1/4           Total WeeklyDose 27.5 mg 25 mg  20 mg 27.5 mg           INR 2.8 2.1 1.9 1.9           Notes 1x boost  rec 12/15 Call  Call 1x miss call             Phone Interview:  Tablet Strength: 5 mg (1/5/22)  Patient Contact Info: [phone] (home) preferred; [phone] (cell)  Estimated OOP cost: [please send to registration if not already done]  Verbal Release Auth: signed 5/25/21 -- May speak w/Femi Ngo, ; May leave voicemail on home phone  Lab Contact Info: [facility]  *Will call once monthly or if INR out of range*     Patient Findings    Negatives:  Signs/symptoms of thrombosis, Signs/symptoms of bleeding, Laboratory test error suspected, Change in health, Change in alcohol use, Change in activity, Upcoming invasive procedure, Emergency department visit, Upcoming dental procedure, Missed doses, Extra doses, Change in medications, Change in diet/appetite, Hospital admission, Bruising, Other complaints   Comments:  All findings negative per pt.      Plan:  1. INR is SUBtherapeutic today at 1.9 (goal 2.0-3.0)  Per Maryjane Amezquita, PharmD, instructed Ms. Ngo to boost tonights warfarin dose to 5 mg then continue warfarin 2.5 mg oral daily except warfarin 5 mg on ThurSunTue until recheck.  2. Repeat INR in one week, 1/11/23.  3. Marianela Ngo understands the importance of calling  the [facility] if she notices any s/sx of bleeding, stroke, or abnormal bruising, if any changes are made to her medications or medication doses (Rx, OTC, herbal), or if any upcoming procedures are scheduled. Marianela Ngo will likewise let us know if any other changes, questions, or concerns arise regarding anticoagulation therapy. she understands the importance of seeking medical attention immediately if she experiences any falls, vehicle accidents, or abnormal bleeding or bruising. Marianela Ngo voiced understanding of this information and confirms that she has the [facility]'s contact information. Otherwise, we will plan to contact the patient once monthly or if her INR is out of range.    Scooby Garcia CPhT  1/4/2023  15:27 EST     May need to consider a permanent dose increase to 27.5mg/week at next encounter.   I, Maryjane Amezquita, PharmD, have reviewed the note in full and agree with the assessment and plan.  01/04/23  15:56 EST

## 2023-01-11 ENCOUNTER — ANTICOAGULATION VISIT (OUTPATIENT)
Dept: PHARMACY | Facility: HOSPITAL | Age: 78
End: 2023-01-11
Payer: MEDICARE

## 2023-01-11 DIAGNOSIS — I48.0 PAROXYSMAL ATRIAL FIBRILLATION: Primary | ICD-10-CM

## 2023-01-11 LAB — INR PPP: 1.8

## 2023-01-11 NOTE — PROGRESS NOTES
Anticoagulation Clinic - Remote Progress Note  mdINR Home Monitor  Testing frequency: weekly    Indication: paroxysmal afib  Referring Provider: Susan [next 4/19/23]  Initial Warfarin Start Date: 2020? 2019?  Goal INR: 2.0-3.0  Current Drug Interactions:    [id number]: 4 (Age ?74, Sex, HTN) [estimated 5/20/21]  Bleed Risk: self reports negative history for GI bleed  Other: DOAC too expensive    Diet: green beans, peas, zucchini, or priyanka salads 2-3x/week (7/6/22)  Alcohol: none  Tobacco: none  OTC Pain Medication: APAP only    INR History:  Date 5/14 5/18 5/25 6/1 6/15 7/6 7/12 7/22 7/28 8/4 8/11 8/18 8/23 8/27   Total WeeklyDose  22.5mg 25mg 25mg 25mg 25mg 25mg 25mg 25mg 25mg 25mg 27.5mg 27.5mg 27.5mg   INR 3.5 1.9 2.3 2.3 2.8 2.7 2.1 2.2 1.9 2.0 1.8 2.3 2.2 1.8   Notes [place] Cards 1x hold; incr GLV     HM  incr GLV  no GLV incr GLV cefdinir cefdinir     Date 9/1 9/8 9/15 9/22 9/29 10/6 10/13 10/20 10/27 11/3 11/10 11/17 11/24 12/1   Total WeeklyDose 27.5mg 27.5mg 27.5mg 27.5mg 27.5mg 27.5mg 27.5mg 30mg 27.5mg 27.5mg 27.5mg 25mg 27.5mg 27.5mg   INR 2.1 2.3 3.3 2.3 2.2 2.0 1.9 2.6 3.2 2.4 3.4 1.6 2.4 2.3   Notes cefdinir  decr GLV?  keflex   1x incr dose   decr GLV? 1x decr dose;   incr GLV?       Date 12/9 12/15 12/22 12/29 1/5/22 1/12 1/20 1/26 1/31 2/3 2/7 2/10 2/16 2/23   Total WeeklyDose 27.5mg 27.5mg 27.5mg 27.5mg 27.5mg 27.5mg 27.5mg 27.5mg 27.5mg 25mg 25mg 22.5mg 27.5mg 27.5mg   INR 2.5 2.4 2.7 2.5 2.6 2.2 2.5 2.1 3.8 2.6 4.1 2.5 2.7 3.5   Notes call    call   COVID+ dex / azith 1x hold pred 20 BID x5d  1x hold; 1x decr dose;   call call call     Date 3/2 3/9 3/16 3/23 3/30 4/5 4/13 4/20 4/27 5/4 5/11 5/18 5/25 6/1   Total WeeklyDose 25mg 27.5mg 27.5mg 27.5mg 27.5mg 27.5mg 27.5 mg 27.5mg 27.5 mg 27.5 mg 27.5 mg 27.5 mg 27.5 mg 27.5mg   INR 2.5 2.9 2.2 2.8 2.7 2.4 2.5 2.3 2.5 2.8 3.1 2.6 2.5 2.3   Notes 1x decr  call call    call    call Call  apap rec 5/19       Date 6/8 6/15 6/22 6/29 7/6 7/14 7/20  7/27 8/3 8/10 8/17 8/25 8/31 9/7   Total Weekly Dose 27.5 mg 27.5 mg 27.5 mg 27.5 mg 27.5 mg 27.5 mg 27.5 mg 30 mg 27.5 mg 27.5mg 27.5 mg 27.5 mg 27.5 mg 27.5 mg   INR 2.5 2.5 2.7 2.5 2.9 2.4 1.8 2.7 2.8 2.8 2.8 3.1 2.6 3.5   Notes call    call  call 12/71x boost   call Call  Dec GLV  call     Date 9/14 9/21 9/28 10/5 10/12 10/19 10/26 11/2 11/9 11/16 11/23 11/30 12/7   Total WeeklyDose 27.5mg 25 mg 25 mg 25 mg 25 mg  25 mg 25 mg 25 mg 25 mg 25 mg 25 mg 25 mg 25 mg   INR 3.1 2.9 2.5 2.9 2.2 2.4 2.3 2.1 2.3 2.3 2.1 2.2 1.9   Notes 1x hold inc GLV call   Call     call    Call  INC GLV     Date 12/14 12/22 12/28 1/4 1/11          Total WeeklyDose 27.5 mg 25 mg  20 mg 27.5 mg 27.5 mg          INR 2.8 2.1 1.9 1.9 1.8          Notes 1x boost  rec 12/15 Call  Call 1x miss call call            Phone Interview:  Tablet Strength: 5 mg (1/5/22)  Patient Contact Info: [phone] (home) preferred; [phone] (cell)  Estimated OOP cost: [please send to registration if not already done]  Verbal Release Auth: signed 5/25/21 -- May speak w/Femi Ngo, ; May leave voicemail on home phone  Lab Contact Info: [facility]  *Will call once monthly or if INR out of range*     Patient Findings    Negatives:  Signs/symptoms of thrombosis, Signs/symptoms of bleeding, Laboratory test error suspected, Change in health, Change in alcohol use, Change in activity, Upcoming invasive procedure, Emergency department visit, Upcoming dental procedure, Missed doses, Extra doses, Change in medications, Change in diet/appetite, Hospital admission, Bruising, Other complaints   Comments:  All findings negative per pt.      Plan:  1. INR is SUBtherapeutic today at 1.8(goal 2.0-3.0)  Per Jean Paul Chatman, PharmD, instructed Ms. Ngo to boost tonights warfarin dose to 5 mg then continue warfarin 2.5 mg oral daily except warfarin 5 mg on ThurSunTue until recheck.  2. Repeat INR in one week, 1/18/23.  3. Marianela Ngo understands the  importance of calling the [facility] if she notices any s/sx of bleeding, stroke, or abnormal bruising, if any changes are made to her medications or medication doses (Rx, OTC, herbal), or if any upcoming procedures are scheduled. Marianela Ngo will likewise let us know if any other changes, questions, or concerns arise regarding anticoagulation therapy. she understands the importance of seeking medical attention immediately if she experiences any falls, vehicle accidents, or abnormal bleeding or bruising. Marianela Ngo voiced understanding of this information and confirms that she has the [facility]'s contact information. Otherwise, we will plan to contact the patient once monthly or if her INR is out of range.    Scooby Garcia CPhT  1/11/2023  14:01 JOSE ANTONIO HAQUE, Divya Jaffe, PharmD, have reviewed the note in full and agree with the assessment and plan.  01/12/23  10:57 EST

## 2023-01-18 ENCOUNTER — ANTICOAGULATION VISIT (OUTPATIENT)
Dept: PHARMACY | Facility: HOSPITAL | Age: 78
End: 2023-01-18
Payer: MEDICARE

## 2023-01-18 DIAGNOSIS — I48.0 PAROXYSMAL ATRIAL FIBRILLATION: Primary | ICD-10-CM

## 2023-01-18 LAB — INR PPP: 2.1

## 2023-01-18 NOTE — PROGRESS NOTES
Anticoagulation Clinic - Remote Progress Note  mdINR Home Monitor  Testing frequency: weekly    Indication: paroxysmal afib  Referring Provider: Susan [next 4/19/23]  Initial Warfarin Start Date: 2020? 2019?  Goal INR: 2.0-3.0  Current Drug Interactions:    [id number]: 4 (Age ?74, Sex, HTN) [estimated 5/20/21]  Bleed Risk: self reports negative history for GI bleed  Other: DOAC too expensive    Diet: green beans, peas, zucchini, or priyanka salads 2-3x/week (7/6/22)  Alcohol: none  Tobacco: none  OTC Pain Medication: APAP only    INR History:  Date 5/14 5/18 5/25 6/1 6/15 7/6 7/12 7/22 7/28 8/4 8/11 8/18 8/23 8/27   Total WeeklyDose  22.5mg 25mg 25mg 25mg 25mg 25mg 25mg 25mg 25mg 25mg 27.5mg 27.5mg 27.5mg   INR 3.5 1.9 2.3 2.3 2.8 2.7 2.1 2.2 1.9 2.0 1.8 2.3 2.2 1.8   Notes [place] Cards 1x hold; incr GLV     HM  incr GLV  no GLV incr GLV cefdinir cefdinir     Date 9/1 9/8 9/15 9/22 9/29 10/6 10/13 10/20 10/27 11/3 11/10 11/17 11/24 12/1   Total WeeklyDose 27.5mg 27.5mg 27.5mg 27.5mg 27.5mg 27.5mg 27.5mg 30mg 27.5mg 27.5mg 27.5mg 25mg 27.5mg 27.5mg   INR 2.1 2.3 3.3 2.3 2.2 2.0 1.9 2.6 3.2 2.4 3.4 1.6 2.4 2.3   Notes cefdinir  decr GLV?  keflex   1x incr dose   decr GLV? 1x decr dose;   incr GLV?       Date 12/9 12/15 12/22 12/29 1/5/22 1/12 1/20 1/26 1/31 2/3 2/7 2/10 2/16 2/23   Total WeeklyDose 27.5mg 27.5mg 27.5mg 27.5mg 27.5mg 27.5mg 27.5mg 27.5mg 27.5mg 25mg 25mg 22.5mg 27.5mg 27.5mg   INR 2.5 2.4 2.7 2.5 2.6 2.2 2.5 2.1 3.8 2.6 4.1 2.5 2.7 3.5   Notes call    call   COVID+ dex / azith 1x hold pred 20 BID x5d  1x hold; 1x decr dose;   call call call     Date 3/2 3/9 3/16 3/23 3/30 4/5 4/13 4/20 4/27 5/4 5/11 5/18 5/25 6/1   Total WeeklyDose 25mg 27.5mg 27.5mg 27.5mg 27.5mg 27.5mg 27.5 mg 27.5mg 27.5 mg 27.5 mg 27.5 mg 27.5 mg 27.5 mg 27.5mg   INR 2.5 2.9 2.2 2.8 2.7 2.4 2.5 2.3 2.5 2.8 3.1 2.6 2.5 2.3   Notes 1x decr  call call    call    call Call  apap rec 5/19       Date 6/8 6/15 6/22 6/29 7/6 7/14 7/20  7/27 8/3 8/10 8/17 8/25 8/31 9/7   Total Weekly Dose 27.5 mg 27.5 mg 27.5 mg 27.5 mg 27.5 mg 27.5 mg 27.5 mg 30 mg 27.5 mg 27.5mg 27.5 mg 27.5 mg 27.5 mg 27.5 mg   INR 2.5 2.5 2.7 2.5 2.9 2.4 1.8 2.7 2.8 2.8 2.8 3.1 2.6 3.5   Notes call    call  call 12/71x boost   call Call  Dec GLV  call     Date 9/14 9/21 9/28 10/5 10/12 10/19 10/26 11/2 11/9 11/16 11/23 11/30 12/7   Total WeeklyDose 27.5mg 25 mg 25 mg 25 mg 25 mg  25 mg 25 mg 25 mg 25 mg 25 mg 25 mg 25 mg 25 mg   INR 3.1 2.9 2.5 2.9 2.2 2.4 2.3 2.1 2.3 2.3 2.1 2.2 1.9   Notes 1x hold inc GLV call   Call     call    Call  INC GLV     Date 12/14 12/22 12/28 1/4 1/11 1/18/23         Total WeeklyDose 27.5 mg 25 mg  20 mg 27.5 mg 27.5 mg 27.5 mg         INR 2.8 2.1 1.9 1.9 1.8 2.1         Notes 1x boost  rec 12/15 Call  Call 1x miss call call Call            Phone Interview:  Tablet Strength: 5 mg (1/5/22)  Patient Contact Info: [phone] (home) preferred; [phone] (cell)  Estimated OOP cost: [please send to registration if not already done]  Verbal Release Auth: signed 5/25/21 -- May speak w/Femi Ngo, ; May leave voicemail on home phone  Lab Contact Info: [facility]  *Will call once monthly or if INR out of range*       Patient Findings  Negatives:  Signs/symptoms of thrombosis, Signs/symptoms of bleeding, Laboratory test error suspected, Change in health, Change in alcohol use, Change in activity, Upcoming invasive procedure, Emergency department visit, Upcoming dental procedure, Missed doses, Extra doses, Change in medications, Change in diet/appetite, Hospital admission, Bruising, Other complaints   Comments:  All findings negative per pt.        Plan:  1. INR is therapeutic today at 2.1 (goal 2.0-3.0) Instructed Ms. Ngo to BOOST tonights warfarin dose to 5 mg then continue warfarin 2.5 mg oral daily except warfarin 5 mg on ThurSunTue until recheck.  2. Repeat INR in one week, 1/25/23.  3. Marianela Ngo understands the importance  of calling the [facility] if she notices any s/sx of bleeding, stroke, or abnormal bruising, if any changes are made to her medications or medication doses (Rx, OTC, herbal), or if any upcoming procedures are scheduled. Marianela Ngo will likewise let us know if any other changes, questions, or concerns arise regarding anticoagulation therapy. she understands the importance of seeking medical attention immediately if she experiences any falls, vehicle accidents, or abnormal bleeding or bruising. Marianela Ngo voiced understanding of this information and confirms that she has the [facility]'s contact information. Otherwise, we will plan to contact the patient once monthly or if her INR is out of range.    Chicho Shine, Pharmacy Technician  1/18/2023  15:45 EST    Continuing 27.5 mg/week.   I, Saravanan Greenberg, PharmD, have reviewed the note in full and agree with the assessment and plan.  01/19/23  09:02 EST

## 2023-01-25 ENCOUNTER — ANTICOAGULATION VISIT (OUTPATIENT)
Dept: PHARMACY | Facility: HOSPITAL | Age: 78
End: 2023-01-25
Payer: MEDICARE

## 2023-01-25 DIAGNOSIS — I48.0 PAROXYSMAL ATRIAL FIBRILLATION: Primary | ICD-10-CM

## 2023-01-25 LAB — INR PPP: 2.2

## 2023-01-25 NOTE — PROGRESS NOTES
Anticoagulation Clinic - Remote Progress Note  mdINR Home Monitor  Testing frequency: weekly    Indication: paroxysmal afib  Referring Provider: Susan [next 4/19/23]  Initial Warfarin Start Date: 2020? 2019?  Goal INR: 2.0-3.0  Current Drug Interactions:    [id number]: 4 (Age ?74, Sex, HTN) [estimated 5/20/21]  Bleed Risk: self reports negative history for GI bleed  Other: DOAC too expensive    Diet: green beans, peas, zucchini, or priyanka salads 2-3x/week (7/6/22)  Alcohol: none  Tobacco: none  OTC Pain Medication: APAP only    INR History:  Date 5/14 5/18 5/25 6/1 6/15 7/6 7/12 7/22 7/28 8/4 8/11 8/18 8/23 8/27   Total WeeklyDose  22.5mg 25mg 25mg 25mg 25mg 25mg 25mg 25mg 25mg 25mg 27.5mg 27.5mg 27.5mg   INR 3.5 1.9 2.3 2.3 2.8 2.7 2.1 2.2 1.9 2.0 1.8 2.3 2.2 1.8   Notes [place] Cards 1x hold; incr GLV     HM  incr GLV  no GLV incr GLV cefdinir cefdinir     Date 9/1 9/8 9/15 9/22 9/29 10/6 10/13 10/20 10/27 11/3 11/10 11/17 11/24 12/1   Total WeeklyDose 27.5mg 27.5mg 27.5mg 27.5mg 27.5mg 27.5mg 27.5mg 30mg 27.5mg 27.5mg 27.5mg 25mg 27.5mg 27.5mg   INR 2.1 2.3 3.3 2.3 2.2 2.0 1.9 2.6 3.2 2.4 3.4 1.6 2.4 2.3   Notes cefdinir  decr GLV?  keflex   1x incr dose   decr GLV? 1x decr dose;   incr GLV?       Date 12/9 12/15 12/22 12/29 1/5/22 1/12 1/20 1/26 1/31 2/3 2/7 2/10 2/16 2/23   Total WeeklyDose 27.5mg 27.5mg 27.5mg 27.5mg 27.5mg 27.5mg 27.5mg 27.5mg 27.5mg 25mg 25mg 22.5mg 27.5mg 27.5mg   INR 2.5 2.4 2.7 2.5 2.6 2.2 2.5 2.1 3.8 2.6 4.1 2.5 2.7 3.5   Notes call    call   COVID+ dex / azith 1x hold pred 20 BID x5d  1x hold; 1x decr dose;   call call call     Date 3/2 3/9 3/16 3/23 3/30 4/5 4/13 4/20 4/27 5/4 5/11 5/18 5/25 6/1   Total WeeklyDose 25mg 27.5mg 27.5mg 27.5mg 27.5mg 27.5mg 27.5 mg 27.5mg 27.5 mg 27.5 mg 27.5 mg 27.5 mg 27.5 mg 27.5mg   INR 2.5 2.9 2.2 2.8 2.7 2.4 2.5 2.3 2.5 2.8 3.1 2.6 2.5 2.3   Notes 1x decr  call call    call    call Call  apap rec 5/19       Date 6/8 6/15 6/22 6/29 7/6 7/14 7/20  7/27 8/3 8/10 8/17 8/25 8/31 9/7   Total Weekly Dose 27.5 mg 27.5 mg 27.5 mg 27.5 mg 27.5 mg 27.5 mg 27.5 mg 30 mg 27.5 mg 27.5mg 27.5 mg 27.5 mg 27.5 mg 27.5 mg   INR 2.5 2.5 2.7 2.5 2.9 2.4 1.8 2.7 2.8 2.8 2.8 3.1 2.6 3.5   Notes call    call  call 12/71x boost   call Call  Dec GLV  call     Date 9/14 9/21 9/28 10/5 10/12 10/19 10/26 11/2 11/9 11/16 11/23 11/30 12/7   Total WeeklyDose 27.5mg 25 mg 25 mg 25 mg 25 mg  25 mg 25 mg 25 mg 25 mg 25 mg 25 mg 25 mg 25 mg   INR 3.1 2.9 2.5 2.9 2.2 2.4 2.3 2.1 2.3 2.3 2.1 2.2 1.9   Notes 1x hold inc GLV call   Call     call    Call  INC GLV     Date 12/14 12/22 12/28 1/4/23 1/11 1/18 1/25        Total WeeklyDose 27.5 mg 25 mg  20 mg 27.5 mg 27.5 mg 27.5 mg 27.5        INR 2.8 2.1 1.9 1.9 1.8 2.1 2.2        Notes 1x boost  rec 12/15 Call  Call 1x miss call call Call  Call           Phone Interview:  Tablet Strength: 5 mg (1/5/22)  Patient Contact Info: [phone] (home) preferred; [phone] (cell)  Estimated OOP cost: [please send to registration if not already done]  Verbal Release Auth: signed 5/25/21 -- May speak w/Femi Ngo, ; May leave voicemail on home phone  Lab Contact Info: [facility]  *Will call once monthly or if INR out of range*             Plan:    1. INR is therapeutic today at 2.2 (goal 2.0-3.0) Ms. Ngo's weekly dose has been boosted the last several weeks, will increase weekly dose to 27.5mg (10% inc). Instructed Ms. Ngo to CHANGE weekly regimen to warfarin 5mg every day except warfarin 2.5mg MonWedFri until recheck.   2. Repeat INR in one week, 1/25/23.  3. Marianela Ngo understands the importance of calling the [facility] if she notices any s/sx of bleeding, stroke, or abnormal bruising, if any changes are made to her medications or medication doses (Rx, OTC, herbal), or if any upcoming procedures are scheduled. Marianela Ngo will likewise let us know if any other changes, questions, or concerns arise  regarding anticoagulation therapy. she understands the importance of seeking medical attention immediately if she experiences any falls, vehicle accidents, or abnormal bleeding or bruising. Marianela Ngo voiced understanding of this information and confirms that she has the [facility]'s contact information. Otherwise, we will plan to contact the patient once monthly or if her INR is out of range.      Sudha Horton, PharmD  Pharmacy Resident  01/25/23  12:08 EST

## 2023-02-01 ENCOUNTER — ANTICOAGULATION VISIT (OUTPATIENT)
Dept: PHARMACY | Facility: HOSPITAL | Age: 78
End: 2023-02-01
Payer: MEDICARE

## 2023-02-01 DIAGNOSIS — I48.0 PAROXYSMAL ATRIAL FIBRILLATION: Primary | ICD-10-CM

## 2023-02-01 LAB — INR PPP: 2

## 2023-02-01 NOTE — PROGRESS NOTES
Anticoagulation Clinic - Remote Progress Note  mdINR Home Monitor  Testing frequency: weekly    Indication: paroxysmal afib  Referring Provider: Susan [next 4/19/23]  Initial Warfarin Start Date: 2020? 2019?  Goal INR: 2.0-3.0  Current Drug Interactions:    [id number]: 4 (Age ?74, Sex, HTN) [estimated 5/20/21]  Bleed Risk: self reports negative history for GI bleed  Other: DOAC too expensive    Diet: green beans, peas, zucchini, or priyanka salads 2-3x/week (7/6/22)  Alcohol: none  Tobacco: none  OTC Pain Medication: APAP only    INR History:  Date 5/14 5/18 5/25 6/1 6/15 7/6 7/12 7/22 7/28 8/4 8/11 8/18 8/23 8/27   Total WeeklyDose  22.5mg 25mg 25mg 25mg 25mg 25mg 25mg 25mg 25mg 25mg 27.5mg 27.5mg 27.5mg   INR 3.5 1.9 2.3 2.3 2.8 2.7 2.1 2.2 1.9 2.0 1.8 2.3 2.2 1.8   Notes [place] Cards 1x hold; incr GLV     HM  incr GLV  no GLV incr GLV cefdinir cefdinir     Date 9/1 9/8 9/15 9/22 9/29 10/6 10/13 10/20 10/27 11/3 11/10 11/17 11/24 12/1   Total WeeklyDose 27.5mg 27.5mg 27.5mg 27.5mg 27.5mg 27.5mg 27.5mg 30mg 27.5mg 27.5mg 27.5mg 25mg 27.5mg 27.5mg   INR 2.1 2.3 3.3 2.3 2.2 2.0 1.9 2.6 3.2 2.4 3.4 1.6 2.4 2.3   Notes cefdinir  decr GLV?  keflex   1x incr dose   decr GLV? 1x decr dose;   incr GLV?       Date 12/9 12/15 12/22 12/29 1/5/22 1/12 1/20 1/26 1/31 2/3 2/7 2/10 2/16 2/23   Total WeeklyDose 27.5mg 27.5mg 27.5mg 27.5mg 27.5mg 27.5mg 27.5mg 27.5mg 27.5mg 25mg 25mg 22.5mg 27.5mg 27.5mg   INR 2.5 2.4 2.7 2.5 2.6 2.2 2.5 2.1 3.8 2.6 4.1 2.5 2.7 3.5   Notes call    call   COVID+ dex / azith 1x hold pred 20 BID x5d  1x hold; 1x decr dose;   call call call     Date 3/2 3/9 3/16 3/23 3/30 4/5 4/13 4/20 4/27 5/4 5/11 5/18 5/25 6/1   Total WeeklyDose 25mg 27.5mg 27.5mg 27.5mg 27.5mg 27.5mg 27.5 mg 27.5mg 27.5 mg 27.5 mg 27.5 mg 27.5 mg 27.5 mg 27.5mg   INR 2.5 2.9 2.2 2.8 2.7 2.4 2.5 2.3 2.5 2.8 3.1 2.6 2.5 2.3   Notes 1x decr  call call    call    call Call  apap rec 5/19       Date 6/8 6/15 6/22 6/29 7/6 7/14 7/20  7/27 8/3 8/10 8/17 8/25 8/31 9/7   Total Weekly Dose 27.5 mg 27.5 mg 27.5 mg 27.5 mg 27.5 mg 27.5 mg 27.5 mg 30 mg 27.5 mg 27.5mg 27.5 mg 27.5 mg 27.5 mg 27.5 mg   INR 2.5 2.5 2.7 2.5 2.9 2.4 1.8 2.7 2.8 2.8 2.8 3.1 2.6 3.5   Notes call    call  call 12/71x boost   call Call  Dec GLV  call     Date 9/14 9/21 9/28 10/5 10/12 10/19 10/26 11/2 11/9 11/16 11/23 11/30 12/7   Total WeeklyDose 27.5mg 25 mg 25 mg 25 mg 25 mg  25 mg 25 mg 25 mg 25 mg 25 mg 25 mg 25 mg 25 mg   INR 3.1 2.9 2.5 2.9 2.2 2.4 2.3 2.1 2.3 2.3 2.1 2.2 1.9   Notes 1x hold inc GLV call   Call     call    Call  INC GLV     Date 12/14 12/22 12/28 1/4/23 1/11 1/18 1/25 2/1       Total WeeklyDose 27.5 mg 25 mg  20 mg 27.5 mg 27.5 mg 27.5 mg 27.5 27.5       INR 2.8 2.1 1.9 1.9 1.8 2.1 2.2 2.0       Notes 1x boost  rec 12/15 Call  Call 1x miss call call Call  Call           Phone Interview:  Tablet Strength: 5 mg (1/5/22)  Patient Contact Info: [phone] (home) preferred; [phone] (cell)  Estimated OOP cost: [please send to registration if not already done]  Verbal Release Auth: signed 5/25/21 -- May speak w/Femi Ngo, ; May leave voicemail on home phone  Lab Contact Info: [facility]  *Will call once monthly or if INR out of range*     Patient Findings  Comments:  Patient not contacted at this encounter.      Plan:  1. INR is therapeutic today at 2.0 (goal 2.0-3.0) Ms. Calvin will continue warfarin 5mg oral daily except warfarin 2.5mg MonWedFri until recheck.   2. Repeat INR in one week, 2/8/23.  3. Marianela Huber understands the importance of calling the [facility] if she notices any s/sx of bleeding, stroke, or abnormal bruising, if any changes are made to her medications or medication doses (Rx, OTC, herbal), or if any upcoming procedures are scheduled. Marianela Ngo will likewise let us know if any other changes, questions, or concerns arise regarding anticoagulation therapy. she understands the importance  of seeking medical attention immediately if she experiences any falls, vehicle accidents, or abnormal bleeding or bruising. Marianela Ngo voiced understanding of this information and confirms that she has the [facility]'s contact information. Otherwise, we will plan to contact the patient once monthly or if her INR is out of range.    Scooby Garcia, Daljit  2/1/2023  13:40 JOSE ANTONIO HAQUE, Radha Orlando, PharmD, have reviewed the note in full and agree with the assessment and plan.  02/01/23  14:12 EST

## 2023-02-08 ENCOUNTER — ANTICOAGULATION VISIT (OUTPATIENT)
Dept: PHARMACY | Facility: HOSPITAL | Age: 78
End: 2023-02-08
Payer: MEDICARE

## 2023-02-08 DIAGNOSIS — I48.0 PAROXYSMAL ATRIAL FIBRILLATION: Primary | ICD-10-CM

## 2023-02-08 LAB — INR PPP: 2.7

## 2023-02-08 NOTE — PROGRESS NOTES
Anticoagulation Clinic - Remote Progress Note  mdINR Home Monitor  Testing frequency: weekly    Indication: paroxysmal afib  Referring Provider: Susan [next 4/19/23]  Initial Warfarin Start Date: 2020? 2019?  Goal INR: 2.0-3.0  Current Drug Interactions:    [id number]: 4 (Age ?74, Sex, HTN) [estimated 5/20/21]  Bleed Risk: self reports negative history for GI bleed  Other: DOAC too expensive    Diet: green beans, peas, zucchini, or priyanka salads 2-3x/week (7/6/22)  Alcohol: none  Tobacco: none  OTC Pain Medication: APAP only    INR History:  Date 5/14 5/18 5/25 6/1 6/15 7/6 7/12 7/22 7/28 8/4 8/11 8/18 8/23 8/27   Total WeeklyDose  22.5mg 25mg 25mg 25mg 25mg 25mg 25mg 25mg 25mg 25mg 27.5mg 27.5mg 27.5mg   INR 3.5 1.9 2.3 2.3 2.8 2.7 2.1 2.2 1.9 2.0 1.8 2.3 2.2 1.8   Notes [place] Cards 1x hold; incr GLV     HM  incr GLV  no GLV incr GLV cefdinir cefdinir     Date 9/1 9/8 9/15 9/22 9/29 10/6 10/13 10/20 10/27 11/3 11/10 11/17 11/24 12/1   Total WeeklyDose 27.5mg 27.5mg 27.5mg 27.5mg 27.5mg 27.5mg 27.5mg 30mg 27.5mg 27.5mg 27.5mg 25mg 27.5mg 27.5mg   INR 2.1 2.3 3.3 2.3 2.2 2.0 1.9 2.6 3.2 2.4 3.4 1.6 2.4 2.3   Notes cefdinir  decr GLV?  keflex   1x incr dose   decr GLV? 1x decr dose;   incr GLV?       Date 12/9 12/15 12/22 12/29 1/5/22 1/12 1/20 1/26 1/31 2/3 2/7 2/10 2/16 2/23   Total WeeklyDose 27.5mg 27.5mg 27.5mg 27.5mg 27.5mg 27.5mg 27.5mg 27.5mg 27.5mg 25mg 25mg 22.5mg 27.5mg 27.5mg   INR 2.5 2.4 2.7 2.5 2.6 2.2 2.5 2.1 3.8 2.6 4.1 2.5 2.7 3.5   Notes call    call   COVID+ dex / azith 1x hold pred 20 BID x5d  1x hold; 1x decr dose;   call call call     Date 3/2 3/9 3/16 3/23 3/30 4/5 4/13 4/20 4/27 5/4 5/11 5/18 5/25 6/1   Total WeeklyDose 25mg 27.5mg 27.5mg 27.5mg 27.5mg 27.5mg 27.5 mg 27.5mg 27.5 mg 27.5 mg 27.5 mg 27.5 mg 27.5 mg 27.5mg   INR 2.5 2.9 2.2 2.8 2.7 2.4 2.5 2.3 2.5 2.8 3.1 2.6 2.5 2.3   Notes 1x decr  call call    call    call Call  apap rec 5/19       Date 6/8 6/15 6/22 6/29 7/6 7/14 7/20  7/27 8/3 8/10 8/17 8/25 8/31 9/7   Total Weekly Dose 27.5 mg 27.5 mg 27.5 mg 27.5 mg 27.5 mg 27.5 mg 27.5 mg 30 mg 27.5 mg 27.5mg 27.5 mg 27.5 mg 27.5 mg 27.5 mg   INR 2.5 2.5 2.7 2.5 2.9 2.4 1.8 2.7 2.8 2.8 2.8 3.1 2.6 3.5   Notes call    call  call 12/71x boost   call Call  Dec GLV  call     Date 9/14 9/21 9/28 10/5 10/12 10/19 10/26 11/2 11/9 11/16 11/23 11/30 12/7   Total WeeklyDose 27.5mg 25 mg 25 mg 25 mg 25 mg  25 mg 25 mg 25 mg 25 mg 25 mg 25 mg 25 mg 25 mg   INR 3.1 2.9 2.5 2.9 2.2 2.4 2.3 2.1 2.3 2.3 2.1 2.2 1.9   Notes 1x hold inc GLV call   Call     call    Call  INC GLV     Date 12/14 12/22 12/28 1/4/23 1/11 1/18 1/25 2/1 2/8      Total WeeklyDose 27.5 mg 25 mg  20 mg 27.5 mg 27.5 mg 27.5 mg 27.5 mg 27.5 mg 27.5 mg      INR 2.8 2.1 1.9 1.9 1.8 2.1 2.2 2.0 2.7      Notes 1x boost  rec 12/15 Call  Call 1x miss call call Call  Call           Phone Interview:  Tablet Strength: 5 mg (1/5/22)  Patient Contact Info: [phone] (home) preferred; [phone] (cell)  Estimated OOP cost: [please send to registration if not already done]  Verbal Release Auth: signed 5/25/21 -- May speak w/Femi Ngo, ; May leave voicemail on home phone  Lab Contact Info: [facility]  *Will call once monthly or if INR out of range*     Patient Findings  Comments:  Patient not contacted at this encounter.      Plan:  1. INR is therapeutic today at 2.7 (goal 2.0-3.0) Ms. Hunters will continue warfarin 5mg oral daily except warfarin 2.5mg MonWedFri until recheck.   2. Repeat INR in one week, 2/15/23.  3. Marianela Ngo understands the importance of calling the [facility] if she notices any s/sx of bleeding, stroke, or abnormal bruising, if any changes are made to her medications or medication doses (Rx, OTC, herbal), or if any upcoming procedures are scheduled. Marianela Ngo will likewise let us know if any other changes, questions, or concerns arise regarding anticoagulation therapy. she  understands the importance of seeking medical attention immediately if she experiences any falls, vehicle accidents, or abnormal bleeding or bruising. Marianela Ngo voiced understanding of this information and confirms that she has the [facility]'s contact information. Otherwise, we will plan to contact the patient once monthly or if her INR is out of range.    Scooby Garcia CPhT  2/8/2023  16:15 EST       [place], Montana Hernandez, [facility], have reviewed the note in full and agree with the assessment and plan.  02/09/23  10:08 EST

## 2023-02-16 ENCOUNTER — ANTICOAGULATION VISIT (OUTPATIENT)
Dept: PHARMACY | Facility: HOSPITAL | Age: 78
End: 2023-02-16
Payer: MEDICARE

## 2023-02-16 DIAGNOSIS — I48.0 PAROXYSMAL ATRIAL FIBRILLATION: Primary | ICD-10-CM

## 2023-02-16 LAB — INR PPP: 2.5

## 2023-02-16 NOTE — PROGRESS NOTES
Anticoagulation Clinic - Remote Progress Note  mdINR Home Monitor  Testing frequency: weekly    Indication: paroxysmal afib  Referring Provider: Susan [next 4/19/23]  Initial Warfarin Start Date: 2020? 2019?  Goal INR: 2.0-3.0  Current Drug Interactions:    [id number]: 4 (Age ?74, Sex, HTN) [estimated 5/20/21]  Bleed Risk: self reports negative history for GI bleed  Other: DOAC too expensive    Diet: green beans, peas, zucchini, or priyanka salads 2-3x/week (7/6/22)  Alcohol: none  Tobacco: none  OTC Pain Medication: APAP only    INR History:  Date 5/14 5/18 5/25 6/1 6/15 7/6 7/12 7/22 7/28 8/4 8/11 8/18 8/23 8/27   Total WeeklyDose  22.5mg 25mg 25mg 25mg 25mg 25mg 25mg 25mg 25mg 25mg 27.5mg 27.5mg 27.5mg   INR 3.5 1.9 2.3 2.3 2.8 2.7 2.1 2.2 1.9 2.0 1.8 2.3 2.2 1.8   Notes [place] Cards 1x hold; incr GLV     HM  incr GLV  no GLV incr GLV cefdinir cefdinir     Date 9/1 9/8 9/15 9/22 9/29 10/6 10/13 10/20 10/27 11/3 11/10 11/17 11/24 12/1   Total WeeklyDose 27.5mg 27.5mg 27.5mg 27.5mg 27.5mg 27.5mg 27.5mg 30mg 27.5mg 27.5mg 27.5mg 25mg 27.5mg 27.5mg   INR 2.1 2.3 3.3 2.3 2.2 2.0 1.9 2.6 3.2 2.4 3.4 1.6 2.4 2.3   Notes cefdinir  decr GLV?  keflex   1x incr dose   decr GLV? 1x decr dose;   incr GLV?       Date 12/9 12/15 12/22 12/29 1/5/22 1/12 1/20 1/26 1/31 2/3 2/7 2/10 2/16 2/23   Total WeeklyDose 27.5mg 27.5mg 27.5mg 27.5mg 27.5mg 27.5mg 27.5mg 27.5mg 27.5mg 25mg 25mg 22.5mg 27.5mg 27.5mg   INR 2.5 2.4 2.7 2.5 2.6 2.2 2.5 2.1 3.8 2.6 4.1 2.5 2.7 3.5   Notes call    call   COVID+ dex / azith 1x hold pred 20 BID x5d  1x hold; 1x decr dose;   call call call     Date 3/2 3/9 3/16 3/23 3/30 4/5 4/13 4/20 4/27 5/4 5/11 5/18 5/25 6/1   Total WeeklyDose 25mg 27.5mg 27.5mg 27.5mg 27.5mg 27.5mg 27.5 mg 27.5mg 27.5 mg 27.5 mg 27.5 mg 27.5 mg 27.5 mg 27.5mg   INR 2.5 2.9 2.2 2.8 2.7 2.4 2.5 2.3 2.5 2.8 3.1 2.6 2.5 2.3   Notes 1x decr  call call    call    call Call  apap rec 5/19       Date 6/8 6/15 6/22 6/29 7/6 7/14 7/20  7/27 8/3 8/10 8/17 8/25 8/31 9/7   Total Weekly Dose 27.5 mg 27.5 mg 27.5 mg 27.5 mg 27.5 mg 27.5 mg 27.5 mg 30 mg 27.5 mg 27.5mg 27.5 mg 27.5 mg 27.5 mg 27.5 mg   INR 2.5 2.5 2.7 2.5 2.9 2.4 1.8 2.7 2.8 2.8 2.8 3.1 2.6 3.5   Notes call    call  call 12/71x boost   call Call  Dec GLV  call     Date 9/14 9/21 9/28 10/5 10/12 10/19 10/26 11/2 11/9 11/16 11/23 11/30 12/7   Total WeeklyDose 27.5mg 25 mg 25 mg 25 mg 25 mg  25 mg 25 mg 25 mg 25 mg 25 mg 25 mg 25 mg 25 mg   INR 3.1 2.9 2.5 2.9 2.2 2.4 2.3 2.1 2.3 2.3 2.1 2.2 1.9   Notes 1x hold inc GLV call   Call     call    Call  INC GLV     Date 12/14 12/22 12/28 1/4/23 1/11 1/18 1/25 2/1 2/8 2/16/23     Total WeeklyDose 27.5 mg 25 mg  20 mg 27.5 mg 27.5 mg 27.5 mg 27.5 mg 27.5 mg 27.5 mg 27.5 mg     INR 2.8 2.1 1.9 1.9 1.8 2.1 2.2 2.0 2.7 2.5     Notes 1x boost  rec 12/15 Call  Call 1x miss call call Call  Call    Call        Phone Interview:  Tablet Strength: 5 mg (1/5/22)  Patient Contact Info: [phone] (home) preferred; [phone] (cell)  Estimated OOP cost: [please send to registration if not already done]  Verbal Release Auth: signed 5/25/21 -- May speak w/Femi Ngo, ; May leave voicemail on home phone  Lab Contact Info: [facility]  *Will call once monthly or if INR out of range*       Patient Findings  Negatives:  Signs/symptoms of thrombosis, Signs/symptoms of bleeding, Laboratory test error suspected, Change in health, Change in alcohol use, Change in activity, Upcoming invasive procedure, Emergency department visit, Upcoming dental procedure, Missed doses, Extra doses, Change in medications, Change in diet/appetite, Hospital admission, Bruising, Other complaints   Comments:  All findings negative per patient        Plan:  1. INR is therapeutic yesterday at 2.5 (goal 2.0-3.0) instructed Ms. Calvin to continue warfarin 5mg oral daily except warfarin 2.5mg MonWedFri until recheck.   2. Repeat INR next week, 2/22/23.  3. Marianela Ngo  understands the importance of calling the [facility] if she notices any s/sx of bleeding, stroke, or abnormal bruising, if any changes are made to her medications or medication doses (Rx, OTC, herbal), or if any upcoming procedures are scheduled. Marianela Ngo will likewise let us know if any other changes, questions, or concerns arise regarding anticoagulation therapy. she understands the importance of seeking medical attention immediately if she experiences any falls, vehicle accidents, or abnormal bleeding or bruising. Marianela Ngo voiced understanding of this information and confirms that she has the [facility]'s contact information. Otherwise, we will plan to contact the patient once monthly or if her INR is out of range.      Chicho Shine, Pharmacy Technician  2/16/2023  16:01 [place] MORAES, [facility], have reviewed the note in full and agree with the assessment and plan.  02/17/23  08:18 EST

## 2023-02-22 ENCOUNTER — ANTICOAGULATION VISIT (OUTPATIENT)
Dept: PHARMACY | Facility: HOSPITAL | Age: 78
End: 2023-02-22
Payer: MEDICARE

## 2023-02-22 DIAGNOSIS — I48.0 PAROXYSMAL ATRIAL FIBRILLATION: Primary | ICD-10-CM

## 2023-02-22 LAB
INR PPP: 2.6
INR PPP: 2.6

## 2023-02-22 NOTE — PROGRESS NOTES
Anticoagulation Clinic - Remote Progress Note  mdINR Home Monitor  Testing frequency: weekly    Indication: paroxysmal afib  Referring Provider: Susan [next 4/19/23]  Initial Warfarin Start Date: 2020? 2019?  Goal INR: 2.0-3.0  Current Drug Interactions:    [id number]: 4 (Age ?74, Sex, HTN) [estimated 5/20/21]  Bleed Risk: self reports negative history for GI bleed  Other: DOAC too expensive    Diet: green beans, peas, zucchini, or priyanka salads 2-3x/week (7/6/22)  Alcohol: none  Tobacco: none  OTC Pain Medication: APAP only    INR History:  Date 5/14 5/18 5/25 6/1 6/15 7/6 7/12 7/22 7/28 8/4 8/11 8/18 8/23 8/27   Total WeeklyDose  22.5mg 25mg 25mg 25mg 25mg 25mg 25mg 25mg 25mg 25mg 27.5mg 27.5mg 27.5mg   INR 3.5 1.9 2.3 2.3 2.8 2.7 2.1 2.2 1.9 2.0 1.8 2.3 2.2 1.8   Notes [place] Cards 1x hold; incr GLV     HM  incr GLV  no GLV incr GLV cefdinir cefdinir     Date 9/1 9/8 9/15 9/22 9/29 10/6 10/13 10/20 10/27 11/3 11/10 11/17 11/24 12/1   Total WeeklyDose 27.5mg 27.5mg 27.5mg 27.5mg 27.5mg 27.5mg 27.5mg 30mg 27.5mg 27.5mg 27.5mg 25mg 27.5mg 27.5mg   INR 2.1 2.3 3.3 2.3 2.2 2.0 1.9 2.6 3.2 2.4 3.4 1.6 2.4 2.3   Notes cefdinir  decr GLV?  keflex   1x incr dose   decr GLV? 1x decr dose;   incr GLV?       Date 12/9 12/15 12/22 12/29 1/5/22 1/12 1/20 1/26 1/31 2/3 2/7 2/10 2/16 2/23   Total WeeklyDose 27.5mg 27.5mg 27.5mg 27.5mg 27.5mg 27.5mg 27.5mg 27.5mg 27.5mg 25mg 25mg 22.5mg 27.5mg 27.5mg   INR 2.5 2.4 2.7 2.5 2.6 2.2 2.5 2.1 3.8 2.6 4.1 2.5 2.7 3.5   Notes call    call   COVID+ dex / azith 1x hold pred 20 BID x5d  1x hold; 1x decr dose;   call call call     Date 3/2 3/9 3/16 3/23 3/30 4/5 4/13 4/20 4/27 5/4 5/11 5/18 5/25 6/1   Total WeeklyDose 25mg 27.5mg 27.5mg 27.5mg 27.5mg 27.5mg 27.5 mg 27.5mg 27.5 mg 27.5 mg 27.5 mg 27.5 mg 27.5 mg 27.5mg   INR 2.5 2.9 2.2 2.8 2.7 2.4 2.5 2.3 2.5 2.8 3.1 2.6 2.5 2.3   Notes 1x decr  call call    call    call Call  apap rec 5/19       Date 6/8 6/15 6/22 6/29 7/6 7/14 7/20  7/27 8/3 8/10 8/17 8/25 8/31 9/7   Total Weekly Dose 27.5 mg 27.5 mg 27.5 mg 27.5 mg 27.5 mg 27.5 mg 27.5 mg 30 mg 27.5 mg 27.5mg 27.5 mg 27.5 mg 27.5 mg 27.5 mg   INR 2.5 2.5 2.7 2.5 2.9 2.4 1.8 2.7 2.8 2.8 2.8 3.1 2.6 3.5   Notes call    call  call 12/71x boost   call Call  Dec GLV  call     Date 9/14 9/21 9/28 10/5 10/12 10/19 10/26 11/2 11/9 11/16 11/23 11/30 12/7   Total WeeklyDose 27.5mg 25 mg 25 mg 25 mg 25 mg  25 mg 25 mg 25 mg 25 mg 25 mg 25 mg 25 mg 25 mg   INR 3.1 2.9 2.5 2.9 2.2 2.4 2.3 2.1 2.3 2.3 2.1 2.2 1.9   Notes 1x hold inc GLV call   Call     call    Call  INC GLV     Date 12/14 12/22 12/28 1/4/23 1/11 1/18 1/25 2/1 2/8 2/16/23 2/22    Total WeeklyDose 27.5 mg 25 mg  20 mg 27.5 mg 27.5 mg 27.5 mg 27.5 mg 27.5 mg 27.5 mg 27.5 mg 27.5mg    INR 2.8 2.1 1.9 1.9 1.8 2.1 2.2 2.0 2.7 2.5 2.6    Notes 1x boost  rec 12/15 Call  Call 1x miss call call Call  Call    Call        Phone Interview:  Tablet Strength: 5 mg (1/5/22)  Patient Contact Info: [phone] (home) preferred; [phone] (cell)  Estimated OOP cost: [please send to registration if not already done]  Verbal Release Auth: signed 5/25/21 -- May speak w/Femi Ngo, ; May leave voicemail on home phone  Lab Contact Info: [facility]  *Will call once monthly or if INR out of range*       Patient Findings  Negatives:  Not contacted   Comments:         Plan:  1. INR is therapeutic at 2.6 (goal 2.0-3.0) instructed Ms. Calvin to continue warfarin 5mg oral daily except warfarin 2.5mg MonWedFri until recheck.   2. Repeat INR next week, 3/1/23.  3. Marianela Ngo understands the importance of calling the [facility] if she notices any s/sx of bleeding, stroke, or abnormal bruising, if any changes are made to her medications or medication doses (Rx, OTC, herbal), or if any upcoming procedures are scheduled. Marianela Ngo will likewise let us know if any other changes, questions, or concerns arise regarding  anticoagulation therapy. she understands the importance of seeking medical attention immediately if she experiences any falls, vehicle accidents, or abnormal bleeding or bruising. Marianela Nog voiced understanding of this information and confirms that she has the [facility]'s contact information. Otherwise, we will plan to contact the patient once monthly or if her INR is out of range.      Divya Jaffe, InaD.  02/22/23   15:09 EST

## 2023-03-01 ENCOUNTER — ANTICOAGULATION VISIT (OUTPATIENT)
Dept: PHARMACY | Facility: HOSPITAL | Age: 78
End: 2023-03-01
Payer: MEDICARE

## 2023-03-01 DIAGNOSIS — I48.0 PAROXYSMAL ATRIAL FIBRILLATION: Primary | ICD-10-CM

## 2023-03-01 LAB — INR PPP: 2.8

## 2023-03-01 NOTE — PROGRESS NOTES
Anticoagulation Clinic - Remote Progress Note  mdINR Home Monitor  Testing frequency: weekly    Indication: paroxysmal afib  Referring Provider: Susan [next 4/19/23]  Initial Warfarin Start Date: 2020? 2019?  Goal INR: 2.0-3.0  Current Drug Interactions:    [id number]: 4 (Age ?74, Sex, HTN) [estimated 5/20/21]  Bleed Risk: self reports negative history for GI bleed  Other: DOAC too expensive    Diet: green beans, peas, zucchini, or priyanka salads 2-3x/week (7/6/22)  Alcohol: none  Tobacco: none  OTC Pain Medication: APAP only    INR History:  Date 5/14 5/18 5/25 6/1 6/15 7/6 7/12 7/22 7/28 8/4 8/11 8/18 8/23 8/27   Total WeeklyDose  22.5mg 25mg 25mg 25mg 25mg 25mg 25mg 25mg 25mg 25mg 27.5mg 27.5mg 27.5mg   INR 3.5 1.9 2.3 2.3 2.8 2.7 2.1 2.2 1.9 2.0 1.8 2.3 2.2 1.8   Notes [place] Cards 1x hold; incr GLV     HM  incr GLV  no GLV incr GLV cefdinir cefdinir     Date 9/1 9/8 9/15 9/22 9/29 10/6 10/13 10/20 10/27 11/3 11/10 11/17 11/24 12/1   Total WeeklyDose 27.5mg 27.5mg 27.5mg 27.5mg 27.5mg 27.5mg 27.5mg 30mg 27.5mg 27.5mg 27.5mg 25mg 27.5mg 27.5mg   INR 2.1 2.3 3.3 2.3 2.2 2.0 1.9 2.6 3.2 2.4 3.4 1.6 2.4 2.3   Notes cefdinir  decr GLV?  keflex   1x incr dose   decr GLV? 1x decr dose;   incr GLV?       Date 12/9 12/15 12/22 12/29 1/5/22 1/12 1/20 1/26 1/31 2/3 2/7 2/10 2/16 2/23   Total WeeklyDose 27.5mg 27.5mg 27.5mg 27.5mg 27.5mg 27.5mg 27.5mg 27.5mg 27.5mg 25mg 25mg 22.5mg 27.5mg 27.5mg   INR 2.5 2.4 2.7 2.5 2.6 2.2 2.5 2.1 3.8 2.6 4.1 2.5 2.7 3.5   Notes call    call   COVID+ dex / azith 1x hold pred 20 BID x5d  1x hold; 1x decr dose;   call call call     Date 3/2 3/9 3/16 3/23 3/30 4/5 4/13 4/20 4/27 5/4 5/11 5/18 5/25 6/1   Total WeeklyDose 25mg 27.5mg 27.5mg 27.5mg 27.5mg 27.5mg 27.5 mg 27.5mg 27.5 mg 27.5 mg 27.5 mg 27.5 mg 27.5 mg 27.5mg   INR 2.5 2.9 2.2 2.8 2.7 2.4 2.5 2.3 2.5 2.8 3.1 2.6 2.5 2.3   Notes 1x decr  call call    call    call Call  apap rec 5/19       Date 6/8 6/15 6/22 6/29 7/6 7/14 7/20  7/27 8/3 8/10 8/17 8/25 8/31 9/7   Total Weekly Dose 27.5 mg 27.5 mg 27.5 mg 27.5 mg 27.5 mg 27.5 mg 27.5 mg 30 mg 27.5 mg 27.5mg 27.5 mg 27.5 mg 27.5 mg 27.5 mg   INR 2.5 2.5 2.7 2.5 2.9 2.4 1.8 2.7 2.8 2.8 2.8 3.1 2.6 3.5   Notes call    call  call 12/71x boost   call Call  Dec GLV  call     Date 9/14 9/21 9/28 10/5 10/12 10/19 10/26 11/2 11/9 11/16 11/23 11/30 12/7   Total WeeklyDose 27.5mg 25 mg 25 mg 25 mg 25 mg  25 mg 25 mg 25 mg 25 mg 25 mg 25 mg 25 mg 25 mg   INR 3.1 2.9 2.5 2.9 2.2 2.4 2.3 2.1 2.3 2.3 2.1 2.2 1.9   Notes 1x hold inc GLV call   Call     call    Call  INC GLV     Date 12/14 12/22 12/28 1/4/23 1/11 1/18 1/25 2/1 2/8 2/16/23 2/22 3/1/23   Total WeeklyDose 27.5 mg 25 mg  20 mg 27.5 mg 27.5 mg 27.5 mg 27.5 mg 27.5 mg 27.5 mg 27.5 mg 27.5mg 27.5 mg    INR 2.8 2.1 1.9 1.9 1.8 2.1 2.2 2.0 2.7 2.5 2.6 2.8   Notes 1x boost  rec 12/15 Call  Call 1x miss call call Call  Call    Call        Phone Interview:  Tablet Strength: 5 mg (1/5/22)  Patient Contact Info: [phone] (home) preferred; [phone] (cell)  Estimated OOP cost: [please send to registration if not already done]  Verbal Release Auth: signed 5/25/21 -- May speak w/Femi Ngo, ; May leave voicemail on home phone  Lab Contact Info: [facility]  *Will call once monthly or if INR out of range*     Patient Findings  Comments:  Patient not contacted at this encounter      Plan:  1. INR is therapeutic at 2.8 (goal 2.0-3.0) Ms. Calvin will continue warfarin 5mg oral daily except warfarin 2.5mg MonWedFri until recheck.   2. Repeat INR next week, 3/8/23.  3. Marianela Ngo understands the importance of calling the [facility] if she notices any s/sx of bleeding, stroke, or abnormal bruising, if any changes are made to her medications or medication doses (Rx, OTC, herbal), or if any upcoming procedures are scheduled. Marianela Ngo will likewise let us know if any other changes, questions, or concerns arise  regarding anticoagulation therapy. she understands the importance of seeking medical attention immediately if she experiences any falls, vehicle accidents, or abnormal bleeding or bruising. Marianela Ngo voiced understanding of this information and confirms that she has the [facility]'s contact information. Otherwise, we will plan to contact the patient once monthly or if her INR is out of range.      Chicho Shine, Pharmacy Technician  3/1/2023  14:05 EST    I, Maryjane Amezquita, PharmD, have reviewed the note in full and agree with the assessment and plan.  03/03/23  11:38 EST

## 2023-03-09 ENCOUNTER — ANTICOAGULATION VISIT (OUTPATIENT)
Dept: PHARMACY | Facility: HOSPITAL | Age: 78
End: 2023-03-09
Payer: MEDICARE

## 2023-03-09 DIAGNOSIS — I48.0 PAROXYSMAL ATRIAL FIBRILLATION: Primary | ICD-10-CM

## 2023-03-09 LAB — INR PPP: 2.6

## 2023-03-09 NOTE — PROGRESS NOTES
Anticoagulation Clinic - Remote Progress Note  mdINR Home Monitor  Testing frequency: weekly    Indication: paroxysmal afib  Referring Provider: Susan [next 4/19/23]  Initial Warfarin Start Date: 2020? 2019?  Goal INR: 2.0-3.0  Current Drug Interactions:    [id number]: 4 (Age ?74, Sex, HTN) [estimated 5/20/21]  Bleed Risk: self reports negative history for GI bleed  Other: DOAC too expensive    Diet: green beans, peas, zucchini, or priyanka salads 2-3x/week (7/6/22)  Alcohol: none  Tobacco: none  OTC Pain Medication: APAP only    INR History:  Date 5/14 5/18 5/25 6/1 6/15 7/6 7/12 7/22 7/28 8/4 8/11 8/18 8/23 8/27   Total WeeklyDose  22.5mg 25mg 25mg 25mg 25mg 25mg 25mg 25mg 25mg 25mg 27.5mg 27.5mg 27.5mg   INR 3.5 1.9 2.3 2.3 2.8 2.7 2.1 2.2 1.9 2.0 1.8 2.3 2.2 1.8   Notes [place] Cards 1x hold; incr GLV     HM  incr GLV  no GLV incr GLV cefdinir cefdinir     Date 9/1 9/8 9/15 9/22 9/29 10/6 10/13 10/20 10/27 11/3 11/10 11/17 11/24 12/1   Total WeeklyDose 27.5mg 27.5mg 27.5mg 27.5mg 27.5mg 27.5mg 27.5mg 30mg 27.5mg 27.5mg 27.5mg 25mg 27.5mg 27.5mg   INR 2.1 2.3 3.3 2.3 2.2 2.0 1.9 2.6 3.2 2.4 3.4 1.6 2.4 2.3   Notes cefdinir  decr GLV?  keflex   1x incr dose   decr GLV? 1x decr dose;   incr GLV?       Date 12/9 12/15 12/22 12/29 1/5/22 1/12 1/20 1/26 1/31 2/3 2/7 2/10 2/16 2/23   Total WeeklyDose 27.5mg 27.5mg 27.5mg 27.5mg 27.5mg 27.5mg 27.5mg 27.5mg 27.5mg 25mg 25mg 22.5mg 27.5mg 27.5mg   INR 2.5 2.4 2.7 2.5 2.6 2.2 2.5 2.1 3.8 2.6 4.1 2.5 2.7 3.5   Notes call    call   COVID+ dex / azith 1x hold pred 20 BID x5d  1x hold; 1x decr dose;   call call call     Date 3/2 3/9 3/16 3/23 3/30 4/5 4/13 4/20 4/27 5/4 5/11 5/18 5/25 6/1   Total WeeklyDose 25mg 27.5mg 27.5mg 27.5mg 27.5mg 27.5mg 27.5 mg 27.5mg 27.5 mg 27.5 mg 27.5 mg 27.5 mg 27.5 mg 27.5mg   INR 2.5 2.9 2.2 2.8 2.7 2.4 2.5 2.3 2.5 2.8 3.1 2.6 2.5 2.3   Notes 1x decr  call call    call    call Call  apap rec 5/19       Date 6/8 6/15 6/22 6/29 7/6 7/14 7/20  7/27 8/3 8/10 8/17 8/25 8/31 9/7   Total Weekly Dose 27.5 mg 27.5 mg 27.5 mg 27.5 mg 27.5 mg 27.5 mg 27.5 mg 30 mg 27.5 mg 27.5mg 27.5 mg 27.5 mg 27.5 mg 27.5 mg   INR 2.5 2.5 2.7 2.5 2.9 2.4 1.8 2.7 2.8 2.8 2.8 3.1 2.6 3.5   Notes call    call  call 12/71x boost   call Call  Dec GLV  call     Date 9/14 9/21 9/28 10/5 10/12 10/19 10/26 11/2 11/9 11/16 11/23 11/30 12/7   Total WeeklyDose 27.5mg 25 mg 25 mg 25 mg 25 mg  25 mg 25 mg 25 mg 25 mg 25 mg 25 mg 25 mg 25 mg   INR 3.1 2.9 2.5 2.9 2.2 2.4 2.3 2.1 2.3 2.3 2.1 2.2 1.9   Notes 1x hold inc GLV call   Call     call    Call  INC GLV     Date 12/14 12/22 12/28 1/4/23 1/11 1/18 1/25 2/1 2/8 2/16/23 2/22 3/1/23   Total WeeklyDose 27.5 mg 25 mg  20 mg 27.5 mg 27.5 mg 27.5 mg 27.5 mg 27.5 mg 27.5 mg 27.5 mg 27.5mg 27.5 mg    INR 2.8 2.1 1.9 1.9 1.8 2.1 2.2 2.0 2.7 2.5 2.6 2.8   Notes 1x boost  rec 12/15 Call  Call 1x miss call call Call  Call    Call        Date 3/9              Total WeeklyDose 27.5 mg              INR 2.6              Notes                 Phone Interview:  Tablet Strength: 5 mg (1/5/22)  Patient Contact Info: [phone] (home) preferred; [phone] (cell)  Estimated OOP cost: [please send to registration if not already done]  Verbal Release Auth: signed 5/25/21 -- May speak w/Femi Huber, ; May leave voicemail on home phone  Lab Contact Info: [facility]  *Will call once monthly or if INR out of range*     Patient Findings  Comments:  Patient not contacted at this encounter      Plan:  1. INR is therapeutic today at 2.6 (goal 2.0-3.0) Ms. Calvin will continue warfarin 5mg oral daily except warfarin 2.5mg MonWedFri until recheck.   2. Repeat INR next week, 3/16/23.  3. Marianela Ngo understands the importance of calling the [facility] if she notices any s/sx of bleeding, stroke, or abnormal bruising, if any changes are made to her medications or medication doses (Rx, OTC, herbal), or if any upcoming procedures  are scheduled. Marianela gNo will likewise let us know if any other changes, questions, or concerns arise regarding anticoagulation therapy. she understands the importance of seeking medical attention immediately if she experiences any falls, vehicle accidents, or abnormal bleeding or bruising. Marianela Ngo voiced understanding of this information and confirms that she has the [facility]'s contact information. Otherwise, we will plan to contact the patient once monthly or if her INR is out of range.    Scooby Garcia CPhT  3/9/2023  12:04 EST     GARLAND, Jayla Soriano, PharmD, have reviewed the note in full and agree with the assessment and plan.  03/09/23  15:07 EST

## 2023-03-16 ENCOUNTER — ANTICOAGULATION VISIT (OUTPATIENT)
Dept: PHARMACY | Facility: HOSPITAL | Age: 78
End: 2023-03-16
Payer: MEDICARE

## 2023-03-16 DIAGNOSIS — I48.0 PAROXYSMAL ATRIAL FIBRILLATION: Primary | ICD-10-CM

## 2023-03-16 LAB — INR PPP: 2.6

## 2023-03-16 NOTE — PROGRESS NOTES
Anticoagulation Clinic - Remote Progress Note  mdINR Home Monitor  Testing frequency: weekly    Indication: paroxysmal afib  Referring Provider: Susan [next 4/19/23]  Initial Warfarin Start Date: 2020? 2019?  Goal INR: 2.0-3.0  Current Drug Interactions:    [id number]: 4 (Age ?74, Sex, HTN) [estimated 5/20/21]  Bleed Risk: self reports negative history for GI bleed  Other: DOAC too expensive    Diet: green beans, peas, zucchini, or priyanka salads 2-3x/week (7/6/22)  Alcohol: none  Tobacco: none  OTC Pain Medication: APAP only    INR History:  Date 5/14 5/18 5/25 6/1 6/15 7/6 7/12 7/22 7/28 8/4 8/11 8/18 8/23 8/27   Total WeeklyDose  22.5mg 25mg 25mg 25mg 25mg 25mg 25mg 25mg 25mg 25mg 27.5mg 27.5mg 27.5mg   INR 3.5 1.9 2.3 2.3 2.8 2.7 2.1 2.2 1.9 2.0 1.8 2.3 2.2 1.8   Notes [place] Cards 1x hold; incr GLV     HM  incr GLV  no GLV incr GLV cefdinir cefdinir     Date 9/1 9/8 9/15 9/22 9/29 10/6 10/13 10/20 10/27 11/3 11/10 11/17 11/24 12/1   Total WeeklyDose 27.5mg 27.5mg 27.5mg 27.5mg 27.5mg 27.5mg 27.5mg 30mg 27.5mg 27.5mg 27.5mg 25mg 27.5mg 27.5mg   INR 2.1 2.3 3.3 2.3 2.2 2.0 1.9 2.6 3.2 2.4 3.4 1.6 2.4 2.3   Notes cefdinir  decr GLV?  keflex   1x incr dose   decr GLV? 1x decr dose;   incr GLV?       Date 12/9 12/15 12/22 12/29 1/5/22 1/12 1/20 1/26 1/31 2/3 2/7 2/10 2/16 2/23   Total WeeklyDose 27.5mg 27.5mg 27.5mg 27.5mg 27.5mg 27.5mg 27.5mg 27.5mg 27.5mg 25mg 25mg 22.5mg 27.5mg 27.5mg   INR 2.5 2.4 2.7 2.5 2.6 2.2 2.5 2.1 3.8 2.6 4.1 2.5 2.7 3.5   Notes call    call   COVID+ dex / azith 1x hold pred 20 BID x5d  1x hold; 1x decr dose;   call call call     Date 3/2 3/9 3/16 3/23 3/30 4/5 4/13 4/20 4/27 5/4 5/11 5/18 5/25 6/1   Total WeeklyDose 25mg 27.5mg 27.5mg 27.5mg 27.5mg 27.5mg 27.5 mg 27.5mg 27.5 mg 27.5 mg 27.5 mg 27.5 mg 27.5 mg 27.5mg   INR 2.5 2.9 2.2 2.8 2.7 2.4 2.5 2.3 2.5 2.8 3.1 2.6 2.5 2.3   Notes 1x decr  call call    call    call Call  apap rec 5/19       Date 6/8 6/15 6/22 6/29 7/6 7/14 7/20  7/27 8/3 8/10 8/17 8/25 8/31 9/7   Total Weekly Dose 27.5 mg 27.5 mg 27.5 mg 27.5 mg 27.5 mg 27.5 mg 27.5 mg 30 mg 27.5 mg 27.5mg 27.5 mg 27.5 mg 27.5 mg 27.5 mg   INR 2.5 2.5 2.7 2.5 2.9 2.4 1.8 2.7 2.8 2.8 2.8 3.1 2.6 3.5   Notes call    call  call 12/71x boost   call Call  Dec GLV  call     Date 9/14 9/21 9/28 10/5 10/12 10/19 10/26 11/2 11/9 11/16 11/23 11/30 12/7   Total WeeklyDose 27.5mg 25 mg 25 mg 25 mg 25 mg  25 mg 25 mg 25 mg 25 mg 25 mg 25 mg 25 mg 25 mg   INR 3.1 2.9 2.5 2.9 2.2 2.4 2.3 2.1 2.3 2.3 2.1 2.2 1.9   Notes 1x hold inc GLV call   Call     call    Call  INC GLV     Date 12/14 12/22 12/28 1/4/23 1/11 1/18 1/25 2/1 2/8 2/16/23 2/22 3/1/23   Total WeeklyDose 27.5 mg 25 mg  20 mg 27.5 mg 27.5 mg 27.5 mg 27.5 mg 27.5 mg 27.5 mg 27.5 mg 27.5mg 27.5 mg    INR 2.8 2.1 1.9 1.9 1.8 2.1 2.2 2.0 2.7 2.5 2.6 2.8   Notes 1x boost  rec 12/15 Call  Call 1x miss call call Call  Call    Call        Date 3/9 3/16             Total WeeklyDose 27.5 mg 27.5 mg             INR 2.6  2.6             Notes  call               Phone Interview:  Tablet Strength: 5 mg (1/5/22)  Patient Contact Info: [phone] (home) preferred; [phone] (cell)  Estimated OOP cost: [please send to registration if not already done]  Verbal Release Auth: signed 5/25/21 -- May speak w/Femi Ngo, ; May leave voicemail on home phone  Lab Contact Info: [facility]  *Will call once monthly or if INR out of range*    Patient Findings  Positives:  Other complaints   Negatives:  Signs/symptoms of thrombosis, Signs/symptoms of bleeding, Laboratory test error suspected, Change in health, Change in alcohol use, Change in activity, Upcoming invasive procedure, Emergency department visit, Upcoming dental procedure, Missed doses, Extra doses, Change in medications, Change in diet/appetite, Hospital admission, Bruising   Comments:  She used all of her new test strips today.  Advised her to contact mdINR to have strips replaced.      Above findings negative     Plan:    1. INR is therapeutic today at 2.6 (goal 2.0-3.0) Instructed Ms. Calvin to continue warfarin 5mg oral daily except warfarin 2.5mg MonWedFri until recheck.   2. Repeat INR next week, 3/23/23.  3. Verbal information provided over the phone. Patient RBV dosing instructions, expresses understanding by teach back, and has no further questions at this time.  4. Marianela Ngo understands the importance of calling the [facility] if she notices any s/sx of bleeding, stroke, or abnormal bruising, if any changes are made to her medications or medication doses (Rx, OTC, herbal), or if any upcoming procedures are scheduled. Marianela Ngo will likewise let us know if any other changes, questions, or concerns arise regarding anticoagulation therapy. she understands the importance of seeking medical attention immediately if she experiences any falls, vehicle accidents, or abnormal bleeding or bruising. Marianela Ngo voiced understanding of this information and confirms that she has the [facility]'s contact information. Otherwise, we will plan to contact the patient once monthly or if her INR is out of range.    Radha Orlando, PharmD  3/16/2023  16:16 EDT

## 2023-03-22 ENCOUNTER — ANTICOAGULATION VISIT (OUTPATIENT)
Dept: PHARMACY | Facility: HOSPITAL | Age: 78
End: 2023-03-22
Payer: MEDICARE

## 2023-03-22 DIAGNOSIS — I48.0 PAROXYSMAL ATRIAL FIBRILLATION: Primary | ICD-10-CM

## 2023-03-22 LAB — INR PPP: 4.1

## 2023-03-29 ENCOUNTER — ANTICOAGULATION VISIT (OUTPATIENT)
Dept: PHARMACY | Facility: HOSPITAL | Age: 78
End: 2023-03-29
Payer: MEDICARE

## 2023-03-29 DIAGNOSIS — I48.0 PAROXYSMAL ATRIAL FIBRILLATION: Primary | ICD-10-CM

## 2023-03-29 LAB — INR PPP: 2.7

## 2023-03-29 NOTE — PROGRESS NOTES
Anticoagulation Clinic - Remote Progress Note  mdINR Home Monitor  Testing frequency: weekly    Indication: paroxysmal afib  Referring Provider: Susan [next 4/19/23]  Initial Warfarin Start Date: 2020? 2019?  Goal INR: 2.0-3.0  Current Drug Interactions:    [id number]: 4 (Age ?74, Sex, HTN) [estimated 5/20/21]  Bleed Risk: self reports negative history for GI bleed  Other: DOAC too expensive    Diet: green beans, peas, zucchini, or priyanka salads 2-3x/week (7/6/22)  Alcohol: none  Tobacco: none  OTC Pain Medication: APAP only    INR History:  Date 5/14 5/18 5/25 6/1 6/15 7/6 7/12 7/22 7/28 8/4 8/11 8/18 8/23 8/27   Total WeeklyDose  22.5mg 25mg 25mg 25mg 25mg 25mg 25mg 25mg 25mg 25mg 27.5mg 27.5mg 27.5mg   INR 3.5 1.9 2.3 2.3 2.8 2.7 2.1 2.2 1.9 2.0 1.8 2.3 2.2 1.8   Notes [place] Cards 1x hold; incr GLV     HM  incr GLV  no GLV incr GLV cefdinir cefdinir     Date 9/1 9/8 9/15 9/22 9/29 10/6 10/13 10/20 10/27 11/3 11/10 11/17 11/24 12/1   Total WeeklyDose 27.5mg 27.5mg 27.5mg 27.5mg 27.5mg 27.5mg 27.5mg 30mg 27.5mg 27.5mg 27.5mg 25mg 27.5mg 27.5mg   INR 2.1 2.3 3.3 2.3 2.2 2.0 1.9 2.6 3.2 2.4 3.4 1.6 2.4 2.3   Notes cefdinir  decr GLV?  keflex   1x incr dose   decr GLV? 1x decr dose;   incr GLV?       Date 12/9 12/15 12/22 12/29 1/5/22 1/12 1/20 1/26 1/31 2/3 2/7 2/10 2/16 2/23   Total WeeklyDose 27.5mg 27.5mg 27.5mg 27.5mg 27.5mg 27.5mg 27.5mg 27.5mg 27.5mg 25mg 25mg 22.5mg 27.5mg 27.5mg   INR 2.5 2.4 2.7 2.5 2.6 2.2 2.5 2.1 3.8 2.6 4.1 2.5 2.7 3.5   Notes call    call   COVID+ dex / azith 1x hold pred 20 BID x5d  1x hold; 1x decr dose;   call call call     Date 3/2 3/9 3/16 3/23 3/30 4/5 4/13 4/20 4/27 5/4 5/11 5/18 5/25 6/1   Total WeeklyDose 25mg 27.5mg 27.5mg 27.5mg 27.5mg 27.5mg 27.5 mg 27.5mg 27.5 mg 27.5 mg 27.5 mg 27.5 mg 27.5 mg 27.5mg   INR 2.5 2.9 2.2 2.8 2.7 2.4 2.5 2.3 2.5 2.8 3.1 2.6 2.5 2.3   Notes 1x decr  call call    call    call Call  apap rec 5/19       Date 6/8 6/15 6/22 6/29 7/6 7/14 7/20  7/27 8/3 8/10 8/17 8/25 8/31 9/7   Total Weekly Dose 27.5 mg 27.5 mg 27.5 mg 27.5 mg 27.5 mg 27.5 mg 27.5 mg 30 mg 27.5 mg 27.5mg 27.5 mg 27.5 mg 27.5 mg 27.5 mg   INR 2.5 2.5 2.7 2.5 2.9 2.4 1.8 2.7 2.8 2.8 2.8 3.1 2.6 3.5   Notes call    call  call 12/71x boost   call Call  Dec GLV  call     Date 9/14 9/21 9/28 10/5 10/12 10/19 10/26 11/2 11/9 11/16 11/23 11/30 12/7   Total WeeklyDose 27.5mg 25 mg 25 mg 25 mg 25 mg  25 mg 25 mg 25 mg 25 mg 25 mg 25 mg 25 mg 25 mg   INR 3.1 2.9 2.5 2.9 2.2 2.4 2.3 2.1 2.3 2.3 2.1 2.2 1.9   Notes 1x hold inc GLV call   Call     call    Call  INC GLV     Date 12/14 12/22 12/28 1/4/23 1/11 1/18 1/25 2/1 2/8 2/16/23 2/22 3/1/23   Total WeeklyDose 27.5 mg 25 mg  20 mg 27.5 mg 27.5 mg 27.5 mg 27.5 mg 27.5 mg 27.5 mg 27.5 mg 27.5mg 27.5 mg    INR 2.8 2.1 1.9 1.9 1.8 2.1 2.2 2.0 2.7 2.5 2.6 2.8   Notes 1x boost  rec 12/15 Call  Call 1x miss call call Call  Call    Call        Date 3/9 3/16 3/22 3/29           Total WeeklyDose 27.5 mg 27.5 mg 27.5 mg 25mg           INR 2.6  2.6 4.1 2.7           Notes  call call call             Phone Interview:  Tablet Strength: 5 mg (1/5/22)  Patient Contact Info: [phone] (home) preferred; [phone] (cell)  Verbal Release Auth: signed 5/25/21 -- May speak w/Femi Huber, ; May leave voicemail on home phone  Lab Contact Info: [facility]  *Will call once monthly or if INR out of range*    Patient Findings    Negatives:  Signs/symptoms of thrombosis, Signs/symptoms of bleeding, Laboratory test error suspected, Change in health, Change in alcohol use, Change in activity, Upcoming invasive procedure, Emergency department visit, Upcoming dental procedure, Missed doses, Extra doses, Change in medications, Change in diet/appetite, Hospital admission, Bruising, Other complaints       Plan:    1. INR is therapeutic today at 2.7 (goal 2.0-3.0).  Instructed Ms. Calvin to resume warfarin 5mg oral daily except warfarin 2.5mg MonWedFri until  recheck.    2. Repeat INR next week,  as Wednesdays work better.  3. Verbal information provided over the phone. Patient RBV dosing instructions, expresses understanding by teach back, and has no further questions at this time.  4. Marianela Ngo understands the importance of calling the [facility] if she notices any s/sx of bleeding, stroke, or abnormal bruising, if any changes are made to her medications or medication doses (Rx, OTC, herbal), or if any upcoming procedures are scheduled. Marianela Ngo will likewise let us know if any other changes, questions, or concerns arise regarding anticoagulation therapy. she understands the importance of seeking medical attention immediately if she experiences any falls, vehicle accidents, or abnormal bleeding or bruising. Marianela Ngo voiced understanding of this information and confirms that she has the [facility]'s contact information. Otherwise, we will plan to contact the patient once monthly or if her INR is out of range.      .Ina BessD.  03/29/23   15:49 EDT

## 2023-04-05 ENCOUNTER — ANTICOAGULATION VISIT (OUTPATIENT)
Dept: PHARMACY | Facility: HOSPITAL | Age: 78
End: 2023-04-05
Payer: MEDICARE

## 2023-04-05 DIAGNOSIS — I48.0 PAROXYSMAL ATRIAL FIBRILLATION: Primary | ICD-10-CM

## 2023-04-05 LAB — INR PPP: 2.6

## 2023-04-05 NOTE — PROGRESS NOTES
Anticoagulation Clinic - Remote Progress Note  mdINR Home Monitor  Testing frequency: weekly    Indication: paroxysmal afib  Referring Provider: Susan [next 4/19/23]  Initial Warfarin Start Date: 2020? 2019?  Goal INR: 2.0-3.0  Current Drug Interactions:    [id number]: 4 (Age ?75, Sex, HTN) [estimated 5/20/21]  Bleed Risk: self reports negative history for GI bleed  Other: DOAC too expensive    Diet: green beans, peas, zucchini, or priyanka salads 2-3x/week (7/6/22)  Alcohol: none  Tobacco: none  OTC Pain Medication: APAP only    INR History:  Date 5/14 5/18 5/25 6/1 6/15 7/6 7/12 7/22 7/28 8/4 8/11 8/18 8/23 8/27   Total WeeklyDose  22.5mg 25mg 25mg 25mg 25mg 25mg 25mg 25mg 25mg 25mg 27.5mg 27.5mg 27.5mg   INR 3.5 1.9 2.3 2.3 2.8 2.7 2.1 2.2 1.9 2.0 1.8 2.3 2.2 1.8   Notes [place] Cards 1x hold; incr GLV     HM  incr GLV  no GLV incr GLV cefdinir cefdinir     Date 9/1 9/8 9/15 9/22 9/29 10/6 10/13 10/20 10/27 11/3 11/10 11/17 11/24 12/1   Total WeeklyDose 27.5mg 27.5mg 27.5mg 27.5mg 27.5mg 27.5mg 27.5mg 30mg 27.5mg 27.5mg 27.5mg 25mg 27.5mg 27.5mg   INR 2.1 2.3 3.3 2.3 2.2 2.0 1.9 2.6 3.2 2.4 3.4 1.6 2.4 2.3   Notes cefdinir  decr GLV?  keflex   1x incr dose   decr GLV? 1x decr dose;   incr GLV?       Date 12/9 12/15 12/22 12/29 1/5/22 1/12 1/20 1/26 1/31 2/3 2/7 2/10 2/16 2/23   Total WeeklyDose 27.5mg 27.5mg 27.5mg 27.5mg 27.5mg 27.5mg 27.5mg 27.5mg 27.5mg 25mg 25mg 22.5mg 27.5mg 27.5mg   INR 2.5 2.4 2.7 2.5 2.6 2.2 2.5 2.1 3.8 2.6 4.1 2.5 2.7 3.5   Notes call    call   COVID+ dex / azith 1x hold pred 20 BID x5d  1x hold; 1x decr dose;   call call call     Date 3/2 3/9 3/16 3/23 3/30 4/5 4/13 4/20 4/27 5/4 5/11 5/18 5/25 6/1   Total WeeklyDose 25mg 27.5mg 27.5mg 27.5mg 27.5mg 27.5mg 27.5 mg 27.5mg 27.5 mg 27.5 mg 27.5 mg 27.5 mg 27.5 mg 27.5mg   INR 2.5 2.9 2.2 2.8 2.7 2.4 2.5 2.3 2.5 2.8 3.1 2.6 2.5 2.3   Notes 1x decr  call call    call    call Call  apap rec 5/19       Date 6/8 6/15 6/22 6/29 7/6 7/14 7/20  7/27 8/3 8/10 8/17 8/25 8/31 9/7   Total Weekly Dose 27.5 mg 27.5 mg 27.5 mg 27.5 mg 27.5 mg 27.5 mg 27.5 mg 30 mg 27.5 mg 27.5mg 27.5 mg 27.5 mg 27.5 mg 27.5 mg   INR 2.5 2.5 2.7 2.5 2.9 2.4 1.8 2.7 2.8 2.8 2.8 3.1 2.6 3.5   Notes call    call  call 12/71x boost   call Call  Dec GLV  call     Date 9/14 9/21 9/28 10/5 10/12 10/19 10/26 11/2 11/9 11/16 11/23 11/30 12/7   Total WeeklyDose 27.5mg 25 mg 25 mg 25 mg 25 mg  25 mg 25 mg 25 mg 25 mg 25 mg 25 mg 25 mg 25 mg   INR 3.1 2.9 2.5 2.9 2.2 2.4 2.3 2.1 2.3 2.3 2.1 2.2 1.9   Notes 1x hold inc GLV call   Call     call    Call  INC GLV     Date 12/14 12/22 12/28 1/4/23 1/11 1/18 1/25 2/1 2/8 2/16/23 2/22 3/1/23   Total WeeklyDose 27.5 mg 25 mg  20 mg 27.5 mg 27.5 mg 27.5 mg 27.5 mg 27.5 mg 27.5 mg 27.5 mg 27.5mg 27.5 mg    INR 2.8 2.1 1.9 1.9 1.8 2.1 2.2 2.0 2.7 2.5 2.6 2.8   Notes 1x boost  rec 12/15 Call  Call 1x miss call call Call  Call    Call        Date 3/9 3/16 3/22 3/29 4/5          Total WeeklyDose 27.5 mg 27.5 mg 27.5 mg 25mg 27.5 mg          INR 2.6  2.6 4.1 2.7 2.6          Notes  call call call call            Phone Interview:  Tablet Strength: 5 mg (1/5/22)  Patient Contact Info: [phone] (home) preferred; [phone] (cell)  Verbal Release Auth: signed 5/25/21 -- May speak w/Femi Ngo, ; May leave voicemail on home phone  Lab Contact Info: [facility]  *Will call once monthly or if INR out of range*    Patient Findings  Negatives:  Signs/symptoms of thrombosis, Signs/symptoms of bleeding, Laboratory test error suspected, Change in health, Change in alcohol use, Change in activity, Upcoming invasive procedure, Emergency department visit, Upcoming dental procedure, Missed doses, Extra doses, Change in medications, Change in diet/appetite, Hospital admission, Bruising, Other complaints   Comments:       Plan:  1. INR is therapeutic today at 2.6 (goal 2.0-3.0).  Ms. Ngo will continue warfarin 5mg oral daily except warfarin 2.5mg  Emile until recheck.    2. Repeat INR next week, 4/12/23.  3. Verbal information provided over the phone. Patient RBV dosing instructions, expresses understanding by teach back, and has no further questions at this time.  4. Marianela Ngo understands the importance of calling the [facility] if she notices any s/sx of bleeding, stroke, or abnormal bruising, if any changes are made to her medications or medication doses (Rx, OTC, herbal), or if any upcoming procedures are scheduled. Marianela Ngo will likewise let us know if any other changes, questions, or concerns arise regarding anticoagulation therapy. she understands the importance of seeking medical attention immediately if she experiences any falls, vehicle accidents, or abnormal bleeding or bruising. Marianela Ngo voiced understanding of this information and confirms that she has the [facility]'s contact information. Otherwise, we will plan to contact the patient once monthly or if her INR is out of range.    Maryjane Amezquita, PharmD  4/5/2023  10:15 EDT

## 2023-04-12 ENCOUNTER — ANTICOAGULATION VISIT (OUTPATIENT)
Dept: PHARMACY | Facility: HOSPITAL | Age: 78
End: 2023-04-12
Payer: MEDICARE

## 2023-04-12 DIAGNOSIS — I48.0 PAROXYSMAL ATRIAL FIBRILLATION: Primary | ICD-10-CM

## 2023-04-12 LAB — INR PPP: 2.7

## 2023-04-12 NOTE — PROGRESS NOTES
Anticoagulation Clinic - Remote Progress Note  mdINR Home Monitor  Testing frequency: weekly    Indication: paroxysmal afib  Referring Provider: Susan [next 4/19/23]  Initial Warfarin Start Date: 2020? 2019?  Goal INR: 2.0-3.0  Current Drug Interactions:    [id number]: 4 (Age ?75, Sex, HTN) [estimated 5/20/21]  Bleed Risk: self reports negative history for GI bleed  Other: DOAC too expensive    Diet: green beans, peas, zucchini, or priyanka salads 2-3x/week (7/6/22)  Alcohol: none  Tobacco: none  OTC Pain Medication: APAP only    INR History:  Date 5/14 5/18 5/25 6/1 6/15 7/6 7/12 7/22 7/28 8/4 8/11 8/18 8/23 8/27   Total WeeklyDose  22.5mg 25mg 25mg 25mg 25mg 25mg 25mg 25mg 25mg 25mg 27.5mg 27.5mg 27.5mg   INR 3.5 1.9 2.3 2.3 2.8 2.7 2.1 2.2 1.9 2.0 1.8 2.3 2.2 1.8   Notes [place] Cards 1x hold; incr GLV     HM  incr GLV  no GLV incr GLV cefdinir cefdinir     Date 9/1 9/8 9/15 9/22 9/29 10/6 10/13 10/20 10/27 11/3 11/10 11/17 11/24 12/1   Total WeeklyDose 27.5mg 27.5mg 27.5mg 27.5mg 27.5mg 27.5mg 27.5mg 30mg 27.5mg 27.5mg 27.5mg 25mg 27.5mg 27.5mg   INR 2.1 2.3 3.3 2.3 2.2 2.0 1.9 2.6 3.2 2.4 3.4 1.6 2.4 2.3   Notes cefdinir  decr GLV?  keflex   1x incr dose   decr GLV? 1x decr dose;   incr GLV?       Date 12/9 12/15 12/22 12/29 1/5/22 1/12 1/20 1/26 1/31 2/3 2/7 2/10 2/16 2/23   Total WeeklyDose 27.5mg 27.5mg 27.5mg 27.5mg 27.5mg 27.5mg 27.5mg 27.5mg 27.5mg 25mg 25mg 22.5mg 27.5mg 27.5mg   INR 2.5 2.4 2.7 2.5 2.6 2.2 2.5 2.1 3.8 2.6 4.1 2.5 2.7 3.5   Notes call    call   COVID+ dex / azith 1x hold pred 20 BID x5d  1x hold; 1x decr dose;   call call call     Date 3/2 3/9 3/16 3/23 3/30 4/5 4/13 4/20 4/27 5/4 5/11 5/18 5/25 6/1   Total WeeklyDose 25mg 27.5mg 27.5mg 27.5mg 27.5mg 27.5mg 27.5 mg 27.5mg 27.5 mg 27.5 mg 27.5 mg 27.5 mg 27.5 mg 27.5mg   INR 2.5 2.9 2.2 2.8 2.7 2.4 2.5 2.3 2.5 2.8 3.1 2.6 2.5 2.3   Notes 1x decr  call call    call    call Call  apap rec 5/19       Date 6/8 6/15 6/22 6/29 7/6 7/14 7/20  7/27 8/3 8/10 8/17 8/25 8/31 9/7   Total Weekly Dose 27.5 mg 27.5 mg 27.5 mg 27.5 mg 27.5 mg 27.5 mg 27.5 mg 30 mg 27.5 mg 27.5mg 27.5 mg 27.5 mg 27.5 mg 27.5 mg   INR 2.5 2.5 2.7 2.5 2.9 2.4 1.8 2.7 2.8 2.8 2.8 3.1 2.6 3.5   Notes call    call  call 12/71x boost   call Call  Dec GLV  call     Date 9/14 9/21 9/28 10/5 10/12 10/19 10/26 11/2 11/9 11/16 11/23 11/30 12/7   Total WeeklyDose 27.5mg 25 mg 25 mg 25 mg 25 mg  25 mg 25 mg 25 mg 25 mg 25 mg 25 mg 25 mg 25 mg   INR 3.1 2.9 2.5 2.9 2.2 2.4 2.3 2.1 2.3 2.3 2.1 2.2 1.9   Notes 1x hold inc GLV call   Call     call    Call  INC GLV     Date 12/14 12/22 12/28 1/4/23 1/11 1/18 1/25 2/1 2/8 2/16/23 2/22 3/1/23   Total WeeklyDose 27.5 mg 25 mg  20 mg 27.5 mg 27.5 mg 27.5 mg 27.5 mg 27.5 mg 27.5 mg 27.5 mg 27.5mg 27.5 mg    INR 2.8 2.1 1.9 1.9 1.8 2.1 2.2 2.0 2.7 2.5 2.6 2.8   Notes 1x boost  rec 12/15 Call  Call 1x miss call call Call  Call    Call        Date 3/9 3/16 3/22 3/29 4/5 4/12         Total WeeklyDose 27.5 mg 27.5 mg 27.5 mg 25mg 27.5 mg 27.5          INR 2.6  2.6 4.1 2.7 2.6 2.7         Notes  call call call call            Phone Interview:  Tablet Strength: 5 mg (1/5/22)  Patient Contact Info: [phone] (home) preferred; [phone] (cell)  Verbal Release Auth: signed 5/25/21 -- May speak w/Femi Ngo, ; May leave voicemail on home phone  Lab Contact Info: [facility]  *Will call once monthly or if INR out of range*    Patient Findings  Comments:  Patient not contacted at this encounter.      Plan:  1. INR is therapeutic today at 2.7 (goal 2.0-3.0).  Ms. Ngo will continue warfarin 5mg oral daily except warfarin 2.5mg MonWedFri until recheck.    2. Repeat INR next week, 4/19/23.  3. Marianela Ngo understands the importance of calling the [facility] if she notices any s/sx of bleeding, stroke, or abnormal bruising, if any changes are made to her medications or medication doses (Rx, OTC, herbal), or if any upcoming  procedures are scheduled. Marianela Ngo will likewise let us know if any other changes, questions, or concerns arise regarding anticoagulation therapy. she understands the importance of seeking medical attention immediately if she experiences any falls, vehicle accidents, or abnormal bleeding or bruising. Marianela Ngo voiced understanding of this information and confirms that she has the [facility]'s contact information. Otherwise, we will plan to contact the patient once monthly or if her INR is out of range.    Scooby Garcia CPhT  4/12/2023  13:31 EDT     I, Radha Orlando, PharmD, have reviewed the note in full and agree with the assessment and plan.  04/12/23  14:21 EDT

## 2023-04-19 ENCOUNTER — OFFICE VISIT (OUTPATIENT)
Dept: CARDIOLOGY | Facility: CLINIC | Age: 78
End: 2023-04-19
Payer: MEDICARE

## 2023-04-19 ENCOUNTER — ANTICOAGULATION VISIT (OUTPATIENT)
Dept: PHARMACY | Facility: HOSPITAL | Age: 78
End: 2023-04-19
Payer: MEDICARE

## 2023-04-19 VITALS
WEIGHT: 201.6 LBS | SYSTOLIC BLOOD PRESSURE: 122 MMHG | DIASTOLIC BLOOD PRESSURE: 82 MMHG | HEIGHT: 65 IN | TEMPERATURE: 98.2 F | OXYGEN SATURATION: 100 % | RESPIRATION RATE: 16 BRPM | BODY MASS INDEX: 33.59 KG/M2 | HEART RATE: 56 BPM

## 2023-04-19 DIAGNOSIS — E78.5 HYPERLIPIDEMIA LDL GOAL <100: ICD-10-CM

## 2023-04-19 DIAGNOSIS — I48.0 PAROXYSMAL ATRIAL FIBRILLATION: Primary | ICD-10-CM

## 2023-04-19 DIAGNOSIS — I48.0 PAROXYSMAL ATRIAL FIBRILLATION: ICD-10-CM

## 2023-04-19 DIAGNOSIS — I10 ESSENTIAL HYPERTENSION: Primary | ICD-10-CM

## 2023-04-19 DIAGNOSIS — J43.2 CENTRILOBULAR EMPHYSEMA: ICD-10-CM

## 2023-04-19 LAB — INR PPP: 2.9

## 2023-04-19 RX ORDER — SOTALOL HYDROCHLORIDE 80 MG/1
TABLET ORAL
Qty: 180 TABLET | Refills: 3 | Status: SHIPPED | OUTPATIENT
Start: 2023-04-19 | End: 2023-04-19 | Stop reason: SDUPTHER

## 2023-04-19 RX ORDER — WARFARIN SODIUM 5 MG/1
5 TABLET ORAL
Qty: 180 TABLET | Refills: 3 | Status: SHIPPED | OUTPATIENT
Start: 2023-04-19

## 2023-04-19 RX ORDER — AMLODIPINE BESYLATE 5 MG/1
5 TABLET ORAL DAILY
Qty: 90 TABLET | Refills: 3 | Status: SHIPPED | OUTPATIENT
Start: 2023-04-19

## 2023-04-19 RX ORDER — LISINOPRIL AND HYDROCHLOROTHIAZIDE 20; 12.5 MG/1; MG/1
1 TABLET ORAL DAILY
Qty: 90 TABLET | Refills: 3 | Status: SHIPPED | OUTPATIENT
Start: 2023-04-19

## 2023-04-19 RX ORDER — ATORVASTATIN CALCIUM 20 MG/1
20 TABLET, FILM COATED ORAL DAILY
Qty: 90 TABLET | Refills: 3 | Status: SHIPPED | OUTPATIENT
Start: 2023-04-19

## 2023-04-19 RX ORDER — SOTALOL HYDROCHLORIDE 80 MG/1
TABLET ORAL
Qty: 180 TABLET | Refills: 3 | Status: SHIPPED | OUTPATIENT
Start: 2023-04-19

## 2023-04-19 NOTE — PROGRESS NOTES
[facility] [place]  [facility] CARDIOLOGY  [address] [place] [address]  Dept: [phone]  Dept Fax: [phone]    Marianela Ngo  1945    Follow Up Office Visit Note    History of Present Illness:  Marianela Ngo is a 77 y.o. female who presents to the clinic for Follow-up. PAF- she is having for the last 2 -3 months more often fluttering lasting 2-3 hours maybe 4-5 times last month despite Sotalol 80.40, EKG sinus with QT near 500 . Therefore I do not think I can increase the Sotalol further, so possibilities will be Tikosyn, Amiodarone vs ablation, she has COPD therefore Amiodarone will not be a good choice , will refer her to see Dr Randall.,    The following portions of the patient's history were reviewed and updated as appropriate: allergies, current medications, past family history, past medical history, past social history, past surgical history, and problem list.    Medications:  albuterol  amLODIPine  atorvastatin  budesonide  lisinopril-hydrochlorothiazide  sotalol  warfarin    Subjective  No Known Allergies     Past Medical History:   Diagnosis Date   • Allergies    • Bradycardia with 51-60 beats per minute    • Chronic atrial fibrillation    • Current use of long term anticoagulation    • DJD (degenerative joint disease)    • Epistaxis    • Essential hypertension    • High risk medication use    • Mild persistent asthma, uncomplicated    • Mixed hyperlipidemia    • Obstructive sleep apnea on CPAP    • Other fatigue    • Paroxysmal atrial fibrillation    • Pneumonia 08/2015   • Shortness of breath    • Vitamin deficiency        Past Surgical History:   Procedure Laterality Date   • CATARACT EXTRACTION, BILATERAL     • HYSTERECTOMY      PARTIAL   • REPLACEMENT TOTAL KNEE BILATERAL  2013,2014       Family History   Problem Relation Age of Onset   • Cancer Father    • Heart disease Sister    • Cancer Brother    • Diabetes Brother    • Hypertension Other         Social  "History     Socioeconomic History   • Marital status:    Tobacco Use   • Smoking status: Former     Packs/day: 1.00     Types: Cigarettes     Quit date:      Years since quittin.3   • Smokeless tobacco: Never   Vaping Use   • Vaping Use: Never used   Substance and Sexual Activity   • Alcohol use: Never   • Drug use: Never   • Sexual activity: Defer       Review of Systems   Constitutional: Negative.    HENT: Negative.    Respiratory: Negative.    Cardiovascular: Positive for palpitations.   Endocrine: Negative.    Genitourinary: Negative.    Musculoskeletal: Negative.    Skin: Negative.    Allergic/Immunologic: Negative.    Neurological: Negative.    Hematological: Negative.    Psychiatric/Behavioral: Negative.        Cardiovascular Procedures    ECHO/MUGA:  STRESS TESTS:   CARDIAC CATH:   DEVICES:   HOLTER:   CT/MRI:   VASCULAR:   CARDIOTHORACIC:     Objective  Vitals:    23 0913   BP: 122/82   Pulse: 56   Resp: 16   Temp: 98.2 °F (36.8 °C)   SpO2: 100%   Weight: 91.4 kg (201 lb 9.6 oz)   Height: 165.1 cm (65\")   PainSc: 0-No pain     Body mass index is 33.55 kg/m².     Physical Exam  Constitutional:       Appearance: Healthy appearance. Not in distress.   Neck:      Vascular: No JVR. JVD normal.   Pulmonary:      Effort: Pulmonary effort is normal.      Breath sounds: Normal breath sounds. No wheezing. No rhonchi. No rales.   Chest:      Chest wall: Not tender to palpatation.   Cardiovascular:      PMI at left midclavicular line. Normal rate. Regular rhythm. Normal S1. Normal S2.      Murmurs: There is no murmur.      No gallop. No click. No rub.   Pulses:     Intact distal pulses.   Edema:     Peripheral edema absent.   Abdominal:      General: Bowel sounds are normal.      Palpations: Abdomen is soft.      Tenderness: There is no abdominal tenderness.   Musculoskeletal: Normal range of motion.         General: No tenderness. Skin:     General: Skin is warm and dry.   Neurological:      " General: No focal deficit present.      Mental Status: Alert and oriented to person, place and time.          Diagnostic Data    ECG 12 Lead    Date/Time: 4/19/2023 9:56 AM  Performed by: Amos Davis MD  Authorized by: Amos Davis MD   Comparison: compared with previous ECG from 10/19/2022  Similar to previous ECG  Rhythm: sinus rhythm  Rate: normal  BPM: 52  Conduction: right bundle branch block and 1st degree AV block  QRS axis: normal    Clinical impression: abnormal EKG            Assessment and Plan  Diagnoses and all orders for this visit:    Essential hypertension- The BP is good on Amlodipine 5mg and Lisinopril 201.25 BP is 120.70    Paroxysmal atrial fibrillation- She is having more events now lasting hours, despite Sotalol, will sent her to see EP.  -     sotalol (BETAPACE) 80 MG tablet; Take 1/2 in am and 1 whole tab in the pm  -     CBC & Differential  -     Comprehensive Metabolic Panel    Hyperlipidemia LDL goal <100- On Lipitor 20 mg, will get a Lipid   -     CK  -     Lipid Panel    Centrilobular emphysema- Per PCP         No follow-ups on file.    Amos Davis MD  04/19/2023

## 2023-04-19 NOTE — PROGRESS NOTES
Anticoagulation Clinic - Remote Progress Note  mdINR Home Monitor  Testing frequency: weekly    Indication: paroxysmal afib  Referring Provider: Susan [next 4/19/23]  Initial Warfarin Start Date: 2020? 2019?  Goal INR: 2.0-3.0  Current Drug Interactions:    [id number]: 4 (Age ?75, Sex, HTN) [estimated 5/20/21]  Bleed Risk: self reports negative history for GI bleed  Other: DOAC too expensive    Diet: green beans, peas, zucchini, or priyanka salads 2-3x/week (7/6/22)  Alcohol: none  Tobacco: none  OTC Pain Medication: APAP only    INR History:  Date 5/14 5/18 5/25 6/1 6/15 7/6 7/12 7/22 7/28 8/4 8/11 8/18 8/23 8/27   Total WeeklyDose  22.5mg 25mg 25mg 25mg 25mg 25mg 25mg 25mg 25mg 25mg 27.5mg 27.5mg 27.5mg   INR 3.5 1.9 2.3 2.3 2.8 2.7 2.1 2.2 1.9 2.0 1.8 2.3 2.2 1.8   Notes [place] Cards 1x hold; incr GLV     HM  incr GLV  no GLV incr GLV cefdinir cefdinir     Date 9/1 9/8 9/15 9/22 9/29 10/6 10/13 10/20 10/27 11/3 11/10 11/17 11/24 12/1   Total WeeklyDose 27.5mg 27.5mg 27.5mg 27.5mg 27.5mg 27.5mg 27.5mg 30mg 27.5mg 27.5mg 27.5mg 25mg 27.5mg 27.5mg   INR 2.1 2.3 3.3 2.3 2.2 2.0 1.9 2.6 3.2 2.4 3.4 1.6 2.4 2.3   Notes cefdinir  decr GLV?  keflex   1x incr dose   decr GLV? 1x decr dose;   incr GLV?       Date 12/9 12/15 12/22 12/29 1/5/22 1/12 1/20 1/26 1/31 2/3 2/7 2/10 2/16 2/23   Total WeeklyDose 27.5mg 27.5mg 27.5mg 27.5mg 27.5mg 27.5mg 27.5mg 27.5mg 27.5mg 25mg 25mg 22.5mg 27.5mg 27.5mg   INR 2.5 2.4 2.7 2.5 2.6 2.2 2.5 2.1 3.8 2.6 4.1 2.5 2.7 3.5   Notes call    call   COVID+ dex / azith 1x hold pred 20 BID x5d  1x hold; 1x decr dose;   call call call     Date 3/2 3/9 3/16 3/23 3/30 4/5 4/13 4/20 4/27 5/4 5/11 5/18 5/25 6/1   Total WeeklyDose 25mg 27.5mg 27.5mg 27.5mg 27.5mg 27.5mg 27.5 mg 27.5mg 27.5 mg 27.5 mg 27.5 mg 27.5 mg 27.5 mg 27.5mg   INR 2.5 2.9 2.2 2.8 2.7 2.4 2.5 2.3 2.5 2.8 3.1 2.6 2.5 2.3   Notes 1x decr  call call    call    call Call  apap rec 5/19       Date 6/8 6/15 6/22 6/29 7/6 7/14 7/20  7/27 8/3 8/10 8/17 8/25 8/31 9/7   Total Weekly Dose 27.5 mg 27.5 mg 27.5 mg 27.5 mg 27.5 mg 27.5 mg 27.5 mg 30 mg 27.5 mg 27.5mg 27.5 mg 27.5 mg 27.5 mg 27.5 mg   INR 2.5 2.5 2.7 2.5 2.9 2.4 1.8 2.7 2.8 2.8 2.8 3.1 2.6 3.5   Notes call    call  call 12/71x boost   call Call  Dec GLV  call     Date 9/14 9/21 9/28 10/5 10/12 10/19 10/26 11/2 11/9 11/16 11/23 11/30 12/7   Total WeeklyDose 27.5mg 25 mg 25 mg 25 mg 25 mg  25 mg 25 mg 25 mg 25 mg 25 mg 25 mg 25 mg 25 mg   INR 3.1 2.9 2.5 2.9 2.2 2.4 2.3 2.1 2.3 2.3 2.1 2.2 1.9   Notes 1x hold inc GLV call   Call     call    Call  INC GLV     Date 12/14 12/22 12/28 1/4/23 1/11 1/18 1/25 2/1 2/8 2/16/23 2/22 3/1/23   Total WeeklyDose 27.5 mg 25 mg  20 mg 27.5 mg 27.5 mg 27.5 mg 27.5 mg 27.5 mg 27.5 mg 27.5 mg 27.5mg 27.5 mg    INR 2.8 2.1 1.9 1.9 1.8 2.1 2.2 2.0 2.7 2.5 2.6 2.8   Notes 1x boost  rec 12/15 Call  Call 1x miss call call Call  Call    Call        Date 3/9 3/16 3/22 3/29 4/5 4/12 4/19        Total WeeklyDose 27.5 mg 27.5 mg 27.5 mg 25mg 27.5 mg 27.5 mg 27.5 mg        INR 2.6  2.6 4.1 2.7 2.6 2.7 2.9        Notes  call call call call            Phone Interview:  Tablet Strength: 5 mg (1/5/22)  Patient Contact Info: [phone] (home) preferred; [phone] (cell)  Verbal Release Auth: signed 5/25/21 -- May speak w/Femi Ngo, ; May leave voicemail on home phone  Lab Contact Info: [facility]  *Will call once monthly or if INR out of range*    Patient Findings  Comments:  Patient not contacted at this encounter.      Plan:  1. INR is therapeutic today at 2.9 (goal 2.0-3.0).  Ms. Ngo will continue warfarin 5mg oral daily except warfarin 2.5mg MonWedFri until recheck.    2. Repeat INR next week, 4/26/23.  3. Marianela Ngo understands the importance of calling the [facility] if she notices any s/sx of bleeding, stroke, or abnormal bruising, if any changes are made to her medications or medication doses (Rx, OTC, herbal), or  if any upcoming procedures are scheduled. Marianela Ngo will likewise let us know if any other changes, questions, or concerns arise regarding anticoagulation therapy. she understands the importance of seeking medical attention immediately if she experiences any falls, vehicle accidents, or abnormal bleeding or bruising. Marianela Ngo voiced understanding of this information and confirms that she has the [facility]'s contact information. Otherwise, we will plan to contact the patient once monthly or if her INR is out of range.      I, Theo Mccann, [facility], have reviewed the note in full and agree with the assessment and plan.  04/19/23  14:38 EDT

## 2023-04-19 NOTE — PROGRESS NOTES
Venipuncture Blood Specimen Collection  Venipuncture performed in clinic by Gifty Cardoso MA with good hemostasis. Patient tolerated the procedure well without complications.   04/19/23   Gifty Cardoso MA

## 2023-04-20 ENCOUNTER — TELEPHONE (OUTPATIENT)
Dept: CARDIOLOGY | Facility: CLINIC | Age: 78
End: 2023-04-20
Payer: MEDICARE

## 2023-04-20 LAB
ALBUMIN SERPL-MCNC: 4.4 G/DL (ref 3.7–4.7)
ALBUMIN/GLOB SERPL: 1.6 {RATIO} (ref 1.2–2.2)
ALP SERPL-CCNC: 90 IU/L (ref 44–121)
ALT SERPL-CCNC: 16 IU/L (ref 0–32)
AST SERPL-CCNC: 19 IU/L (ref 0–40)
BASOPHILS # BLD AUTO: 0 X10E3/UL (ref 0–0.2)
BASOPHILS NFR BLD AUTO: 1 %
BILIRUB SERPL-MCNC: 0.5 MG/DL (ref 0–1.2)
BUN SERPL-MCNC: 13 MG/DL (ref 8–27)
BUN/CREAT SERPL: 16 (ref 12–28)
CALCIUM SERPL-MCNC: 9.5 MG/DL (ref 8.7–10.3)
CHLORIDE SERPL-SCNC: 101 MMOL/L (ref 96–106)
CHOLEST SERPL-MCNC: 131 MG/DL (ref 100–199)
CK SERPL-CCNC: 65 U/L (ref 32–182)
CO2 SERPL-SCNC: 28 MMOL/L (ref 20–29)
CREAT SERPL-MCNC: 0.83 MG/DL (ref 0.57–1)
EGFRCR SERPLBLD CKD-EPI 2021: 72 ML/MIN/1.73
EOSINOPHIL # BLD AUTO: 0.3 X10E3/UL (ref 0–0.4)
EOSINOPHIL NFR BLD AUTO: 6 %
ERYTHROCYTE [DISTWIDTH] IN BLOOD BY AUTOMATED COUNT: 13.2 % (ref 11.7–15.4)
GLOBULIN SER CALC-MCNC: 2.7 G/DL (ref 1.5–4.5)
GLUCOSE SERPL-MCNC: 104 MG/DL (ref 70–99)
HCT VFR BLD AUTO: 39.4 % (ref 34–46.6)
HDLC SERPL-MCNC: 53 MG/DL
HGB BLD-MCNC: 13.3 G/DL (ref 11.1–15.9)
IMM GRANULOCYTES # BLD AUTO: 0 X10E3/UL (ref 0–0.1)
IMM GRANULOCYTES NFR BLD AUTO: 0 %
LDLC SERPL CALC-MCNC: 61 MG/DL (ref 0–99)
LYMPHOCYTES # BLD AUTO: 1.3 X10E3/UL (ref 0.7–3.1)
LYMPHOCYTES NFR BLD AUTO: 29 %
MCH RBC QN AUTO: 30.7 PG (ref 26.6–33)
MCHC RBC AUTO-ENTMCNC: 33.8 G/DL (ref 31.5–35.7)
MCV RBC AUTO: 91 FL (ref 79–97)
MONOCYTES # BLD AUTO: 0.4 X10E3/UL (ref 0.1–0.9)
MONOCYTES NFR BLD AUTO: 10 %
NEUTROPHILS # BLD AUTO: 2.5 X10E3/UL (ref 1.4–7)
NEUTROPHILS NFR BLD AUTO: 54 %
PLATELET # BLD AUTO: 239 X10E3/UL (ref 150–450)
POTASSIUM SERPL-SCNC: 4.2 MMOL/L (ref 3.5–5.2)
PROT SERPL-MCNC: 7.1 G/DL (ref 6–8.5)
RBC # BLD AUTO: 4.33 X10E6/UL (ref 3.77–5.28)
SODIUM SERPL-SCNC: 141 MMOL/L (ref 134–144)
TRIGL SERPL-MCNC: 86 MG/DL (ref 0–149)
VLDLC SERPL CALC-MCNC: 17 MG/DL (ref 5–40)
WBC # BLD AUTO: 4.6 X10E3/UL (ref 3.4–10.8)

## 2023-04-20 NOTE — TELEPHONE ENCOUNTER
Left a message for MsIvan Huber that all her labs were good. Keep your same medications for now and call if any issues.

## 2023-04-20 NOTE — TELEPHONE ENCOUNTER
----- Message from Amos Davis MD sent at 4/20/2023  9:28 AM EDT -----  Lab are good, keep same meds sugar mildly elevated 104

## 2023-04-26 ENCOUNTER — ANTICOAGULATION VISIT (OUTPATIENT)
Dept: PHARMACY | Facility: HOSPITAL | Age: 78
End: 2023-04-26
Payer: MEDICARE

## 2023-04-26 DIAGNOSIS — I48.0 PAROXYSMAL ATRIAL FIBRILLATION: Primary | ICD-10-CM

## 2023-04-26 LAB — INR PPP: 2.7

## 2023-04-26 NOTE — PROGRESS NOTES
Anticoagulation Clinic - Remote Progress Note  mdINR Home Monitor  Testing frequency: weekly    Indication: paroxysmal afib  Referring Provider: Susan [next 4/19/23]  Initial Warfarin Start Date: 2020? 2019?  Goal INR: 2.0-3.0  Current Drug Interactions:    [id number]: 4 (Age ?74, Sex, HTN) [estimated 5/20/21]  Bleed Risk: self reports negative history for GI bleed  Other: DOAC too expensive    Diet: green beans, peas, zucchini, or priyanka salads 2-3x/week (7/6/22)  Alcohol: none  Tobacco: none  OTC Pain Medication: APAP only    INR History:  Date 5/14 5/18 5/25 6/1 6/15 7/6 7/12 7/22 7/28 8/4 8/11 8/18 8/23 8/27   Total WeeklyDose  22.5mg 25mg 25mg 25mg 25mg 25mg 25mg 25mg 25mg 25mg 27.5mg 27.5mg 27.5mg   INR 3.5 1.9 2.3 2.3 2.8 2.7 2.1 2.2 1.9 2.0 1.8 2.3 2.2 1.8   Notes [place] Cards 1x hold; incr GLV     HM  incr GLV  no GLV incr GLV cefdinir cefdinir     Date 9/1 9/8 9/15 9/22 9/29 10/6 10/13 10/20 10/27 11/3 11/10 11/17 11/24 12/1   Total WeeklyDose 27.5mg 27.5mg 27.5mg 27.5mg 27.5mg 27.5mg 27.5mg 30mg 27.5mg 27.5mg 27.5mg 25mg 27.5mg 27.5mg   INR 2.1 2.3 3.3 2.3 2.2 2.0 1.9 2.6 3.2 2.4 3.4 1.6 2.4 2.3   Notes cefdinir  decr GLV?  keflex   1x incr dose   decr GLV? 1x decr dose;   incr GLV?       Date 12/9 12/15 12/22 12/29 1/5/22 1/12 1/20 1/26 1/31 2/3 2/7 2/10 2/16 2/23   Total WeeklyDose 27.5mg 27.5mg 27.5mg 27.5mg 27.5mg 27.5mg 27.5mg 27.5mg 27.5mg 25mg 25mg 22.5mg 27.5mg 27.5mg   INR 2.5 2.4 2.7 2.5 2.6 2.2 2.5 2.1 3.8 2.6 4.1 2.5 2.7 3.5   Notes call    call   COVID+ dex / azith 1x hold pred 20 BID x5d  1x hold; 1x decr dose;   call call call     Date 3/2 3/9 3/16 3/23 3/30 4/5 4/13 4/20 4/27 5/4 5/11 5/18 5/25 6/1   Total WeeklyDose 25mg 27.5mg 27.5mg 27.5mg 27.5mg 27.5mg 27.5 mg 27.5mg 27.5 mg 27.5 mg 27.5 mg 27.5 mg 27.5 mg 27.5mg   INR 2.5 2.9 2.2 2.8 2.7 2.4 2.5 2.3 2.5 2.8 3.1 2.6 2.5 2.3   Notes 1x decr  call call    call    call Call  apap rec 5/19       Date 6/8 6/15 6/22 6/29 7/6 7/14 7/20  Avni Figueroa called requesting a refill on the following medications:  Requested Prescriptions     Pending Prescriptions Disp Refills    potassium chloride (KLOR-CON M) 20 MEQ extended release tablet 60 tablet 5     Sig: Take 1 tablet by mouth 2 times daily       Date of last visit: 5/26/2021  Date of next visit (if applicable):Visit date not found  Date of last refill: 01/20/21  Pharmacy Name: [facility] 7/27 8/3 8/10 8/17 8/25 8/31 9/7   Total Weekly Dose 27.5 mg 27.5 mg 27.5 mg 27.5 mg 27.5 mg 27.5 mg 27.5 mg 30 mg 27.5 mg 27.5mg 27.5 mg 27.5 mg 27.5 mg 27.5 mg   INR 2.5 2.5 2.7 2.5 2.9 2.4 1.8 2.7 2.8 2.8 2.8 3.1 2.6 3.5   Notes call    call  call 12/71x boost   call Call  Dec GLV  call     Date 9/14 9/21 9/28 10/5 10/12 10/19 10/26 11/2 11/9 11/16 11/23 11/30 12/7   Total WeeklyDose 27.5mg 25 mg 25 mg 25 mg 25 mg  25 mg 25 mg 25 mg 25 mg 25 mg 25 mg 25 mg 25 mg   INR 3.1 2.9 2.5 2.9 2.2 2.4 2.3 2.1 2.3 2.3 2.1 2.2 1.9   Notes 1x hold inc GLV call   Call     call    Call  INC GLV     Date 12/14 12/22 12/28 1/4/23 1/11 1/18 1/25 2/1 2/8 2/16/23 2/22 3/1/23   Total WeeklyDose 27.5 mg 25 mg  20 mg 27.5 mg 27.5 mg 27.5 mg 27.5 mg 27.5 mg 27.5 mg 27.5 mg 27.5mg 27.5 mg    INR 2.8 2.1 1.9 1.9 1.8 2.1 2.2 2.0 2.7 2.5 2.6 2.8   Notes 1x boost  rec 12/15 Call  Call 1x miss call call Call  Call    Call        Date 3/9 3/16 3/22 3/29 4/5 4/12 4/19 4/26       Total WeeklyDose 27.5 mg 27.5 mg 27.5 mg 25mg 27.5 mg 27.5 mg 27.5 mg 27.5 mg       INR 2.6  2.6 4.1 2.7 2.6 2.7 2.9 2.7       Notes  call call call call            Phone Interview:  Tablet Strength: 5 mg (1/5/22)  Patient Contact Info: [phone] (home) preferred; [phone] (cell)  Verbal Release Auth: signed 5/25/21 -- May speak w/Femi Ngo, ; May leave voicemail on home phone  Lab Contact Info: [facility]  *Will call once monthly or if INR out of range*    Patient Findings  Comments:  Patient not contacted at this encounter.      Plan:  1. INR is therapeutic today at 2.7 (goal 2.0-3.0).  Ms. Ngo will continue warfarin 5mg oral daily except warfarin 2.5mg MonWedFri until recheck.    2. Repeat INR next week, 5/3/23.  3. Marianela Ngo understands the importance of calling the [facility] if she notices any s/sx of bleeding, stroke, or abnormal bruising, if any changes are made to her medications or medication doses (Rx, OTC,  herbal), or if any upcoming procedures are scheduled. aMrianela Ngo will likewise let us know if any other changes, questions, or concerns arise regarding anticoagulation therapy. she understands the importance of seeking medical attention immediately if she experiences any falls, vehicle accidents, or abnormal bleeding or bruising. Marianela Ngo voiced understanding of this information and confirms that she has the [facility]'s contact information. Otherwise, we will plan to contact the patient once monthly or if her INR is out of range.      [facility], have reviewed the note in full and agree with the assessment and plan.  04/27/23  09:06 EDT

## 2023-05-03 ENCOUNTER — ANTICOAGULATION VISIT (OUTPATIENT)
Dept: PHARMACY | Facility: HOSPITAL | Age: 78
End: 2023-05-03
Payer: MEDICARE

## 2023-05-03 DIAGNOSIS — I48.0 PAROXYSMAL ATRIAL FIBRILLATION: Primary | ICD-10-CM

## 2023-05-03 LAB — INR PPP: 2.5

## 2023-05-03 NOTE — PROGRESS NOTES
Anticoagulation Clinic - Remote Progress Note  mdINR Home Monitor  Testing frequency: weekly    Indication: paroxysmal afib  Referring Provider: Susan [next 4/19/23]  Initial Warfarin Start Date: 2020? 2019?  Goal INR: 2.0-3.0  Current Drug Interactions:    [id number]: 4 (Age ?74, Sex, HTN) [estimated 5/20/21]  Bleed Risk: self reports negative history for GI bleed  Other: DOAC too expensive    Diet: green beans, peas, zucchini, or priyanka salads 2-3x/week (7/6/22)  Alcohol: none  Tobacco: none  OTC Pain Medication: APAP only    INR History:  Date 5/14 5/18 5/25 6/1 6/15 7/6 7/12 7/22 7/28 8/4 8/11 8/18 8/23 8/27   Total WeeklyDose  22.5mg 25mg 25mg 25mg 25mg 25mg 25mg 25mg 25mg 25mg 27.5mg 27.5mg 27.5mg   INR 3.5 1.9 2.3 2.3 2.8 2.7 2.1 2.2 1.9 2.0 1.8 2.3 2.2 1.8   Notes [place] Cards 1x hold; incr GLV     HM  incr GLV  no GLV incr GLV cefdinir cefdinir     Date 9/1 9/8 9/15 9/22 9/29 10/6 10/13 10/20 10/27 11/3 11/10 11/17 11/24 12/1   Total WeeklyDose 27.5mg 27.5mg 27.5mg 27.5mg 27.5mg 27.5mg 27.5mg 30mg 27.5mg 27.5mg 27.5mg 25mg 27.5mg 27.5mg   INR 2.1 2.3 3.3 2.3 2.2 2.0 1.9 2.6 3.2 2.4 3.4 1.6 2.4 2.3   Notes cefdinir  decr GLV?  keflex   1x incr dose   decr GLV? 1x decr dose;   incr GLV?       Date 12/9 12/15 12/22 12/29 1/5/22 1/12 1/20 1/26 1/31 2/3 2/7 2/10 2/16 2/23   Total WeeklyDose 27.5mg 27.5mg 27.5mg 27.5mg 27.5mg 27.5mg 27.5mg 27.5mg 27.5mg 25mg 25mg 22.5mg 27.5mg 27.5mg   INR 2.5 2.4 2.7 2.5 2.6 2.2 2.5 2.1 3.8 2.6 4.1 2.5 2.7 3.5   Notes call    call   COVID+ dex / azith 1x hold pred 20 BID x5d  1x hold; 1x decr dose;   call call call     Date 3/2 3/9 3/16 3/23 3/30 4/5 4/13 4/20 4/27 5/4 5/11 5/18 5/25 6/1   Total WeeklyDose 25mg 27.5mg 27.5mg 27.5mg 27.5mg 27.5mg 27.5 mg 27.5mg 27.5 mg 27.5 mg 27.5 mg 27.5 mg 27.5 mg 27.5mg   INR 2.5 2.9 2.2 2.8 2.7 2.4 2.5 2.3 2.5 2.8 3.1 2.6 2.5 2.3   Notes 1x decr  call call    call    call Call  apap rec 5/19       Date 6/8 6/15 6/22 6/29 7/6 7/14 7/20  7/27 8/3 8/10 8/17 8/25 8/31 9/7   Total Weekly Dose 27.5 mg 27.5 mg 27.5 mg 27.5 mg 27.5 mg 27.5 mg 27.5 mg 30 mg 27.5 mg 27.5mg 27.5 mg 27.5 mg 27.5 mg 27.5 mg   INR 2.5 2.5 2.7 2.5 2.9 2.4 1.8 2.7 2.8 2.8 2.8 3.1 2.6 3.5   Notes call    call  call 12/71x boost   call Call  Dec GLV  call     Date 9/14 9/21 9/28 10/5 10/12 10/19 10/26 11/2 11/9 11/16 11/23 11/30 12/7   Total WeeklyDose 27.5mg 25 mg 25 mg 25 mg 25 mg  25 mg 25 mg 25 mg 25 mg 25 mg 25 mg 25 mg 25 mg   INR 3.1 2.9 2.5 2.9 2.2 2.4 2.3 2.1 2.3 2.3 2.1 2.2 1.9   Notes 1x hold inc GLV call   Call     call    Call  INC GLV     Date 12/14 12/22 12/28 1/4/23 1/11 1/18 1/25 2/1 2/8 2/16/23 2/22 3/1/23   Total WeeklyDose 27.5 mg 25 mg  20 mg 27.5 mg 27.5 mg 27.5 mg 27.5 mg 27.5 mg 27.5 mg 27.5 mg 27.5mg 27.5 mg    INR 2.8 2.1 1.9 1.9 1.8 2.1 2.2 2.0 2.7 2.5 2.6 2.8   Notes 1x boost  rec 12/15 Call  Call 1x miss call call Call  Call    Call        Date 3/9 3/16 3/22 3/29 4/5 4/12 4/19 4/26 5/3      Total WeeklyDose 27.5 mg 27.5 mg 27.5 mg 25mg 27.5 mg 27.5 mg 27.5 mg 27.5 mg 27.5 mg       INR 2.6  2.6 4.1 2.7 2.6 2.7 2.9 2.7 2.5      Notes  call call call call    call        Phone Interview:  Tablet Strength: 5 mg (1/5/22)  Patient Contact Info: [phone] (home) preferred; [phone] (cell)  Verbal Release Auth: signed 5/25/21 -- May speak w/Femi Ngo, ; May leave voicemail on home phone  Lab Contact Info: [facility]  *Will call once monthly or if INR out of range*    Patient Findings    Positives:  Upcoming invasive procedure   Negatives:  Signs/symptoms of thrombosis, Signs/symptoms of bleeding, Laboratory test error suspected, Change in health, Change in alcohol use, Change in activity, Emergency department visit, Upcoming dental procedure, Missed doses, Extra doses, Change in medications, Change in diet/appetite, Hospital admission, Bruising, Other complaints   Comments:  May be getting an ablation for her Afib, nothing definitive  yet in terms of date/time but patient will call us to let us know in case a surgery plan needs to be put together. Otherwise no changes per patient.      Plan:  1. INR is therapeutic today at 2.5 (goal 2.0-3.0).  Ms. Ngo will continue warfarin 5mg oral daily except warfarin 2.5mg MonWedFri until recheck.    2. Repeat INR next week, 5/10/23.  3. Marianela Ngo understands the importance of calling the [facility] if she notices any s/sx of bleeding, stroke, or abnormal bruising, if any changes are made to her medications or medication doses (Rx, OTC, herbal), or if any upcoming procedures are scheduled. Marianela Ngo will likewise let us know if any other changes, questions, or concerns arise regarding anticoagulation therapy. she understands the importance of seeking medical attention immediately if she experiences any falls, vehicle accidents, or abnormal bleeding or bruising. Marianela Ngo voiced understanding of this information and confirms that she has the [facility]'s contact information. Otherwise, we will plan to contact the patient once monthly or if her INR is out of range.      Theo Mccann,  John  05/03/23  11:55 EDT

## 2023-05-10 ENCOUNTER — ANTICOAGULATION VISIT (OUTPATIENT)
Dept: PHARMACY | Facility: HOSPITAL | Age: 78
End: 2023-05-10
Payer: MEDICARE

## 2023-05-10 DIAGNOSIS — I48.0 PAROXYSMAL ATRIAL FIBRILLATION: Primary | ICD-10-CM

## 2023-05-10 LAB — INR PPP: 2.8

## 2023-05-10 NOTE — PROGRESS NOTES
Anticoagulation Clinic - Remote Progress Note  mdINR Home Monitor  Testing frequency: weekly    Indication: paroxysmal afib  Referring Provider: Susan [next 4/19/23]  Initial Warfarin Start Date: 2020? 2019?  Goal INR: 2.0-3.0  Current Drug Interactions:    [id number]: 4 (Age ?74, Sex, HTN) [estimated 5/20/21]  Bleed Risk: self reports negative history for GI bleed  Other: DOAC too expensive    Diet: green beans, peas, zucchini, or priyanka salads 2-3x/week (7/6/22)  Alcohol: none  Tobacco: none  OTC Pain Medication: APAP only    INR History:  Date 5/14 5/18 5/25 6/1 6/15 7/6 7/12 7/22 7/28 8/4 8/11 8/18 8/23 8/27   Total WeeklyDose  22.5mg 25mg 25mg 25mg 25mg 25mg 25mg 25mg 25mg 25mg 27.5mg 27.5mg 27.5mg   INR 3.5 1.9 2.3 2.3 2.8 2.7 2.1 2.2 1.9 2.0 1.8 2.3 2.2 1.8   Notes [place] Cards 1x hold; incr GLV     HM  incr GLV  no GLV incr GLV cefdinir cefdinir     Date 9/1 9/8 9/15 9/22 9/29 10/6 10/13 10/20 10/27 11/3 11/10 11/17 11/24 12/1   Total WeeklyDose 27.5mg 27.5mg 27.5mg 27.5mg 27.5mg 27.5mg 27.5mg 30mg 27.5mg 27.5mg 27.5mg 25mg 27.5mg 27.5mg   INR 2.1 2.3 3.3 2.3 2.2 2.0 1.9 2.6 3.2 2.4 3.4 1.6 2.4 2.3   Notes cefdinir  decr GLV?  keflex   1x incr dose   decr GLV? 1x decr dose;   incr GLV?       Date 12/9 12/15 12/22 12/29 1/5/22 1/12 1/20 1/26 1/31 2/3 2/7 2/10 2/16 2/23   Total WeeklyDose 27.5mg 27.5mg 27.5mg 27.5mg 27.5mg 27.5mg 27.5mg 27.5mg 27.5mg 25mg 25mg 22.5mg 27.5mg 27.5mg   INR 2.5 2.4 2.7 2.5 2.6 2.2 2.5 2.1 3.8 2.6 4.1 2.5 2.7 3.5   Notes call    call   COVID+ dex / azith 1x hold pred 20 BID x5d  1x hold; 1x decr dose;   call call call     Date 3/2 3/9 3/16 3/23 3/30 4/5 4/13 4/20 4/27 5/4 5/11 5/18 5/25 6/1   Total WeeklyDose 25mg 27.5mg 27.5mg 27.5mg 27.5mg 27.5mg 27.5 mg 27.5mg 27.5 mg 27.5 mg 27.5 mg 27.5 mg 27.5 mg 27.5mg   INR 2.5 2.9 2.2 2.8 2.7 2.4 2.5 2.3 2.5 2.8 3.1 2.6 2.5 2.3   Notes 1x decr  call call    call    call Call  apap rec 5/19       Date 6/8 6/15 6/22 6/29 7/6 7/14 7/20  7/27 8/3 8/10 8/17 8/25 8/31 9/7   Total Weekly Dose 27.5 mg 27.5 mg 27.5 mg 27.5 mg 27.5 mg 27.5 mg 27.5 mg 30 mg 27.5 mg 27.5mg 27.5 mg 27.5 mg 27.5 mg 27.5 mg   INR 2.5 2.5 2.7 2.5 2.9 2.4 1.8 2.7 2.8 2.8 2.8 3.1 2.6 3.5   Notes call    call  call 12/71x boost   call Call  Dec GLV  call     Date 9/14 9/21 9/28 10/5 10/12 10/19 10/26 11/2 11/9 11/16 11/23 11/30 12/7   Total WeeklyDose 27.5mg 25 mg 25 mg 25 mg 25 mg  25 mg 25 mg 25 mg 25 mg 25 mg 25 mg 25 mg 25 mg   INR 3.1 2.9 2.5 2.9 2.2 2.4 2.3 2.1 2.3 2.3 2.1 2.2 1.9   Notes 1x hold inc GLV call   Call     call    Call  INC GLV     Date 12/14 12/22 12/28 1/4/23 1/11 1/18 1/25 2/1 2/8 2/16/23 2/22 3/1/23   Total WeeklyDose 27.5 mg 25 mg  20 mg 27.5 mg 27.5 mg 27.5 mg 27.5 mg 27.5 mg 27.5 mg 27.5 mg 27.5mg 27.5 mg    INR 2.8 2.1 1.9 1.9 1.8 2.1 2.2 2.0 2.7 2.5 2.6 2.8   Notes 1x boost  rec 12/15 Call  Call 1x miss call call Call  Call    Call        Date 3/9 3/16 3/22 3/29 4/5 4/12 4/19 4/26 5/3 5/8/23     Total WeeklyDose 27.5 mg 27.5 mg 27.5 mg 25mg 27.5 mg 27.5 mg 27.5 mg 27.5 mg 27.5 mg  27.5 mg      INR 2.6  2.6 4.1 2.7 2.6 2.7 2.9 2.7 2.5 2.8     Notes  call call call call    call        Phone Interview:  Tablet Strength: 5 mg (1/5/22)  Patient Contact Info: [phone] (home) preferred; [phone] (cell)  Verbal Release Auth: signed 5/25/21 -- May speak w/Femi Ngo, ; May leave voicemail on home phone  Lab Contact Info: [facility]  *Will call once monthly or if INR out of range*      Patient Findings  Comments:  Patient not contacted at this encounter        Plan:  1. INR is therapeutic today at 2.8 (goal 2.0-3.0).  Ms. Ngo will continue warfarin 5mg oral daily except warfarin 2.5mg MonWedFri until recheck.    2. Repeat INR next week, 5/17/23.  3. Marianela Ngo understands the importance of calling the [facility] if she notices any s/sx of bleeding, stroke, or abnormal bruising, if any changes are made to her  medications or medication doses (Rx, OTC, herbal), or if any upcoming procedures are scheduled. Marianela Ngo will likewise let us know if any other changes, questions, or concerns arise regarding anticoagulation therapy. she understands the importance of seeking medical attention immediately if she experiences any falls, vehicle accidents, or abnormal bleeding or bruising. Marianela Ngo voiced understanding of this information and confirms that she has the [facility]'s contact information. Otherwise, we will plan to contact the patient once monthly or if her INR is out of range.      Chicho Shine, Pharmacy Technician  5/10/2023  12:12 EDT      I, Maryjane Amezquita, PharmD, have reviewed the note in full and agree with the assessment and plan.  05/11/23  08:59 EDT

## 2023-05-17 ENCOUNTER — ANTICOAGULATION VISIT (OUTPATIENT)
Dept: PHARMACY | Facility: HOSPITAL | Age: 78
End: 2023-05-17
Payer: MEDICARE

## 2023-05-17 DIAGNOSIS — I48.0 PAROXYSMAL ATRIAL FIBRILLATION: Primary | ICD-10-CM

## 2023-05-17 LAB — INR PPP: 2

## 2023-05-17 NOTE — PROGRESS NOTES
Anticoagulation Clinic - Remote Progress Note  mdINR Home Monitor  Testing frequency: weekly    Indication: paroxysmal afib  Referring Provider: Susan [next 4/19/23]  Initial Warfarin Start Date: 2020? 2019?  Goal INR: 2.0-3.0  Current Drug Interactions:    [id number]: 4 (Age ?74, Sex, HTN) [estimated 5/20/21]  Bleed Risk: self reports negative history for GI bleed  Other: DOAC too expensive    Diet: green beans, peas, zucchini, or priyanka salads 2-3x/week (7/6/22)  Alcohol: none  Tobacco: none  OTC Pain Medication: APAP only    INR History:  Date 5/14 5/18 5/25 6/1 6/15 7/6 7/12 7/22 7/28 8/4 8/11 8/18 8/23 8/27   Total WeeklyDose  22.5mg 25mg 25mg 25mg 25mg 25mg 25mg 25mg 25mg 25mg 27.5mg 27.5mg 27.5mg   INR 3.5 1.9 2.3 2.3 2.8 2.7 2.1 2.2 1.9 2.0 1.8 2.3 2.2 1.8   Notes [place] Cards 1x hold; incr GLV     HM  incr GLV  no GLV incr GLV cefdinir cefdinir     Date 9/1 9/8 9/15 9/22 9/29 10/6 10/13 10/20 10/27 11/3 11/10 11/17 11/24 12/1   Total WeeklyDose 27.5mg 27.5mg 27.5mg 27.5mg 27.5mg 27.5mg 27.5mg 30mg 27.5mg 27.5mg 27.5mg 25mg 27.5mg 27.5mg   INR 2.1 2.3 3.3 2.3 2.2 2.0 1.9 2.6 3.2 2.4 3.4 1.6 2.4 2.3   Notes cefdinir  decr GLV?  keflex   1x incr dose   decr GLV? 1x decr dose;   incr GLV?       Date 12/9 12/15 12/22 12/29 1/5/22 1/12 1/20 1/26 1/31 2/3 2/7 2/10 2/16 2/23   Total WeeklyDose 27.5mg 27.5mg 27.5mg 27.5mg 27.5mg 27.5mg 27.5mg 27.5mg 27.5mg 25mg 25mg 22.5mg 27.5mg 27.5mg   INR 2.5 2.4 2.7 2.5 2.6 2.2 2.5 2.1 3.8 2.6 4.1 2.5 2.7 3.5   Notes call    call   COVID+ dex / azith 1x hold pred 20 BID x5d  1x hold; 1x decr dose;   call call call     Date 3/2 3/9 3/16 3/23 3/30 4/5 4/13 4/20 4/27 5/4 5/11 5/18 5/25 6/1   Total WeeklyDose 25mg 27.5mg 27.5mg 27.5mg 27.5mg 27.5mg 27.5 mg 27.5mg 27.5 mg 27.5 mg 27.5 mg 27.5 mg 27.5 mg 27.5mg   INR 2.5 2.9 2.2 2.8 2.7 2.4 2.5 2.3 2.5 2.8 3.1 2.6 2.5 2.3   Notes 1x decr  call call    call    call Call  apap rec 5/19       Date 6/8 6/15 6/22 6/29 7/6 7/14 7/20  7/27 8/3 8/10 8/17 8/25 8/31 9/7   Total Weekly Dose 27.5 mg 27.5 mg 27.5 mg 27.5 mg 27.5 mg 27.5 mg 27.5 mg 30 mg 27.5 mg 27.5mg 27.5 mg 27.5 mg 27.5 mg 27.5 mg   INR 2.5 2.5 2.7 2.5 2.9 2.4 1.8 2.7 2.8 2.8 2.8 3.1 2.6 3.5   Notes call    call  call 12/71x boost   call Call  Dec GLV  call     Date 9/14 9/21 9/28 10/5 10/12 10/19 10/26 11/2 11/9 11/16 11/23 11/30 12/7   Total WeeklyDose 27.5mg 25 mg 25 mg 25 mg 25 mg  25 mg 25 mg 25 mg 25 mg 25 mg 25 mg 25 mg 25 mg   INR 3.1 2.9 2.5 2.9 2.2 2.4 2.3 2.1 2.3 2.3 2.1 2.2 1.9   Notes 1x hold inc GLV call   Call     call    Call  INC GLV     Date 12/14 12/22 12/28 1/4/23 1/11 1/18 1/25 2/1 2/8 2/16/23 2/22 3/1/23   Total WeeklyDose 27.5 mg 25 mg  20 mg 27.5 mg 27.5 mg 27.5 mg 27.5 mg 27.5 mg 27.5 mg 27.5 mg 27.5mg 27.5 mg    INR 2.8 2.1 1.9 1.9 1.8 2.1 2.2 2.0 2.7 2.5 2.6 2.8   Notes 1x boost  rec 12/15 Call  Call 1x miss call call Call  Call    Call        Date 3/9 3/16 3/22 3/29 4/5 4/12 4/19 4/26 5/3 5/10/23 5/17    Total WeeklyDose 27.5 mg 27.5 mg 27.5 mg 25mg 27.5 mg 27.5 mg 27.5 mg 27.5 mg 27.5 mg  27.5 mg  27.5 mg    INR 2.6  2.6 4.1 2.7 2.6 2.7 2.9 2.7 2.5 2.8 2.0    Notes  call call call call    call        Phone Interview:  Tablet Strength: 5 mg (1/5/22)  Patient Contact Info: [phone] (home) preferred; [phone] (cell)  Verbal Release Auth: signed 5/25/21 -- May speak w/Femi Ngo, ; May leave voicemail on home phone  Lab Contact Info: [facility]  *Will call once monthly or if INR out of range*    Patient Findings    Comments:  Patient not contacted at this encounter.      Plan:  1. INR is therapeutic today at 2.0 (goal 2.0-3.0).  Ms. Ngo will continue warfarin 5mg oral daily except warfarin 2.5mg MonWedFri until recheck.    2. Repeat INR next week, 5/24/23.  3. Mariaenla Ngo understands the importance of calling the [facility] if she notices any s/sx of bleeding, stroke, or abnormal bruising, if any changes are  made to her medications or medication doses (Rx, OTC, herbal), or if any upcoming procedures are scheduled. Marianela Ngo will likewise let us know if any other changes, questions, or concerns arise regarding anticoagulation therapy. she understands the importance of seeking medical attention immediately if she experiences any falls, vehicle accidents, or abnormal bleeding or bruising. Marianela Ngo voiced understanding of this information and confirms that she has the [facility]'s contact information. Otherwise, we will plan to contact the patient once monthly or if her INR is out of range.    Scooby Garcia CPhT  5/17/2023  13:49 EDT     I, Divya Jaffe, PharmD, have reviewed the note in full and agree with the assessment and plan.  05/17/23  14:03 EDT

## 2023-05-24 ENCOUNTER — ANTICOAGULATION VISIT (OUTPATIENT)
Dept: PHARMACY | Facility: HOSPITAL | Age: 78
End: 2023-05-24
Payer: MEDICARE

## 2023-05-24 DIAGNOSIS — I48.0 PAROXYSMAL ATRIAL FIBRILLATION: Primary | ICD-10-CM

## 2023-05-24 LAB — INR PPP: 2.4

## 2023-05-24 NOTE — PROGRESS NOTES
Anticoagulation Clinic - Remote Progress Note  mdINR Home Monitor  Testing frequency: weekly    Indication: paroxysmal afib  Referring Provider: Susan [next 4/19/23]  Initial Warfarin Start Date: 2020? 2019?  Goal INR: 2.0-3.0  Current Drug Interactions:    [id number]: 4 (Age ?74, Sex, HTN) [estimated 5/20/21]  Bleed Risk: self reports negative history for GI bleed  Other: DOAC too expensive    Diet: green beans, peas, zucchini, or priyanka salads 2-3x/week (7/6/22)  Alcohol: none  Tobacco: none  OTC Pain Medication: APAP only    INR History:  Date 5/14 5/18 5/25 6/1 6/15 7/6 7/12 7/22 7/28 8/4 8/11 8/18 8/23 8/27   Total WeeklyDose  22.5mg 25mg 25mg 25mg 25mg 25mg 25mg 25mg 25mg 25mg 27.5mg 27.5mg 27.5mg   INR 3.5 1.9 2.3 2.3 2.8 2.7 2.1 2.2 1.9 2.0 1.8 2.3 2.2 1.8   Notes [place] Cards 1x hold; incr GLV     HM  incr GLV  no GLV incr GLV cefdinir cefdinir     Date 9/1 9/8 9/15 9/22 9/29 10/6 10/13 10/20 10/27 11/3 11/10 11/17 11/24 12/1   Total WeeklyDose 27.5mg 27.5mg 27.5mg 27.5mg 27.5mg 27.5mg 27.5mg 30mg 27.5mg 27.5mg 27.5mg 25mg 27.5mg 27.5mg   INR 2.1 2.3 3.3 2.3 2.2 2.0 1.9 2.6 3.2 2.4 3.4 1.6 2.4 2.3   Notes cefdinir  decr GLV?  keflex   1x incr dose   decr GLV? 1x decr dose;   incr GLV?       Date 12/9 12/15 12/22 12/29 1/5/22 1/12 1/20 1/26 1/31 2/3 2/7 2/10 2/16 2/23   Total WeeklyDose 27.5mg 27.5mg 27.5mg 27.5mg 27.5mg 27.5mg 27.5mg 27.5mg 27.5mg 25mg 25mg 22.5mg 27.5mg 27.5mg   INR 2.5 2.4 2.7 2.5 2.6 2.2 2.5 2.1 3.8 2.6 4.1 2.5 2.7 3.5   Notes call    call   COVID+ dex / azith 1x hold pred 20 BID x5d  1x hold; 1x decr dose;   call call call     Date 3/2 3/9 3/16 3/23 3/30 4/5 4/13 4/20 4/27 5/4 5/11 5/18 5/25 6/1   Total WeeklyDose 25mg 27.5mg 27.5mg 27.5mg 27.5mg 27.5mg 27.5 mg 27.5mg 27.5 mg 27.5 mg 27.5 mg 27.5 mg 27.5 mg 27.5mg   INR 2.5 2.9 2.2 2.8 2.7 2.4 2.5 2.3 2.5 2.8 3.1 2.6 2.5 2.3   Notes 1x decr  call call    call    call Call  apap rec 5/19       Date 6/8 6/15 6/22 6/29 7/6 7/14 7/20  7/27 8/3 8/10 8/17 8/25 8/31 9/7   Total Weekly Dose 27.5 mg 27.5 mg 27.5 mg 27.5 mg 27.5 mg 27.5 mg 27.5 mg 30 mg 27.5 mg 27.5mg 27.5 mg 27.5 mg 27.5 mg 27.5 mg   INR 2.5 2.5 2.7 2.5 2.9 2.4 1.8 2.7 2.8 2.8 2.8 3.1 2.6 3.5   Notes call    call  call 12/71x boost   call Call  Dec GLV  call     Date 9/14 9/21 9/28 10/5 10/12 10/19 10/26 11/2 11/9 11/16 11/23 11/30 12/7   Total WeeklyDose 27.5mg 25 mg 25 mg 25 mg 25 mg  25 mg 25 mg 25 mg 25 mg 25 mg 25 mg 25 mg 25 mg   INR 3.1 2.9 2.5 2.9 2.2 2.4 2.3 2.1 2.3 2.3 2.1 2.2 1.9   Notes 1x hold inc GLV call   Call     call    Call  INC GLV     Date 12/14 12/22 12/28 1/4/23 1/11 1/18 1/25 2/1 2/8 2/16/23 2/22 3/1/23   Total WeeklyDose 27.5 mg 25 mg  20 mg 27.5 mg 27.5 mg 27.5 mg 27.5 mg 27.5 mg 27.5 mg 27.5 mg 27.5mg 27.5 mg    INR 2.8 2.1 1.9 1.9 1.8 2.1 2.2 2.0 2.7 2.5 2.6 2.8   Notes 1x boost  rec 12/15 Call  Call 1x miss call call Call  Call    Call        Date 3/9 3/16 3/22 3/29 4/5 4/12 4/19 4/26 5/3 5/10/23 5/17 5/24   Total WeeklyDose 27.5 mg 27.5 mg 27.5 mg 25mg 27.5 mg 27.5 mg 27.5 mg 27.5 mg 27.5 mg  27.5 mg  27.5 mg 27.5 mg   INR 2.6  2.6 4.1 2.7 2.6 2.7 2.9 2.7 2.5 2.8 2.0 2.4   Notes  call call call call    call        Phone Interview:  Tablet Strength: 5 mg (1/5/22)  Patient Contact Info: [phone] (home) preferred; [phone] (cell)  Verbal Release Auth: signed 5/25/21 -- May speak w/Femi Ngo, ; May leave voicemail on home phone  Lab Contact Info: [facility]  *Will call once monthly or if INR out of range*      Patient Findings  Comments:  Patient not contacted at this encounter        Plan:  1. INR is therapeutic today at 2.4 (goal 2.0-3.0).  Ms. Ngo will continue warfarin 5mg oral daily except warfarin 2.5mg MonWedFri until recheck.    2. Repeat INR next week, 5/31/23.  3. Marianela Ngo understands the importance of calling the [facility] if she notices any s/sx of bleeding, stroke, or abnormal bruising, if  any changes are made to her medications or medication doses (Rx, OTC, herbal), or if any upcoming procedures are scheduled. Marianela Ngo will likewise let us know if any other changes, questions, or concerns arise regarding anticoagulation therapy. she understands the importance of seeking medical attention immediately if she experiences any falls, vehicle accidents, or abnormal bleeding or bruising. Marianela Ngo voiced understanding of this information and confirms that she has the [facility]'s contact information. Otherwise, we will plan to contact the patient once monthly or if her INR is out of range.    Chicho Shine, Pharmacy Technician  5/24/2023  16:01 EDT    I, Jayla Soriano, PharmD, have reviewed the note in full and agree with the assessment and plan.  05/24/23  16:07 EDT

## 2023-05-31 ENCOUNTER — ANTICOAGULATION VISIT (OUTPATIENT)
Dept: PHARMACY | Facility: HOSPITAL | Age: 78
End: 2023-05-31

## 2023-05-31 DIAGNOSIS — I48.0 PAROXYSMAL ATRIAL FIBRILLATION: Primary | ICD-10-CM

## 2023-05-31 LAB — INR PPP: 2.8

## 2023-05-31 NOTE — PROGRESS NOTES
Anticoagulation Clinic - Remote Progress Note  mdINR Home Monitor  Testing frequency: weekly    Indication: paroxysmal afib  Referring Provider: Susan [next 4/19/23]  Initial Warfarin Start Date: 2020? 2019?  Goal INR: 2.0-3.0  Current Drug Interactions:    [id number]: 4 (Age ?74, Sex, HTN) [estimated 5/20/21]  Bleed Risk: self reports negative history for GI bleed  Other: DOAC too expensive    Diet: green beans, peas, zucchini, or priyanka salads 2-3x/week (7/6/22)  Alcohol: none  Tobacco: none  OTC Pain Medication: APAP only    INR History:  Date 5/14 5/18 5/25 6/1 6/15 7/6 7/12 7/22 7/28 8/4 8/11 8/18 8/23 8/27   Total WeeklyDose  22.5mg 25mg 25mg 25mg 25mg 25mg 25mg 25mg 25mg 25mg 27.5mg 27.5mg 27.5mg   INR 3.5 1.9 2.3 2.3 2.8 2.7 2.1 2.2 1.9 2.0 1.8 2.3 2.2 1.8   Notes [place] Cards 1x hold; incr GLV     HM  incr GLV  no GLV incr GLV cefdinir cefdinir     Date 9/1 9/8 9/15 9/22 9/29 10/6 10/13 10/20 10/27 11/3 11/10 11/17 11/24 12/1   Total WeeklyDose 27.5mg 27.5mg 27.5mg 27.5mg 27.5mg 27.5mg 27.5mg 30mg 27.5mg 27.5mg 27.5mg 25mg 27.5mg 27.5mg   INR 2.1 2.3 3.3 2.3 2.2 2.0 1.9 2.6 3.2 2.4 3.4 1.6 2.4 2.3   Notes cefdinir  decr GLV?  keflex   1x incr dose   decr GLV? 1x decr dose;   incr GLV?       Date 12/9 12/15 12/22 12/29 1/5/22 1/12 1/20 1/26 1/31 2/3 2/7 2/10 2/16 2/23   Total WeeklyDose 27.5mg 27.5mg 27.5mg 27.5mg 27.5mg 27.5mg 27.5mg 27.5mg 27.5mg 25mg 25mg 22.5mg 27.5mg 27.5mg   INR 2.5 2.4 2.7 2.5 2.6 2.2 2.5 2.1 3.8 2.6 4.1 2.5 2.7 3.5   Notes call    call   COVID+ dex / azith 1x hold pred 20 BID x5d  1x hold; 1x decr dose;   call call call     Date 3/2 3/9 3/16 3/23 3/30 4/5 4/13 4/20 4/27 5/4 5/11 5/18 5/25 6/1   Total WeeklyDose 25mg 27.5mg 27.5mg 27.5mg 27.5mg 27.5mg 27.5 mg 27.5mg 27.5 mg 27.5 mg 27.5 mg 27.5 mg 27.5 mg 27.5mg   INR 2.5 2.9 2.2 2.8 2.7 2.4 2.5 2.3 2.5 2.8 3.1 2.6 2.5 2.3   Notes 1x decr  call call    call    call Call  apap rec 5/19       Date 6/8 6/15 6/22 6/29 7/6 7/14 7/20  7/27 8/3 8/10 8/17 8/25 8/31 9/7   Total Weekly Dose 27.5 mg 27.5 mg 27.5 mg 27.5 mg 27.5 mg 27.5 mg 27.5 mg 30 mg 27.5 mg 27.5mg 27.5 mg 27.5 mg 27.5 mg 27.5 mg   INR 2.5 2.5 2.7 2.5 2.9 2.4 1.8 2.7 2.8 2.8 2.8 3.1 2.6 3.5   Notes call    call  call 12/71x boost   call Call  Dec GLV  call     Date 9/14 9/21 9/28 10/5 10/12 10/19 10/26 11/2 11/9 11/16 11/23 11/30 12/7   Total WeeklyDose 27.5mg 25 mg 25 mg 25 mg 25 mg  25 mg 25 mg 25 mg 25 mg 25 mg 25 mg 25 mg 25 mg   INR 3.1 2.9 2.5 2.9 2.2 2.4 2.3 2.1 2.3 2.3 2.1 2.2 1.9   Notes 1x hold inc GLV call   Call     call    Call  INC GLV     Date 12/14 12/22 12/28 1/4/23 1/11 1/18 1/25 2/1 2/8 2/16/23 2/22 3/1/23   Total WeeklyDose 27.5 mg 25 mg  20 mg 27.5 mg 27.5 mg 27.5 mg 27.5 mg 27.5 mg 27.5 mg 27.5 mg 27.5mg 27.5 mg    INR 2.8 2.1 1.9 1.9 1.8 2.1 2.2 2.0 2.7 2.5 2.6 2.8   Notes 1x boost  rec 12/15 Call  Call 1x miss call call Call  Call    Call        Date 3/9 3/16 3/22 3/29 4/5 4/12 4/19 4/26 5/3 5/10/23 5/17 5/24   Total WeeklyDose 27.5 mg 27.5 mg 27.5 mg 25mg 27.5 mg 27.5 mg 27.5 mg 27.5 mg 27.5 mg  27.5 mg  27.5 mg 27.5 mg   INR 2.6  2.6 4.1 2.7 2.6 2.7 2.9 2.7 2.5 2.8 2.0 2.4   Notes  call call call call    call        Date 5/31              Total WeeklyDose 27.5 mg              INR 2.8              Notes call                Phone Interview:  Tablet Strength: 5 mg (1/5/22)  Patient Contact Info: [phone] (home) preferred; [phone] (cell)  Verbal Release Auth: signed 5/25/21 -- May speak w/Femi Ngo, ; May leave voicemail on home phone  Lab Contact Info: [facility]  *Will call once monthly or if INR out of range*    Patient Findings  Negatives:  Signs/symptoms of thrombosis, Signs/symptoms of bleeding, Laboratory test error suspected, Change in health, Change in alcohol use, Change in activity, Upcoming invasive procedure, Emergency department visit, Upcoming dental procedure, Missed doses, Extra doses, Change in medications, Change  in diet/appetite, Hospital admission, Bruising, Other complaints   Comments:  All findings negative per pt.      Plan:  1. INR is therapeutic today at 2.8 (goal 2.0-3.0). Instructed Ms. Ngo to continue warfarin 5mg oral daily except warfarin 2.5mg MonWedFri until recheck.    2. Repeat INR in one week, 6/7/23.  3. Marianela Ngo understands the importance of calling the [facility] if she notices any s/sx of bleeding, stroke, or abnormal bruising, if any changes are made to her medications or medication doses (Rx, OTC, herbal), or if any upcoming procedures are scheduled. Marianela Ngo will likewise let us know if any other changes, questions, or concerns arise regarding anticoagulation therapy. she understands the importance of seeking medical attention immediately if she experiences any falls, vehicle accidents, or abnormal bleeding or bruising. Marianela Ngo voiced understanding of this information and confirms that she has the [facility]'s contact information. Otherwise, we will plan to contact the patient once monthly or if her INR is out of range.    Scooby Garcia CPhT  5/31/2023  15:22 EDT       I, Maryjane Amezquita, PharmD, have reviewed the note in full and agree with the assessment and plan.  05/31/23  15:43 EDT

## 2023-06-07 ENCOUNTER — ANTICOAGULATION VISIT (OUTPATIENT)
Dept: PHARMACY | Facility: HOSPITAL | Age: 78
End: 2023-06-07
Payer: MEDICARE

## 2023-06-07 DIAGNOSIS — I48.0 PAROXYSMAL ATRIAL FIBRILLATION: Primary | ICD-10-CM

## 2023-06-07 LAB — INR PPP: 2.5

## 2023-06-07 NOTE — PROGRESS NOTES
Anticoagulation Clinic - Remote Progress Note  mdINR Home Monitor  Testing frequency: weekly    Indication: paroxysmal afib  Referring Provider: Susan [next 4/19/23]  Initial Warfarin Start Date: 2020? 2019?  Goal INR: 2.0-3.0  Current Drug Interactions:    [id number]: 4 (Age ?74, Sex, HTN) [estimated 5/20/21]  Bleed Risk: self reports negative history for GI bleed  Other: DOAC too expensive    Diet: green beans, peas, zucchini, or priyanka salads 2-3x/week (7/6/22)  Alcohol: none  Tobacco: none  OTC Pain Medication: APAP only    INR History:  Date 5/14 5/18 5/25 6/1 6/15 7/6 7/12 7/22 7/28 8/4 8/11 8/18 8/23 8/27   Total WeeklyDose  22.5mg 25mg 25mg 25mg 25mg 25mg 25mg 25mg 25mg 25mg 27.5mg 27.5mg 27.5mg   INR 3.5 1.9 2.3 2.3 2.8 2.7 2.1 2.2 1.9 2.0 1.8 2.3 2.2 1.8   Notes [place] Cards 1x hold; incr GLV     HM  incr GLV  no GLV incr GLV cefdinir cefdinir     Date 9/1 9/8 9/15 9/22 9/29 10/6 10/13 10/20 10/27 11/3 11/10 11/17 11/24 12/1   Total WeeklyDose 27.5mg 27.5mg 27.5mg 27.5mg 27.5mg 27.5mg 27.5mg 30mg 27.5mg 27.5mg 27.5mg 25mg 27.5mg 27.5mg   INR 2.1 2.3 3.3 2.3 2.2 2.0 1.9 2.6 3.2 2.4 3.4 1.6 2.4 2.3   Notes cefdinir  decr GLV?  keflex   1x incr dose   decr GLV? 1x decr dose;   incr GLV?       Date 12/9 12/15 12/22 12/29 1/5/22 1/12 1/20 1/26 1/31 2/3 2/7 2/10 2/16 2/23   Total WeeklyDose 27.5mg 27.5mg 27.5mg 27.5mg 27.5mg 27.5mg 27.5mg 27.5mg 27.5mg 25mg 25mg 22.5mg 27.5mg 27.5mg   INR 2.5 2.4 2.7 2.5 2.6 2.2 2.5 2.1 3.8 2.6 4.1 2.5 2.7 3.5   Notes call    call   COVID+ dex / azith 1x hold pred 20 BID x5d  1x hold; 1x decr dose;   call call call     Date 3/2 3/9 3/16 3/23 3/30 4/5 4/13 4/20 4/27 5/4 5/11 5/18 5/25 6/1   Total WeeklyDose 25mg 27.5mg 27.5mg 27.5mg 27.5mg 27.5mg 27.5 mg 27.5mg 27.5 mg 27.5 mg 27.5 mg 27.5 mg 27.5 mg 27.5mg   INR 2.5 2.9 2.2 2.8 2.7 2.4 2.5 2.3 2.5 2.8 3.1 2.6 2.5 2.3   Notes 1x decr  call call    call    call Call  apap rec 5/19       Date 6/8 6/15 6/22 6/29 7/6 7/14 7/20  7/27 8/3 8/10 8/17 8/25 8/31 9/7   Total Weekly Dose 27.5 mg 27.5 mg 27.5 mg 27.5 mg 27.5 mg 27.5 mg 27.5 mg 30 mg 27.5 mg 27.5mg 27.5 mg 27.5 mg 27.5 mg 27.5 mg   INR 2.5 2.5 2.7 2.5 2.9 2.4 1.8 2.7 2.8 2.8 2.8 3.1 2.6 3.5   Notes call    call  call 12/71x boost   call Call  Dec GLV  call     Date 9/14 9/21 9/28 10/5 10/12 10/19 10/26 11/2 11/9 11/16 11/23 11/30 12/7   Total WeeklyDose 27.5mg 25 mg 25 mg 25 mg 25 mg  25 mg 25 mg 25 mg 25 mg 25 mg 25 mg 25 mg 25 mg   INR 3.1 2.9 2.5 2.9 2.2 2.4 2.3 2.1 2.3 2.3 2.1 2.2 1.9   Notes 1x hold inc GLV call   Call     call    Call  INC GLV     Date 12/14 12/22 12/28 1/4/23 1/11 1/18 1/25 2/1 2/8 2/16/23 2/22 3/1/23   Total WeeklyDose 27.5 mg 25 mg  20 mg 27.5 mg 27.5 mg 27.5 mg 27.5 mg 27.5 mg 27.5 mg 27.5 mg 27.5mg 27.5 mg    INR 2.8 2.1 1.9 1.9 1.8 2.1 2.2 2.0 2.7 2.5 2.6 2.8   Notes 1x boost  rec 12/15 Call  Call 1x miss call call Call  Call    Call        Date 3/9 3/16 3/22 3/29 4/5 4/12 4/19 4/26 5/3 5/10/23 5/17 5/24   Total WeeklyDose 27.5 mg 27.5 mg 27.5 mg 25mg 27.5 mg 27.5 mg 27.5 mg 27.5 mg 27.5 mg  27.5 mg  27.5 mg 27.5 mg   INR 2.6  2.6 4.1 2.7 2.6 2.7 2.9 2.7 2.5 2.8 2.0 2.4   Notes  call call call call    call        Date 5/31 6/7/23             Total WeeklyDose 27.5 mg 27.5 mg              INR 2.8 2.5             Notes call                Phone Interview:  Tablet Strength: 5 mg (1/5/22)  Patient Contact Info: [phone] (home) preferred; [phone] (cell)  Verbal Release Auth: signed 5/25/21 -- May speak w/Femi Ngo, ; May leave voicemail on home phone  Lab Contact Info: [facility]  *Will call once monthly or if INR out of range*      Patient Findings  Comments: Patient not contacted at this encounter       Plan:  INR is therapeutic today at 2.5 (goal 2.0-3.0). Instructed Ms. Ngo to continue warfarin 5mg oral daily except warfarin 2.5mg MonWedFri until recheck.    Repeat INR in one week, 6/14/23.  Marianela Ngo understands the  importance of calling the [facility] if she notices any s/sx of bleeding, stroke, or abnormal bruising, if any changes are made to her medications or medication doses (Rx, OTC, herbal), or if any upcoming procedures are scheduled. Marianela Ngo will likewise let us know if any other changes, questions, or concerns arise regarding anticoagulation therapy. she understands the importance of seeking medical attention immediately if she experiences any falls, vehicle accidents, or abnormal bleeding or bruising. Marianela Ngo voiced understanding of this information and confirms that she has the [facility]'s contact information. Otherwise, we will plan to contact the patient once monthly or if her INR is out of range.    Chicho Shine, Pharmacy Technician  6/7/2023  15:24 EDT    I, Saravanan Greenberg, PharmD, have reviewed the note in full and agree with the assessment and plan.  06/08/23  13:11 EDT

## 2023-06-14 ENCOUNTER — ANTICOAGULATION VISIT (OUTPATIENT)
Dept: PHARMACY | Facility: HOSPITAL | Age: 78
End: 2023-06-14
Payer: MEDICARE

## 2023-06-14 DIAGNOSIS — I48.0 PAROXYSMAL ATRIAL FIBRILLATION: Primary | ICD-10-CM

## 2023-06-14 LAB — INR PPP: 3.2

## 2023-06-14 NOTE — PROGRESS NOTES
Anticoagulation Clinic - Remote Progress Note  mdINR Home Monitor  Testing frequency: weekly    Indication: paroxysmal afib  Referring Provider: Susan [next 4/19/23]  Initial Warfarin Start Date: 2020? 2019?  Goal INR: 2.0-3.0  Current Drug Interactions:    [id number]: 4 (Age ?74, Sex, HTN) [estimated 5/20/21]  Bleed Risk: self reports negative history for GI bleed  Other: DOAC too expensive    Diet: green beans, peas, zucchini, or priyanka salads 2-3x/week (7/6/22)  Alcohol: none  Tobacco: none  OTC Pain Medication: APAP only    INR History:  Date 5/14 5/18 5/25 6/1 6/15 7/6 7/12 7/22 7/28 8/4 8/11 8/18 8/23 8/27   Total WeeklyDose  22.5mg 25mg 25mg 25mg 25mg 25mg 25mg 25mg 25mg 25mg 27.5mg 27.5mg 27.5mg   INR 3.5 1.9 2.3 2.3 2.8 2.7 2.1 2.2 1.9 2.0 1.8 2.3 2.2 1.8   Notes [place] Cards 1x hold; incr GLV     HM  incr GLV  no GLV incr GLV cefdinir cefdinir     Date 9/1 9/8 9/15 9/22 9/29 10/6 10/13 10/20 10/27 11/3 11/10 11/17 11/24 12/1   Total WeeklyDose 27.5mg 27.5mg 27.5mg 27.5mg 27.5mg 27.5mg 27.5mg 30mg 27.5mg 27.5mg 27.5mg 25mg 27.5mg 27.5mg   INR 2.1 2.3 3.3 2.3 2.2 2.0 1.9 2.6 3.2 2.4 3.4 1.6 2.4 2.3   Notes cefdinir  decr GLV?  keflex   1x incr dose   decr GLV? 1x decr dose;   incr GLV?       Date 12/9 12/15 12/22 12/29 1/5/22 1/12 1/20 1/26 1/31 2/3 2/7 2/10 2/16 2/23   Total WeeklyDose 27.5mg 27.5mg 27.5mg 27.5mg 27.5mg 27.5mg 27.5mg 27.5mg 27.5mg 25mg 25mg 22.5mg 27.5mg 27.5mg   INR 2.5 2.4 2.7 2.5 2.6 2.2 2.5 2.1 3.8 2.6 4.1 2.5 2.7 3.5   Notes call    call   COVID+ dex / azith 1x hold pred 20 BID x5d  1x hold; 1x decr dose;   call call call     Date 3/2 3/9 3/16 3/23 3/30 4/5 4/13 4/20 4/27 5/4 5/11 5/18 5/25 6/1   Total WeeklyDose 25mg 27.5mg 27.5mg 27.5mg 27.5mg 27.5mg 27.5 mg 27.5mg 27.5 mg 27.5 mg 27.5 mg 27.5 mg 27.5 mg 27.5mg   INR 2.5 2.9 2.2 2.8 2.7 2.4 2.5 2.3 2.5 2.8 3.1 2.6 2.5 2.3   Notes 1x decr  call call    call    call Call  apap rec 5/19       Date 6/8 6/15 6/22 6/29 7/6 7/14 7/20  7/27 8/3 8/10 8/17 8/25 8/31 9/7   Total Weekly Dose 27.5 mg 27.5 mg 27.5 mg 27.5 mg 27.5 mg 27.5 mg 27.5 mg 30 mg 27.5 mg 27.5mg 27.5 mg 27.5 mg 27.5 mg 27.5 mg   INR 2.5 2.5 2.7 2.5 2.9 2.4 1.8 2.7 2.8 2.8 2.8 3.1 2.6 3.5   Notes call    call  call 12/71x boost   call Call  Dec GLV  call     Date 9/14 9/21 9/28 10/5 10/12 10/19 10/26 11/2 11/9 11/16 11/23 11/30 12/7   Total WeeklyDose 27.5mg 25 mg 25 mg 25 mg 25 mg  25 mg 25 mg 25 mg 25 mg 25 mg 25 mg 25 mg 25 mg   INR 3.1 2.9 2.5 2.9 2.2 2.4 2.3 2.1 2.3 2.3 2.1 2.2 1.9   Notes 1x hold inc GLV call   Call     call    Call  INC GLV     Date 12/14 12/22 12/28 1/4/23 1/11 1/18 1/25 2/1 2/8 2/16/23 2/22 3/1/23   Total WeeklyDose 27.5 mg 25 mg  20 mg 27.5 mg 27.5 mg 27.5 mg 27.5 mg 27.5 mg 27.5 mg 27.5 mg 27.5mg 27.5 mg    INR 2.8 2.1 1.9 1.9 1.8 2.1 2.2 2.0 2.7 2.5 2.6 2.8   Notes 1x boost  rec 12/15 Call  Call 1x miss call call Call  Call    Call        Date 3/9 3/16 3/22 3/29 4/5 4/12 4/19 4/26 5/3 5/10/23 5/17 5/24   Total WeeklyDose 27.5 mg 27.5 mg 27.5 mg 25mg 27.5 mg 27.5 mg 27.5 mg 27.5 mg 27.5 mg  27.5 mg  27.5 mg 27.5 mg   INR 2.6  2.6 4.1 2.7 2.6 2.7 2.9 2.7 2.5 2.8 2.0 2.4   Notes  call call call call    call        Date 5/31 6/7/23 6/14            Total WeeklyDose 27.5 mg 27.5 mg  27.5 mg            INR 2.8 2.5 3.2            Notes call  Call  Dec GLV              Phone Interview:  Tablet Strength: 5 mg (1/5/22)  Patient Contact Info: [phone] (home) preferred; [phone] (cell)  Verbal Release Auth: signed 5/25/21 -- May speak w/Femi Ngo, ; May leave voicemail on home phone  Lab Contact Info: [facility]  *Will call once monthly or if INR out of range*    Patient Findings    Positives: Change in diet/appetite   Negatives: Signs/symptoms of thrombosis, Signs/symptoms of bleeding, Laboratory test error suspected, Change in health, Change in alcohol use, Change in activity, Upcoming invasive procedure, Emergency department visit,  Upcoming dental procedure, Missed doses, Extra doses, Change in medications, Hospital admission, Bruising, Other complaints   Comments: Pt has been moving and diet has been inconsistent     Plan:  INR is SUPRAtherapeutic today at 3.2 (goal 2.0-3.0). Per Theo Mccann, PharmD,Instructed Ms. Ngo to continue warfarin 5mg oral daily except warfarin 2.5mg MonWedFri until recheck.  Ms Ngo said she ate an extra serving of green beans for lunch today after checking INR.  Repeat INR in one week, 6/21/23.  Marianela Ngo understands the importance of calling the [facility] if she notices any s/sx of bleeding, stroke, or abnormal bruising, if any changes are made to her medications or medication doses (Rx, OTC, herbal), or if any upcoming procedures are scheduled. Marianela Ngo will likewise let us know if any other changes, questions, or concerns arise regarding anticoagulation therapy. she understands the importance of seeking medical attention immediately if she experiences any falls, vehicle accidents, or abnormal bleeding or bruising. Marianela Ngo voiced understanding of this information and confirms that she has the [facility]'s contact information. Otherwise, we will plan to contact the patient once monthly or if her INR is out of range.    Scooby Garcia CPhT  6/14/2023  14:26 EDT       I, Theo Mccann [facility], assisted in the patient interview. I have reviewed the note in full and agree with the assessment and plan.  06/14/23  14:37 EDT

## 2023-07-12 ENCOUNTER — TELEPHONE (OUTPATIENT)
Dept: PHARMACY | Facility: HOSPITAL | Age: 78
End: 2023-07-12

## 2023-07-12 NOTE — TELEPHONE ENCOUNTER
Pt called to report her HM wouldn't result today, she used four strips today. Advised Ms Ngo to step away from HM for 30 minutes, clean and dry hands, and try again. Ms Ngo reports she has two strips left.

## 2023-07-26 ENCOUNTER — ANTICOAGULATION VISIT (OUTPATIENT)
Dept: PHARMACY | Facility: HOSPITAL | Age: 78
End: 2023-07-26
Payer: MEDICARE

## 2023-07-26 DIAGNOSIS — I48.0 PAROXYSMAL ATRIAL FIBRILLATION: Primary | ICD-10-CM

## 2023-07-26 LAB — INR PPP: 2.6

## 2023-07-26 NOTE — PROGRESS NOTES
Anticoagulation Clinic - Remote Progress Note  mdINR Home Monitor  Testing frequency: weekly    Indication: paroxysmal afib  Referring Provider: Susan [next 4/19/23]  Initial Warfarin Start Date: 2020? 2019?  Goal INR: 2.0-3.0  Current Drug Interactions:    [id number]: 4 (Age ?75, Sex, HTN) [estimated 5/20/21]  Bleed Risk: self reports negative history for GI bleed  Other: DOAC too expensive    Diet: green beans, peas, zucchini, or priyanka salads 2-3x/week (7/6/2023)  Alcohol: none  Tobacco: none  OTC Pain Medication: APAP only    INR History:  Date 5/14 5/18 5/25 6/1 6/15 7/6 7/12 7/22 7/28 8/4 8/11 8/18 8/23 8/27   Total WeeklyDose  22.5mg 25mg 25mg 25mg 25mg 25mg 25mg 25mg 25mg 25mg 27.5mg 27.5mg 27.5mg   INR 3.5 1.9 2.3 2.3 2.8 2.7 2.1 2.2 1.9 2.0 1.8 2.3 2.2 1.8   Notes [place] Cards 1x hold; incr GLV     HM  incr GLV  no GLV incr GLV cefdinir cefdinir     Date 9/1 9/8 9/15 9/22 9/29 10/6 10/13 10/20 10/27 11/3 11/10 11/17 11/24 12/1   Total WeeklyDose 27.5mg 27.5mg 27.5mg 27.5mg 27.5mg 27.5mg 27.5mg 30mg 27.5mg 27.5mg 27.5mg 25mg 27.5mg 27.5mg   INR 2.1 2.3 3.3 2.3 2.2 2.0 1.9 2.6 3.2 2.4 3.4 1.6 2.4 2.3   Notes cefdinir  decr GLV?  keflex   1x incr dose   decr GLV? 1x decr dose;   incr GLV?       Date 12/9 12/15 12/22 12/29 1/5/22 1/12 1/20 1/26 1/31 2/3 2/7 2/10 2/16 2/23   Total WeeklyDose 27.5mg 27.5mg 27.5mg 27.5mg 27.5mg 27.5mg 27.5mg 27.5mg 27.5mg 25mg 25mg 22.5mg 27.5mg 27.5mg   INR 2.5 2.4 2.7 2.5 2.6 2.2 2.5 2.1 3.8 2.6 4.1 2.5 2.7 3.5   Notes call    call   COVID+ dex / azith 1x hold pred 20 BID x5d  1x hold; 1x decr dose;   call call call     Date 3/2 3/9 3/16 3/23 3/30 4/5 4/13 4/20 4/27 5/4 5/11 5/18 5/25 6/1   Total WeeklyDose 25mg 27.5mg 27.5mg 27.5mg 27.5mg 27.5mg 27.5 mg 27.5mg 27.5 mg 27.5 mg 27.5 mg 27.5 mg 27.5 mg 27.5mg   INR 2.5 2.9 2.2 2.8 2.7 2.4 2.5 2.3 2.5 2.8 3.1 2.6 2.5 2.3   Notes 1x decr  call call    call    call Call  apap rec 5/19       Date 6/8 6/15 6/22 6/29 7/6 7/14  7/20 7/27 8/3 8/10 8/17 8/25 8/31 9/7   Total Weekly Dose 27.5 mg 27.5 mg 27.5 mg 27.5 mg 27.5 mg 27.5 mg 27.5 mg 30 mg 27.5 mg 27.5mg 27.5 mg 27.5 mg 27.5 mg 27.5 mg   INR 2.5 2.5 2.7 2.5 2.9 2.4 1.8 2.7 2.8 2.8 2.8 3.1 2.6 3.5   Notes call    call  call 12/71x boost   call Call  Dec GLV  call     Date 9/14 9/21 9/28 10/5 10/12 10/19 10/26 11/2 11/9 11/16 11/23 11/30 12/7   Total WeeklyDose 27.5mg 25 mg 25 mg 25 mg 25 mg  25 mg 25 mg 25 mg 25 mg 25 mg 25 mg 25 mg 25 mg   INR 3.1 2.9 2.5 2.9 2.2 2.4 2.3 2.1 2.3 2.3 2.1 2.2 1.9   Notes 1x hold inc GLV call   Call     call    Call  INC GLV     Date 12/14 12/22 12/28 1/4/23 1/11 1/18 1/25 2/1 2/8 2/16/23 2/22 3/1/23   Total WeeklyDose 27.5 mg 25 mg  20 mg 27.5 mg 27.5 mg 27.5 mg 27.5 mg 27.5 mg 27.5 mg 27.5 mg 27.5mg 27.5 mg    INR 2.8 2.1 1.9 1.9 1.8 2.1 2.2 2.0 2.7 2.5 2.6 2.8   Notes 1x boost  rec 12/15 Call  Call 1x miss call call Call  Call    Call        Date 3/9 3/16 3/22 3/29 4/5 4/12 4/19 4/26 5/3 5/10/23 5/17 5/24   Total WeeklyDose 27.5 mg 27.5 mg 27.5 mg 25mg 27.5 mg 27.5 mg 27.5 mg 27.5 mg 27.5 mg  27.5 mg  27.5 mg 27.5 mg   INR 2.6  2.6 4.1 2.7 2.6 2.7 2.9 2.7 2.5 2.8 2.0 2.4   Notes  call call call call    call        Date 5/31 6/7/23 6/14 6/21/23 6/28 7/5 7/13 7/19/23 7/26/23      Total WeeklyDose 27.5 mg 27.5 mg  27.5 mg 27.5 mg 27.5 mg 25mg 25 mg 25 mg 25 mg      INR 2.8 2.5 3.2 2.9 3.8 3.2 2.9 2.3 2.6      Notes call  Call  Dec GLV   Hold x1; moving; call           Phone Interview:  Tablet Strength: 5 mg (1/5/22)  Patient Contact Info: [phone] (home) preferred; [phone] (cell)  Verbal Release Auth: signed 5/25/21 -- May speak w/Femi Ngo, ; May leave voicemail on home phone  Lab Contact Info: [facility]  *Will call once monthly or if INR out of range*      Patient Findings  Comments: Patient not contacted at this encounter       Plan:  INR was therapeutic yesterday at 2.6 (goal 2.0-3.0). Ms. Ngo will continue warfarin  2.5mg oral daily except warfarin 5 mg SunTueThur until recheck.  Repeat INR in one week, 8/2/23.  Marianela Ngo understands the importance of calling the [facility] if she notices any s/sx of bleeding, stroke, or abnormal bruising, if any changes are made to her medications or medication doses (Rx, OTC, herbal), or if any upcoming procedures are scheduled. Marianela Ngo will likewise let us know if any other changes, questions, or concerns arise regarding anticoagulation therapy. she understands the importance of seeking medical attention immediately if she experiences any falls, vehicle accidents, or abnormal bleeding or bruising. Marianela Ngo voiced understanding of this information and confirms that she has the [facility]'s contact information. Otherwise, we will plan to contact the patient once monthly or if her INR is out of range.    Chicho Shine, Pharmacy Technician  07/26/23  16:02 EDT      I, Saravanan Greenberg, PharmD, have reviewed the note in full and agree with the assessment and plan.  07/27/23  12:28 EDT

## 2023-08-02 ENCOUNTER — ANTICOAGULATION VISIT (OUTPATIENT)
Dept: PHARMACY | Facility: HOSPITAL | Age: 78
End: 2023-08-02
Payer: MEDICARE

## 2023-08-02 DIAGNOSIS — I48.0 PAROXYSMAL ATRIAL FIBRILLATION: Primary | ICD-10-CM

## 2023-08-02 LAB — INR PPP: 2.9

## 2023-08-10 ENCOUNTER — ANTICOAGULATION VISIT (OUTPATIENT)
Dept: PHARMACY | Facility: HOSPITAL | Age: 78
End: 2023-08-10
Payer: MEDICARE

## 2023-08-10 DIAGNOSIS — I48.0 PAROXYSMAL ATRIAL FIBRILLATION: Primary | ICD-10-CM

## 2023-08-10 LAB — INR PPP: 2.8

## 2023-08-10 NOTE — PROGRESS NOTES
Anticoagulation Clinic - Remote Progress Note  mdINR Home Monitor  Testing frequency: weekly    Indication: paroxysmal afib  Referring Provider: Susan [next 4/19/23]  Initial Warfarin Start Date: 2020? 2019?  Goal INR: 2.0-3.0  Current Drug Interactions:    [id number]: 4 (Age ?75, Sex, HTN) [estimated 5/20/21]  Bleed Risk: self reports negative history for GI bleed  Other: DOAC too expensive    Diet: green beans, peas, zucchini, or priyanka salads 2-3x/week (7/6/2023)  Alcohol: none  Tobacco: none  OTC Pain Medication: APAP only    INR History:  Date 5/14 5/18 5/25 6/1 6/15 7/6 7/12 7/22 7/28 8/4 8/11 8/18 8/23 8/27   Total WeeklyDose  22.5mg 25mg 25mg 25mg 25mg 25mg 25mg 25mg 25mg 25mg 27.5mg 27.5mg 27.5mg   INR 3.5 1.9 2.3 2.3 2.8 2.7 2.1 2.2 1.9 2.0 1.8 2.3 2.2 1.8   Notes [place] Cards 1x hold; incr GLV     HM  incr GLV  no GLV incr GLV cefdinir cefdinir     Date 9/1 9/8 9/15 9/22 9/29 10/6 10/13 10/20 10/27 11/3 11/10 11/17 11/24 12/1   Total WeeklyDose 27.5mg 27.5mg 27.5mg 27.5mg 27.5mg 27.5mg 27.5mg 30mg 27.5mg 27.5mg 27.5mg 25mg 27.5mg 27.5mg   INR 2.1 2.3 3.3 2.3 2.2 2.0 1.9 2.6 3.2 2.4 3.4 1.6 2.4 2.3   Notes cefdinir  decr GLV?  keflex   1x incr dose   decr GLV? 1x decr dose;   incr GLV?       Date 12/9 12/15 12/22 12/29 1/5/22 1/12 1/20 1/26 1/31 2/3 2/7 2/10 2/16 2/23   Total WeeklyDose 27.5mg 27.5mg 27.5mg 27.5mg 27.5mg 27.5mg 27.5mg 27.5mg 27.5mg 25mg 25mg 22.5mg 27.5mg 27.5mg   INR 2.5 2.4 2.7 2.5 2.6 2.2 2.5 2.1 3.8 2.6 4.1 2.5 2.7 3.5   Notes call    call   COVID+ dex / azith 1x hold pred 20 BID x5d  1x hold; 1x decr dose;   call call call     Date 3/2 3/9 3/16 3/23 3/30 4/5 4/13 4/20 4/27 5/4 5/11 5/18 5/25 6/1   Total WeeklyDose 25mg 27.5mg 27.5mg 27.5mg 27.5mg 27.5mg 27.5 mg 27.5mg 27.5 mg 27.5 mg 27.5 mg 27.5 mg 27.5 mg 27.5mg   INR 2.5 2.9 2.2 2.8 2.7 2.4 2.5 2.3 2.5 2.8 3.1 2.6 2.5 2.3   Notes 1x decr  call call    call    call Call  apap rec 5/19       Date 6/8 6/15 6/22 6/29 7/6 7/14  7/20 7/27 8/3 8/10 8/17 8/25 8/31 9/7   Total Weekly Dose 27.5 mg 27.5 mg 27.5 mg 27.5 mg 27.5 mg 27.5 mg 27.5 mg 30 mg 27.5 mg 27.5mg 27.5 mg 27.5 mg 27.5 mg 27.5 mg   INR 2.5 2.5 2.7 2.5 2.9 2.4 1.8 2.7 2.8 2.8 2.8 3.1 2.6 3.5   Notes call    call  call 12/71x boost   call Call  Dec GLV  call     Date 9/14 9/21 9/28 10/5 10/12 10/19 10/26 11/2 11/9 11/16 11/23 11/30 12/7   Total WeeklyDose 27.5mg 25 mg 25 mg 25 mg 25 mg  25 mg 25 mg 25 mg 25 mg 25 mg 25 mg 25 mg 25 mg   INR 3.1 2.9 2.5 2.9 2.2 2.4 2.3 2.1 2.3 2.3 2.1 2.2 1.9   Notes 1x hold inc GLV call   Call     call    Call  INC GLV     Date 12/14 12/22 12/28 1/4/23 1/11 1/18 1/25 2/1 2/8 2/16/23 2/22 3/1/23   Total WeeklyDose 27.5 mg 25 mg  20 mg 27.5 mg 27.5 mg 27.5 mg 27.5 mg 27.5 mg 27.5 mg 27.5 mg 27.5mg 27.5 mg    INR 2.8 2.1 1.9 1.9 1.8 2.1 2.2 2.0 2.7 2.5 2.6 2.8   Notes 1x boost  rec 12/15 Call  Call 1x miss call call Call  Call    Call        Date 3/9 3/16 3/22 3/29 4/5 4/12 4/19 4/26 5/3 5/10/23 5/17 5/24   Total WeeklyDose 27.5 mg 27.5 mg 27.5 mg 25mg 27.5 mg 27.5 mg 27.5 mg 27.5 mg 27.5 mg  27.5 mg  27.5 mg 27.5 mg   INR 2.6  2.6 4.1 2.7 2.6 2.7 2.9 2.7 2.5 2.8 2.0 2.4   Notes  call call call call    call        Date 5/31 6/7/23 6/14 6/21/23 6/28 7/5 7/13 7/19/23 7/26/23 8/2 08/10    Total WeeklyDose 27.5 mg 27.5 mg  27.5 mg 27.5 mg 27.5 mg 25mg 25 mg 25 mg 25 mg 25 mg 25 mg    INR 2.8 2.5 3.2 2.9 3.8 3.2 2.9 2.3 2.6 2.9 2.8    Notes call  Call  Dec GLV   Hold x1; moving; call     call      Phone Interview:  Tablet Strength: 5 mg (1/5/22)  Patient Contact Info: [phone] (home) preferred; [phone] (cell)  Verbal Release Auth: signed 5/25/21 -- May speak w/Femi Ngo, ; May leave voicemail on home phone  Lab Contact Info: [facility]  *Will call once monthly or if INR out of range*    Patient Findings  Positives: Change in health   Negatives: Signs/symptoms of thrombosis, Signs/symptoms of bleeding, Laboratory test error  suspected, Change in alcohol use, Change in activity, Upcoming invasive procedure, Emergency department visit, Upcoming dental procedure, Missed doses, Extra doses, Change in medications, Change in diet/appetite, Hospital admission, Bruising, Other complaints   Comments: PT state she has lost about 40 lb, she had been planning to lose weight for a while now, and is feeling a lot better.  All other findings negative per pt.     Plan:  INR was therapeutic yesterday at 2.8 (goal 2.0-3.0). Ms. Ngo will continue warfarin 2.5mg oral daily except warfarin 5 mg SunTueThur until recheck.  Repeat INR in one week, 8/16/23.  Marianela Ngo understands the importance of calling the [facility] if she notices any s/sx of bleeding, stroke, or abnormal bruising, if any changes are made to her medications or medication doses (Rx, OTC, herbal), or if any upcoming procedures are scheduled. Marianela Ngo will likewise let us know if any other changes, questions, or concerns arise regarding anticoagulation therapy. she understands the importance of seeking medical attention immediately if she experiences any falls, vehicle accidents, or abnormal bleeding or bruising. Marianela Ngo voiced understanding of this information and confirms that she has the [facility]'s contact information. Otherwise, we will plan to contact the patient once monthly or if her INR is out of range.    Babita Leonard  Pharmacy Technician  8/10/2023 10:34 EDT    I, Maryjane Amezquita, PharmD, have reviewed the note in full and agree with the assessment and plan.  08/10/23  13:41 EDT

## 2023-08-16 ENCOUNTER — ANTICOAGULATION VISIT (OUTPATIENT)
Dept: PHARMACY | Facility: HOSPITAL | Age: 78
End: 2023-08-16
Payer: MEDICARE

## 2023-08-16 DIAGNOSIS — I48.0 PAROXYSMAL ATRIAL FIBRILLATION: Primary | ICD-10-CM

## 2023-08-16 LAB — INR PPP: 2.9

## 2023-08-16 NOTE — PROGRESS NOTES
Anticoagulation Clinic - Remote Progress Note  mdINR Home Monitor  Testing frequency: weekly    Indication: paroxysmal afib  Referring Provider: Susan [next 4/19/23]  Initial Warfarin Start Date: 2020? 2019?  Goal INR: 2.0-3.0  Current Drug Interactions:    [id number]: 4 (Age ?75, Sex, HTN) [estimated 5/20/21]  Bleed Risk: self reports negative history for GI bleed  Other: DOAC too expensive    Diet: green beans, peas, zucchini, or priyanka salads 2-3x/week (7/6/2023)  Alcohol: none  Tobacco: none  OTC Pain Medication: APAP only    INR History:  Date 5/14 5/18 5/25 6/1 6/15 7/6 7/12 7/22 7/28 8/4 8/11 8/18 8/23 8/27   Total WeeklyDose  22.5mg 25mg 25mg 25mg 25mg 25mg 25mg 25mg 25mg 25mg 27.5mg 27.5mg 27.5mg   INR 3.5 1.9 2.3 2.3 2.8 2.7 2.1 2.2 1.9 2.0 1.8 2.3 2.2 1.8   Notes [place] Cards 1x hold; incr GLV     HM  incr GLV  no GLV incr GLV cefdinir cefdinir     Date 9/1 9/8 9/15 9/22 9/29 10/6 10/13 10/20 10/27 11/3 11/10 11/17 11/24 12/1   Total WeeklyDose 27.5mg 27.5mg 27.5mg 27.5mg 27.5mg 27.5mg 27.5mg 30mg 27.5mg 27.5mg 27.5mg 25mg 27.5mg 27.5mg   INR 2.1 2.3 3.3 2.3 2.2 2.0 1.9 2.6 3.2 2.4 3.4 1.6 2.4 2.3   Notes cefdinir  decr GLV?  keflex   1x incr dose   decr GLV? 1x decr dose;   incr GLV?       Date 12/9 12/15 12/22 12/29 1/5/22 1/12 1/20 1/26 1/31 2/3 2/7 2/10 2/16 2/23   Total WeeklyDose 27.5mg 27.5mg 27.5mg 27.5mg 27.5mg 27.5mg 27.5mg 27.5mg 27.5mg 25mg 25mg 22.5mg 27.5mg 27.5mg   INR 2.5 2.4 2.7 2.5 2.6 2.2 2.5 2.1 3.8 2.6 4.1 2.5 2.7 3.5   Notes call    call   COVID+ dex / azith 1x hold pred 20 BID x5d  1x hold; 1x decr dose;   call call call     Date 3/2 3/9 3/16 3/23 3/30 4/5 4/13 4/20 4/27 5/4 5/11 5/18 5/25 6/1   Total WeeklyDose 25mg 27.5mg 27.5mg 27.5mg 27.5mg 27.5mg 27.5 mg 27.5mg 27.5 mg 27.5 mg 27.5 mg 27.5 mg 27.5 mg 27.5mg   INR 2.5 2.9 2.2 2.8 2.7 2.4 2.5 2.3 2.5 2.8 3.1 2.6 2.5 2.3   Notes 1x decr  call call    call    call Call  apap rec 5/19       Date 6/8 6/15 6/22 6/29 7/6 7/14  7/20 7/27 8/3 8/10 8/17 8/25 8/31 9/7   Total Weekly Dose 27.5 mg 27.5 mg 27.5 mg 27.5 mg 27.5 mg 27.5 mg 27.5 mg 30 mg 27.5 mg 27.5mg 27.5 mg 27.5 mg 27.5 mg 27.5 mg   INR 2.5 2.5 2.7 2.5 2.9 2.4 1.8 2.7 2.8 2.8 2.8 3.1 2.6 3.5   Notes call    call  call 12/71x boost   call Call  Dec GLV  call     Date 9/14 9/21 9/28 10/5 10/12 10/19 10/26 11/2 11/9 11/16 11/23 11/30 12/7   Total WeeklyDose 27.5mg 25 mg 25 mg 25 mg 25 mg  25 mg 25 mg 25 mg 25 mg 25 mg 25 mg 25 mg 25 mg   INR 3.1 2.9 2.5 2.9 2.2 2.4 2.3 2.1 2.3 2.3 2.1 2.2 1.9   Notes 1x hold inc GLV call   Call     call    Call  INC GLV     Date 12/14 12/22 12/28 1/4/23 1/11 1/18 1/25 2/1 2/8 2/16/23 2/22 3/1/23   Total WeeklyDose 27.5 mg 25 mg  20 mg 27.5 mg 27.5 mg 27.5 mg 27.5 mg 27.5 mg 27.5 mg 27.5 mg 27.5mg 27.5 mg    INR 2.8 2.1 1.9 1.9 1.8 2.1 2.2 2.0 2.7 2.5 2.6 2.8   Notes 1x boost  rec 12/15 Call  Call 1x miss call call Call  Call    Call        Date 3/9 3/16 3/22 3/29 4/5 4/12 4/19 4/26 5/3 5/10/23 5/17 5/24   Total WeeklyDose 27.5 mg 27.5 mg 27.5 mg 25mg 27.5 mg 27.5 mg 27.5 mg 27.5 mg 27.5 mg  27.5 mg  27.5 mg 27.5 mg   INR 2.6  2.6 4.1 2.7 2.6 2.7 2.9 2.7 2.5 2.8 2.0 2.4   Notes  call call call call    call        Date 5/31 6/7/23 6/14 6/21/23 6/28 7/5 7/13 7/19/23 7/26/23 8/2 08/10 8/16/23   Total WeeklyDose 27.5 mg 27.5 mg  27.5 mg 27.5 mg 27.5 mg 25mg 25 mg 25 mg 25 mg 25 mg 25 mg 25 mg   INR 2.8 2.5 3.2 2.9 3.8 3.2 2.9 2.3 2.6 2.9 2.8 2.9   Notes call  Call  Dec GLV   Hold x1; moving; call     call      Date               Total WeeklyDose               INR               Notes                 Phone Interview:  Tablet Strength: 5 mg (1/5/22)  Patient Contact Info: [phone] (home) preferred; [phone] (cell)  Verbal Release Auth: signed 5/25/21 -- May speak w/Femidoron Ngo, ; May leave voicemail on home phone  Lab Contact Info: [facility]  *Will call once monthly or if INR out of range*      Patient Findings  Comments:  Patient not contacted at this encounter       Plan:  INR was therapeutic today at 2.9 (goal 2.0-3.0). Ms. Ngo will continue warfarin 2.5mg oral daily except warfarin 5 mg SunTueThur until recheck.  Repeat INR in one week, 8/23.  Marianela Ngo understands the importance of calling the [facility] if she notices any s/sx of bleeding, stroke, or abnormal bruising, if any changes are made to her medications or medication doses (Rx, OTC, herbal), or if any upcoming procedures are scheduled. Marianela Ngo will likewise let us know if any other changes, questions, or concerns arise regarding anticoagulation therapy. she understands the importance of seeking medical attention immediately if she experiences any falls, vehicle accidents, or abnormal bleeding or bruising. Marianela Ngo voiced understanding of this information and confirms that she has the [facility]'s contact information. Otherwise, we will plan to contact the patient once monthly or if her INR is out of range.    Chicho Shine, Pharmacy Technician  8/16/2023  16:11 EDT    I, Saravanan Greenberg, PharmD, have reviewed the note in full and agree with the assessment and plan.  08/17/23  10:02 EDT

## 2023-08-17 ENCOUNTER — OFFICE VISIT (OUTPATIENT)
Dept: FAMILY MEDICINE CLINIC | Facility: CLINIC | Age: 78
End: 2023-08-17
Payer: MEDICARE

## 2023-08-17 VITALS
DIASTOLIC BLOOD PRESSURE: 64 MMHG | OXYGEN SATURATION: 96 % | TEMPERATURE: 98.6 F | HEART RATE: 82 BPM | HEIGHT: 65 IN | BODY MASS INDEX: 29.99 KG/M2 | WEIGHT: 180 LBS | SYSTOLIC BLOOD PRESSURE: 118 MMHG

## 2023-08-17 DIAGNOSIS — M79.604 RIGHT LEG PAIN: ICD-10-CM

## 2023-08-17 DIAGNOSIS — L03.115 CELLULITIS OF RIGHT LEG: Primary | ICD-10-CM

## 2023-08-17 PROCEDURE — 3074F SYST BP LT 130 MM HG: CPT | Performed by: PHYSICIAN ASSISTANT

## 2023-08-17 PROCEDURE — 99213 OFFICE O/P EST LOW 20 MIN: CPT | Performed by: PHYSICIAN ASSISTANT

## 2023-08-17 PROCEDURE — 3078F DIAST BP <80 MM HG: CPT | Performed by: PHYSICIAN ASSISTANT

## 2023-08-17 NOTE — PROGRESS NOTES
"Chief Complaint  Leg Pain (Went to [facility] ER Tuesday night for right leg being red and swollen and painful. )    Subjective          Marianela Ngo presents to [facility]  History of Present Illness  Patient in today for evaluation on right lower leg pain that started yesterday. She had gone to ER 2 days for not feeling well and chills. Her wbc was at 18.0 but there was no source of infection found and was instructed to monitor closely.  She states yesterday started to noticed pain from mid right calf down to foot and started with redness and heat which has worsened overnight. She can barely put weight on this leg due to pain. No fever. No vomiting or diarrhea. Denies any recent travel or trauma to leg.   Leg Pain   The incident occurred 12 to 24 hours ago. There was no injury mechanism. The pain is present in the right leg. Associated symptoms include an inability to bear weight.     Objective   Vital Signs:   /64 (BP Location: Left arm, Patient Position: Sitting, Cuff Size: Large Adult)   Pulse 82   Temp 98.6 øF (37 øC)   Ht 165.1 cm (65\")   Wt 81.6 kg (180 lb)   SpO2 96%   BMI 29.95 kg/mý     Body mass index is 29.95 kg/mý.    Review of Systems   Constitutional:  Negative for fever.   Cardiovascular:  Negative for chest pain.   Gastrointestinal:  Negative for diarrhea, nausea and vomiting.   Musculoskeletal:  Positive for arthralgias.        Redness and heat to right lower leg     Past History:  Medical History: has a past medical history of Allergies, Bradycardia with 51-60 beats per minute, Chronic atrial fibrillation, Current use of long term anticoagulation, DJD (degenerative joint disease), Epistaxis, Essential hypertension, High risk medication use, Mild persistent asthma, uncomplicated, Mixed hyperlipidemia, Obstructive sleep apnea on CPAP, Other fatigue, Paroxysmal atrial fibrillation, Pneumonia (08/2015), Shortness of breath, and Vitamin deficiency.   Surgical " History: has a past surgical history that includes Hysterectomy; Replacement total knee bilateral (2013,2014); and Cataract extraction, bilateral.   Family History: family history includes Cancer in her brother and father; Diabetes in her brother; Heart disease in her sister; Hypertension in an other family member.   Social History: reports that she quit smoking about 49 years ago. Her smoking use included cigarettes. She smoked an average of 1 pack per day. She has never used smokeless tobacco. She reports that she does not drink alcohol and does not use drugs.      Current Outpatient Medications:     albuterol (PROVENTIL) (2.5 MG/3ML) 0.083% nebulizer solution, USE 1 VIAL IN NEBULIZER EVERY 4 TO 6 HOURS, Disp: 180 each, Rfl: 11    amLODIPine (NORVASC) 5 MG tablet, Take 1 tablet by mouth Daily., Disp: 90 tablet, Rfl: 3    atorvastatin (LIPITOR) 20 MG tablet, Take 1 tablet by mouth Daily., Disp: 90 tablet, Rfl: 3    budesonide (PULMICORT) 0.5 MG/2ML nebulizer solution, USE 1 VIAL IN NEBULIZER DAILY - Rinse Mouth After Use, Disp: 30 each, Rfl: 11    lisinopril-hydrochlorothiazide (PRINZIDE,ZESTORETIC) 20-12.5 MG per tablet, Take 1 tablet by mouth Daily., Disp: 90 tablet, Rfl: 3    sotalol (BETAPACE) 80 MG tablet, Take 1/2 in am and 1 whole tab in the pm, Disp: 180 tablet, Rfl: 3    warfarin (COUMADIN) 5 MG tablet, Take 1 tablet by mouth Daily., Disp: 180 tablet, Rfl: 3  Allergies: Patient has no known allergies.    Physical Exam  Constitutional:       Appearance: Normal appearance.   Cardiovascular:      Rate and Rhythm: Normal rate.   Pulmonary:      Effort: Pulmonary effort is normal.      Breath sounds: Normal breath sounds.   Skin:     Findings: Erythema present.             Comments: Redness and heat to anterior shin and redness/ heat to posterior shin ; patient only able to put minimal weight on leg due to pain; swelling noted to lower right leg vs right ; bialteral knee replacement scars noted   Neurological:       Mental Status: She is alert and oriented to person, place, and time.   Psychiatric:         Mood and Affect: Mood normal.         Behavior: Behavior normal.           Assessment and Plan   Diagnoses and all orders for this visit:    1. Cellulitis of right leg (Primary)  With extent of redness , heat and swelling to right lower leg and pain noted - recommend ER evaluation today as may also need doppler ultrasound, repeat labs, imaging - concern as has had  knee replacement -- she states her coumadin has been checked and in normal range- no recent changes to dosage noted ;  f/up as directed after evaluation   2. Right leg pain            Follow Up   No follow-ups on file.  Patient was given instructions and counseling regarding her condition or for health maintenance advice. Please see specific information pulled into the AVS if appropriate.     Marietta Archibald PA-C

## 2023-08-23 ENCOUNTER — ANTICOAGULATION VISIT (OUTPATIENT)
Dept: PHARMACY | Facility: HOSPITAL | Age: 78
End: 2023-08-23
Payer: MEDICARE

## 2023-08-23 DIAGNOSIS — I48.0 PAROXYSMAL ATRIAL FIBRILLATION: Primary | ICD-10-CM

## 2023-08-23 LAB — INR PPP: 2.9

## 2023-08-23 NOTE — PROGRESS NOTES
Anticoagulation Clinic - Remote Progress Note  mdINR Home Monitor  Testing frequency: weekly    Indication: paroxysmal afib  Referring Provider: Susan [next 4/19/23]  Initial Warfarin Start Date: 2020? 2019?  Goal INR: 2.0-3.0  Current Drug Interactions: hydrochlorothiazide   [id number]: 4 (Age ?74, Sex, HTN) [estimated 5/20/21]  Bleed Risk: self reports negative history for GI bleed  Other: DOAC too expensive    Diet: green beans, peas, zucchini, or priyanka salads 2-3x/week (7/6/2023)  Alcohol: none  Tobacco: none  OTC Pain Medication: APAP only    INR History:  Date 5/14 5/18 5/25 6/1 6/15 7/6 7/12 7/22 7/28 8/4 8/11 8/18 8/23 8/27   Total WeeklyDose  22.5mg 25mg 25mg 25mg 25mg 25mg 25mg 25mg 25mg 25mg 27.5mg 27.5mg 27.5mg   INR 3.5 1.9 2.3 2.3 2.8 2.7 2.1 2.2 1.9 2.0 1.8 2.3 2.2 1.8   Notes [place] Cards 1x hold; incr GLV     HM  incr GLV  no GLV incr GLV cefdinir cefdinir     Date 9/1 9/8 9/15 9/22 9/29 10/6 10/13 10/20 10/27 11/3 11/10 11/17 11/24 12/1   Total WeeklyDose 27.5mg 27.5mg 27.5mg 27.5mg 27.5mg 27.5mg 27.5mg 30mg 27.5mg 27.5mg 27.5mg 25mg 27.5mg 27.5mg   INR 2.1 2.3 3.3 2.3 2.2 2.0 1.9 2.6 3.2 2.4 3.4 1.6 2.4 2.3   Notes cefdinir  decr GLV?  keflex   1x incr dose   decr GLV? 1x decr dose;   incr GLV?       Date 12/9 12/15 12/22 12/29 1/5/22 1/12 1/20 1/26 1/31 2/3 2/7 2/10 2/16 2/23   Total WeeklyDose 27.5mg 27.5mg 27.5mg 27.5mg 27.5mg 27.5mg 27.5mg 27.5mg 27.5mg 25mg 25mg 22.5mg 27.5mg 27.5mg   INR 2.5 2.4 2.7 2.5 2.6 2.2 2.5 2.1 3.8 2.6 4.1 2.5 2.7 3.5   Notes call    call   COVID+ dex / azith 1x hold pred 20 BID x5d  1x hold; 1x decr dose;   call call call     Date 3/2 3/9 3/16 3/23 3/30 4/5 4/13 4/20 4/27 5/4 5/11 5/18 5/25 6/1   Total WeeklyDose 25mg 27.5mg 27.5mg 27.5mg 27.5mg 27.5mg 27.5 mg 27.5mg 27.5 mg 27.5 mg 27.5 mg 27.5 mg 27.5 mg 27.5mg   INR 2.5 2.9 2.2 2.8 2.7 2.4 2.5 2.3 2.5 2.8 3.1 2.6 2.5 2.3   Notes 1x decr  call call    call    call Call  apap rec 5/19       Date 6/8 6/15  6/22 6/29 7/6 7/14 7/20 7/27 8/3 8/10 8/17 8/25 8/31 9/7   Total Weekly Dose 27.5 mg 27.5 mg 27.5 mg 27.5 mg 27.5 mg 27.5 mg 27.5 mg 30 mg 27.5 mg 27.5mg 27.5 mg 27.5 mg 27.5 mg 27.5 mg   INR 2.5 2.5 2.7 2.5 2.9 2.4 1.8 2.7 2.8 2.8 2.8 3.1 2.6 3.5   Notes call    call  call 12/71x boost   call Call  Dec GLV  call     Date 9/14 9/21 9/28 10/5 10/12 10/19 10/26 11/2 11/9 11/16 11/23 11/30 12/7   Total WeeklyDose 27.5mg 25 mg 25 mg 25 mg 25 mg  25 mg 25 mg 25 mg 25 mg 25 mg 25 mg 25 mg 25 mg   INR 3.1 2.9 2.5 2.9 2.2 2.4 2.3 2.1 2.3 2.3 2.1 2.2 1.9   Notes 1x hold inc GLV call   Call     call    Call  INC GLV     Date 12/14 12/22 12/28 1/4/23 1/11 1/18 1/25 2/1 2/8 2/16/23 2/22 3/1/23   Total WeeklyDose 27.5 mg 25 mg  20 mg 27.5 mg 27.5 mg 27.5 mg 27.5 mg 27.5 mg 27.5 mg 27.5 mg 27.5mg 27.5 mg    INR 2.8 2.1 1.9 1.9 1.8 2.1 2.2 2.0 2.7 2.5 2.6 2.8   Notes 1x boost  rec 12/15 Call  Call 1x miss call call Call  Call    Call        Date 3/9 3/16 3/22 3/29 4/5 4/12 4/19 4/26 5/3 5/10/23 5/17 5/24   Total WeeklyDose 27.5 mg 27.5 mg 27.5 mg 25mg 27.5 mg 27.5 mg 27.5 mg 27.5 mg 27.5 mg  27.5 mg  27.5 mg 27.5 mg   INR 2.6  2.6 4.1 2.7 2.6 2.7 2.9 2.7 2.5 2.8 2.0 2.4   Notes  call call call call    call        Date 5/31 6/7/23 6/14 6/21/23 6/28 7/5 7/13 7/19/23 7/26/23 8/2 08/10 8/16/23   Total WeeklyDose 27.5 mg 27.5 mg  27.5 mg 27.5 mg 27.5 mg 25mg 25 mg 25 mg 25 mg 25 mg 25 mg 25 mg   INR 2.8 2.5 3.2 2.9 3.8 3.2 2.9 2.3 2.6 2.9 2.8 2.9   Notes call  Call  Dec GLV   Hold x1; moving; call     call      Date 08/23              Total WeeklyDose 25 mg              INR 2.9              Notes                 Phone Interview:  Tablet Strength: 5 mg (1/5/22)  Patient Contact Info: [phone] (home) preferred; [phone] (cell)  Verbal Release Auth: signed 5/25/21 -- May speak w/Femi Ngo, ; May leave voicemail on home phone  Lab Contact Info: [facility]  *Will call once monthly or if INR out of  range*      Patient Findings  Comments: Patient not contacted at this encounter       Plan:  INR was therapeutic today at 2.9 (goal 2.0-3.0). Ms. Ngo will continue warfarin 2.5mg oral daily except warfarin 5 mg SunTueThur until recheck.  Repeat INR in one week, 8/30.  Marianela Ngo understands the importance of calling the [facility] if she notices any s/sx of bleeding, stroke, or abnormal bruising, if any changes are made to her medications or medication doses (Rx, OTC, herbal), or if any upcoming procedures are scheduled. Marianela Ngo will likewise let us know if any other changes, questions, or concerns arise regarding anticoagulation therapy. she understands the importance of seeking medical attention immediately if she experiences any falls, vehicle accidents, or abnormal bleeding or bruising. Marianela Ngo voiced understanding of this information and confirms that she has the [facility]'s contact information. Otherwise, we will plan to contact the patient once monthly or if her INR is out of range.    Babita Leonard  Pharmacy Technician  8/23/2023 14:35 EDT    I, Montana Hernandez, [facility], have reviewed the note in full and agree with the assessment and plan.  08/23/23  14:58 EDT

## 2023-08-24 ENCOUNTER — OFFICE VISIT (OUTPATIENT)
Dept: FAMILY MEDICINE CLINIC | Facility: CLINIC | Age: 78
End: 2023-08-24
Payer: MEDICARE

## 2023-08-24 VITALS
HEIGHT: 65 IN | HEART RATE: 71 BPM | OXYGEN SATURATION: 96 % | TEMPERATURE: 98.2 F | BODY MASS INDEX: 30.27 KG/M2 | DIASTOLIC BLOOD PRESSURE: 64 MMHG | SYSTOLIC BLOOD PRESSURE: 120 MMHG | WEIGHT: 181.7 LBS

## 2023-08-24 DIAGNOSIS — L03.115 CELLULITIS OF RIGHT LOWER EXTREMITY: Primary | ICD-10-CM

## 2023-08-24 RX ORDER — CEFDINIR 300 MG/1
300 CAPSULE ORAL 2 TIMES DAILY
COMMUNITY

## 2023-08-24 NOTE — PROGRESS NOTES
"Chief Complaint  Hospital Follow Up Visit    Subjective          Marianela Ngo presents to [facility]  History of Present Illness  Patient in today for hospital follow-up.  She was admitted to [facility] on 8/17/2023 and discharged on 8/20/2023 with a diagnosis of right lower extremity cellulitis.  She had a negative venous Doppler and CT scan that was negative for osteomyelitis and abscess  and suggestive of cellulitis.  She was  discharged on cefdinir and is tolerating the medication well.  She denies any fever.  States redness and heat has gone.  She still has some mild swelling to the right lower leg.  States the pain in the leg has also shown improvement.  Denies any nausea, vomiting, or diarrhea.    Objective   Vital Signs:   /64 (BP Location: Left arm, Patient Position: Sitting, Cuff Size: Adult)   Pulse 71   Temp 98.2 øF (36.8 øC)   Ht 165.1 cm (65\")   Wt 82.4 kg (181 lb 11.2 oz)   SpO2 96%   BMI 30.24 kg/mý     Body mass index is 30.24 kg/mý.    Review of Systems   Constitutional:  Negative for fever.   Respiratory:  Negative for shortness of breath.    Cardiovascular:  Positive for leg swelling. Negative for chest pain.     Past History:  Medical History: has a past medical history of Allergies, Bradycardia with 51-60 beats per minute, Chronic atrial fibrillation, Current use of long term anticoagulation, DJD (degenerative joint disease), Epistaxis, Essential hypertension, High risk medication use, Mild persistent asthma, uncomplicated, Mixed hyperlipidemia, Obstructive sleep apnea on CPAP, Other fatigue, Paroxysmal atrial fibrillation, Pneumonia (08/2015), Shortness of breath, and Vitamin deficiency.   Surgical History: has a past surgical history that includes Hysterectomy; Replacement total knee bilateral (2013,2014); and Cataract extraction, bilateral.   Family History: family history includes Cancer in her brother and father; Diabetes in her brother; " Heart disease in her sister; Hypertension in an other family member.   Social History: reports that she quit smoking about 49 years ago. Her smoking use included cigarettes. She has a 48.00 pack-year smoking history. She has never used smokeless tobacco. She reports that she does not drink alcohol and does not use drugs.      Current Outpatient Medications:     albuterol (PROVENTIL) (2.5 MG/3ML) 0.083% nebulizer solution, USE 1 VIAL IN NEBULIZER EVERY 4 TO 6 HOURS, Disp: 180 each, Rfl: 11    amLODIPine (NORVASC) 5 MG tablet, Take 1 tablet by mouth Daily., Disp: 90 tablet, Rfl: 3    atorvastatin (LIPITOR) 20 MG tablet, Take 1 tablet by mouth Daily., Disp: 90 tablet, Rfl: 3    budesonide (PULMICORT) 0.5 MG/2ML nebulizer solution, USE 1 VIAL IN NEBULIZER DAILY - Rinse Mouth After Use, Disp: 30 each, Rfl: 11    cefdinir (OMNICEF) 300 MG capsule, Take 1 capsule by mouth 2 (Two) Times a Day., Disp: , Rfl:     lisinopril-hydrochlorothiazide (PRINZIDE,ZESTORETIC) 20-12.5 MG per tablet, Take 1 tablet by mouth Daily., Disp: 90 tablet, Rfl: 3    sotalol (BETAPACE) 80 MG tablet, Take 1/2 in am and 1 whole tab in the pm, Disp: 180 tablet, Rfl: 3    warfarin (COUMADIN) 5 MG tablet, Take 1 tablet by mouth Daily., Disp: 180 tablet, Rfl: 3  Allergies: Patient has no known allergies.    Physical Exam  Constitutional:       Appearance: Normal appearance.   Musculoskeletal:      Comments: Mild swelling noted to right lower extremity vs left; minimal tenderness to palpation of right lower extremity but significantly improved since initial visit   Skin:     Comments: Previous redness and heat to right lower extremity has gone    Neurological:      Mental Status: She is alert and oriented to person, place, and time.   Psychiatric:         Mood and Affect: Mood normal.         Behavior: Behavior normal.           Assessment and Plan   Diagnoses and all orders for this visit:    1. Cellulitis of right lower extremity (Primary)  Leg has  shown significant improvement and pain has improved; continue antibiotic; continue with elevation of leg as needed- discussed use of compression stockings; monitor symptoms and rtc prn           Follow Up   No follow-ups on file.  Patient was given instructions and counseling regarding her condition or for health maintenance advice. Please see specific information pulled into the AVS if appropriate.     Marietta Archibald PA-C

## 2023-08-30 ENCOUNTER — ANTICOAGULATION VISIT (OUTPATIENT)
Dept: PHARMACY | Facility: HOSPITAL | Age: 78
End: 2023-08-30
Payer: MEDICARE

## 2023-08-30 DIAGNOSIS — I48.0 PAROXYSMAL ATRIAL FIBRILLATION: Primary | ICD-10-CM

## 2023-08-30 LAB — INR PPP: 2

## 2023-08-30 NOTE — PROGRESS NOTES
Anticoagulation Clinic - Remote Progress Note  mdINR Home Monitor  Testing frequency: weekly    Indication: paroxysmal afib  Referring Provider: Susan [next 10/25/23]  Initial Warfarin Start Date: 2020? 2019?  Goal INR: 2.0-3.0  Current Drug Interactions: hydrochlorothiazide   [id number]: 4 (Age ?74, Sex, HTN) [estimated 5/20/21]  Bleed Risk: self reports negative history for GI bleed  Other: DOAC too expensive    Diet: green beans, peas, zucchini, or priyanka salads 2-3x/week (7/6/2023)  Alcohol: none  Tobacco: none  OTC Pain Medication: APAP only    INR History:  Date 5/14 5/18 5/25 6/1 6/15 7/6 7/12 7/22 7/28 8/4 8/11 8/18 8/23 8/27   Total WeeklyDose  22.5mg 25mg 25mg 25mg 25mg 25mg 25mg 25mg 25mg 25mg 27.5mg 27.5mg 27.5mg   INR 3.5 1.9 2.3 2.3 2.8 2.7 2.1 2.2 1.9 2.0 1.8 2.3 2.2 1.8   Notes [place] Cards 1x hold; incr GLV     HM  incr GLV  no GLV incr GLV cefdinir cefdinir     Date 9/1 9/8 9/15 9/22 9/29 10/6 10/13 10/20 10/27 11/3 11/10 11/17 11/24 12/1   Total WeeklyDose 27.5mg 27.5mg 27.5mg 27.5mg 27.5mg 27.5mg 27.5mg 30mg 27.5mg 27.5mg 27.5mg 25mg 27.5mg 27.5mg   INR 2.1 2.3 3.3 2.3 2.2 2.0 1.9 2.6 3.2 2.4 3.4 1.6 2.4 2.3   Notes cefdinir  decr GLV?  keflex   1x incr dose   decr GLV? 1x decr dose;   incr GLV?       Date 12/9 12/15 12/22 12/29 1/5/22 1/12 1/20 1/26 1/31 2/3 2/7 2/10 2/16 2/23   Total WeeklyDose 27.5mg 27.5mg 27.5mg 27.5mg 27.5mg 27.5mg 27.5mg 27.5mg 27.5mg 25mg 25mg 22.5mg 27.5mg 27.5mg   INR 2.5 2.4 2.7 2.5 2.6 2.2 2.5 2.1 3.8 2.6 4.1 2.5 2.7 3.5   Notes call    call   COVID+ dex / azith 1x hold pred 20 BID x5d  1x hold; 1x decr dose;   call call call     Date 3/2 3/9 3/16 3/23 3/30 4/5 4/13 4/20 4/27 5/4 5/11 5/18 5/25 6/1   Total WeeklyDose 25mg 27.5mg 27.5mg 27.5mg 27.5mg 27.5mg 27.5 mg 27.5mg 27.5 mg 27.5 mg 27.5 mg 27.5 mg 27.5 mg 27.5mg   INR 2.5 2.9 2.2 2.8 2.7 2.4 2.5 2.3 2.5 2.8 3.1 2.6 2.5 2.3   Notes 1x decr  call call    call    call Call  apap rec 5/19       Date 6/8 6/15  6/22 6/29 7/6 7/14 7/20 7/27 8/3 8/10 8/17 8/25 8/31 9/7   Total Weekly Dose 27.5 mg 27.5 mg 27.5 mg 27.5 mg 27.5 mg 27.5 mg 27.5 mg 30 mg 27.5 mg 27.5mg 27.5 mg 27.5 mg 27.5 mg 27.5 mg   INR 2.5 2.5 2.7 2.5 2.9 2.4 1.8 2.7 2.8 2.8 2.8 3.1 2.6 3.5   Notes call    call  call 12/71x boost   call Call  Dec GLV  call     Date 9/14 9/21 9/28 10/5 10/12 10/19 10/26 11/2 11/9 11/16 11/23 11/30 12/7   Total WeeklyDose 27.5mg 25 mg 25 mg 25 mg 25 mg  25 mg 25 mg 25 mg 25 mg 25 mg 25 mg 25 mg 25 mg   INR 3.1 2.9 2.5 2.9 2.2 2.4 2.3 2.1 2.3 2.3 2.1 2.2 1.9   Notes 1x hold inc GLV call   Call     call    Call  INC GLV     Date 12/14 12/22 12/28 1/4/23 1/11 1/18 1/25 2/1 2/8 2/16/23 2/22 3/1/23   Total WeeklyDose 27.5 mg 25 mg  20 mg 27.5 mg 27.5 mg 27.5 mg 27.5 mg 27.5 mg 27.5 mg 27.5 mg 27.5mg 27.5 mg    INR 2.8 2.1 1.9 1.9 1.8 2.1 2.2 2.0 2.7 2.5 2.6 2.8   Notes 1x boost  rec 12/15 Call  Call 1x miss call call Call  Call    Call        Date 3/9 3/16 3/22 3/29 4/5 4/12 4/19 4/26 5/3 5/10/23 5/17 5/24   Total WeeklyDose 27.5 mg 27.5 mg 27.5 mg 25mg 27.5 mg 27.5 mg 27.5 mg 27.5 mg 27.5 mg  27.5 mg  27.5 mg 27.5 mg   INR 2.6  2.6 4.1 2.7 2.6 2.7 2.9 2.7 2.5 2.8 2.0 2.4   Notes  call call call call    call        Date 5/31 6/7/23 6/14 6/21/23 6/28 7/5 7/13 7/19/23 7/26/23 8/2 08/10 8/16/23   Total WeeklyDose 27.5 mg 27.5 mg  27.5 mg 27.5 mg 27.5 mg 25mg 25 mg 25 mg 25 mg 25 mg 25 mg 25 mg   INR 2.8 2.5 3.2 2.9 3.8 3.2 2.9 2.3 2.6 2.9 2.8 2.9   Notes call  Call  Dec GLV   Hold x1; moving; call     call      Date 08/23 8/30/23             Total WeeklyDose 25 mg 25 mg             INR 2.9 2.0             Notes                 Phone Interview:  Tablet Strength: 5 mg (1/5/22)  Patient Contact Info: [phone] (home) preferred; [phone] (cell)  Verbal Release Auth: signed 5/25/21 -- May speak w/Femi Ngo, ; May leave voicemail on home phone  Lab Contact Info: [facility]  *Will call once monthly or if INR out of  range*      Patient Findings  Comments: Patient not contacted at this encounter       Plan:  INR was therapeutic today at 2.0 (goal 2.0-3.0). Ms. Ngo will continue warfarin 2.5mg oral daily except 5 mg SunTueThur until recheck.  Repeat INR in one week, 9/6/23  Marianela Ngo understands the importance of calling the [facility] if she notices any s/sx of bleeding, stroke, or abnormal bruising, if any changes are made to her medications or medication doses (Rx, OTC, herbal), or if any upcoming procedures are scheduled. Marianela Ngo will likewise let us know if any other changes, questions, or concerns arise regarding anticoagulation therapy. she understands the importance of seeking medical attention immediately if she experiences any falls, vehicle accidents, or abnormal bleeding or bruising. Marianela Ngo voiced understanding of this information and confirms that she has the [facility]'s contact information. Otherwise, we will plan to contact the patient once monthly or if her INR is out of range.    Chicho Shine, Pharmacy Technician  8/30/2023  15:22 EDT    I, Radha Orlando, PharmD, have reviewed the note in full and agree with the assessment and plan.  Monitor closely due to significant drop in INR.  08/30/23  16:19 EDT

## 2023-09-08 ENCOUNTER — ANTICOAGULATION VISIT (OUTPATIENT)
Dept: PHARMACY | Facility: HOSPITAL | Age: 78
End: 2023-09-08
Payer: MEDICARE

## 2023-09-08 DIAGNOSIS — I48.0 PAROXYSMAL ATRIAL FIBRILLATION: Primary | ICD-10-CM

## 2023-09-08 LAB — INR PPP: 2.4

## 2023-09-08 NOTE — PROGRESS NOTES
Anticoagulation Clinic - Remote Progress Note  mdINR Home Monitor  Testing frequency: weekly    Indication: paroxysmal afib  Referring Provider: Susan [next 10/25/23]  Initial Warfarin Start Date: 2020? 2019?  Goal INR: 2.0-3.0  Current Drug Interactions: hydrochlorothiazide   [id number]: 4 (Age ?74, Sex, HTN) [estimated 5/20/21]  Bleed Risk: self reports negative history for GI bleed  Other: DOAC too expensive    Diet: green beans, peas, zucchini, or priyanka salads 2-3x/week (7/6/2023)  Alcohol: none  Tobacco: none  OTC Pain Medication: APAP only    INR History:  Date 5/14 5/18 5/25 6/1 6/15 7/6 7/12 7/22 7/28 8/4 8/11 8/18 8/23 8/27   Total WeeklyDose  22.5mg 25mg 25mg 25mg 25mg 25mg 25mg 25mg 25mg 25mg 27.5mg 27.5mg 27.5mg   INR 3.5 1.9 2.3 2.3 2.8 2.7 2.1 2.2 1.9 2.0 1.8 2.3 2.2 1.8   Notes [place] Cards 1x hold; incr GLV     HM  incr GLV  no GLV incr GLV cefdinir cefdinir     Date 9/1 9/8 9/15 9/22 9/29 10/6 10/13 10/20 10/27 11/3 11/10 11/17 11/24 12/1   Total WeeklyDose 27.5mg 27.5mg 27.5mg 27.5mg 27.5mg 27.5mg 27.5mg 30mg 27.5mg 27.5mg 27.5mg 25mg 27.5mg 27.5mg   INR 2.1 2.3 3.3 2.3 2.2 2.0 1.9 2.6 3.2 2.4 3.4 1.6 2.4 2.3   Notes cefdinir  decr GLV?  keflex   1x incr dose   decr GLV? 1x decr dose;   incr GLV?       Date 12/9 12/15 12/22 12/29 1/5/22 1/12 1/20 1/26 1/31 2/3 2/7 2/10 2/16 2/23   Total WeeklyDose 27.5mg 27.5mg 27.5mg 27.5mg 27.5mg 27.5mg 27.5mg 27.5mg 27.5mg 25mg 25mg 22.5mg 27.5mg 27.5mg   INR 2.5 2.4 2.7 2.5 2.6 2.2 2.5 2.1 3.8 2.6 4.1 2.5 2.7 3.5   Notes call    call   COVID+ dex / azith 1x hold pred 20 BID x5d  1x hold; 1x decr dose;   call call call     Date 3/2 3/9 3/16 3/23 3/30 4/5 4/13 4/20 4/27 5/4 5/11 5/18 5/25 6/1   Total WeeklyDose 25mg 27.5mg 27.5mg 27.5mg 27.5mg 27.5mg 27.5 mg 27.5mg 27.5 mg 27.5 mg 27.5 mg 27.5 mg 27.5 mg 27.5mg   INR 2.5 2.9 2.2 2.8 2.7 2.4 2.5 2.3 2.5 2.8 3.1 2.6 2.5 2.3   Notes 1x decr  call call    call    call Call  apap rec 5/19       Date 6/8 6/15  6/22 6/29 7/6 7/14 7/20 7/27 8/3 8/10 8/17 8/25 8/31 9/7   Total Weekly Dose 27.5 mg 27.5 mg 27.5 mg 27.5 mg 27.5 mg 27.5 mg 27.5 mg 30 mg 27.5 mg 27.5mg 27.5 mg 27.5 mg 27.5 mg 27.5 mg   INR 2.5 2.5 2.7 2.5 2.9 2.4 1.8 2.7 2.8 2.8 2.8 3.1 2.6 3.5   Notes call    call  call 12/71x boost   call Call  Dec GLV  call     Date 9/14 9/21 9/28 10/5 10/12 10/19 10/26 11/2 11/9 11/16 11/23 11/30 12/7   Total WeeklyDose 27.5mg 25 mg 25 mg 25 mg 25 mg  25 mg 25 mg 25 mg 25 mg 25 mg 25 mg 25 mg 25 mg   INR 3.1 2.9 2.5 2.9 2.2 2.4 2.3 2.1 2.3 2.3 2.1 2.2 1.9   Notes 1x hold inc GLV call   Call     call    Call  INC GLV     Date 12/14 12/22 12/28 1/4/23 1/11 1/18 1/25 2/1 2/8 2/16/23 2/22 3/1/23   Total WeeklyDose 27.5 mg 25 mg  20 mg 27.5 mg 27.5 mg 27.5 mg 27.5 mg 27.5 mg 27.5 mg 27.5 mg 27.5mg 27.5 mg    INR 2.8 2.1 1.9 1.9 1.8 2.1 2.2 2.0 2.7 2.5 2.6 2.8   Notes 1x boost  rec 12/15 Call  Call 1x miss call call Call  Call    Call        Date 3/9 3/16 3/22 3/29 4/5 4/12 4/19 4/26 5/3 5/10/23 5/17 5/24   Total WeeklyDose 27.5 mg 27.5 mg 27.5 mg 25mg 27.5 mg 27.5 mg 27.5 mg 27.5 mg 27.5 mg  27.5 mg  27.5 mg 27.5 mg   INR 2.6  2.6 4.1 2.7 2.6 2.7 2.9 2.7 2.5 2.8 2.0 2.4   Notes  call call call call    call        Date 5/31 6/7/23 6/14 6/21/23 6/28 7/5 7/13 7/19/23 7/26/23 8/2 08/10 8/16/23   Total WeeklyDose 27.5 mg 27.5 mg  27.5 mg 27.5 mg 27.5 mg 25mg 25 mg 25 mg 25 mg 25 mg 25 mg 25 mg   INR 2.8 2.5 3.2 2.9 3.8 3.2 2.9 2.3 2.6 2.9 2.8 2.9   Notes call  Call  Dec GLV   Hold x1; moving; call     call      Date 08/23 8/30/23 9/8            Total WeeklyDose 25 mg 25 mg 25 mg            INR 2.9 2.0 2.4            Notes   call              Phone Interview:  Tablet Strength: 5 mg (1/5/22)  Patient Contact Info: [phone] (home) preferred; [phone] (cell)  Verbal Release Auth: signed 5/25/21 -- May speak w/Femi Ngo, ; May leave voicemail on home phone  Lab Contact Info: [facility]  *Will call once monthly  or if INR out of range*      Patient Findings  Positives: Change in health, Change in diet/appetite   Negatives: Signs/symptoms of thrombosis, Signs/symptoms of bleeding, Laboratory test error suspected, Change in alcohol use, Change in activity, Upcoming invasive procedure, Emergency department visit, Upcoming dental procedure, Missed doses, Extra doses, Change in medications, Hospital admission, Bruising, Other complaints   Comments: Pt stated she had loss about 40 pounds, she was wanting to loss weight so she has been monitoring what eat and eating smaller portion of food to help with her weight loss, PT stated she is look to loss about 10 more pounds and will be at her goal weight.  All other findings negative per pt.       Plan:  INR was therapeutic today 09/08/23 at 2.4 (goal 2.0-3.0). Ms. Ngo will continue warfarin 2.5mg oral daily except 5 mg SunTueThur until recheck.  Repeat INR in one week, 9/15/23  Marianela Ngo understands the importance of calling the [facility] if she notices any s/sx of bleeding, stroke, or abnormal bruising, if any changes are made to her medications or medication doses (Rx, OTC, herbal), or if any upcoming procedures are scheduled. Marianela Ngo will likewise let us know if any other changes, questions, or concerns arise regarding anticoagulation therapy. she understands the importance of seeking medical attention immediately if she experiences any falls, vehicle accidents, or abnormal bleeding or bruising. Marianela Ngo voiced understanding of this information and confirms that she has the [facility]'s contact information. Otherwise, we will plan to contact the patient once monthly or if her INR is out of range.      Babita Leonard,  Pharmacy Technician,  09/08/23 1:50 PM    Divya HAQUE, PharmD, have reviewed the note in full and agree with the assessment and plan.  09/08/23  14:21 EDT

## 2023-09-14 ENCOUNTER — ANTICOAGULATION VISIT (OUTPATIENT)
Dept: PHARMACY | Facility: HOSPITAL | Age: 78
End: 2023-09-14
Payer: MEDICARE

## 2023-09-14 DIAGNOSIS — I48.0 PAROXYSMAL ATRIAL FIBRILLATION: Primary | ICD-10-CM

## 2023-09-14 LAB — INR PPP: 2.4

## 2023-09-14 NOTE — PROGRESS NOTES
Anticoagulation Clinic - Remote Progress Note  mdINR Home Monitor  Testing frequency: weekly    Indication: paroxysmal afib  Referring Provider: Susan [next 10/25/23]  Initial Warfarin Start Date: 2020? 2019?  Goal INR: 2.0-3.0  Current Drug Interactions: hydrochlorothiazide   [id number]: 4 (Age ?74, Sex, HTN) [estimated 5/20/21]  Bleed Risk: self reports negative history for GI bleed  Other: DOAC too expensive    Diet: green beans, peas, zucchini, or priyanka salads 2-3x/week (7/6/2023)  Alcohol: none  Tobacco: none  OTC Pain Medication: APAP only    INR History:  Date 5/14 5/18 5/25 6/1 6/15 7/6 7/12 7/22 7/28 8/4 8/11 8/18 8/23 8/27   Total WeeklyDose  22.5mg 25mg 25mg 25mg 25mg 25mg 25mg 25mg 25mg 25mg 27.5mg 27.5mg 27.5mg   INR 3.5 1.9 2.3 2.3 2.8 2.7 2.1 2.2 1.9 2.0 1.8 2.3 2.2 1.8   Notes [place] Cards 1x hold; incr GLV     HM  incr GLV  no GLV incr GLV cefdinir cefdinir     Date 9/1 9/8 9/15 9/22 9/29 10/6 10/13 10/20 10/27 11/3 11/10 11/17 11/24 12/1   Total WeeklyDose 27.5mg 27.5mg 27.5mg 27.5mg 27.5mg 27.5mg 27.5mg 30mg 27.5mg 27.5mg 27.5mg 25mg 27.5mg 27.5mg   INR 2.1 2.3 3.3 2.3 2.2 2.0 1.9 2.6 3.2 2.4 3.4 1.6 2.4 2.3   Notes cefdinir  decr GLV?  keflex   1x incr dose   decr GLV? 1x decr dose;   incr GLV?       Date 12/9 12/15 12/22 12/29 1/5/22 1/12 1/20 1/26 1/31 2/3 2/7 2/10 2/16 2/23   Total WeeklyDose 27.5mg 27.5mg 27.5mg 27.5mg 27.5mg 27.5mg 27.5mg 27.5mg 27.5mg 25mg 25mg 22.5mg 27.5mg 27.5mg   INR 2.5 2.4 2.7 2.5 2.6 2.2 2.5 2.1 3.8 2.6 4.1 2.5 2.7 3.5   Notes call    call   COVID+ dex / azith 1x hold pred 20 BID x5d  1x hold; 1x decr dose;   call call call     Date 3/2 3/9 3/16 3/23 3/30 4/5 4/13 4/20 4/27 5/4 5/11 5/18 5/25 6/1   Total WeeklyDose 25mg 27.5mg 27.5mg 27.5mg 27.5mg 27.5mg 27.5 mg 27.5mg 27.5 mg 27.5 mg 27.5 mg 27.5 mg 27.5 mg 27.5mg   INR 2.5 2.9 2.2 2.8 2.7 2.4 2.5 2.3 2.5 2.8 3.1 2.6 2.5 2.3   Notes 1x decr  call call    call    call Call  apap rec 5/19       Date 6/8 6/15  6/22 6/29 7/6 7/14 7/20 7/27 8/3 8/10 8/17 8/25 8/31 9/7   Total Weekly Dose 27.5 mg 27.5 mg 27.5 mg 27.5 mg 27.5 mg 27.5 mg 27.5 mg 30 mg 27.5 mg 27.5mg 27.5 mg 27.5 mg 27.5 mg 27.5 mg   INR 2.5 2.5 2.7 2.5 2.9 2.4 1.8 2.7 2.8 2.8 2.8 3.1 2.6 3.5   Notes call    call  call 12/71x boost   call Call  Dec GLV  call     Date 9/14 9/21 9/28 10/5 10/12 10/19 10/26 11/2 11/9 11/16 11/23 11/30 12/7   Total WeeklyDose 27.5mg 25 mg 25 mg 25 mg 25 mg  25 mg 25 mg 25 mg 25 mg 25 mg 25 mg 25 mg 25 mg   INR 3.1 2.9 2.5 2.9 2.2 2.4 2.3 2.1 2.3 2.3 2.1 2.2 1.9   Notes 1x hold inc GLV call   Call     call    Call  INC GLV     Date 12/14 12/22 12/28 1/4/23 1/11 1/18 1/25 2/1 2/8 2/16/23 2/22 3/1/23   Total WeeklyDose 27.5 mg 25 mg  20 mg 27.5 mg 27.5 mg 27.5 mg 27.5 mg 27.5 mg 27.5 mg 27.5 mg 27.5mg 27.5 mg    INR 2.8 2.1 1.9 1.9 1.8 2.1 2.2 2.0 2.7 2.5 2.6 2.8   Notes 1x boost  rec 12/15 Call  Call 1x miss call call Call  Call    Call        Date 3/9 3/16 3/22 3/29 4/5 4/12 4/19 4/26 5/3 5/10/23 5/17 5/24   Total WeeklyDose 27.5 mg 27.5 mg 27.5 mg 25mg 27.5 mg 27.5 mg 27.5 mg 27.5 mg 27.5 mg  27.5 mg  27.5 mg 27.5 mg   INR 2.6  2.6 4.1 2.7 2.6 2.7 2.9 2.7 2.5 2.8 2.0 2.4   Notes  call call call call    call        Date 5/31 6/7/23 6/14 6/21/23 6/28 7/5 7/13 7/19/23 7/26/23 8/2 08/10 8/16/23   Total WeeklyDose 27.5 mg 27.5 mg  27.5 mg 27.5 mg 27.5 mg 25mg 25 mg 25 mg 25 mg 25 mg 25 mg 25 mg   INR 2.8 2.5 3.2 2.9 3.8 3.2 2.9 2.3 2.6 2.9 2.8 2.9   Notes call  Call  Dec GLV   Hold x1; moving; call     call      Date 08/23 8/30/23 9/8 9/14           Total WeeklyDose 25 mg 25 mg 25 mg 25 mg           INR 2.9 2.0 2.4 2.4           Notes   call              Phone Interview:  Tablet Strength: 5 mg (1/5/22)  Patient Contact Info: [phone] (home) preferred; [phone] (cell)  Verbal Release Auth: signed 5/25/21 -- May speak w/Femi Huber, ; May leave voicemail on home phone  Lab Contact Info: [facility]  *Will call  once monthly or if INR out of range*    Patient Findings  Comments: Patient not contacted at this encounter     Plan:  INR was therapeutic today 09/14/23 at 2.4 again (goal 2.0-3.0). Ms. Ngo will continue warfarin 2.5mg oral daily except 5 mg SunTueThur until recheck.  Repeat INR in one week, 9/21  Marianela Ngo understands the importance of calling the [facility] if she notices any s/sx of bleeding, stroke, or abnormal bruising, if any changes are made to her medications or medication doses (Rx, OTC, herbal), or if any upcoming procedures are scheduled. Marianela Ngo will likewise let us know if any other changes, questions, or concerns arise regarding anticoagulation therapy. she understands the importance of seeking medical attention immediately if she experiences any falls, vehicle accidents, or abnormal bleeding or bruising. Marianela Ngo voiced understanding of this information and confirms that she has the [facility]'s contact information. Otherwise, we will plan to contact the patient once monthly or if her INR is out of range.    Chicho Shine, Pharmacy Technician  9/14/2023  16:15 EDT      I, Maryjane Amezquita, PharmD, have reviewed the note in full and agree with the assessment and plan.  09/18/23  11:31 EDT

## 2023-09-21 ENCOUNTER — ANTICOAGULATION VISIT (OUTPATIENT)
Dept: PHARMACY | Facility: HOSPITAL | Age: 78
End: 2023-09-21
Payer: MEDICARE

## 2023-09-21 DIAGNOSIS — I48.0 PAROXYSMAL ATRIAL FIBRILLATION: Primary | ICD-10-CM

## 2023-09-21 LAB — INR PPP: 2.7

## 2023-09-21 NOTE — PROGRESS NOTES
Anticoagulation Clinic - Remote Progress Note  mdINR Home Monitor  Testing frequency: weekly    Indication: paroxysmal afib  Referring Provider: Susan [next 10/25/23]  Initial Warfarin Start Date: 2020? 2019?  Goal INR: 2.0-3.0  Current Drug Interactions: hydrochlorothiazide   [id number]: 4 (Age ?74, Sex, HTN) [estimated 5/20/21]  Bleed Risk: self reports negative history for GI bleed  Other: DOAC too expensive    Diet: green beans, peas, zucchini, or priyanka salads 2-3x/week (7/6/2023)  Alcohol: none  Tobacco: none  OTC Pain Medication: APAP only    INR History:  Date 5/14 5/18 5/25 6/1 6/15 7/6 7/12 7/22 7/28 8/4 8/11 8/18 8/23 8/27   Total WeeklyDose  22.5mg 25mg 25mg 25mg 25mg 25mg 25mg 25mg 25mg 25mg 27.5mg 27.5mg 27.5mg   INR 3.5 1.9 2.3 2.3 2.8 2.7 2.1 2.2 1.9 2.0 1.8 2.3 2.2 1.8   Notes [place] Cards 1x hold; incr GLV     HM  incr GLV  no GLV incr GLV cefdinir cefdinir     Date 9/1 9/8 9/15 9/22 9/29 10/6 10/13 10/20 10/27 11/3 11/10 11/17 11/24 12/1   Total WeeklyDose 27.5mg 27.5mg 27.5mg 27.5mg 27.5mg 27.5mg 27.5mg 30mg 27.5mg 27.5mg 27.5mg 25mg 27.5mg 27.5mg   INR 2.1 2.3 3.3 2.3 2.2 2.0 1.9 2.6 3.2 2.4 3.4 1.6 2.4 2.3   Notes cefdinir  decr GLV?  keflex   1x incr dose   decr GLV? 1x decr dose;   incr GLV?       Date 12/9 12/15 12/22 12/29 1/5/22 1/12 1/20 1/26 1/31 2/3 2/7 2/10 2/16 2/23   Total WeeklyDose 27.5mg 27.5mg 27.5mg 27.5mg 27.5mg 27.5mg 27.5mg 27.5mg 27.5mg 25mg 25mg 22.5mg 27.5mg 27.5mg   INR 2.5 2.4 2.7 2.5 2.6 2.2 2.5 2.1 3.8 2.6 4.1 2.5 2.7 3.5   Notes call    call   COVID+ dex / azith 1x hold pred 20 BID x5d  1x hold; 1x decr dose;   call call call     Date 3/2 3/9 3/16 3/23 3/30 4/5 4/13 4/20 4/27 5/4 5/11 5/18 5/25 6/1   Total WeeklyDose 25mg 27.5mg 27.5mg 27.5mg 27.5mg 27.5mg 27.5 mg 27.5mg 27.5 mg 27.5 mg 27.5 mg 27.5 mg 27.5 mg 27.5mg   INR 2.5 2.9 2.2 2.8 2.7 2.4 2.5 2.3 2.5 2.8 3.1 2.6 2.5 2.3   Notes 1x decr  call call    call    call Call  apap rec 5/19       Date 6/8 6/15  6/22 6/29 7/6 7/14 7/20 7/27 8/3 8/10 8/17 8/25 8/31 9/7   Total Weekly Dose 27.5 mg 27.5 mg 27.5 mg 27.5 mg 27.5 mg 27.5 mg 27.5 mg 30 mg 27.5 mg 27.5mg 27.5 mg 27.5 mg 27.5 mg 27.5 mg   INR 2.5 2.5 2.7 2.5 2.9 2.4 1.8 2.7 2.8 2.8 2.8 3.1 2.6 3.5   Notes call    call  call 12/71x boost   call Call  Dec GLV  call     Date 9/14 9/21 9/28 10/5 10/12 10/19 10/26 11/2 11/9 11/16 11/23 11/30 12/7   Total WeeklyDose 27.5mg 25 mg 25 mg 25 mg 25 mg  25 mg 25 mg 25 mg 25 mg 25 mg 25 mg 25 mg 25 mg   INR 3.1 2.9 2.5 2.9 2.2 2.4 2.3 2.1 2.3 2.3 2.1 2.2 1.9   Notes 1x hold inc GLV call   Call     call    Call  INC GLV     Date 12/14 12/22 12/28 1/4/23 1/11 1/18 1/25 2/1 2/8 2/16/23 2/22 3/1/23   Total WeeklyDose 27.5 mg 25 mg  20 mg 27.5 mg 27.5 mg 27.5 mg 27.5 mg 27.5 mg 27.5 mg 27.5 mg 27.5mg 27.5 mg    INR 2.8 2.1 1.9 1.9 1.8 2.1 2.2 2.0 2.7 2.5 2.6 2.8   Notes 1x boost  rec 12/15 Call  Call 1x miss call call Call  Call    Call        Date 3/9 3/16 3/22 3/29 4/5 4/12 4/19 4/26 5/3 5/10/23 5/17 5/24   Total WeeklyDose 27.5 mg 27.5 mg 27.5 mg 25mg 27.5 mg 27.5 mg 27.5 mg 27.5 mg 27.5 mg  27.5 mg  27.5 mg 27.5 mg   INR 2.6  2.6 4.1 2.7 2.6 2.7 2.9 2.7 2.5 2.8 2.0 2.4   Notes  call call call call    call        Date 5/31 6/7/23 6/14 6/21/23 6/28 7/5 7/13 7/19/23 7/26/23 8/2 08/10 8/16/23   Total WeeklyDose 27.5 mg 27.5 mg  27.5 mg 27.5 mg 27.5 mg 25mg 25 mg 25 mg 25 mg 25 mg 25 mg 25 mg   INR 2.8 2.5 3.2 2.9 3.8 3.2 2.9 2.3 2.6 2.9 2.8 2.9   Notes call  Call  Dec GLV   Hold x1; moving; call     call      Date 08/23 8/30/23 9/8 9/14 09/21          Total WeeklyDose 25 mg 25 mg 25 mg 25 mg 25 mg          INR 2.9 2.0 2.4 2.4 2.7          Notes   call              Phone Interview:  Tablet Strength: 5 mg (1/5/22)  Patient Contact Info: [phone] (home) preferred; [phone] (cell)  Verbal Release Auth: signed 5/25/21 -- May speak w/Femi Ngo, ; May leave voicemail on home phone  Lab Contact Info: [facility]  *Will call once monthly or if INR out of range*    Patient Findings  Comments: Patient not contacted at this encounter     Plan:  INR was therapeutic today 09/21/23 at 2.7 again (goal 2.0-3.0). Ms. Ngo will continue warfarin 2.5mg oral daily except 5 mg SunTueThur until recheck.  Repeat INR in one week, 9/28  Marianela Ngo understands the importance of calling the [facility] if she notices any s/sx of bleeding, stroke, or abnormal bruising, if any changes are made to her medications or medication doses (Rx, OTC, herbal), or if any upcoming procedures are scheduled. Marianela Ngo will likewise let us know if any other changes, questions, or concerns arise regarding anticoagulation therapy. she understands the importance of seeking medical attention immediately if she experiences any falls, vehicle accidents, or abnormal bleeding or bruising. Marianela Ngo voiced understanding of this information and confirms that she has the [facility]'s contact information. Otherwise, we will plan to contact the patient once monthly or if her INR is out of range.    Latasha Maldonado, Pharmacy Intern  09/21/23  12:31 EDT       I, Alisia Cunningham, [facility], have reviewed the note in full and agree with the assessment and plan.  09/21/23  13:50 EDT

## 2023-09-28 ENCOUNTER — ANTICOAGULATION VISIT (OUTPATIENT)
Dept: PHARMACY | Facility: HOSPITAL | Age: 78
End: 2023-09-28
Payer: MEDICARE

## 2023-09-28 DIAGNOSIS — I48.0 PAROXYSMAL ATRIAL FIBRILLATION: Primary | ICD-10-CM

## 2023-09-28 LAB — INR PPP: 2.5

## 2023-09-28 NOTE — PROGRESS NOTES
Anticoagulation Clinic - Remote Progress Note  mdINR Home Monitor  Testing frequency: weekly    Indication: paroxysmal afib  Referring Provider: Susan [next 10/25/23]  Initial Warfarin Start Date: 2020? 2019?  Goal INR: 2.0-3.0  Current Drug Interactions: hydrochlorothiazide   [id number]: 4 (Age ?74, Sex, HTN) [estimated 5/20/21]  Bleed Risk: self reports negative history for GI bleed  Other: DOAC too expensive    Diet: green beans, peas, zucchini, or priyanka salads 2-3x/week (7/6/2023)  Alcohol: none  Tobacco: none  OTC Pain Medication: APAP only    INR History:  Date 5/14 5/18 5/25 6/1 6/15 7/6 7/12 7/22 7/28 8/4 8/11 8/18 8/23 8/27   Total WeeklyDose  22.5mg 25mg 25mg 25mg 25mg 25mg 25mg 25mg 25mg 25mg 27.5mg 27.5mg 27.5mg   INR 3.5 1.9 2.3 2.3 2.8 2.7 2.1 2.2 1.9 2.0 1.8 2.3 2.2 1.8   Notes [place] Cards 1x hold; incr GLV     HM  incr GLV  no GLV incr GLV cefdinir cefdinir     Date 9/1 9/8 9/15 9/22 9/29 10/6 10/13 10/20 10/27 11/3 11/10 11/17 11/24 12/1   Total WeeklyDose 27.5mg 27.5mg 27.5mg 27.5mg 27.5mg 27.5mg 27.5mg 30mg 27.5mg 27.5mg 27.5mg 25mg 27.5mg 27.5mg   INR 2.1 2.3 3.3 2.3 2.2 2.0 1.9 2.6 3.2 2.4 3.4 1.6 2.4 2.3   Notes cefdinir  decr GLV?  keflex   1x incr dose   decr GLV? 1x decr dose;   incr GLV?       Date 12/9 12/15 12/22 12/29 1/5/22 1/12 1/20 1/26 1/31 2/3 2/7 2/10 2/16 2/23   Total WeeklyDose 27.5mg 27.5mg 27.5mg 27.5mg 27.5mg 27.5mg 27.5mg 27.5mg 27.5mg 25mg 25mg 22.5mg 27.5mg 27.5mg   INR 2.5 2.4 2.7 2.5 2.6 2.2 2.5 2.1 3.8 2.6 4.1 2.5 2.7 3.5   Notes call    call   COVID+ dex / azith 1x hold pred 20 BID x5d  1x hold; 1x decr dose;   call call call     Date 3/2 3/9 3/16 3/23 3/30 4/5 4/13 4/20 4/27 5/4 5/11 5/18 5/25 6/1   Total WeeklyDose 25mg 27.5mg 27.5mg 27.5mg 27.5mg 27.5mg 27.5 mg 27.5mg 27.5 mg 27.5 mg 27.5 mg 27.5 mg 27.5 mg 27.5mg   INR 2.5 2.9 2.2 2.8 2.7 2.4 2.5 2.3 2.5 2.8 3.1 2.6 2.5 2.3   Notes 1x decr  call call    call    call Call  apap rec 5/19       Date 6/8 6/15  6/22 6/29 7/6 7/14 7/20 7/27 8/3 8/10 8/17 8/25 8/31 9/7   Total Weekly Dose 27.5 mg 27.5 mg 27.5 mg 27.5 mg 27.5 mg 27.5 mg 27.5 mg 30 mg 27.5 mg 27.5mg 27.5 mg 27.5 mg 27.5 mg 27.5 mg   INR 2.5 2.5 2.7 2.5 2.9 2.4 1.8 2.7 2.8 2.8 2.8 3.1 2.6 3.5   Notes call    call  call 12/71x boost   call Call  Dec GLV  call     Date 9/14 9/21 9/28 10/5 10/12 10/19 10/26 11/2 11/9 11/16 11/23 11/30 12/7   Total WeeklyDose 27.5mg 25 mg 25 mg 25 mg 25 mg  25 mg 25 mg 25 mg 25 mg 25 mg 25 mg 25 mg 25 mg   INR 3.1 2.9 2.5 2.9 2.2 2.4 2.3 2.1 2.3 2.3 2.1 2.2 1.9   Notes 1x hold inc GLV call   Call     call    Call  INC GLV     Date 12/14 12/22 12/28 1/4/23 1/11 1/18 1/25 2/1 2/8 2/16/23 2/22 3/1/23   Total WeeklyDose 27.5 mg 25 mg  20 mg 27.5 mg 27.5 mg 27.5 mg 27.5 mg 27.5 mg 27.5 mg 27.5 mg 27.5mg 27.5 mg    INR 2.8 2.1 1.9 1.9 1.8 2.1 2.2 2.0 2.7 2.5 2.6 2.8   Notes 1x boost  rec 12/15 Call  Call 1x miss call call Call  Call    Call        Date 3/9 3/16 3/22 3/29 4/5 4/12 4/19 4/26 5/3 5/10/23 5/17 5/24   Total WeeklyDose 27.5 mg 27.5 mg 27.5 mg 25mg 27.5 mg 27.5 mg 27.5 mg 27.5 mg 27.5 mg  27.5 mg  27.5 mg 27.5 mg   INR 2.6  2.6 4.1 2.7 2.6 2.7 2.9 2.7 2.5 2.8 2.0 2.4   Notes  call call call call    call        Date 5/31 6/7/23 6/14 6/21/23 6/28 7/5 7/13 7/19/23 7/26/23 8/2 08/10 8/16/23   Total WeeklyDose 27.5 mg 27.5 mg  27.5 mg 27.5 mg 27.5 mg 25mg 25 mg 25 mg 25 mg 25 mg 25 mg 25 mg   INR 2.8 2.5 3.2 2.9 3.8 3.2 2.9 2.3 2.6 2.9 2.8 2.9   Notes call  Call  Dec GLV   Hold x1; moving; call     call      Date 08/23 8/30/23 9/8 9/14 09/21 09/28         Total WeeklyDose 25 mg 25 mg 25 mg 25 mg 25 mg 25 mg         INR 2.9 2.0 2.4 2.4 2.7 2.8         Notes   call    call          Phone Interview:  Tablet Strength: 5 mg (1/5/22)  Patient Contact Info: [phone] (home) preferred; [phone] (cell)  Verbal Release Auth: signed 5/25/21 -- May speak w/Femi Ngo, ; May leave voicemail on home phone  Lab Contact Info:  [facility]  *Will call once monthly or if INR out of range*       Patient Findings  Comments: Patient not contacted at this encounter     Plan:  INR was therapeutic today 09/28/23 at 2.5 again (goal 2.0-3.0). Ms. Ngo will continue warfarin 2.5mg oral daily except 5 mg SunTueThur until recheck.  Repeat INR in one week, 10/05/23  Marianela Ngo understands the importance of calling the [facility] if she notices any s/sx of bleeding, stroke, or abnormal bruising, if any changes are made to her medications or medication doses (Rx, OTC, herbal), or if any upcoming procedures are scheduled. Marianela Ngo will likewise let us know if any other changes, questions, or concerns arise regarding anticoagulation therapy. she understands the importance of seeking medical attention immediately if she experiences any falls, vehicle accidents, or abnormal bleeding or bruising. Marianela Ngo voiced understanding of this information and confirms that she has the [facility]'s contact information. Otherwise, we will plan to contact the patient once monthly or if her INR is out of range.    Babita Leonard  Pharmacy Technician  9/28/2023 11:58 EDT    I, Maryjane Amezquita, PharmD, have reviewed the note in full and agree with the assessment and plan.  09/28/23  15:09 EDT

## 2023-10-05 ENCOUNTER — ANTICOAGULATION VISIT (OUTPATIENT)
Dept: PHARMACY | Facility: HOSPITAL | Age: 78
End: 2023-10-05
Payer: MEDICARE

## 2023-10-05 DIAGNOSIS — I48.0 PAROXYSMAL ATRIAL FIBRILLATION: Primary | ICD-10-CM

## 2023-10-05 LAB — INR PPP: 2.3

## 2023-10-05 NOTE — PROGRESS NOTES
Anticoagulation Clinic - Remote Progress Note  mdINR Home Monitor  Testing frequency: weekly    Indication: paroxysmal afib  Referring Provider: Susan [next 10/25/23]  Initial Warfarin Start Date: 2020? 2019?  Goal INR: 2.0-3.0  Current Drug Interactions: hydrochlorothiazide   [id number]: 4 (Age ?74, Sex, HTN) [estimated 5/20/21]  Bleed Risk: self reports negative history for GI bleed  Other: DOAC too expensive    Diet: green beans, peas, zucchini, or priyanka salads 2-3x/week (7/6/2023)  Alcohol: none  Tobacco: none  OTC Pain Medication: APAP only    INR History:  Date 5/14 5/18 5/25 6/1 6/15 7/6 7/12 7/22 7/28 8/4 8/11 8/18 8/23 8/27   Total WeeklyDose  22.5mg 25mg 25mg 25mg 25mg 25mg 25mg 25mg 25mg 25mg 27.5mg 27.5mg 27.5mg   INR 3.5 1.9 2.3 2.3 2.8 2.7 2.1 2.2 1.9 2.0 1.8 2.3 2.2 1.8   Notes [place] Cards 1x hold; incr GLV     HM  incr GLV  no GLV incr GLV cefdinir cefdinir     Date 9/1 9/8 9/15 9/22 9/29 10/6 10/13 10/20 10/27 11/3 11/10 11/17 11/24 12/1   Total WeeklyDose 27.5mg 27.5mg 27.5mg 27.5mg 27.5mg 27.5mg 27.5mg 30mg 27.5mg 27.5mg 27.5mg 25mg 27.5mg 27.5mg   INR 2.1 2.3 3.3 2.3 2.2 2.0 1.9 2.6 3.2 2.4 3.4 1.6 2.4 2.3   Notes cefdinir  decr GLV?  keflex   1x incr dose   decr GLV? 1x decr dose;   incr GLV?       Date 12/9 12/15 12/22 12/29 1/5/22 1/12 1/20 1/26 1/31 2/3 2/7 2/10 2/16 2/23   Total WeeklyDose 27.5mg 27.5mg 27.5mg 27.5mg 27.5mg 27.5mg 27.5mg 27.5mg 27.5mg 25mg 25mg 22.5mg 27.5mg 27.5mg   INR 2.5 2.4 2.7 2.5 2.6 2.2 2.5 2.1 3.8 2.6 4.1 2.5 2.7 3.5   Notes call    call   COVID+ dex / azith 1x hold pred 20 BID x5d  1x hold; 1x decr dose;   call call call     Date 3/2 3/9 3/16 3/23 3/30 4/5 4/13 4/20 4/27 5/4 5/11 5/18 5/25 6/1   Total WeeklyDose 25mg 27.5mg 27.5mg 27.5mg 27.5mg 27.5mg 27.5 mg 27.5mg 27.5 mg 27.5 mg 27.5 mg 27.5 mg 27.5 mg 27.5mg   INR 2.5 2.9 2.2 2.8 2.7 2.4 2.5 2.3 2.5 2.8 3.1 2.6 2.5 2.3   Notes 1x decr  call call    call    call Call  apap rec 5/19       Date 6/8 6/15  6/22 6/29 7/6 7/14 7/20 7/27 8/3 8/10 8/17 8/25 8/31 9/7   Total Weekly Dose 27.5 mg 27.5 mg 27.5 mg 27.5 mg 27.5 mg 27.5 mg 27.5 mg 30 mg 27.5 mg 27.5mg 27.5 mg 27.5 mg 27.5 mg 27.5 mg   INR 2.5 2.5 2.7 2.5 2.9 2.4 1.8 2.7 2.8 2.8 2.8 3.1 2.6 3.5   Notes call    call  call 12/71x boost   call Call  Dec GLV  call     Date 9/14 9/21 9/28 10/5 10/12 10/19 10/26 11/2 11/9 11/16 11/23 11/30 12/7   Total WeeklyDose 27.5mg 25 mg 25 mg 25 mg 25 mg  25 mg 25 mg 25 mg 25 mg 25 mg 25 mg 25 mg 25 mg   INR 3.1 2.9 2.5 2.9 2.2 2.4 2.3 2.1 2.3 2.3 2.1 2.2 1.9   Notes 1x hold inc GLV call   Call     call    Call  INC GLV     Date 12/14 12/22 12/28 1/4/23 1/11 1/18 1/25 2/1 2/8 2/16/23 2/22 3/1/23   Total WeeklyDose 27.5 mg 25 mg  20 mg 27.5 mg 27.5 mg 27.5 mg 27.5 mg 27.5 mg 27.5 mg 27.5 mg 27.5mg 27.5 mg    INR 2.8 2.1 1.9 1.9 1.8 2.1 2.2 2.0 2.7 2.5 2.6 2.8   Notes 1x boost  rec 12/15 Call  Call 1x miss call call Call  Call    Call        Date 3/9 3/16 3/22 3/29 4/5 4/12 4/19 4/26 5/3 5/10/23 5/17 5/24   Total WeeklyDose 27.5 mg 27.5 mg 27.5 mg 25mg 27.5 mg 27.5 mg 27.5 mg 27.5 mg 27.5 mg  27.5 mg  27.5 mg 27.5 mg   INR 2.6  2.6 4.1 2.7 2.6 2.7 2.9 2.7 2.5 2.8 2.0 2.4   Notes  call call call call    call        Date 5/31 6/7/23 6/14 6/21/23 6/28 7/5 7/13 7/19/23 7/26/23 8/2 08/10 8/16/23   Total WeeklyDose 27.5 mg 27.5 mg  27.5 mg 27.5 mg 27.5 mg 25mg 25 mg 25 mg 25 mg 25 mg 25 mg 25 mg   INR 2.8 2.5 3.2 2.9 3.8 3.2 2.9 2.3 2.6 2.9 2.8 2.9   Notes call  Call  Dec GLV   Hold x1; moving; call     call      Date 08/23 8/30/23 9/8 9/14 09/21 09/28 10/05        Total WeeklyDose 25 mg 25 mg 25 mg 25 mg 25 mg 25 mg 25 mg        INR 2.9 2.0 2.4 2.4 2.7 2.8 2.3        Notes   call    call          Phone Interview:  Tablet Strength: 5 mg (1/5/22)  Patient Contact Info: [phone] (home) preferred; [phone] (cell)  Verbal Release Auth: signed 5/25/21 -- May speak w/Femi Ngo, ; May leave voicemail on home phone  Lab  Contact Info: [facility]  *Will call once monthly or if INR out of range*         Patient Findings  Negatives: Signs/symptoms of thrombosis, Signs/symptoms of bleeding, Laboratory test error suspected, Change in health, Change in alcohol use, Change in activity, Upcoming invasive procedure, Emergency department visit, Upcoming dental procedure, Missed doses, Extra doses, Change in medications, Change in diet/appetite, Hospital admission, Bruising, Other complaints   Comments: All findings negative per pt.       Plan:  INR was therapeutic today 10/5/23 at 2.3  (goal 2.0-3.0). Ms. Ngo will continue warfarin 2.5mg oral daily except 5 mg SunTueThur until recheck.  Repeat INR in one week, 10/12/23  Marianela Ngo understands the importance of calling the [facility] if she notices any s/sx of bleeding, stroke, or abnormal bruising, if any changes are made to her medications or medication doses (Rx, OTC, herbal), or if any upcoming procedures are scheduled. Marianela Ngo will likewise let us know if any other changes, questions, or concerns arise regarding anticoagulation therapy. she understands the importance of seeking medical attention immediately if she experiences any falls, vehicle accidents, or abnormal bleeding or bruising. Marianela Ngo voiced understanding of this information and confirms that she has the [facility]'s contact information. Otherwise, we will plan to contact the patient once monthly or if her INR is out of range.      Babita Leonard  Pharmacy Technician  10/5/2023 11:26 EDT    I, Divya Jaffe, PharmD, have reviewed the note in full and agree with the assessment and plan.  10/05/23  11:45 EDT

## 2023-10-13 ENCOUNTER — ANTICOAGULATION VISIT (OUTPATIENT)
Dept: PHARMACY | Facility: HOSPITAL | Age: 78
End: 2023-10-13
Payer: MEDICARE

## 2023-10-13 DIAGNOSIS — I48.0 PAROXYSMAL ATRIAL FIBRILLATION: Primary | ICD-10-CM

## 2023-10-13 LAB — INR PPP: 1.9

## 2023-10-13 NOTE — PROGRESS NOTES
Anticoagulation Clinic - Remote Progress Note  mdINR Home Monitor  Testing frequency: weekly    Indication: paroxysmal afib  Referring Provider: Susan [next 10/25/23]  Initial Warfarin Start Date: 2020? 2019?  Goal INR: 2.0-3.0  Current Drug Interactions: hydrochlorothiazide   [id number]: 4 (Age ?75, Sex, HTN) [estimated 5/20/21]  Bleed Risk: self reports negative history for GI bleed  Other: DOAC too expensive    Diet: green beans, peas, zucchini, or priyanka salads 2-3x/week (7/6/2023)  Alcohol: none  Tobacco: none  OTC Pain Medication: APAP only    INR History:  Date 5/14 5/18 5/25 6/1 6/15 7/6 7/12 7/22 7/28 8/4 8/11 8/18 8/23 8/27   Total WeeklyDose  22.5mg 25mg 25mg 25mg 25mg 25mg 25mg 25mg 25mg 25mg 27.5mg 27.5mg 27.5mg   INR 3.5 1.9 2.3 2.3 2.8 2.7 2.1 2.2 1.9 2.0 1.8 2.3 2.2 1.8   Notes [place] Cards 1x hold; incr GLV     HM  incr GLV  no GLV incr GLV cefdinir cefdinir     Date 9/1 9/8 9/15 9/22 9/29 10/6 10/13 10/20 10/27 11/3 11/10 11/17 11/24 12/1   Total WeeklyDose 27.5mg 27.5mg 27.5mg 27.5mg 27.5mg 27.5mg 27.5mg 30mg 27.5mg 27.5mg 27.5mg 25mg 27.5mg 27.5mg   INR 2.1 2.3 3.3 2.3 2.2 2.0 1.9 2.6 3.2 2.4 3.4 1.6 2.4 2.3   Notes cefdinir  decr GLV?  keflex   1x incr dose   decr GLV? 1x decr dose;   incr GLV?       Date 12/9 12/15 12/22 12/29 1/5/22 1/12 1/20 1/26 1/31 2/3 2/7 2/10 2/16 2/23   Total WeeklyDose 27.5mg 27.5mg 27.5mg 27.5mg 27.5mg 27.5mg 27.5mg 27.5mg 27.5mg 25mg 25mg 22.5mg 27.5mg 27.5mg   INR 2.5 2.4 2.7 2.5 2.6 2.2 2.5 2.1 3.8 2.6 4.1 2.5 2.7 3.5   Notes call    call   COVID+ dex / azith 1x hold pred 20 BID x5d  1x hold; 1x decr dose;   call call call     Date 3/2 3/9 3/16 3/23 3/30 4/5 4/13 4/20 4/27 5/4 5/11 5/18 5/25 6/1   Total WeeklyDose 25mg 27.5mg 27.5mg 27.5mg 27.5mg 27.5mg 27.5 mg 27.5mg 27.5 mg 27.5 mg 27.5 mg 27.5 mg 27.5 mg 27.5mg   INR 2.5 2.9 2.2 2.8 2.7 2.4 2.5 2.3 2.5 2.8 3.1 2.6 2.5 2.3   Notes 1x decr  call call    call    call Call  apap rec 5/19       Date 6/8 6/15  6/22 6/29 7/6 7/14 7/20 7/27 8/3 8/10 8/17 8/25 8/31 9/7   Total Weekly Dose 27.5 mg 27.5 mg 27.5 mg 27.5 mg 27.5 mg 27.5 mg 27.5 mg 30 mg 27.5 mg 27.5mg 27.5 mg 27.5 mg 27.5 mg 27.5 mg   INR 2.5 2.5 2.7 2.5 2.9 2.4 1.8 2.7 2.8 2.8 2.8 3.1 2.6 3.5   Notes call    call  call 12/71x boost   call Call  Dec GLV  call     Date 9/14 9/21 9/28 10/5 10/12 10/19 10/26 11/2 11/9 11/16 11/23 11/30 12/7   Total WeeklyDose 27.5mg 25 mg 25 mg 25 mg 25 mg  25 mg 25 mg 25 mg 25 mg 25 mg 25 mg 25 mg 25 mg   INR 3.1 2.9 2.5 2.9 2.2 2.4 2.3 2.1 2.3 2.3 2.1 2.2 1.9   Notes 1x hold inc GLV call   Call     call    Call  INC GLV     Date 12/14 12/22 12/28 1/4/23 1/11 1/18 1/25 2/1 2/8 2/16/23 2/22 3/1/23   Total WeeklyDose 27.5 mg 25 mg  20 mg 27.5 mg 27.5 mg 27.5 mg 27.5 mg 27.5 mg 27.5 mg 27.5 mg 27.5mg 27.5 mg    INR 2.8 2.1 1.9 1.9 1.8 2.1 2.2 2.0 2.7 2.5 2.6 2.8   Notes 1x boost  rec 12/15 Call  Call 1x miss call call Call  Call    Call        Date 3/9 3/16 3/22 3/29 4/5 4/12 4/19 4/26 5/3 5/10/23 5/17 5/24   Total WeeklyDose 27.5 mg 27.5 mg 27.5 mg 25mg 27.5 mg 27.5 mg 27.5 mg 27.5 mg 27.5 mg  27.5 mg  27.5 mg 27.5 mg   INR 2.6  2.6 4.1 2.7 2.6 2.7 2.9 2.7 2.5 2.8 2.0 2.4   Notes  call call call call    call        Date 5/31 6/7/23 6/14 6/21/23 6/28 7/5 7/13 7/19/23 7/26/23 8/2 08/10 8/16/23   Total WeeklyDose 27.5 mg 27.5 mg  27.5 mg 27.5 mg 27.5 mg 25mg 25 mg 25 mg 25 mg 25 mg 25 mg 25 mg   INR 2.8 2.5 3.2 2.9 3.8 3.2 2.9 2.3 2.6 2.9 2.8 2.9   Notes call  Call  Dec GLV   Hold x1; moving; call     call      Date 08/23 8/30/23 9/8 9/14 09/21 09/28 10/05 10/13/23       Total WeeklyDose 25 mg 25 mg 25 mg 25 mg 25 mg 25 mg 25 mg 25 mg       INR 2.9 2.0 2.4 2.4 2.7 2.8 2.3 1.9       Notes   call    call Call  1x boost        Phone Interview:  Tablet Strength: 5 mg (1/5/22)  Patient Contact Info: [phone] (home) preferred; [phone] (cell)  Verbal Release Auth: signed 5/25/21 -- May speak w/Femi Ngo, ; May leave  voicemail on home phone  Lab Contact Info: [facility]  *Will call once monthly or if INR out of range*         Patient Findings  Negatives: Signs/symptoms of thrombosis, Signs/symptoms of bleeding, Laboratory test error suspected, Change in health, Change in alcohol use, Change in activity, Upcoming invasive procedure, Emergency department visit, Upcoming dental procedure, Missed doses, Extra doses, Change in medications, Change in diet/appetite, Hospital admission, Bruising, Other complaints   Comments: All findings negative per pt.       Plan:  INR was therapeutic today 10/13/23 at 1.9 (goal 2.0-3.0). Per Maryjane Amezquita, PharmD, Instructed Ms. Ngo to BOOST tonight's dose of warfarin to 5 mg then continue warfarin 2.5mg oral daily except 5 mg SunTueThur until recheck.  Repeat INR in one week, 10/19/23  Marianela Ngo understands the importance of calling the [facility] if she notices any s/sx of bleeding, stroke, or abnormal bruising, if any changes are made to her medications or medication doses (Rx, OTC, herbal), or if any upcoming procedures are scheduled. Marianela Ngo will likewise let us know if any other changes, questions, or concerns arise regarding anticoagulation therapy. she understands the importance of seeking medical attention immediately if she experiences any falls, vehicle accidents, or abnormal bleeding or bruising. Marianela Ngo voiced understanding of this information and confirms that she has the [facility]'s contact information. Otherwise, we will plan to contact the patient once monthly or if her INR is out of range.    Chicho Shine, Pharmacy Technician  10/13/2023  14:30 EDT    I, Divya Jaffe, PharmD, have reviewed the note in full and agree with the assessment and plan.  10/13/23  16:29 EDT

## 2023-10-19 ENCOUNTER — ANTICOAGULATION VISIT (OUTPATIENT)
Dept: PHARMACY | Facility: HOSPITAL | Age: 78
End: 2023-10-19
Payer: MEDICARE

## 2023-10-19 DIAGNOSIS — I48.0 PAROXYSMAL ATRIAL FIBRILLATION: Primary | ICD-10-CM

## 2023-10-19 LAB — INR PPP: 2.4

## 2023-10-19 NOTE — PROGRESS NOTES
Anticoagulation Clinic - Remote Progress Note  mdINR Home Monitor  Testing frequency: weekly    Indication: paroxysmal afib  Referring Provider: Susan [next 10/25/23]  Initial Warfarin Start Date: 2020? 2019?  Goal INR: 2.0-3.0  Current Drug Interactions: hydrochlorothiazide   [id number]: 4 (Age ?74, Sex, HTN) [estimated 5/20/21]  Bleed Risk: self reports negative history for GI bleed  Other: DOAC too expensive    Diet: green beans, peas, zucchini, or priyanka salads 2-3x/week (7/6/2023)  Alcohol: none  Tobacco: none  OTC Pain Medication: APAP only    INR History:  Date 5/14 5/18 5/25 6/1 6/15 7/6 7/12 7/22 7/28 8/4 8/11 8/18 8/23 8/27   Total WeeklyDose  22.5mg 25mg 25mg 25mg 25mg 25mg 25mg 25mg 25mg 25mg 27.5mg 27.5mg 27.5mg   INR 3.5 1.9 2.3 2.3 2.8 2.7 2.1 2.2 1.9 2.0 1.8 2.3 2.2 1.8   Notes [place] Cards 1x hold; incr GLV     HM  incr GLV  no GLV incr GLV cefdinir cefdinir     Date 9/1 9/8 9/15 9/22 9/29 10/6 10/13 10/20 10/27 11/3 11/10 11/17 11/24 12/1   Total WeeklyDose 27.5mg 27.5mg 27.5mg 27.5mg 27.5mg 27.5mg 27.5mg 30mg 27.5mg 27.5mg 27.5mg 25mg 27.5mg 27.5mg   INR 2.1 2.3 3.3 2.3 2.2 2.0 1.9 2.6 3.2 2.4 3.4 1.6 2.4 2.3   Notes cefdinir  decr GLV?  keflex   1x incr dose   decr GLV? 1x decr dose;   incr GLV?       Date 12/9 12/15 12/22 12/29 1/5/22 1/12 1/20 1/26 1/31 2/3 2/7 2/10 2/16 2/23   Total WeeklyDose 27.5mg 27.5mg 27.5mg 27.5mg 27.5mg 27.5mg 27.5mg 27.5mg 27.5mg 25mg 25mg 22.5mg 27.5mg 27.5mg   INR 2.5 2.4 2.7 2.5 2.6 2.2 2.5 2.1 3.8 2.6 4.1 2.5 2.7 3.5   Notes call    call   COVID+ dex / azith 1x hold pred 20 BID x5d  1x hold; 1x decr dose;   call call call     Date 3/2 3/9 3/16 3/23 3/30 4/5 4/13 4/20 4/27 5/4 5/11 5/18 5/25 6/1   Total WeeklyDose 25mg 27.5mg 27.5mg 27.5mg 27.5mg 27.5mg 27.5 mg 27.5mg 27.5 mg 27.5 mg 27.5 mg 27.5 mg 27.5 mg 27.5mg   INR 2.5 2.9 2.2 2.8 2.7 2.4 2.5 2.3 2.5 2.8 3.1 2.6 2.5 2.3   Notes 1x decr  call call    call    call Call  apap rec 5/19       Date 6/8 6/15  6/22 6/29 7/6 7/14 7/20 7/27 8/3 8/10 8/17 8/25 8/31 9/7   Total Weekly Dose 27.5 mg 27.5 mg 27.5 mg 27.5 mg 27.5 mg 27.5 mg 27.5 mg 30 mg 27.5 mg 27.5mg 27.5 mg 27.5 mg 27.5 mg 27.5 mg   INR 2.5 2.5 2.7 2.5 2.9 2.4 1.8 2.7 2.8 2.8 2.8 3.1 2.6 3.5   Notes call    call  call 12/71x boost   call Call  Dec GLV  call     Date 9/14 9/21 9/28 10/5 10/12 10/19 10/26 11/2 11/9 11/16 11/23 11/30 12/7   Total WeeklyDose 27.5mg 25 mg 25 mg 25 mg 25 mg  25 mg 25 mg 25 mg 25 mg 25 mg 25 mg 25 mg 25 mg   INR 3.1 2.9 2.5 2.9 2.2 2.4 2.3 2.1 2.3 2.3 2.1 2.2 1.9   Notes 1x hold inc GLV call   Call     call    Call  INC GLV     Date 12/14 12/22 12/28 1/4/23 1/11 1/18 1/25 2/1 2/8 2/16/23 2/22 3/1/23   Total WeeklyDose 27.5 mg 25 mg  20 mg 27.5 mg 27.5 mg 27.5 mg 27.5 mg 27.5 mg 27.5 mg 27.5 mg 27.5mg 27.5 mg    INR 2.8 2.1 1.9 1.9 1.8 2.1 2.2 2.0 2.7 2.5 2.6 2.8   Notes 1x boost  rec 12/15 Call  Call 1x miss call call Call  Call    Call        Date 3/9 3/16 3/22 3/29 4/5 4/12 4/19 4/26 5/3 5/10/23 5/17 5/24   Total WeeklyDose 27.5 mg 27.5 mg 27.5 mg 25mg 27.5 mg 27.5 mg 27.5 mg 27.5 mg 27.5 mg  27.5 mg  27.5 mg 27.5 mg   INR 2.6  2.6 4.1 2.7 2.6 2.7 2.9 2.7 2.5 2.8 2.0 2.4   Notes  call call call call    call        Date 5/31 6/7/23 6/14 6/21/23 6/28 7/5 7/13 7/19/23 7/26/23 8/2 08/10 8/16/23   Total WeeklyDose 27.5 mg 27.5 mg  27.5 mg 27.5 mg 27.5 mg 25mg 25 mg 25 mg 25 mg 25 mg 25 mg 25 mg   INR 2.8 2.5 3.2 2.9 3.8 3.2 2.9 2.3 2.6 2.9 2.8 2.9   Notes call  Call  Dec GLV   Hold x1; moving; call     call      Date 08/23 8/30/23 9/8 9/14 09/21 09/28 10/05 10/13/23 10/19      Total WeeklyDose 25 mg 25 mg 25 mg 25 mg 25 mg 25 mg 25 mg 25 mg 27.5 mg      INR 2.9 2.0 2.4 2.4 2.7 2.8 2.3 1.9 2.4      Notes   call    call Call  1x boost        Phone Interview:  Tablet Strength: 5 mg (1/5/22)  Patient Contact Info: [phone] (home) preferred; [phone] (cell)  Verbal Release Auth: signed 5/25/21 -- May speak w/Femi gNo, ;  May leave voicemail on home phone  Lab Contact Info: [facility]  *Will call once monthly or if INR out of range*    Patient Findings  Negatives: Signs/symptoms of thrombosis, Signs/symptoms of bleeding, Laboratory test error suspected, Change in health, Change in alcohol use, Change in activity, Upcoming invasive procedure, Emergency department visit, Upcoming dental procedure, Missed doses, Extra doses, Change in medications, Change in diet/appetite, Hospital admission, Bruising, Other complaints   Comments: Pt denies any issues with bleeding or bruising, with no changes in medications or diet. No upcoming procedures, verified last 7 day dosing.     Plan:  INR was therapeutic today at 2.4 (goal 2.0-3.0). Instructed Ms. Ngo to continue warfarin 2.5 mg PO QD except 5 mg SunTuesThurs until recheck (25 mg/week)  Repeat INR in one week, 10/26/23  Marianela Ngo understands the importance of calling the [facility] if she notices any s/sx of bleeding, stroke, or abnormal bruising, if any changes are made to her medications or medication doses (Rx, OTC, herbal), or if any upcoming procedures are scheduled. Marianela Ngo will likewise let us know if any other changes, questions, or concerns arise regarding anticoagulation therapy. she understands the importance of seeking medical attention immediately if she experiences any falls, vehicle accidents, or abnormal bleeding or bruising. Marianela Ngo voiced understanding of this information and confirms that she has the [facility]'s contact information. Otherwise, we will plan to contact the patient once monthly or if her INR is out of range.    If SUBtherapeutic at next check, may consider maintenance dose of 5 mg PO QD except 2.5 mg MWF (27.5mg/week).  Latasha Maldonado, Pharmacy Intern  10/19/23  13:22 EDT       I, Alisia Cunningham, [facility], have reviewed the note in full and agree with the assessment and plan.  10/19/23  13:55  EDT

## 2023-10-25 ENCOUNTER — OFFICE VISIT (OUTPATIENT)
Dept: CARDIOLOGY | Facility: CLINIC | Age: 78
End: 2023-10-25
Payer: MEDICARE

## 2023-10-25 VITALS
WEIGHT: 178 LBS | OXYGEN SATURATION: 99 % | RESPIRATION RATE: 18 BRPM | HEIGHT: 65 IN | SYSTOLIC BLOOD PRESSURE: 110 MMHG | TEMPERATURE: 97 F | BODY MASS INDEX: 29.66 KG/M2 | HEART RATE: 80 BPM | DIASTOLIC BLOOD PRESSURE: 68 MMHG

## 2023-10-25 DIAGNOSIS — I48.0 PAROXYSMAL ATRIAL FIBRILLATION: Primary | ICD-10-CM

## 2023-10-25 DIAGNOSIS — I10 ESSENTIAL HYPERTENSION: ICD-10-CM

## 2023-10-25 DIAGNOSIS — J43.2 CENTRILOBULAR EMPHYSEMA: ICD-10-CM

## 2023-10-25 DIAGNOSIS — E78.5 HYPERLIPIDEMIA LDL GOAL <100: ICD-10-CM

## 2023-10-25 PROCEDURE — 99214 OFFICE O/P EST MOD 30 MIN: CPT | Performed by: INTERNAL MEDICINE

## 2023-10-25 PROCEDURE — 1160F RVW MEDS BY RX/DR IN RCRD: CPT | Performed by: INTERNAL MEDICINE

## 2023-10-25 PROCEDURE — 93000 ELECTROCARDIOGRAM COMPLETE: CPT | Performed by: INTERNAL MEDICINE

## 2023-10-25 PROCEDURE — 3074F SYST BP LT 130 MM HG: CPT | Performed by: INTERNAL MEDICINE

## 2023-10-25 PROCEDURE — 3078F DIAST BP <80 MM HG: CPT | Performed by: INTERNAL MEDICINE

## 2023-10-25 PROCEDURE — 1159F MED LIST DOCD IN RCRD: CPT | Performed by: INTERNAL MEDICINE

## 2023-10-25 NOTE — PROGRESS NOTES
[facility] [place]  [facility]  [address] [place] [address]  Dept: [phone]  Dept Fax: [phone]    Marianela Ngo  1945    Follow Up Office Visit Note    History of Present Illness:  Marianela Ngo is a 78 y.o. female who presents to the clinic for Follow-up. PAF - she has lost weight near 30 pounds and seems doing better no more palpitations on Sotalol 80.40 EKG sinus with QT near 500, will observe, no major changes    The following portions of the patient's history were reviewed and updated as appropriate: allergies, current medications, past family history, past medical history, past social history, past surgical history, and problem list.    Medications:  albuterol  amLODIPine  atorvastatin  budesonide  lisinopril-hydrochlorothiazide  sotalol  warfarin    Subjective  No Known Allergies     Past Medical History:   Diagnosis Date    Allergies     Bradycardia with 51-60 beats per minute     Chronic atrial fibrillation     Current use of long term anticoagulation     DJD (degenerative joint disease)     Epistaxis     Essential hypertension     High risk medication use     Mild persistent asthma, uncomplicated     Mixed hyperlipidemia     Obstructive sleep apnea on CPAP     Other fatigue     Paroxysmal atrial fibrillation     Pneumonia 08/2015    Shortness of breath     Vitamin deficiency        Past Surgical History:   Procedure Laterality Date    CATARACT EXTRACTION, BILATERAL      HYSTERECTOMY      PARTIAL    REPLACEMENT TOTAL KNEE BILATERAL  2013,2014       Family History   Problem Relation Age of Onset    Cancer Father     Heart disease Sister     Cancer Brother     Diabetes Brother     Hypertension Other         Social History     Socioeconomic History    Marital status:    Tobacco Use    Smoking status: Former     Packs/day: 1.00     Years: 48.00     Additional pack years: 0.00     Total pack years: 48.00     Types: Cigarettes     Quit date: 1974     " Years since quittin.8    Smokeless tobacco: Never   Vaping Use    Vaping Use: Never used   Substance and Sexual Activity    Alcohol use: Never    Drug use: Never    Sexual activity: Defer       Review of Systems    Cardiovascular Procedures    ECHO/MUGA:  STRESS TESTS:   CARDIAC CATH:   DEVICES:   HOLTER:   CT/MRI:   VASCULAR:   CARDIOTHORACIC:     Objective  Vitals:    10/25/23 0932   BP: 110/68   BP Location: Right arm   Patient Position: Lying   Cuff Size: Adult   Pulse: 80   Resp: 18   Temp: 97 °F (36.1 °C)   TempSrc: Infrared   SpO2: 99%   Weight: 80.7 kg (178 lb)   Height: 165.1 cm (65\")   PainSc: 0-No pain     Body mass index is 29.62 kg/m².     Physical Exam  Vitals reviewed.   Constitutional:       Appearance: Healthy appearance. Not in distress.   Neck:      Vascular: No JVR. JVD normal.   Pulmonary:      Effort: Pulmonary effort is normal.      Breath sounds: Normal breath sounds. No wheezing. No rhonchi. No rales.   Chest:      Chest wall: Not tender to palpatation.   Cardiovascular:      PMI at left midclavicular line. Normal rate. Regular rhythm. Normal S1. Normal S2.       Murmurs: There is no murmur.      No gallop.  No click. No rub.   Pulses:     Intact distal pulses.   Edema:     Peripheral edema absent.   Abdominal:      General: Bowel sounds are normal.      Palpations: Abdomen is soft.      Tenderness: There is no abdominal tenderness.   Musculoskeletal: Normal range of motion.         General: No tenderness. Skin:     General: Skin is warm and dry.   Neurological:      General: No focal deficit present.      Mental Status: Alert and oriented to person, place and time.        Diagnostic Data    ECG 12 Lead    Date/Time: 10/25/2023 10:14 AM  Performed by: Amos Davis MD    Authorized by: Amos Davis MD  Comparison: compared with previous ECG from 2023  Rhythm: sinus rhythm and sinus bradycardia  Rate: normal  BPM: 53  Conduction: right bundle branch block  QRS " axis: normal    Clinical impression: abnormal EKG      Assessment and Plan  Diagnoses and all orders for this visit:    Paroxysmal atrial fibrillation- Doing good, on Sotalol 80.40, QT is about the same near 500 . Also  on warfarin    Essential hypertension- BP is 125.80., on Amlodipine 5mg and also lisinopril 20.12.5    Hyperlipidemia LDL goal <100- On Lipitor 20 mg no complaints Ldl is 61. Tolerates well    Centrilobular emphysema         Return in about 6 months (around 4/25/2024) for Recheck with Dr. Davis.    Amos Davis MD  10/25/2023

## 2023-10-26 ENCOUNTER — ANTICOAGULATION VISIT (OUTPATIENT)
Dept: PHARMACY | Facility: HOSPITAL | Age: 78
End: 2023-10-26
Payer: MEDICARE

## 2023-10-26 DIAGNOSIS — I48.0 PAROXYSMAL ATRIAL FIBRILLATION: Primary | ICD-10-CM

## 2023-10-26 LAB — INR PPP: 1.8

## 2023-10-26 NOTE — PROGRESS NOTES
Anticoagulation Clinic - Remote Progress Note  mdINR Home Monitor  Testing frequency: weekly    Indication: paroxysmal afib  Referring Provider: Susan [next 10/25/23]  Initial Warfarin Start Date: 2020? 2019?  Goal INR: 2.0-3.0  Current Drug Interactions: hydrochlorothiazide   [id number]: 4 (Age ?74, Sex, HTN) [estimated 5/20/21]  Bleed Risk: self reports negative history for GI bleed  Other: DOAC too expensive    Diet: green beans, peas, zucchini, or priyanka salads 2-3x/week (7/6/2023)  Alcohol: none  Tobacco: none  OTC Pain Medication: APAP only    INR History:  Date 5/14 5/18 5/25 6/1 6/15 7/6 7/12 7/22 7/28 8/4 8/11 8/18 8/23 8/27   Total WeeklyDose  22.5mg 25mg 25mg 25mg 25mg 25mg 25mg 25mg 25mg 25mg 27.5mg 27.5mg 27.5mg   INR 3.5 1.9 2.3 2.3 2.8 2.7 2.1 2.2 1.9 2.0 1.8 2.3 2.2 1.8   Notes [place] Cards 1x hold; incr GLV     HM  incr GLV  no GLV incr GLV cefdinir cefdinir     Date 9/1 9/8 9/15 9/22 9/29 10/6 10/13 10/20 10/27 11/3 11/10 11/17 11/24 12/1   Total WeeklyDose 27.5mg 27.5mg 27.5mg 27.5mg 27.5mg 27.5mg 27.5mg 30mg 27.5mg 27.5mg 27.5mg 25mg 27.5mg 27.5mg   INR 2.1 2.3 3.3 2.3 2.2 2.0 1.9 2.6 3.2 2.4 3.4 1.6 2.4 2.3   Notes cefdinir  decr GLV?  keflex   1x incr dose   decr GLV? 1x decr dose;   incr GLV?       Date 12/9 12/15 12/22 12/29 1/5/22 1/12 1/20 1/26 1/31 2/3 2/7 2/10 2/16 2/23   Total WeeklyDose 27.5mg 27.5mg 27.5mg 27.5mg 27.5mg 27.5mg 27.5mg 27.5mg 27.5mg 25mg 25mg 22.5mg 27.5mg 27.5mg   INR 2.5 2.4 2.7 2.5 2.6 2.2 2.5 2.1 3.8 2.6 4.1 2.5 2.7 3.5   Notes call    call   COVID+ dex / azith 1x hold pred 20 BID x5d  1x hold; 1x decr dose;   call call call     Date 3/2 3/9 3/16 3/23 3/30 4/5 4/13 4/20 4/27 5/4 5/11 5/18 5/25 6/1   Total WeeklyDose 25mg 27.5mg 27.5mg 27.5mg 27.5mg 27.5mg 27.5 mg 27.5mg 27.5 mg 27.5 mg 27.5 mg 27.5 mg 27.5 mg 27.5mg   INR 2.5 2.9 2.2 2.8 2.7 2.4 2.5 2.3 2.5 2.8 3.1 2.6 2.5 2.3   Notes 1x decr  call call    call    call Call  apap rec 5/19       Date 6/8 6/15  6/22 6/29 7/6 7/14 7/20 7/27 8/3 8/10 8/17 8/25 8/31 9/7   Total Weekly Dose 27.5 mg 27.5 mg 27.5 mg 27.5 mg 27.5 mg 27.5 mg 27.5 mg 30 mg 27.5 mg 27.5mg 27.5 mg 27.5 mg 27.5 mg 27.5 mg   INR 2.5 2.5 2.7 2.5 2.9 2.4 1.8 2.7 2.8 2.8 2.8 3.1 2.6 3.5   Notes call    call  call 12/71x boost   call Call  Dec GLV  call     Date 9/14 9/21 9/28 10/5 10/12 10/19 10/26 11/2 11/9 11/16 11/23 11/30 12/7   Total WeeklyDose 27.5mg 25 mg 25 mg 25 mg 25 mg  25 mg 25 mg 25 mg 25 mg 25 mg 25 mg 25 mg 25 mg   INR 3.1 2.9 2.5 2.9 2.2 2.4 2.3 2.1 2.3 2.3 2.1 2.2 1.9   Notes 1x hold inc GLV call   Call     call    Call  INC GLV     Date 12/14 12/22 12/28 1/4/23 1/11 1/18 1/25 2/1 2/8 2/16/23 2/22 3/1/23   Total WeeklyDose 27.5 mg 25 mg  20 mg 27.5 mg 27.5 mg 27.5 mg 27.5 mg 27.5 mg 27.5 mg 27.5 mg 27.5mg 27.5 mg    INR 2.8 2.1 1.9 1.9 1.8 2.1 2.2 2.0 2.7 2.5 2.6 2.8   Notes 1x boost  rec 12/15 Call  Call 1x miss call call Call  Call    Call        Date 3/9 3/16 3/22 3/29 4/5 4/12 4/19 4/26 5/3 5/10/23 5/17 5/24   Total WeeklyDose 27.5 mg 27.5 mg 27.5 mg 25mg 27.5 mg 27.5 mg 27.5 mg 27.5 mg 27.5 mg  27.5 mg  27.5 mg 27.5 mg   INR 2.6  2.6 4.1 2.7 2.6 2.7 2.9 2.7 2.5 2.8 2.0 2.4   Notes  call call call call    call        Date 5/31 6/7/23 6/14 6/21/23 6/28 7/5 7/13 7/19/23 7/26/23 8/2 08/10 8/16/23   Total WeeklyDose 27.5 mg 27.5 mg  27.5 mg 27.5 mg 27.5 mg 25mg 25 mg 25 mg 25 mg 25 mg 25 mg 25 mg   INR 2.8 2.5 3.2 2.9 3.8 3.2 2.9 2.3 2.6 2.9 2.8 2.9   Notes call  Call  Dec GLV   Hold x1; moving; call     call      Date 08/23 8/30/23 9/8 9/14 09/21 09/28 10/05 10/13/23 10/19 10/26     Total WeeklyDose 25 mg 25 mg 25 mg 25 mg 25 mg 25 mg 25 mg 25 mg 27.5 mg 25 mg      INR 2.9 2.0 2.4 2.4 2.7 2.8 2.3 1.9 2.4 1.8     Notes   call    call Call  1x boost        Phone Interview:  Tablet Strength: 5 mg (1/5/22)  Patient Contact Info: [phone] (home) preferred; [phone] (cell)  Verbal Release Auth: signed 5/25/21 -- May speak sherron/Femi  Huber, ; May leave voicemail on home phone  Lab Contact Info: [facility]  *Will call once monthly or if INR out of range*    Patient Findings  Positives: Change in diet/appetite   Negatives: Signs/symptoms of thrombosis, Signs/symptoms of bleeding, Laboratory test error suspected, Change in health, Change in alcohol use, Change in activity, Upcoming invasive procedure, Emergency department visit, Upcoming dental procedure, Missed doses, Extra doses, Change in medications, Hospital admission, Bruising, Other complaints   Comments: Pt has not had any green veggies this week and she usually eats greens. Counseled pt that GLV decrease INR, so it's not likely her omitting them this past week has caused her low INR. Verified dosing     Plan:  INR was SUBtherapeutic today at 1.8 (goal 2.0-3.0). Instructed Ms. Ngo to begin new maintenance dose of warfarin 5 mg QD except 2.5 mg MWF until recheck (27.5 mg/week)  Repeat INR in one week, 11/02/23  Marianela Ngo understands the importance of calling the [facility] if she notices any s/sx of bleeding, stroke, or abnormal bruising, if any changes are made to her medications or medication doses (Rx, OTC, herbal), or if any upcoming procedures are scheduled. Marianela Ngo will likewise let us know if any other changes, questions, or concerns arise regarding anticoagulation therapy. she understands the importance of seeking medical attention immediately if she experiences any falls, vehicle accidents, or abnormal bleeding or bruising. Marianela Ngo voiced understanding of this information and confirms that she has the [facility]'s contact information. Otherwise, we will plan to contact the patient once monthly or if her INR is out of range.    Latasha Maldonado, Pharmacy Intern  10/26/23  12:05 EDT     Would consider dose increase on Friday instead of Saturday, however, will continue due to easier to remember for patient.  I,  Maryjane Amezquita, PharmD, have reviewed the note in full and agree with the assessment and plan.  10/26/23  14:48 EDT

## 2023-11-03 ENCOUNTER — ANTICOAGULATION VISIT (OUTPATIENT)
Dept: PHARMACY | Facility: HOSPITAL | Age: 78
End: 2023-11-03
Payer: MEDICARE

## 2023-11-03 ENCOUNTER — CLINICAL SUPPORT (OUTPATIENT)
Dept: CARDIOLOGY | Facility: CLINIC | Age: 78
End: 2023-11-03
Payer: MEDICARE

## 2023-11-03 DIAGNOSIS — I48.0 PAROXYSMAL ATRIAL FIBRILLATION: Primary | ICD-10-CM

## 2023-11-03 LAB — INR PPP: 2 (ref 0.9–1.1)

## 2023-11-03 PROCEDURE — 36416 COLLJ CAPILLARY BLOOD SPEC: CPT | Performed by: INTERNAL MEDICINE

## 2023-11-03 NOTE — PROGRESS NOTES
Anticoagulation Clinic - Remote Progress Note  mdINR Home Monitor  Testing frequency: weekly    Indication: paroxysmal afib  Referring Provider: Susan [next 10/25/23]  Initial Warfarin Start Date: 2020? 2019?  Goal INR: 2.0-3.0  Current Drug Interactions: hydrochlorothiazide   [id number]: 4 (Age ?74, Sex, HTN) [estimated 5/20/21]  Bleed Risk: self reports negative history for GI bleed  Other: DOAC too expensive    Diet: green beans, peas, zucchini, or priyanka salads 2-3x/week (7/6/2023)  Alcohol: none  Tobacco: none  OTC Pain Medication: APAP only    INR History:  Date 5/14 5/18 5/25 6/1 6/15 7/6 7/12 7/22 7/28 8/4 8/11 8/18 8/23 8/27   Total WeeklyDose  22.5mg 25mg 25mg 25mg 25mg 25mg 25mg 25mg 25mg 25mg 27.5mg 27.5mg 27.5mg   INR 3.5 1.9 2.3 2.3 2.8 2.7 2.1 2.2 1.9 2.0 1.8 2.3 2.2 1.8   Notes [place] Cards 1x hold; incr GLV     HM  incr GLV  no GLV incr GLV cefdinir cefdinir     Date 9/1 9/8 9/15 9/22 9/29 10/6 10/13 10/20 10/27 11/3 11/10 11/17 11/24 12/1   Total WeeklyDose 27.5mg 27.5mg 27.5mg 27.5mg 27.5mg 27.5mg 27.5mg 30mg 27.5mg 27.5mg 27.5mg 25mg 27.5mg 27.5mg   INR 2.1 2.3 3.3 2.3 2.2 2.0 1.9 2.6 3.2 2.4 3.4 1.6 2.4 2.3   Notes cefdinir  decr GLV?  keflex   1x incr dose   decr GLV? 1x decr dose;   incr GLV?       Date 12/9 12/15 12/22 12/29 1/5/22 1/12 1/20 1/26 1/31 2/3 2/7 2/10 2/16 2/23   Total WeeklyDose 27.5mg 27.5mg 27.5mg 27.5mg 27.5mg 27.5mg 27.5mg 27.5mg 27.5mg 25mg 25mg 22.5mg 27.5mg 27.5mg   INR 2.5 2.4 2.7 2.5 2.6 2.2 2.5 2.1 3.8 2.6 4.1 2.5 2.7 3.5   Notes call    call   COVID+ dex / azith 1x hold pred 20 BID x5d  1x hold; 1x decr dose;   call call call     Date 3/2 3/9 3/16 3/23 3/30 4/5 4/13 4/20 4/27 5/4 5/11 5/18 5/25 6/1   Total WeeklyDose 25mg 27.5mg 27.5mg 27.5mg 27.5mg 27.5mg 27.5 mg 27.5mg 27.5 mg 27.5 mg 27.5 mg 27.5 mg 27.5 mg 27.5mg   INR 2.5 2.9 2.2 2.8 2.7 2.4 2.5 2.3 2.5 2.8 3.1 2.6 2.5 2.3   Notes 1x decr  call call    call    call Call  apap rec 5/19       Date 6/8 6/15  6/22 6/29 7/6 7/14 7/20 7/27 8/3 8/10 8/17 8/25 8/31 9/7   Total Weekly Dose 27.5 mg 27.5 mg 27.5 mg 27.5 mg 27.5 mg 27.5 mg 27.5 mg 30 mg 27.5 mg 27.5mg 27.5 mg 27.5 mg 27.5 mg 27.5 mg   INR 2.5 2.5 2.7 2.5 2.9 2.4 1.8 2.7 2.8 2.8 2.8 3.1 2.6 3.5   Notes call    call  call 12/71x boost   call Call  Dec GLV  call     Date 9/14 9/21 9/28 10/5 10/12 10/19 10/26 11/2 11/9 11/16 11/23 11/30 12/7   Total WeeklyDose 27.5mg 25 mg 25 mg 25 mg 25 mg  25 mg 25 mg 25 mg 25 mg 25 mg 25 mg 25 mg 25 mg   INR 3.1 2.9 2.5 2.9 2.2 2.4 2.3 2.1 2.3 2.3 2.1 2.2 1.9   Notes 1x hold inc GLV call   Call     call    Call  INC GLV     Date 12/14 12/22 12/28 1/4/23 1/11 1/18 1/25 2/1 2/8 2/16/23 2/22 3/1/23   Total WeeklyDose 27.5 mg 25 mg  20 mg 27.5 mg 27.5 mg 27.5 mg 27.5 mg 27.5 mg 27.5 mg 27.5 mg 27.5mg 27.5 mg    INR 2.8 2.1 1.9 1.9 1.8 2.1 2.2 2.0 2.7 2.5 2.6 2.8   Notes 1x boost  rec 12/15 Call  Call 1x miss call call Call  Call    Call        Date 3/9 3/16 3/22 3/29 4/5 4/12 4/19 4/26 5/3 5/10/23 5/17 5/24   Total WeeklyDose 27.5 mg 27.5 mg 27.5 mg 25mg 27.5 mg 27.5 mg 27.5 mg 27.5 mg 27.5 mg  27.5 mg  27.5 mg 27.5 mg   INR 2.6  2.6 4.1 2.7 2.6 2.7 2.9 2.7 2.5 2.8 2.0 2.4   Notes  call call call call    call        Date 5/31 6/7/23 6/14 6/21/23 6/28 7/5 7/13 7/19/23 7/26/23 8/2 08/10 8/16/23   Total WeeklyDose 27.5 mg 27.5 mg  27.5 mg 27.5 mg 27.5 mg 25mg 25 mg 25 mg 25 mg 25 mg 25 mg 25 mg   INR 2.8 2.5 3.2 2.9 3.8 3.2 2.9 2.3 2.6 2.9 2.8 2.9   Notes call  Call  Dec GLV   Hold x1; moving; call     call      Date 08/23 8/30/23 9/8 9/14 09/21 09/28 10/05 10/13/23 10/19 10/26 11/3    Total WeeklyDose 25 mg 25 mg 25 mg 25 mg 25 mg 25 mg 25 mg 25 mg 27.5 mg 25 mg  27.5 mg    INR 2.9 2.0 2.4 2.4 2.7 2.8 2.3 1.9 2.4 1.8 2.0    Notes   call    call Call  1x boost        Phone Interview:  Tablet Strength: 5 mg (1/5/22)  Patient Contact Info: [phone] (home) preferred; [phone] (cell)  Verbal Release Auth: signed 5/25/21 -- May speak  w/Femi Ngo, ; May leave voicemail on home phone  Lab Contact Info: [facility]  *Will call once monthly or if INR out of range*    Patient Findings  Positives: Change in diet/appetite, Other complaints   Negatives: Signs/symptoms of thrombosis, Signs/symptoms of bleeding, Laboratory test error suspected, Change in health, Change in alcohol use, Change in activity, Upcoming invasive procedure, Emergency department visit, Upcoming dental procedure, Missed doses, Extra doses, Change in medications, Hospital admission, Bruising   Comments: She did eat some broccoli this past week a little out of her usual intake.  She has a cough but blames the change in weather. I asked her to call us if she is prescribed anything new.  She has a decreased appetite.       Plan:    INR was therapeutic today at 2.0 (goal 2.0-3.0). Instructed Ms. Ngo to continue  new maintenance dose of warfarin 5 mg QD except 2.5 mg MWF until recheck (27.5 mg/week)  Repeat INR in one week, 11/09/23  Marianela Ngo understands the importance of calling the [facility] if she notices any s/sx of bleeding, stroke, or abnormal bruising, if any changes are made to her medications or medication doses (Rx, OTC, herbal), or if any upcoming procedures are scheduled. Marianela Ngo will likewise let us know if any other changes, questions, or concerns arise regarding anticoagulation therapy. she understands the importance of seeking medical attention immediately if she experiences any falls, vehicle accidents, or abnormal bleeding or bruising. Marianela Ngo voiced understanding of this information and confirms that she has the [facility]'s contact information. Otherwise, we will plan to contact the patient once monthly or if her INR is out of range.    Saravanan Greenberg [facility], PharmD  11/03/23  10:03 EDT        If you are a smoker, it is important for your health to stop smoking. Please be aware that second hand smoke is also harmful.

## 2023-11-03 NOTE — PROGRESS NOTES
Capillary Blood Specimen Collection  Capillary blood collection performed in clinic by Gifty Cardoso MA. Patient tolerated the procedure well without complications.   11/03/23   Gifty Cardoso MA

## 2023-11-09 ENCOUNTER — ANTICOAGULATION VISIT (OUTPATIENT)
Dept: PHARMACY | Facility: HOSPITAL | Age: 78
End: 2023-11-09
Payer: MEDICARE

## 2023-11-09 DIAGNOSIS — I48.0 PAROXYSMAL ATRIAL FIBRILLATION: Primary | ICD-10-CM

## 2023-11-09 LAB — INR PPP: 3.1

## 2023-11-09 NOTE — PROGRESS NOTES
Anticoagulation Clinic - Remote Progress Note  mdINR Home Monitor  Testing frequency: weekly    Indication: paroxysmal afib  Referring Provider: Susan [next 10/25/23]  Initial Warfarin Start Date: 2020? 2019?  Goal INR: 2.0-3.0  Current Drug Interactions: hydrochlorothiazide   [id number]: 4 (Age ?74, Sex, HTN) [estimated 5/20/21]  Bleed Risk: self reports negative history for GI bleed  Other: DOAC too expensive    Diet: green beans, peas, zucchini, or priyanka salads 2-3x/week (7/6/2023)  Alcohol: none  Tobacco: none  OTC Pain Medication: APAP only    INR History:  Date 5/14 5/18 5/25 6/1 6/15 7/6 7/12 7/22 7/28 8/4 8/11 8/18 8/23 8/27   Total WeeklyDose  22.5mg 25mg 25mg 25mg 25mg 25mg 25mg 25mg 25mg 25mg 27.5mg 27.5mg 27.5mg   INR 3.5 1.9 2.3 2.3 2.8 2.7 2.1 2.2 1.9 2.0 1.8 2.3 2.2 1.8   Notes [place] Cards 1x hold; incr GLV     HM  incr GLV  no GLV incr GLV cefdinir cefdinir     Date 9/1 9/8 9/15 9/22 9/29 10/6 10/13 10/20 10/27 11/3 11/10 11/17 11/24 12/1   Total WeeklyDose 27.5mg 27.5mg 27.5mg 27.5mg 27.5mg 27.5mg 27.5mg 30mg 27.5mg 27.5mg 27.5mg 25mg 27.5mg 27.5mg   INR 2.1 2.3 3.3 2.3 2.2 2.0 1.9 2.6 3.2 2.4 3.4 1.6 2.4 2.3   Notes cefdinir  decr GLV?  keflex   1x incr dose   decr GLV? 1x decr dose;   incr GLV?       Date 12/9 12/15 12/22 12/29 1/5/22 1/12 1/20 1/26 1/31 2/3 2/7 2/10 2/16 2/23   Total WeeklyDose 27.5mg 27.5mg 27.5mg 27.5mg 27.5mg 27.5mg 27.5mg 27.5mg 27.5mg 25mg 25mg 22.5mg 27.5mg 27.5mg   INR 2.5 2.4 2.7 2.5 2.6 2.2 2.5 2.1 3.8 2.6 4.1 2.5 2.7 3.5   Notes call    call   COVID+ dex / azith 1x hold pred 20 BID x5d  1x hold; 1x decr dose;   call call call     Date 3/2 3/9 3/16 3/23 3/30 4/5 4/13 4/20 4/27 5/4 5/11 5/18 5/25 6/1   Total WeeklyDose 25mg 27.5mg 27.5mg 27.5mg 27.5mg 27.5mg 27.5 mg 27.5mg 27.5 mg 27.5 mg 27.5 mg 27.5 mg 27.5 mg 27.5mg   INR 2.5 2.9 2.2 2.8 2.7 2.4 2.5 2.3 2.5 2.8 3.1 2.6 2.5 2.3   Notes 1x decr  call call    call    call Call  apap rec 5/19       Date 6/8 6/15  6/22 6/29 7/6 7/14 7/20 7/27 8/3 8/10 8/17 8/25 8/31 9/7   Total Weekly Dose 27.5 mg 27.5 mg 27.5 mg 27.5 mg 27.5 mg 27.5 mg 27.5 mg 30 mg 27.5 mg 27.5mg 27.5 mg 27.5 mg 27.5 mg 27.5 mg   INR 2.5 2.5 2.7 2.5 2.9 2.4 1.8 2.7 2.8 2.8 2.8 3.1 2.6 3.5   Notes call    call  call 12/71x boost   call Call  Dec GLV  call     Date 9/14 9/21 9/28 10/5 10/12 10/19 10/26 11/2 11/9 11/16 11/23 11/30 12/7   Total WeeklyDose 27.5mg 25 mg 25 mg 25 mg 25 mg  25 mg 25 mg 25 mg 25 mg 25 mg 25 mg 25 mg 25 mg   INR 3.1 2.9 2.5 2.9 2.2 2.4 2.3 2.1 2.3 2.3 2.1 2.2 1.9   Notes 1x hold inc GLV call   Call     call    Call  INC GLV     Date 12/14 12/22 12/28 1/4/23 1/11 1/18 1/25 2/1 2/8 2/16/23 2/22 3/1/23   Total WeeklyDose 27.5 mg 25 mg  20 mg 27.5 mg 27.5 mg 27.5 mg 27.5 mg 27.5 mg 27.5 mg 27.5 mg 27.5mg 27.5 mg    INR 2.8 2.1 1.9 1.9 1.8 2.1 2.2 2.0 2.7 2.5 2.6 2.8   Notes 1x boost  rec 12/15 Call  Call 1x miss call call Call  Call    Call        Date 3/9 3/16 3/22 3/29 4/5 4/12 4/19 4/26 5/3 5/10/23 5/17 5/24   Total WeeklyDose 27.5 mg 27.5 mg 27.5 mg 25mg 27.5 mg 27.5 mg 27.5 mg 27.5 mg 27.5 mg  27.5 mg  27.5 mg 27.5 mg   INR 2.6  2.6 4.1 2.7 2.6 2.7 2.9 2.7 2.5 2.8 2.0 2.4   Notes  call call call call    call        Date 5/31 6/7/23 6/14 6/21/23 6/28 7/5 7/13 7/19/23 7/26/23 8/2 08/10 8/16/23   Total WeeklyDose 27.5 mg 27.5 mg  27.5 mg 27.5 mg 27.5 mg 25mg 25 mg 25 mg 25 mg 25 mg 25 mg 25 mg   INR 2.8 2.5 3.2 2.9 3.8 3.2 2.9 2.3 2.6 2.9 2.8 2.9   Notes call  Call  Dec GLV   Hold x1; moving; call     call      Date 08/23 8/30/23 9/8 9/14 09/21 09/28 10/05 10/13/23 10/19 10/26 11/3 11/9   Total WeeklyDose 25 mg 25 mg 25 mg 25 mg 25 mg 25 mg 25 mg 25 mg 27.5 mg 25 mg  27.5 mg 27.5 mg   INR 2.9 2.0 2.4 2.4 2.7 2.8 2.3 1.9 2.4 1.8 2.0 3.1   Notes   call    call Call  1x boost        Phone Interview:  Tablet Strength: 5 mg (1/5/22)  Patient Contact Info: [phone] (home) preferred; [phone] (cell)  Verbal Release Auth: signed  5/25/21 -- May speak w/Femi Ngo, ; May leave voicemail on home phone  Lab Contact Info: [facility]  *Will call once monthly or if INR out of range*    Patient Findings  Positives: Change in diet/appetite   Negatives: Signs/symptoms of thrombosis, Signs/symptoms of bleeding, Laboratory test error suspected, Change in health, Change in alcohol use, Change in activity, Upcoming invasive procedure, Emergency department visit, Upcoming dental procedure, Missed doses, Extra doses, Change in medications, Hospital admission, Bruising, Other complaints   Comments: Hasn't had any GLV since Tuesday. Educated patient on the need for a consistent dietary intake. She said she has had a reduced appetite and intake over the past few months as she has been losing weight. Down 47 lbs and planning to get to 50 lost. According to patient, she may level out her diet after that.    Otherwise negative findings. Denies need for refills     Plan:  INR was slightly SUPRAtherapeutic today at 3.1 (goal 2.0-3.0). Instructed Ms. Ngo to resume previous maintenance dose of warfarin 5 mg SunTuesThur 2.5 mg MonWedFriSat until recheck (25 mg/week)  Repeat INR in one week, 11/16/23  Marianela Ngo understands the importance of calling the [facility] if she notices any s/sx of bleeding, stroke, or abnormal bruising, if any changes are made to her medications or medication doses (Rx, OTC, herbal), or if any upcoming procedures are scheduled. Marianela Ngo will likewise let us know if any other changes, questions, or concerns arise regarding anticoagulation therapy. she understands the importance of seeking medical attention immediately if she experiences any falls, vehicle accidents, or abnormal bleeding or bruising. Marianela Ngo voiced understanding of this information and confirms that she has the [facility]'s contact information. Otherwise, we will plan to contact the patient once monthly or  if her INR is out of range.    William Suresh, PharmD  Pharmacy Resident  11/9/2023  11:27 EST

## 2023-11-13 ENCOUNTER — TELEPHONE (OUTPATIENT)
Dept: FAMILY MEDICINE CLINIC | Facility: CLINIC | Age: 78
End: 2023-11-13
Payer: MEDICARE

## 2023-11-13 RX ORDER — BUDESONIDE 0.5 MG/2ML
INHALANT ORAL
Qty: 30 EACH | Refills: 5 | Status: SHIPPED | OUTPATIENT
Start: 2023-11-13 | End: 2023-11-20 | Stop reason: SDUPTHER

## 2023-11-13 RX ORDER — ALBUTEROL SULFATE 2.5 MG/3ML
SOLUTION RESPIRATORY (INHALATION)
Qty: 180 EACH | Refills: 5 | Status: SHIPPED | OUTPATIENT
Start: 2023-11-13 | End: 2023-11-20 | Stop reason: SDUPTHER

## 2023-11-16 ENCOUNTER — ANTICOAGULATION VISIT (OUTPATIENT)
Dept: PHARMACY | Facility: HOSPITAL | Age: 78
End: 2023-11-16
Payer: MEDICARE

## 2023-11-16 DIAGNOSIS — I48.0 PAROXYSMAL ATRIAL FIBRILLATION: Primary | ICD-10-CM

## 2023-11-16 LAB — INR PPP: 2.4

## 2023-11-16 NOTE — PROGRESS NOTES
Anticoagulation Clinic - Remote Progress Note  mdINR Home Monitor  Testing frequency: weekly    Indication: paroxysmal afib  Referring Provider: Susan [next 10/25/23]  Initial Warfarin Start Date: 2020? 2019?  Goal INR: 2.0-3.0  Current Drug Interactions: hydrochlorothiazide   [id number]: 4 (Age ?74, Sex, HTN) [estimated 5/20/21]  Bleed Risk: self reports negative history for GI bleed  Other: DOAC too expensive    Diet: green beans, peas, zucchini, or priyanka salads 2-3x/week (7/6/2023)  Alcohol: none  Tobacco: none  OTC Pain Medication: APAP only    INR History:  Date 5/14 5/18 5/25 6/1 6/15 7/6 7/12 7/22 7/28 8/4 8/11 8/18 8/23 8/27   Total WeeklyDose  22.5mg 25mg 25mg 25mg 25mg 25mg 25mg 25mg 25mg 25mg 27.5mg 27.5mg 27.5mg   INR 3.5 1.9 2.3 2.3 2.8 2.7 2.1 2.2 1.9 2.0 1.8 2.3 2.2 1.8   Notes [place] Cards 1x hold; incr GLV     HM  incr GLV  no GLV incr GLV cefdinir cefdinir     Date 9/1 9/8 9/15 9/22 9/29 10/6 10/13 10/20 10/27 11/3 11/10 11/17 11/24 12/1   Total WeeklyDose 27.5mg 27.5mg 27.5mg 27.5mg 27.5mg 27.5mg 27.5mg 30mg 27.5mg 27.5mg 27.5mg 25mg 27.5mg 27.5mg   INR 2.1 2.3 3.3 2.3 2.2 2.0 1.9 2.6 3.2 2.4 3.4 1.6 2.4 2.3   Notes cefdinir  decr GLV?  keflex   1x incr dose   decr GLV? 1x decr dose;   incr GLV?       Date 12/9 12/15 12/22 12/29 1/5/22 1/12 1/20 1/26 1/31 2/3 2/7 2/10 2/16 2/23   Total WeeklyDose 27.5mg 27.5mg 27.5mg 27.5mg 27.5mg 27.5mg 27.5mg 27.5mg 27.5mg 25mg 25mg 22.5mg 27.5mg 27.5mg   INR 2.5 2.4 2.7 2.5 2.6 2.2 2.5 2.1 3.8 2.6 4.1 2.5 2.7 3.5   Notes call    call   COVID+ dex / azith 1x hold pred 20 BID x5d  1x hold; 1x decr dose;   call call call     Date 3/2 3/9 3/16 3/23 3/30 4/5 4/13 4/20 4/27 5/4 5/11 5/18 5/25 6/1   Total WeeklyDose 25mg 27.5mg 27.5mg 27.5mg 27.5mg 27.5mg 27.5 mg 27.5mg 27.5 mg 27.5 mg 27.5 mg 27.5 mg 27.5 mg 27.5mg   INR 2.5 2.9 2.2 2.8 2.7 2.4 2.5 2.3 2.5 2.8 3.1 2.6 2.5 2.3   Notes 1x decr  call call    call    call Call  apap rec 5/19       Date 6/8 6/15  6/22 6/29 7/6 7/14 7/20 7/27 8/3 8/10 8/17 8/25 8/31 9/7   Total Weekly Dose 27.5 mg 27.5 mg 27.5 mg 27.5 mg 27.5 mg 27.5 mg 27.5 mg 30 mg 27.5 mg 27.5mg 27.5 mg 27.5 mg 27.5 mg 27.5 mg   INR 2.5 2.5 2.7 2.5 2.9 2.4 1.8 2.7 2.8 2.8 2.8 3.1 2.6 3.5   Notes call    call  call 12/71x boost   call Call  Dec GLV  call     Date 9/14 9/21 9/28 10/5 10/12 10/19 10/26 11/2 11/9 11/16 11/23 11/30 12/7   Total WeeklyDose 27.5mg 25 mg 25 mg 25 mg 25 mg  25 mg 25 mg 25 mg 25 mg 25 mg 25 mg 25 mg 25 mg   INR 3.1 2.9 2.5 2.9 2.2 2.4 2.3 2.1 2.3 2.3 2.1 2.2 1.9   Notes 1x hold inc GLV call   Call     call    Call  INC GLV     Date 12/14 12/22 12/28 1/4/23 1/11 1/18 1/25 2/1 2/8 2/16/23 2/22 3/1/23   Total WeeklyDose 27.5 mg 25 mg  20 mg 27.5 mg 27.5 mg 27.5 mg 27.5 mg 27.5 mg 27.5 mg 27.5 mg 27.5mg 27.5 mg    INR 2.8 2.1 1.9 1.9 1.8 2.1 2.2 2.0 2.7 2.5 2.6 2.8   Notes 1x boost  rec 12/15 Call  Call 1x miss call call Call  Call    Call        Date 3/9 3/16 3/22 3/29 4/5 4/12 4/19 4/26 5/3 5/10/23 5/17 5/24   Total WeeklyDose 27.5 mg 27.5 mg 27.5 mg 25mg 27.5 mg 27.5 mg 27.5 mg 27.5 mg 27.5 mg  27.5 mg  27.5 mg 27.5 mg   INR 2.6  2.6 4.1 2.7 2.6 2.7 2.9 2.7 2.5 2.8 2.0 2.4   Notes  call call call call    call        Date 5/31 6/7/23 6/14 6/21/23 6/28 7/5 7/13 7/19/23 7/26/23 8/2 08/10 8/16/23   Total WeeklyDose 27.5 mg 27.5 mg  27.5 mg 27.5 mg 27.5 mg 25mg 25 mg 25 mg 25 mg 25 mg 25 mg 25 mg   INR 2.8 2.5 3.2 2.9 3.8 3.2 2.9 2.3 2.6 2.9 2.8 2.9   Notes call  Call  Dec GLV   Hold x1; moving; call     call      Date 08/23 8/30/23 9/8 9/14 09/21 09/28 10/05 10/13/23 10/19 10/26 11/3 11/9   Total WeeklyDose 25 mg 25 mg 25 mg 25 mg 25 mg 25 mg 25 mg 25 mg 27.5 mg 25 mg  27.5 mg 27.5 mg   INR 2.9 2.0 2.4 2.4 2.7 2.8 2.3 1.9 2.4 1.8 2.0 3.1   Notes   call    call Call  1x boost   call     Date 11/16              Total Weekly Dose 25 mg              INR 2.4              Notes                 Phone Interview:  Tablet Strength: 5 mg  (1/5/22)  Patient Contact Info: [phone] (home) preferred; [phone] (cell)  Verbal Release Auth: signed 5/25/21 -- May speak w/Femi Ngo, ; May leave voicemail on home phone  Lab Contact Info: [facility]  *Will call once monthly or if INR out of range*    Patient Findings  Comments: Patient not contacted during this encounter.     Plan:  INR was slightly therapeutic today at 2.4 (goal 2.0-3.0). Instructed Ms. Ngo to resume previous maintenance dose of warfarin 5 mg SunTuesThur 2.5 mg MonWedFriSat until recheck (25 mg/week)  Repeat INR in one week, 11/22/23  Marianela Ngo understands the importance of calling the [facility] if she notices any s/sx of bleeding, stroke, or abnormal bruising, if any changes are made to her medications or medication doses (Rx, OTC, herbal), or if any upcoming procedures are scheduled. Marianela Ngo will likewise let us know if any other changes, questions, or concerns arise regarding anticoagulation therapy. she understands the importance of seeking medical attention immediately if she experiences any falls, vehicle accidents, or abnormal bleeding or bruising. Marianela Ngo voiced understanding of this information and confirms that she has the [facility]'s contact information. Otherwise, we will plan to contact the patient once monthly or if her INR is out of range.    Yuan Moreira  Pharmacy Intern  11/16/2023 14:40 EST     Maryjane HAQUE, PharmD, have reviewed the note in full and agree with the assessment and plan.  11/16/23  16:26 EST

## 2023-11-20 ENCOUNTER — TELEPHONE (OUTPATIENT)
Dept: FAMILY MEDICINE CLINIC | Facility: CLINIC | Age: 78
End: 2023-11-20
Payer: MEDICARE

## 2023-11-20 RX ORDER — BUDESONIDE 0.5 MG/2ML
INHALANT ORAL
Qty: 30 EACH | Refills: 5 | Status: SHIPPED | OUTPATIENT
Start: 2023-11-20

## 2023-11-20 RX ORDER — ALBUTEROL SULFATE 2.5 MG/3ML
SOLUTION RESPIRATORY (INHALATION)
Qty: 180 EACH | Refills: 5 | Status: SHIPPED | OUTPATIENT
Start: 2023-11-20

## 2023-11-20 NOTE — TELEPHONE ENCOUNTER
Incoming Refill Request      Medication requested (name and dose): Budesonide (0.5 mg/ 2ml)   Albuterol (3ml)     Pharmacy where request should be sent: [facility]    Additional details provided by patient: Pt takes this medicine for asthma, and has been out since Saturday.     Best call back number: [phone]    Does the patient have less than a 3 day supply:  [x] Yes  [] No    Philip Storm Rep  11/20/23, 12:06 EST

## 2023-11-22 ENCOUNTER — ANTICOAGULATION VISIT (OUTPATIENT)
Dept: PHARMACY | Facility: HOSPITAL | Age: 78
End: 2023-11-22
Payer: MEDICARE

## 2023-11-22 DIAGNOSIS — I48.0 PAROXYSMAL ATRIAL FIBRILLATION: Primary | ICD-10-CM

## 2023-11-22 LAB — INR PPP: 2.3

## 2023-11-22 NOTE — PROGRESS NOTES
Anticoagulation Clinic - Remote Progress Note  mdINR Home Monitor  Testing frequency: weekly    Indication: paroxysmal afib  Referring Provider: Susan [next 10/25/23]  Initial Warfarin Start Date: 2020? 2019?  Goal INR: 2.0-3.0  Current Drug Interactions: hydrochlorothiazide   [id number]: 4 (Age ?74, Sex, HTN) [estimated 5/20/21]  Bleed Risk: self reports negative history for GI bleed  Other: DOAC too expensive    Diet: green beans, peas, zucchini, or priyanka salads 2-3x/week (7/6/2023)  Alcohol: none  Tobacco: none  OTC Pain Medication: APAP only    INR History:  Date 5/14 5/18 5/25 6/1 6/15 7/6 7/12 7/22 7/28 8/4 8/11 8/18 8/23 8/27   Total WeeklyDose  22.5mg 25mg 25mg 25mg 25mg 25mg 25mg 25mg 25mg 25mg 27.5mg 27.5mg 27.5mg   INR 3.5 1.9 2.3 2.3 2.8 2.7 2.1 2.2 1.9 2.0 1.8 2.3 2.2 1.8   Notes [place] Cards 1x hold; incr GLV     HM  incr GLV  no GLV incr GLV cefdinir cefdinir     Date 9/1 9/8 9/15 9/22 9/29 10/6 10/13 10/20 10/27 11/3 11/10 11/17 11/24 12/1   Total WeeklyDose 27.5mg 27.5mg 27.5mg 27.5mg 27.5mg 27.5mg 27.5mg 30mg 27.5mg 27.5mg 27.5mg 25mg 27.5mg 27.5mg   INR 2.1 2.3 3.3 2.3 2.2 2.0 1.9 2.6 3.2 2.4 3.4 1.6 2.4 2.3   Notes cefdinir  decr GLV?  keflex   1x incr dose   decr GLV? 1x decr dose;   incr GLV?       Date 12/9 12/15 12/22 12/29 1/5/22 1/12 1/20 1/26 1/31 2/3 2/7 2/10 2/16 2/23   Total WeeklyDose 27.5mg 27.5mg 27.5mg 27.5mg 27.5mg 27.5mg 27.5mg 27.5mg 27.5mg 25mg 25mg 22.5mg 27.5mg 27.5mg   INR 2.5 2.4 2.7 2.5 2.6 2.2 2.5 2.1 3.8 2.6 4.1 2.5 2.7 3.5   Notes call    call   COVID+ dex / azith 1x hold pred 20 BID x5d  1x hold; 1x decr dose;   call call call     Date 3/2 3/9 3/16 3/23 3/30 4/5 4/13 4/20 4/27 5/4 5/11 5/18 5/25 6/1   Total WeeklyDose 25mg 27.5mg 27.5mg 27.5mg 27.5mg 27.5mg 27.5 mg 27.5mg 27.5 mg 27.5 mg 27.5 mg 27.5 mg 27.5 mg 27.5mg   INR 2.5 2.9 2.2 2.8 2.7 2.4 2.5 2.3 2.5 2.8 3.1 2.6 2.5 2.3   Notes 1x decr  call call    call    call Call  apap rec 5/19       Date 6/8 6/15  6/22 6/29 7/6 7/14 7/20 7/27 8/3 8/10 8/17 8/25 8/31 9/7   Total Weekly Dose 27.5 mg 27.5 mg 27.5 mg 27.5 mg 27.5 mg 27.5 mg 27.5 mg 30 mg 27.5 mg 27.5mg 27.5 mg 27.5 mg 27.5 mg 27.5 mg   INR 2.5 2.5 2.7 2.5 2.9 2.4 1.8 2.7 2.8 2.8 2.8 3.1 2.6 3.5   Notes call    call  call 12/71x boost   call Call  Dec GLV  call     Date 9/14 9/21 9/28 10/5 10/12 10/19 10/26 11/2 11/9 11/16 11/23 11/30 12/7   Total WeeklyDose 27.5mg 25 mg 25 mg 25 mg 25 mg  25 mg 25 mg 25 mg 25 mg 25 mg 25 mg 25 mg 25 mg   INR 3.1 2.9 2.5 2.9 2.2 2.4 2.3 2.1 2.3 2.3 2.1 2.2 1.9   Notes 1x hold inc GLV call   Call     call    Call  INC GLV     Date 12/14 12/22 12/28 1/4/23 1/11 1/18 1/25 2/1 2/8 2/16/23 2/22 3/1/23   Total WeeklyDose 27.5 mg 25 mg  20 mg 27.5 mg 27.5 mg 27.5 mg 27.5 mg 27.5 mg 27.5 mg 27.5 mg 27.5mg 27.5 mg    INR 2.8 2.1 1.9 1.9 1.8 2.1 2.2 2.0 2.7 2.5 2.6 2.8   Notes 1x boost  rec 12/15 Call  Call 1x miss call call Call  Call    Call        Date 3/9 3/16 3/22 3/29 4/5 4/12 4/19 4/26 5/3 5/10/23 5/17 5/24   Total WeeklyDose 27.5 mg 27.5 mg 27.5 mg 25mg 27.5 mg 27.5 mg 27.5 mg 27.5 mg 27.5 mg  27.5 mg  27.5 mg 27.5 mg   INR 2.6  2.6 4.1 2.7 2.6 2.7 2.9 2.7 2.5 2.8 2.0 2.4   Notes  call call call call    call        Date 5/31 6/7/23 6/14 6/21/23 6/28 7/5 7/13 7/19/23 7/26/23 8/2 08/10 8/16/23   Total WeeklyDose 27.5 mg 27.5 mg  27.5 mg 27.5 mg 27.5 mg 25mg 25 mg 25 mg 25 mg 25 mg 25 mg 25 mg   INR 2.8 2.5 3.2 2.9 3.8 3.2 2.9 2.3 2.6 2.9 2.8 2.9   Notes call  Call  Dec GLV   Hold x1; moving; call     call      Date 08/23 8/30/23 9/8 9/14 09/21 09/28 10/05 10/13/23 10/19 10/26 11/3 11/9   Total WeeklyDose 25 mg 25 mg 25 mg 25 mg 25 mg 25 mg 25 mg 25 mg 27.5 mg 25 mg  27.5 mg 27.5 mg   INR 2.9 2.0 2.4 2.4 2.7 2.8 2.3 1.9 2.4 1.8 2.0 3.1   Notes   call    call Call  1x boost   call     Date 11/16 11/22             Total Weekly Dose 25 mg 25 mg             INR 2.4 2.3             Notes                 Phone Interview:  Tablet Strength: 5  mg (1/5/22)  Patient Contact Info: [phone] (home) preferred; [phone] (cell)  Verbal Release Auth: signed 5/25/21 -- May speak w/Femi Ngo, ; May leave voicemail on home phone  Lab Contact Info: [facility]  *Will call once monthly or if INR out of range*    Patient Findings  Comments: Patient not contacted during this encounter.     Plan:  INR was therapeutic today at 2.3 (goal 2.0-3.0). Ms. Ngo to continue dose of warfarin 5 mg SunTuesThur 2.5 mg MonWedFriSat until recheck (25 mg/week)  Repeat INR in one week, 11/29/23  Marianela Ngo understands the importance of calling the [facility] if she notices any s/sx of bleeding, stroke, or abnormal bruising, if any changes are made to her medications or medication doses (Rx, OTC, herbal), or if any upcoming procedures are scheduled. Marianela Ngo will likewise let us know if any other changes, questions, or concerns arise regarding anticoagulation therapy. she understands the importance of seeking medical attention immediately if she experiences any falls, vehicle accidents, or abnormal bleeding or bruising. Marianela Ngo voiced understanding of this information and confirms that she has the [facility]'s contact information. Otherwise, we will plan to contact the patient once monthly or if her INR is out of range.    Yuan Moreira  Pharmacy Intern  11/22/2023 14:36 Maryjane MORAES, PharmD, have reviewed the note in full and agree with the assessment and plan.  11/22/23  15:43 EST

## 2023-11-29 ENCOUNTER — ANTICOAGULATION VISIT (OUTPATIENT)
Dept: PHARMACY | Facility: HOSPITAL | Age: 78
End: 2023-11-29
Payer: MEDICARE

## 2023-11-29 ENCOUNTER — TELEPHONE (OUTPATIENT)
Dept: PHARMACY | Facility: HOSPITAL | Age: 78
End: 2023-11-29

## 2023-11-29 DIAGNOSIS — I48.0 PAROXYSMAL ATRIAL FIBRILLATION: Primary | ICD-10-CM

## 2023-11-29 LAB — INR PPP: 2.5

## 2023-11-29 NOTE — TELEPHONE ENCOUNTER
Patient called to inform the clinic that she has started Penicillin 500 mg twice daily, Patient took her first dose on 11/28/23 in the evening. Patient is testing her INR today, and we will give her new instruction after she tested her INR.    Babita Leonard  Pharmacy Technician  11/29/2023 08:38 EST

## 2023-11-29 NOTE — PROGRESS NOTES
Anticoagulation Clinic - Remote Progress Note  mdINR Home Monitor  Testing frequency: weekly    Indication: paroxysmal afib  Referring Provider: Susan [next 10/25/23]  Initial Warfarin Start Date: 2020? 2019?  Goal INR: 2.0-3.0  Current Drug Interactions: hydrochlorothiazide   [id number]: 4 (Age ?74, Sex, HTN) [estimated 5/20/21]  Bleed Risk: self reports negative history for GI bleed  Other: DOAC too expensive    Diet: green beans, peas, zucchini, or priyanka salads 2-3x/week (7/6/2023)  Alcohol: none  Tobacco: none  OTC Pain Medication: APAP only    INR History:  Date 5/14 5/18 5/25 6/1 6/15 7/6 7/12 7/22 7/28 8/4 8/11 8/18 8/23 8/27   Total WeeklyDose  22.5mg 25mg 25mg 25mg 25mg 25mg 25mg 25mg 25mg 25mg 27.5mg 27.5mg 27.5mg   INR 3.5 1.9 2.3 2.3 2.8 2.7 2.1 2.2 1.9 2.0 1.8 2.3 2.2 1.8   Notes [place] Cards 1x hold; incr GLV     HM  incr GLV  no GLV incr GLV cefdinir cefdinir     Date 9/1 9/8 9/15 9/22 9/29 10/6 10/13 10/20 10/27 11/3 11/10 11/17 11/24 12/1   Total WeeklyDose 27.5mg 27.5mg 27.5mg 27.5mg 27.5mg 27.5mg 27.5mg 30mg 27.5mg 27.5mg 27.5mg 25mg 27.5mg 27.5mg   INR 2.1 2.3 3.3 2.3 2.2 2.0 1.9 2.6 3.2 2.4 3.4 1.6 2.4 2.3   Notes cefdinir  decr GLV?  keflex   1x incr dose   decr GLV? 1x decr dose;   incr GLV?       Date 12/9 12/15 12/22 12/29 1/5/22 1/12 1/20 1/26 1/31 2/3 2/7 2/10 2/16 2/23   Total WeeklyDose 27.5mg 27.5mg 27.5mg 27.5mg 27.5mg 27.5mg 27.5mg 27.5mg 27.5mg 25mg 25mg 22.5mg 27.5mg 27.5mg   INR 2.5 2.4 2.7 2.5 2.6 2.2 2.5 2.1 3.8 2.6 4.1 2.5 2.7 3.5   Notes call    call   COVID+ dex / azith 1x hold pred 20 BID x5d  1x hold; 1x decr dose;   call call call     Date 3/2 3/9 3/16 3/23 3/30 4/5 4/13 4/20 4/27 5/4 5/11 5/18 5/25 6/1   Total WeeklyDose 25mg 27.5mg 27.5mg 27.5mg 27.5mg 27.5mg 27.5 mg 27.5mg 27.5 mg 27.5 mg 27.5 mg 27.5 mg 27.5 mg 27.5mg   INR 2.5 2.9 2.2 2.8 2.7 2.4 2.5 2.3 2.5 2.8 3.1 2.6 2.5 2.3   Notes 1x decr  call call    call    call Call  apap rec 5/19       Date 6/8 6/15  6/22 6/29 7/6 7/14 7/20 7/27 8/3 8/10 8/17 8/25 8/31 9/7   Total Weekly Dose 27.5 mg 27.5 mg 27.5 mg 27.5 mg 27.5 mg 27.5 mg 27.5 mg 30 mg 27.5 mg 27.5mg 27.5 mg 27.5 mg 27.5 mg 27.5 mg   INR 2.5 2.5 2.7 2.5 2.9 2.4 1.8 2.7 2.8 2.8 2.8 3.1 2.6 3.5   Notes call    call  call 12/71x boost   call Call  Dec GLV  call     Date 9/14 9/21 9/28 10/5 10/12 10/19 10/26 11/2 11/9 11/16 11/23 11/30 12/7   Total WeeklyDose 27.5mg 25 mg 25 mg 25 mg 25 mg  25 mg 25 mg 25 mg 25 mg 25 mg 25 mg 25 mg 25 mg   INR 3.1 2.9 2.5 2.9 2.2 2.4 2.3 2.1 2.3 2.3 2.1 2.2 1.9   Notes 1x hold inc GLV call   Call     call    Call  INC GLV     Date 12/14 12/22 12/28 1/4/23 1/11 1/18 1/25 2/1 2/8 2/16/23 2/22 3/1/23   Total WeeklyDose 27.5 mg 25 mg  20 mg 27.5 mg 27.5 mg 27.5 mg 27.5 mg 27.5 mg 27.5 mg 27.5 mg 27.5mg 27.5 mg    INR 2.8 2.1 1.9 1.9 1.8 2.1 2.2 2.0 2.7 2.5 2.6 2.8   Notes 1x boost  rec 12/15 Call  Call 1x miss call call Call  Call    Call        Date 3/9 3/16 3/22 3/29 4/5 4/12 4/19 4/26 5/3 5/10/23 5/17 5/24   Total WeeklyDose 27.5 mg 27.5 mg 27.5 mg 25mg 27.5 mg 27.5 mg 27.5 mg 27.5 mg 27.5 mg  27.5 mg  27.5 mg 27.5 mg   INR 2.6  2.6 4.1 2.7 2.6 2.7 2.9 2.7 2.5 2.8 2.0 2.4   Notes  call call call call    call        Date 5/31 6/7/23 6/14 6/21/23 6/28 7/5 7/13 7/19/23 7/26/23 8/2 08/10 8/16/23   Total WeeklyDose 27.5 mg 27.5 mg  27.5 mg 27.5 mg 27.5 mg 25mg 25 mg 25 mg 25 mg 25 mg 25 mg 25 mg   INR 2.8 2.5 3.2 2.9 3.8 3.2 2.9 2.3 2.6 2.9 2.8 2.9   Notes call  Call  Dec GLV   Hold x1; moving; call     call      Date 08/23 8/30/23 9/8 9/14 09/21 09/28 10/05 10/13/23 10/19 10/26 11/3 11/9   Total WeeklyDose 25 mg 25 mg 25 mg 25 mg 25 mg 25 mg 25 mg 25 mg 27.5 mg 25 mg  27.5 mg 27.5 mg   INR 2.9 2.0 2.4 2.4 2.7 2.8 2.3 1.9 2.4 1.8 2.0 3.1   Notes   call    call Call  1x boost   call     Date 11/16 11/22 11/29            Total Weekly Dose 25 mg 25 mg 25 mg            INR 2.4 2.3 2.5            Notes   Call               Phone  Interview:  Tablet Strength: 5 mg (1/5/22)  Patient Contact Info: [phone] (home) preferred; [phone] (cell)  Verbal Release Auth: signed 5/25/21 -- May speak w/Femi Ngo, ; May leave voicemail on home phone  Lab Contact Info: [facility]  *Will call once monthly or if INR out of range*    Patient Findings  Positives: Change in medications   Negatives: Signs/symptoms of thrombosis, Signs/symptoms of bleeding, Laboratory test error suspected, Change in health, Change in alcohol use, Change in activity, Upcoming invasive procedure, Emergency department visit, Upcoming dental procedure, Missed doses, Extra doses, Change in diet/appetite, Hospital admission, Bruising, Other complaints   Comments: Patient reported she has been prescribed penicillin 500 mg bid for ten days. She had her dose yesterday evening    All other findings negative per patient     Plan:  INR was therapeutic today at 2.5 (goal 2.0-3.0). Per Jean Paul Chatman, PharmD, instructed Ms. Ngo to continue dose of warfarin 2.5 mg daily except warfarin 5 mg oral SunTuesThurs until recheck (25 mg/week)  Repeat INR in one week, 12/6/23  Marianela Ngo understands the importance of calling the [facility] if she notices any s/sx of bleeding, stroke, or abnormal bruising, if any changes are made to her medications or medication doses (Rx, OTC, herbal), or if any upcoming procedures are scheduled. Marianela Ngo will likewise let us know if any other changes, questions, or concerns arise regarding anticoagulation therapy. she understands the importance of seeking medical attention immediately if she experiences any falls, vehicle accidents, or abnormal bleeding or bruising. Marianela Ngo voiced understanding of this information and confirms that she has the [facility]'s contact information. Otherwise, we will plan to contact the patient once monthly or if her INR is out of range.    Chicho Shine,  Pharmacy Technician  11/29/2023  13:43 Maryjane MORAES, PharmD, have reviewed the note in full and agree with the assessment and plan.  11/30/23  13:42 EST

## 2023-12-07 ENCOUNTER — ANTICOAGULATION VISIT (OUTPATIENT)
Dept: PHARMACY | Facility: HOSPITAL | Age: 78
End: 2023-12-07
Payer: MEDICARE

## 2023-12-07 DIAGNOSIS — I48.0 PAROXYSMAL ATRIAL FIBRILLATION: Primary | ICD-10-CM

## 2023-12-07 LAB — INR PPP: 2.6

## 2023-12-07 NOTE — PROGRESS NOTES
Anticoagulation Clinic - Remote Progress Note  mdINR Home Monitor  Testing frequency: weekly    Indication: paroxysmal afib  Referring Provider: Susan [next 10/25/23]  Initial Warfarin Start Date: 2020? 2019?  Goal INR: 2.0-3.0  Current Drug Interactions: hydrochlorothiazide   [id number]: 4 (Age ?74, Sex, HTN) [estimated 5/20/21]  Bleed Risk: self reports negative history for GI bleed  Other: DOAC too expensive    Diet: green beans, peas, zucchini, or priyanka salads 2-3x/week (7/6/2023)  Alcohol: none  Tobacco: none  OTC Pain Medication: APAP only    INR History:  Date 5/14 5/18 5/25 6/1 6/15 7/6 7/12 7/22 7/28 8/4 8/11 8/18 8/23 8/27   Total WeeklyDose  22.5mg 25mg 25mg 25mg 25mg 25mg 25mg 25mg 25mg 25mg 27.5mg 27.5mg 27.5mg   INR 3.5 1.9 2.3 2.3 2.8 2.7 2.1 2.2 1.9 2.0 1.8 2.3 2.2 1.8   Notes [place] Cards 1x hold; incr GLV     HM  incr GLV  no GLV incr GLV cefdinir cefdinir     Date 9/1 9/8 9/15 9/22 9/29 10/6 10/13 10/20 10/27 11/3 11/10 11/17 11/24 12/1   Total WeeklyDose 27.5mg 27.5mg 27.5mg 27.5mg 27.5mg 27.5mg 27.5mg 30mg 27.5mg 27.5mg 27.5mg 25mg 27.5mg 27.5mg   INR 2.1 2.3 3.3 2.3 2.2 2.0 1.9 2.6 3.2 2.4 3.4 1.6 2.4 2.3   Notes cefdinir  decr GLV?  keflex   1x incr dose   decr GLV? 1x decr dose;   incr GLV?       Date 12/9 12/15 12/22 12/29 1/5/22 1/12 1/20 1/26 1/31 2/3 2/7 2/10 2/16 2/23   Total WeeklyDose 27.5mg 27.5mg 27.5mg 27.5mg 27.5mg 27.5mg 27.5mg 27.5mg 27.5mg 25mg 25mg 22.5mg 27.5mg 27.5mg   INR 2.5 2.4 2.7 2.5 2.6 2.2 2.5 2.1 3.8 2.6 4.1 2.5 2.7 3.5   Notes call    call   COVID+ dex / azith 1x hold pred 20 BID x5d  1x hold; 1x decr dose;   call call call     Date 3/2 3/9 3/16 3/23 3/30 4/5 4/13 4/20 4/27 5/4 5/11 5/18 5/25 6/1   Total WeeklyDose 25mg 27.5mg 27.5mg 27.5mg 27.5mg 27.5mg 27.5 mg 27.5mg 27.5 mg 27.5 mg 27.5 mg 27.5 mg 27.5 mg 27.5mg   INR 2.5 2.9 2.2 2.8 2.7 2.4 2.5 2.3 2.5 2.8 3.1 2.6 2.5 2.3   Notes 1x decr  call call    call    call Call  apap rec 5/19       Date 6/8 6/15  6/22 6/29 7/6 7/14 7/20 7/27 8/3 8/10 8/17 8/25 8/31 9/7   Total Weekly Dose 27.5 mg 27.5 mg 27.5 mg 27.5 mg 27.5 mg 27.5 mg 27.5 mg 30 mg 27.5 mg 27.5mg 27.5 mg 27.5 mg 27.5 mg 27.5 mg   INR 2.5 2.5 2.7 2.5 2.9 2.4 1.8 2.7 2.8 2.8 2.8 3.1 2.6 3.5   Notes call    call  call 12/71x boost   call Call  Dec GLV  call     Date 9/14 9/21 9/28 10/5 10/12 10/19 10/26 11/2 11/9 11/16 11/23 11/30 12/7   Total WeeklyDose 27.5mg 25 mg 25 mg 25 mg 25 mg  25 mg 25 mg 25 mg 25 mg 25 mg 25 mg 25 mg 25 mg   INR 3.1 2.9 2.5 2.9 2.2 2.4 2.3 2.1 2.3 2.3 2.1 2.2 1.9   Notes 1x hold inc GLV call   Call     call    Call  INC GLV     Date 12/14 12/22 12/28 1/4/23 1/11 1/18 1/25 2/1 2/8 2/16/23 2/22 3/1/23   Total WeeklyDose 27.5 mg 25 mg  20 mg 27.5 mg 27.5 mg 27.5 mg 27.5 mg 27.5 mg 27.5 mg 27.5 mg 27.5mg 27.5 mg    INR 2.8 2.1 1.9 1.9 1.8 2.1 2.2 2.0 2.7 2.5 2.6 2.8   Notes 1x boost  rec 12/15 Call  Call 1x miss call call Call  Call    Call        Date 3/9 3/16 3/22 3/29 4/5 4/12 4/19 4/26 5/3 5/10/23 5/17 5/24   Total WeeklyDose 27.5 mg 27.5 mg 27.5 mg 25mg 27.5 mg 27.5 mg 27.5 mg 27.5 mg 27.5 mg  27.5 mg  27.5 mg 27.5 mg   INR 2.6  2.6 4.1 2.7 2.6 2.7 2.9 2.7 2.5 2.8 2.0 2.4   Notes  call call call call    call        Date 5/31 6/7/23 6/14 6/21/23 6/28 7/5 7/13 7/19/23 7/26/23 8/2 08/10 8/16/23   Total WeeklyDose 27.5 mg 27.5 mg  27.5 mg 27.5 mg 27.5 mg 25mg 25 mg 25 mg 25 mg 25 mg 25 mg 25 mg   INR 2.8 2.5 3.2 2.9 3.8 3.2 2.9 2.3 2.6 2.9 2.8 2.9   Notes call  Call  Dec GLV   Hold x1; moving; call     call      Date 08/23 8/30/23 9/8 9/14 09/21 09/28 10/05 10/13/23 10/19 10/26 11/3 11/9   Total WeeklyDose 25 mg 25 mg 25 mg 25 mg 25 mg 25 mg 25 mg 25 mg 27.5 mg 25 mg  27.5 mg 27.5 mg   INR 2.9 2.0 2.4 2.4 2.7 2.8 2.3 1.9 2.4 1.8 2.0 3.1   Notes   call    call Call  1x boost   call     Date 11/16 11/22 11/29 12/06           Total Weekly Dose 25 mg 25 mg 25 mg 25 mg           INR 2.4 2.3 2.5 2.6           Notes   Call  Rec'd 12/07              Phone Interview:  Tablet Strength: 5 mg (1/5/22)  Patient Contact Info: [phone] (home) preferred; [phone] (cell)  Verbal Release Auth: signed 5/25/21 -- May speak w/Femi Ngo, ; May leave voicemail on home phone  Lab Contact Info: [facility]  *Will call once monthly or if INR out of range*    Patient Findings    Comments: Patient not contacted during this encounter.     Plan:  INR was therapeutic today at 2.6 (goal 2.0-3.0).  Ms. Ngo to continue dose of warfarin 2.5 mg daily except warfarin 5 mg oral SunTuesThurs until recheck (25 mg/week)  Repeat INR in one week, 12/13/23  Marianela Ngo understands the importance of calling the [facility] if she notices any s/sx of bleeding, stroke, or abnormal bruising, if any changes are made to her medications or medication doses (Rx, OTC, herbal), or if any upcoming procedures are scheduled. Marianela Ngo will likewise let us know if any other changes, questions, or concerns arise regarding anticoagulation therapy. she understands the importance of seeking medical attention immediately if she experiences any falls, vehicle accidents, or abnormal bleeding or bruising. Marianela Ngo voiced understanding of this information and confirms that she has the [facility]'s contact information. Otherwise, we will plan to contact the patient once monthly or if her INR is out of range.    Yuan Moreira  Pharmacy Intern  12/7/2023 13:22 EST    Maryjane HAQUE, PharmD, have reviewed the note in full and agree with the assessment and plan.  12/07/23  14:42 EST

## 2023-12-14 ENCOUNTER — ANTICOAGULATION VISIT (OUTPATIENT)
Dept: PHARMACY | Facility: HOSPITAL | Age: 78
End: 2023-12-14
Payer: MEDICARE

## 2023-12-14 DIAGNOSIS — I48.0 PAROXYSMAL ATRIAL FIBRILLATION: Primary | ICD-10-CM

## 2023-12-14 LAB — INR PPP: 2.6

## 2023-12-20 ENCOUNTER — ANTICOAGULATION VISIT (OUTPATIENT)
Dept: PHARMACY | Facility: HOSPITAL | Age: 78
End: 2023-12-20
Payer: MEDICARE

## 2023-12-20 DIAGNOSIS — I48.0 PAROXYSMAL ATRIAL FIBRILLATION: Primary | ICD-10-CM

## 2023-12-20 LAB — INR PPP: 2.5

## 2023-12-20 NOTE — PROGRESS NOTES
Anticoagulation Clinic - Remote Progress Note  mdINR Home Monitor  Testing frequency: weekly    Indication: paroxysmal afib  Referring Provider: Susan [next 10/25/23]  Initial Warfarin Start Date: 2020? 2019?  Goal INR: 2.0-3.0  Current Drug Interactions: hydrochlorothiazide   [id number]: 4 (Age ?74, Sex, HTN) [estimated 5/20/21]  Bleed Risk: self reports negative history for GI bleed  Other: DOAC too expensive    Diet: green beans, peas, zucchini, or priyanka salads 2-3x/week (7/6/2023)  Alcohol: none  Tobacco: none  OTC Pain Medication: APAP only    INR History:  Date 5/14 5/18 5/25 6/1 6/15 7/6 7/12 7/22 7/28 8/4 8/11 8/18 8/23 8/27   Total WeeklyDose  22.5mg 25mg 25mg 25mg 25mg 25mg 25mg 25mg 25mg 25mg 27.5mg 27.5mg 27.5mg   INR 3.5 1.9 2.3 2.3 2.8 2.7 2.1 2.2 1.9 2.0 1.8 2.3 2.2 1.8   Notes [place] Cards 1x hold; incr GLV     HM  incr GLV  no GLV incr GLV cefdinir cefdinir     Date 9/1 9/8 9/15 9/22 9/29 10/6 10/13 10/20 10/27 11/3 11/10 11/17 11/24 12/1   Total WeeklyDose 27.5mg 27.5mg 27.5mg 27.5mg 27.5mg 27.5mg 27.5mg 30mg 27.5mg 27.5mg 27.5mg 25mg 27.5mg 27.5mg   INR 2.1 2.3 3.3 2.3 2.2 2.0 1.9 2.6 3.2 2.4 3.4 1.6 2.4 2.3   Notes cefdinir  decr GLV?  keflex   1x incr dose   decr GLV? 1x decr dose;   incr GLV?       Date 12/9 12/15 12/22 12/29 1/5/22 1/12 1/20 1/26 1/31 2/3 2/7 2/10 2/16 2/23   Total WeeklyDose 27.5mg 27.5mg 27.5mg 27.5mg 27.5mg 27.5mg 27.5mg 27.5mg 27.5mg 25mg 25mg 22.5mg 27.5mg 27.5mg   INR 2.5 2.4 2.7 2.5 2.6 2.2 2.5 2.1 3.8 2.6 4.1 2.5 2.7 3.5   Notes call    call   COVID+ dex / azith 1x hold pred 20 BID x5d  1x hold; 1x decr dose;   call call call     Date 3/2 3/9 3/16 3/23 3/30 4/5 4/13 4/20 4/27 5/4 5/11 5/18 5/25 6/1   Total WeeklyDose 25mg 27.5mg 27.5mg 27.5mg 27.5mg 27.5mg 27.5 mg 27.5mg 27.5 mg 27.5 mg 27.5 mg 27.5 mg 27.5 mg 27.5mg   INR 2.5 2.9 2.2 2.8 2.7 2.4 2.5 2.3 2.5 2.8 3.1 2.6 2.5 2.3   Notes 1x decr  call call    call    call Call  apap rec 5/19       Date 6/8 6/15  6/22 6/29 7/6 7/14 7/20 7/27 8/3 8/10 8/17 8/25 8/31 9/7   Total Weekly Dose 27.5 mg 27.5 mg 27.5 mg 27.5 mg 27.5 mg 27.5 mg 27.5 mg 30 mg 27.5 mg 27.5mg 27.5 mg 27.5 mg 27.5 mg 27.5 mg   INR 2.5 2.5 2.7 2.5 2.9 2.4 1.8 2.7 2.8 2.8 2.8 3.1 2.6 3.5   Notes call    call  call 12/71x boost   call Call  Dec GLV  call     Date 9/14 9/21 9/28 10/5 10/12 10/19 10/26 11/2 11/9 11/16 11/23 11/30 12/7   Total WeeklyDose 27.5mg 25 mg 25 mg 25 mg 25 mg  25 mg 25 mg 25 mg 25 mg 25 mg 25 mg 25 mg 25 mg   INR 3.1 2.9 2.5 2.9 2.2 2.4 2.3 2.1 2.3 2.3 2.1 2.2 1.9   Notes 1x hold inc GLV call   Call     call    Call  INC GLV     Date 12/14 12/22 12/28 1/4/23 1/11 1/18 1/25 2/1 2/8 2/16/23 2/22 3/1/23   Total WeeklyDose 27.5 mg 25 mg  20 mg 27.5 mg 27.5 mg 27.5 mg 27.5 mg 27.5 mg 27.5 mg 27.5 mg 27.5mg 27.5 mg    INR 2.8 2.1 1.9 1.9 1.8 2.1 2.2 2.0 2.7 2.5 2.6 2.8   Notes 1x boost  rec 12/15 Call  Call 1x miss call call Call  Call    Call        Date 3/9 3/16 3/22 3/29 4/5 4/12 4/19 4/26 5/3 5/10/23 5/17 5/24   Total WeeklyDose 27.5 mg 27.5 mg 27.5 mg 25mg 27.5 mg 27.5 mg 27.5 mg 27.5 mg 27.5 mg  27.5 mg  27.5 mg 27.5 mg   INR 2.6  2.6 4.1 2.7 2.6 2.7 2.9 2.7 2.5 2.8 2.0 2.4   Notes  call call call call    call        Date 5/31 6/7/23 6/14 6/21/23 6/28 7/5 7/13 7/19/23 7/26/23 8/2 08/10 8/16/23   Total WeeklyDose 27.5 mg 27.5 mg  27.5 mg 27.5 mg 27.5 mg 25mg 25 mg 25 mg 25 mg 25 mg 25 mg 25 mg   INR 2.8 2.5 3.2 2.9 3.8 3.2 2.9 2.3 2.6 2.9 2.8 2.9   Notes call  Call  Dec GLV   Hold x1; moving; call     call      Date 08/23 8/30/23 9/8 9/14 09/21 09/28 10/05 10/13/23 10/19 10/26 11/3 11/9   Total WeeklyDose 25 mg 25 mg 25 mg 25 mg 25 mg 25 mg 25 mg 25 mg 27.5 mg 25 mg  27.5 mg 27.5 mg   INR 2.9 2.0 2.4 2.4 2.7 2.8 2.3 1.9 2.4 1.8 2.0 3.1   Notes   call    call Call  1x boost   call     Date 11/16 11/22 11/29 12/06 12/14 12/20         Total Weekly Dose 25 mg 25 mg 25 mg 25 mg 25 mg 25 mg         INR 2.4 2.3 2.5 2.6 2.6 2.5         Notes    "Call  Rec'd 12/07  Call            Phone Interview:  Tablet Strength: 5 mg (1/5/22)  Patient Contact Info: [phone] (home) preferred; [phone] (cell)  Verbal Release Auth: signed 5/25/21 -- May speak w/Femi Ngo, ; May leave voicemail on home phone  Lab Contact Info: [facility]  *Will call once monthly or if INR out of range*      Patient Findings  Negatives: Signs/symptoms of thrombosis, Signs/symptoms of bleeding, Laboratory test error suspected, Change in health, Change in alcohol use, Change in activity, Upcoming invasive procedure, Emergency department visit, Upcoming dental procedure, Missed doses, Extra doses, Change in medications, Change in diet/appetite, Hospital admission, Bruising, Other complaints   Comments: 12/20 - Patient not contacted at this encounter, I attempted to LVM. Pt. Is on the \" call once a month list \" so I will try again when she rechecks next week    Patient did call back 12/21 - No changes per patient. Dosing verified.       Plan:  INR was therapeutic today at 2.5 (goal 2.0-3.0).  Ms. Ngo to continue dose of warfarin 2.5 mg daily except warfarin 5 mg oral SunTuesThurs until recheck (25 mg/week).  Repeat INR in one week, 12/27/23.  Marianela Ngo understands the importance of calling the [facility] if she notices any s/sx of bleeding, stroke, or abnormal bruising, if any changes are made to her medications or medication doses (Rx, OTC, herbal), or if any upcoming procedures are scheduled. Marianela Ngo will likewise let us know if any other changes, questions, or concerns arise regarding anticoagulation therapy. she understands the importance of seeking medical attention immediately if she experiences any falls, vehicle accidents, or abnormal bleeding or bruising. Marianela Ngo voiced understanding of this information and confirms that she has the [facility]'s contact information. Otherwise, we will plan to contact the " patient once monthly or if her INR is out of range.    Jean Paul Chatman, PharmD, BCPS  12/21/2023  11:59 EST

## 2023-12-27 ENCOUNTER — ANTICOAGULATION VISIT (OUTPATIENT)
Dept: PHARMACY | Facility: HOSPITAL | Age: 78
End: 2023-12-27
Payer: MEDICARE

## 2023-12-27 DIAGNOSIS — I48.0 PAROXYSMAL ATRIAL FIBRILLATION: Primary | ICD-10-CM

## 2023-12-27 LAB — INR PPP: 3.6 (ref 2–3)

## 2023-12-27 NOTE — PROGRESS NOTES
Anticoagulation Clinic - Remote Progress Note  mdINR Home Monitor  Testing frequency: weekly    Indication: paroxysmal afib  Referring Provider: Susan [next 10/25/23]  Initial Warfarin Start Date: 2020? 2019?  Goal INR: 2.0-3.0  Current Drug Interactions: hydrochlorothiazide   [id number]: 4 (Age ?74, Sex, HTN) [estimated 5/20/21]  Bleed Risk: self reports negative history for GI bleed  Other: DOAC too expensive    Diet: green beans, peas, zucchini, or priyanka salads 2-3x/week (7/6/2023)  Alcohol: none  Tobacco: none  OTC Pain Medication: APAP only    INR History:  Date 5/14 5/18 5/25 6/1 6/15 7/6 7/12 7/22 7/28 8/4 8/11 8/18 8/23 8/27   Total WeeklyDose  22.5mg 25mg 25mg 25mg 25mg 25mg 25mg 25mg 25mg 25mg 27.5mg 27.5mg 27.5mg   INR 3.5 1.9 2.3 2.3 2.8 2.7 2.1 2.2 1.9 2.0 1.8 2.3 2.2 1.8   Notes [place] Cards 1x hold; incr GLV     HM  incr GLV  no GLV incr GLV cefdinir cefdinir     Date 9/1 9/8 9/15 9/22 9/29 10/6 10/13 10/20 10/27 11/3 11/10 11/17 11/24 12/1   Total WeeklyDose 27.5mg 27.5mg 27.5mg 27.5mg 27.5mg 27.5mg 27.5mg 30mg 27.5mg 27.5mg 27.5mg 25mg 27.5mg 27.5mg   INR 2.1 2.3 3.3 2.3 2.2 2.0 1.9 2.6 3.2 2.4 3.4 1.6 2.4 2.3   Notes cefdinir  decr GLV?  keflex   1x incr dose   decr GLV? 1x decr dose;   incr GLV?       Date 12/9 12/15 12/22 12/29 1/5/22 1/12 1/20 1/26 1/31 2/3 2/7 2/10 2/16 2/23   Total WeeklyDose 27.5mg 27.5mg 27.5mg 27.5mg 27.5mg 27.5mg 27.5mg 27.5mg 27.5mg 25mg 25mg 22.5mg 27.5mg 27.5mg   INR 2.5 2.4 2.7 2.5 2.6 2.2 2.5 2.1 3.8 2.6 4.1 2.5 2.7 3.5   Notes call    call   COVID+ dex / azith 1x hold pred 20 BID x5d  1x hold; 1x decr dose;   call call call     Date 3/2 3/9 3/16 3/23 3/30 4/5 4/13 4/20 4/27 5/4 5/11 5/18 5/25 6/1   Total WeeklyDose 25mg 27.5mg 27.5mg 27.5mg 27.5mg 27.5mg 27.5 mg 27.5mg 27.5 mg 27.5 mg 27.5 mg 27.5 mg 27.5 mg 27.5mg   INR 2.5 2.9 2.2 2.8 2.7 2.4 2.5 2.3 2.5 2.8 3.1 2.6 2.5 2.3   Notes 1x decr  call call    call    call Call  apap rec 5/19       Date 6/8 6/15  6/22 6/29 7/6 7/14 7/20 7/27 8/3 8/10 8/17 8/25 8/31 9/7   Total Weekly Dose 27.5 mg 27.5 mg 27.5 mg 27.5 mg 27.5 mg 27.5 mg 27.5 mg 30 mg 27.5 mg 27.5mg 27.5 mg 27.5 mg 27.5 mg 27.5 mg   INR 2.5 2.5 2.7 2.5 2.9 2.4 1.8 2.7 2.8 2.8 2.8 3.1 2.6 3.5   Notes call    call  call 12/71x boost   call Call  Dec GLV  call     Date 9/14 9/21 9/28 10/5 10/12 10/19 10/26 11/2 11/9 11/16 11/23 11/30 12/7   Total WeeklyDose 27.5mg 25 mg 25 mg 25 mg 25 mg  25 mg 25 mg 25 mg 25 mg 25 mg 25 mg 25 mg 25 mg   INR 3.1 2.9 2.5 2.9 2.2 2.4 2.3 2.1 2.3 2.3 2.1 2.2 1.9   Notes 1x hold inc GLV call   Call     call    Call  INC GLV     Date 12/14 12/22 12/28 1/4/23 1/11 1/18 1/25 2/1 2/8 2/16/23 2/22 3/1/23   Total WeeklyDose 27.5 mg 25 mg  20 mg 27.5 mg 27.5 mg 27.5 mg 27.5 mg 27.5 mg 27.5 mg 27.5 mg 27.5mg 27.5 mg    INR 2.8 2.1 1.9 1.9 1.8 2.1 2.2 2.0 2.7 2.5 2.6 2.8   Notes 1x boost  rec 12/15 Call  Call 1x miss call call Call  Call    Call        Date 3/9 3/16 3/22 3/29 4/5 4/12 4/19 4/26 5/3 5/10/23 5/17 5/24   Total WeeklyDose 27.5 mg 27.5 mg 27.5 mg 25mg 27.5 mg 27.5 mg 27.5 mg 27.5 mg 27.5 mg  27.5 mg  27.5 mg 27.5 mg   INR 2.6  2.6 4.1 2.7 2.6 2.7 2.9 2.7 2.5 2.8 2.0 2.4   Notes  call call call call    call        Date 5/31 6/7/23 6/14 6/21/23 6/28 7/5 7/13 7/19/23 7/26/23 8/2 08/10 8/16/23   Total WeeklyDose 27.5 mg 27.5 mg  27.5 mg 27.5 mg 27.5 mg 25mg 25 mg 25 mg 25 mg 25 mg 25 mg 25 mg   INR 2.8 2.5 3.2 2.9 3.8 3.2 2.9 2.3 2.6 2.9 2.8 2.9   Notes call  Call  Dec GLV   Hold x1; moving; call     call      Date 08/23 8/30/23 9/8 9/14 09/21 09/28 10/05 10/13/23 10/19 10/26 11/3 11/9   Total WeeklyDose 25 mg 25 mg 25 mg 25 mg 25 mg 25 mg 25 mg 25 mg 27.5 mg 25 mg  27.5 mg 27.5 mg   INR 2.9 2.0 2.4 2.4 2.7 2.8 2.3 1.9 2.4 1.8 2.0 3.1   Notes   call    call Call  1x boost   call     Date 11/16 11/22 11/29 12/06 12/14 12/20 12/27        Total Weekly Dose 25 mg 25 mg 25 mg 25 mg 25 mg 25 mg 25mg        INR 2.4 2.3 2.5 2.6 2.6 2.5 3.6         Notes   Call  Rec'd 12/07  Call            Phone Interview:  Tablet Strength: 5 mg (1/5/22)  Patient Contact Info: [phone] (home) preferred; [phone] (cell)  Verbal Release Auth: signed 5/25/21 -- May speak w/Femi Ngo, ; May leave voicemail on home phone  Lab Contact Info: [facility]  *Will call once monthly or if INR out of range*      Patient Findings  Negatives: Signs/symptoms of thrombosis, Signs/symptoms of bleeding, Laboratory test error suspected, Change in health, Change in alcohol use, Change in activity, Upcoming invasive procedure, Emergency department visit, Upcoming dental procedure, Missed doses, Extra doses, Change in medications, Change in diet/appetite, Hospital admission, Bruising, Other complaints   Comments: Ms Ngo has been taking warfarin as directed. She has been eating less GLV than usual.     Plan:  INR is  supratherapeutic today at 3.6 (goal 2.0-3.0).  Instructed Ms. Ngo to take warfarin 2.5mg tonight and tomorrow, then continue dose of warfarin 2.5 mg daily except warfarin 5 mg oral SunTuesThurs until recheck (25 mg/week).  Repeat INR in one week, 1/3/24  Marianela Ngo understands the importance of calling the [facility] if she notices any s/sx of bleeding, stroke, or abnormal bruising, if any changes are made to her medications or medication doses (Rx, OTC, herbal), or if any upcoming procedures are scheduled. Marianela Ngo will likewise let us know if any other changes, questions, or concerns arise regarding anticoagulation therapy. she understands the importance of seeking medical attention immediately if she experiences any falls, vehicle accidents, or abnormal bleeding or bruising. Marianela Ngo voiced understanding of this information and confirms that she has the [facility]'s contact information. Otherwise, we will plan to contact the patient once monthly or if her INR is out of range.    Pablo Taylor,  PharmD  12/27/2023  16:08 EST

## 2024-01-03 ENCOUNTER — ANTICOAGULATION VISIT (OUTPATIENT)
Dept: PHARMACY | Facility: HOSPITAL | Age: 79
End: 2024-01-03
Payer: MEDICARE

## 2024-01-03 DIAGNOSIS — I48.0 PAROXYSMAL ATRIAL FIBRILLATION: Primary | ICD-10-CM

## 2024-01-03 LAB — INR PPP: 2.4

## 2024-01-03 NOTE — PROGRESS NOTES
Anticoagulation Clinic - Remote Progress Note  mdINR Home Monitor  Testing frequency: weekly    Indication: paroxysmal afib  Referring Provider: Susan [next 10/25/23]  Initial Warfarin Start Date: 2020? 2019?  Goal INR: 2.0-3.0  Current Drug Interactions: hydrochlorothiazide   [id number]: 4 (Age ?74, Sex, HTN) [estimated 5/20/21]  Bleed Risk: self reports negative history for GI bleed  Other: DOAC too expensive    Diet: green beans, peas, zucchini, or priyanka salads 2-3x/week (7/6/2023)  Alcohol: none  Tobacco: none  OTC Pain Medication: APAP only    INR History:  Date 5/14 5/18 5/25 6/1 6/15 7/6 7/12 7/22 7/28 8/4 8/11 8/18 8/23 8/27   Total WeeklyDose  22.5mg 25mg 25mg 25mg 25mg 25mg 25mg 25mg 25mg 25mg 27.5mg 27.5mg 27.5mg   INR 3.5 1.9 2.3 2.3 2.8 2.7 2.1 2.2 1.9 2.0 1.8 2.3 2.2 1.8   Notes [place] Cards 1x hold; incr GLV     HM  incr GLV  no GLV incr GLV cefdinir cefdinir     Date 9/1 9/8 9/15 9/22 9/29 10/6 10/13 10/20 10/27 11/3 11/10 11/17 11/24 12/1   Total WeeklyDose 27.5mg 27.5mg 27.5mg 27.5mg 27.5mg 27.5mg 27.5mg 30mg 27.5mg 27.5mg 27.5mg 25mg 27.5mg 27.5mg   INR 2.1 2.3 3.3 2.3 2.2 2.0 1.9 2.6 3.2 2.4 3.4 1.6 2.4 2.3   Notes cefdinir  decr GLV?  keflex   1x incr dose   decr GLV? 1x decr dose;   incr GLV?       Date 12/9 12/15 12/22 12/29 1/5/22 1/12 1/20 1/26 1/31 2/3 2/7 2/10 2/16 2/23   Total WeeklyDose 27.5mg 27.5mg 27.5mg 27.5mg 27.5mg 27.5mg 27.5mg 27.5mg 27.5mg 25mg 25mg 22.5mg 27.5mg 27.5mg   INR 2.5 2.4 2.7 2.5 2.6 2.2 2.5 2.1 3.8 2.6 4.1 2.5 2.7 3.5   Notes call    call   COVID+ dex / azith 1x hold pred 20 BID x5d  1x hold; 1x decr dose;   call call call     Date 3/2 3/9 3/16 3/23 3/30 4/5 4/13 4/20 4/27 5/4 5/11 5/18 5/25 6/1   Total WeeklyDose 25mg 27.5mg 27.5mg 27.5mg 27.5mg 27.5mg 27.5 mg 27.5mg 27.5 mg 27.5 mg 27.5 mg 27.5 mg 27.5 mg 27.5mg   INR 2.5 2.9 2.2 2.8 2.7 2.4 2.5 2.3 2.5 2.8 3.1 2.6 2.5 2.3   Notes 1x decr  call call    call    call Call  apap rec 5/19       Date 6/8 6/15  6/22 6/29 7/6 7/14 7/20 7/27 8/3 8/10 8/17 8/25 8/31 9/7   Total Weekly Dose 27.5 mg 27.5 mg 27.5 mg 27.5 mg 27.5 mg 27.5 mg 27.5 mg 30 mg 27.5 mg 27.5mg 27.5 mg 27.5 mg 27.5 mg 27.5 mg   INR 2.5 2.5 2.7 2.5 2.9 2.4 1.8 2.7 2.8 2.8 2.8 3.1 2.6 3.5   Notes call    call  call 12/71x boost   call Call  Dec GLV  call     Date 9/14 9/21 9/28 10/5 10/12 10/19 10/26 11/2 11/9 11/16 11/23 11/30 12/7   Total WeeklyDose 27.5mg 25 mg 25 mg 25 mg 25 mg  25 mg 25 mg 25 mg 25 mg 25 mg 25 mg 25 mg 25 mg   INR 3.1 2.9 2.5 2.9 2.2 2.4 2.3 2.1 2.3 2.3 2.1 2.2 1.9   Notes 1x hold inc GLV call   Call     call    Call  INC GLV     Date 12/14 12/22 12/28 1/4/23 1/11 1/18 1/25 2/1 2/8 2/16/23 2/22 3/1/23   Total WeeklyDose 27.5 mg 25 mg  20 mg 27.5 mg 27.5 mg 27.5 mg 27.5 mg 27.5 mg 27.5 mg 27.5 mg 27.5mg 27.5 mg    INR 2.8 2.1 1.9 1.9 1.8 2.1 2.2 2.0 2.7 2.5 2.6 2.8   Notes 1x boost  rec 12/15 Call  Call 1x miss call call Call  Call    Call        Date 3/9 3/16 3/22 3/29 4/5 4/12 4/19 4/26 5/3 5/10/23 5/17 5/24   Total WeeklyDose 27.5 mg 27.5 mg 27.5 mg 25mg 27.5 mg 27.5 mg 27.5 mg 27.5 mg 27.5 mg  27.5 mg  27.5 mg 27.5 mg   INR 2.6  2.6 4.1 2.7 2.6 2.7 2.9 2.7 2.5 2.8 2.0 2.4   Notes  call call call call    call        Date 5/31 6/7/23 6/14 6/21/23 6/28 7/5 7/13 7/19/23 7/26/23 8/2 08/10 8/16/23   Total WeeklyDose 27.5 mg 27.5 mg  27.5 mg 27.5 mg 27.5 mg 25mg 25 mg 25 mg 25 mg 25 mg 25 mg 25 mg   INR 2.8 2.5 3.2 2.9 3.8 3.2 2.9 2.3 2.6 2.9 2.8 2.9   Notes call  Call  Dec GLV   Hold x1; moving; call     call      Date 08/23 8/30/23 9/8 9/14 09/21 09/28 10/05 10/13/23 10/19 10/26 11/3 11/9   Total WeeklyDose 25 mg 25 mg 25 mg 25 mg 25 mg 25 mg 25 mg 25 mg 27.5 mg 25 mg  27.5 mg 27.5 mg   INR 2.9 2.0 2.4 2.4 2.7 2.8 2.3 1.9 2.4 1.8 2.0 3.1   Notes   call    call Call  1x boost   call     Date 11/16 11/22 11/29 12/06 12/14 12/20 12/27 1/3/24       Total Weekly Dose 25 mg 25 mg 25 mg 25 mg 25 mg 25 mg 25mg 22.5  mg       INR 2.4 2.3 2.5 2.6  2.6 2.5 3.6 2.4       Notes   Call  Rec'd 12/07  Call   1x red          Phone Interview:  Tablet Strength: 5 mg (1/5/22)  Patient Contact Info: [phone] (home) preferred; [phone] (cell)  Verbal Release Auth: signed 5/25/21 -- May speak w/Femi Ngo, ; May leave voicemail on home phone  Lab Contact Info: [facility]  *Will call once monthly or if INR out of range*    Patient Findings  Negatives: Signs/symptoms of thrombosis, Signs/symptoms of bleeding, Laboratory test error suspected, Change in health, Change in alcohol use, Change in activity, Upcoming invasive procedure, Emergency department visit, Upcoming dental procedure, Missed doses, Extra doses, Change in medications, Change in diet/appetite, Hospital admission, Bruising, Other complaints   Comments: All findings negative per patient       Plan:  INR is  therapeutic today at 2.4 (goal 2.0-3.0).  Instructed Ms. Ngo to continue dose of warfarin 2.5 mg daily except warfarin 5 mg oral SunTuesThurs until recheck (25 mg/week).  Repeat INR in one week, 1/10/24  Marianela Ngo understands the importance of calling the [facility] if she notices any s/sx of bleeding, stroke, or abnormal bruising, if any changes are made to her medications or medication doses (Rx, OTC, herbal), or if any upcoming procedures are scheduled. Marianela Ngo will likewise let us know if any other changes, questions, or concerns arise regarding anticoagulation therapy. she understands the importance of seeking medical attention immediately if she experiences any falls, vehicle accidents, or abnormal bleeding or bruising. Marianela Ngo voiced understanding of this information and confirms that she has the [facility]'s contact information. Otherwise, we will plan to contact the patient once monthly or if her INR is out of range.    Chicho Shine, Pharmacy Technician  1/3/2024  13:45 EST     I, Maryjane Amezquita, PharmD, have  reviewed the note in full and agree with the assessment and plan.  01/03/24  15:02 EST

## 2024-01-17 ENCOUNTER — ANTICOAGULATION VISIT (OUTPATIENT)
Dept: PHARMACY | Facility: HOSPITAL | Age: 79
End: 2024-01-17
Payer: MEDICARE

## 2024-01-17 DIAGNOSIS — I48.0 PAROXYSMAL ATRIAL FIBRILLATION: Primary | ICD-10-CM

## 2024-01-17 LAB — INR PPP: 2.3

## 2024-01-17 NOTE — PROGRESS NOTES
Anticoagulation Clinic - Remote Progress Note  mdINR Home Monitor  Testing frequency: weekly    Indication: paroxysmal afib  Referring Provider: Susan [next 10/25/23]  Initial Warfarin Start Date: 2020? 2019?  Goal INR: 2.0-3.0  Current Drug Interactions: hydrochlorothiazide   [id number]: 4 (Age ?74, Sex, HTN) [estimated 5/20/21]  Bleed Risk: self reports negative history for GI bleed  Other: DOAC too expensive    Diet: green beans, peas, zucchini, or priyanka salads 2-3x/week (7/6/2023)  Alcohol: none  Tobacco: none  OTC Pain Medication: APAP only    INR History:  Date 5/14 5/18 5/25 6/1 6/15 7/6 7/12 7/22 7/28 8/4 8/11 8/18 8/23 8/27   Total WeeklyDose  22.5mg 25mg 25mg 25mg 25mg 25mg 25mg 25mg 25mg 25mg 27.5mg 27.5mg 27.5mg   INR 3.5 1.9 2.3 2.3 2.8 2.7 2.1 2.2 1.9 2.0 1.8 2.3 2.2 1.8   Notes [place] Cards 1x hold; incr GLV     HM  incr GLV  no GLV incr GLV cefdinir cefdinir     Date 9/1 9/8 9/15 9/22 9/29 10/6 10/13 10/20 10/27 11/3 11/10 11/17 11/24 12/1   Total WeeklyDose 27.5mg 27.5mg 27.5mg 27.5mg 27.5mg 27.5mg 27.5mg 30mg 27.5mg 27.5mg 27.5mg 25mg 27.5mg 27.5mg   INR 2.1 2.3 3.3 2.3 2.2 2.0 1.9 2.6 3.2 2.4 3.4 1.6 2.4 2.3   Notes cefdinir  decr GLV?  keflex   1x incr dose   decr GLV? 1x decr dose;   incr GLV?       Date 12/9 12/15 12/22 12/29 1/5/22 1/12 1/20 1/26 1/31 2/3 2/7 2/10 2/16 2/23   Total WeeklyDose 27.5mg 27.5mg 27.5mg 27.5mg 27.5mg 27.5mg 27.5mg 27.5mg 27.5mg 25mg 25mg 22.5mg 27.5mg 27.5mg   INR 2.5 2.4 2.7 2.5 2.6 2.2 2.5 2.1 3.8 2.6 4.1 2.5 2.7 3.5   Notes call    call   COVID+ dex / azith 1x hold pred 20 BID x5d  1x hold; 1x decr dose;   call call call     Date 3/2 3/9 3/16 3/23 3/30 4/5 4/13 4/20 4/27 5/4 5/11 5/18 5/25 6/1   Total WeeklyDose 25mg 27.5mg 27.5mg 27.5mg 27.5mg 27.5mg 27.5 mg 27.5mg 27.5 mg 27.5 mg 27.5 mg 27.5 mg 27.5 mg 27.5mg   INR 2.5 2.9 2.2 2.8 2.7 2.4 2.5 2.3 2.5 2.8 3.1 2.6 2.5 2.3   Notes 1x decr  call call    call    call Call  apap rec 5/19       Date 6/8 6/15  6/22 6/29 7/6 7/14 7/20 7/27 8/3 8/10 8/17 8/25 8/31 9/7   Total Weekly Dose 27.5 mg 27.5 mg 27.5 mg 27.5 mg 27.5 mg 27.5 mg 27.5 mg 30 mg 27.5 mg 27.5mg 27.5 mg 27.5 mg 27.5 mg 27.5 mg   INR 2.5 2.5 2.7 2.5 2.9 2.4 1.8 2.7 2.8 2.8 2.8 3.1 2.6 3.5   Notes call    call  call 12/71x boost   call Call  Dec GLV  call     Date 9/14 9/21 9/28 10/5 10/12 10/19 10/26 11/2 11/9 11/16 11/23 11/30 12/7   Total WeeklyDose 27.5mg 25 mg 25 mg 25 mg 25 mg  25 mg 25 mg 25 mg 25 mg 25 mg 25 mg 25 mg 25 mg   INR 3.1 2.9 2.5 2.9 2.2 2.4 2.3 2.1 2.3 2.3 2.1 2.2 1.9   Notes 1x hold inc GLV call   Call     call    Call  INC GLV     Date 12/14 12/22 12/28 1/4/23 1/11 1/18 1/25 2/1 2/8 2/16/23 2/22 3/1/23   Total WeeklyDose 27.5 mg 25 mg  20 mg 27.5 mg 27.5 mg 27.5 mg 27.5 mg 27.5 mg 27.5 mg 27.5 mg 27.5mg 27.5 mg    INR 2.8 2.1 1.9 1.9 1.8 2.1 2.2 2.0 2.7 2.5 2.6 2.8   Notes 1x boost  rec 12/15 Call  Call 1x miss call call Call  Call    Call        Date 3/9 3/16 3/22 3/29 4/5 4/12 4/19 4/26 5/3 5/10/23 5/17 5/24   Total WeeklyDose 27.5 mg 27.5 mg 27.5 mg 25mg 27.5 mg 27.5 mg 27.5 mg 27.5 mg 27.5 mg  27.5 mg  27.5 mg 27.5 mg   INR 2.6  2.6 4.1 2.7 2.6 2.7 2.9 2.7 2.5 2.8 2.0 2.4   Notes  call call call call    call        Date 5/31 6/7/23 6/14 6/21/23 6/28 7/5 7/13 7/19/23 7/26/23 8/2 08/10 8/16/23   Total WeeklyDose 27.5 mg 27.5 mg  27.5 mg 27.5 mg 27.5 mg 25mg 25 mg 25 mg 25 mg 25 mg 25 mg 25 mg   INR 2.8 2.5 3.2 2.9 3.8 3.2 2.9 2.3 2.6 2.9 2.8 2.9   Notes call  Call  Dec GLV   Hold x1; moving; call     call      Date 08/23 8/30/23 9/8 9/14 09/21 09/28 10/05 10/13/23 10/19 10/26 11/3 11/9   Total WeeklyDose 25 mg 25 mg 25 mg 25 mg 25 mg 25 mg 25 mg 25 mg 27.5 mg 25 mg  27.5 mg 27.5 mg   INR 2.9 2.0 2.4 2.4 2.7 2.8 2.3 1.9 2.4 1.8 2.0 3.1   Notes   call    call Call  1x boost   call     Date 11/16 11/22 11/29 12/06 12/14 12/20 12/27 1/3/24 1/10/24 1/17/24     Total Weekly Dose 25 mg 25 mg 25 mg 25 mg 25 mg 25 mg 25mg 22.5  mg 25 mg  25 mg       INR 2.4 2.3 2.5 2.6 2.6 2.5 3.6 2.4 2.6 2.3     Notes   Call  Rec'd 12/07  Call   1x red   Call        Phone Interview:  Tablet Strength: 5 mg (1/5/22)  Patient Contact Info: [phone] (home) preferred; [phone] (cell)  Verbal Release Auth: signed 5/25/21 -- May speak w/Femi Ngo, ; May leave voicemail on home phone  Lab Contact Info: [facility]  *Will call once monthly or if INR out of range*      Patient Findings  Negatives: Signs/symptoms of thrombosis, Signs/symptoms of bleeding, Laboratory test error suspected, Change in health, Change in alcohol use, Change in activity, Upcoming invasive procedure, Emergency department visit, Upcoming dental procedure, Missed doses, Extra doses, Change in medications, Change in diet/appetite, Hospital admission, Bruising, Other complaints   Comments: All findings negative per patient       Plan:  INR is  therapeutic today at 2.3 (goal 2.0-3.0).  Instructed Ms. Ngo to continue warfarin 2.5 mg oral daily except warfarin 5 mg oral SunTuesThurs until recheck (25 mg/week).  Repeat INR in one week, 1/24/24  Marianela Ngo understands the importance of calling the [facility] if she notices any s/sx of bleeding, stroke, or abnormal bruising, if any changes are made to her medications or medication doses (Rx, OTC, herbal), or if any upcoming procedures are scheduled. Marianela Ngo will likewise let us know if any other changes, questions, or concerns arise regarding anticoagulation therapy. she understands the importance of seeking medical attention immediately if she experiences any falls, vehicle accidents, or abnormal bleeding or bruising. Marianela Ngo voiced understanding of this information and confirms that she has the [facility]'s contact information. Otherwise, we will plan to contact the patient once monthly or if her INR is out of range.    Chicho Shine, Pharmacy Technician  1/17/2024  14:50 EST     I,  Theo Armstrong, [facility], have reviewed the note in full and agree with the assessment and plan.  01/17/24  15:25 EST

## 2024-01-24 ENCOUNTER — ANTICOAGULATION VISIT (OUTPATIENT)
Dept: PHARMACY | Facility: HOSPITAL | Age: 79
End: 2024-01-24
Payer: MEDICARE

## 2024-01-24 DIAGNOSIS — I48.0 PAROXYSMAL ATRIAL FIBRILLATION: Primary | ICD-10-CM

## 2024-01-24 LAB — INR PPP: 2.1

## 2024-01-24 NOTE — PROGRESS NOTES
Anticoagulation Clinic - Remote Progress Note  mdINR Home Monitor  Testing frequency: weekly    Indication: paroxysmal afib  Referring Provider: Susan [next 10/25/23]  Initial Warfarin Start Date: 2020? 2019?  Goal INR: 2.0-3.0  Current Drug Interactions: hydrochlorothiazide   [id number]: 4 (Age ?74, Sex, HTN) [estimated 5/20/21]  Bleed Risk: self reports negative history for GI bleed  Other: DOAC too expensive    Diet: green beans, peas, zucchini, or priyanka salads 2-3x/week (7/6/2023)  Alcohol: none  Tobacco: none  OTC Pain Medication: APAP only    INR History:  Date 5/14 5/18 5/25 6/1 6/15 7/6 7/12 7/22 7/28 8/4 8/11 8/18 8/23 8/27   Total WeeklyDose  22.5mg 25mg 25mg 25mg 25mg 25mg 25mg 25mg 25mg 25mg 27.5mg 27.5mg 27.5mg   INR 3.5 1.9 2.3 2.3 2.8 2.7 2.1 2.2 1.9 2.0 1.8 2.3 2.2 1.8   Notes [place] Cards 1x hold; incr GLV     HM  incr GLV  no GLV incr GLV cefdinir cefdinir     Date 9/1 9/8 9/15 9/22 9/29 10/6 10/13 10/20 10/27 11/3 11/10 11/17 11/24 12/1   Total WeeklyDose 27.5mg 27.5mg 27.5mg 27.5mg 27.5mg 27.5mg 27.5mg 30mg 27.5mg 27.5mg 27.5mg 25mg 27.5mg 27.5mg   INR 2.1 2.3 3.3 2.3 2.2 2.0 1.9 2.6 3.2 2.4 3.4 1.6 2.4 2.3   Notes cefdinir  decr GLV?  keflex   1x incr dose   decr GLV? 1x decr dose;   incr GLV?       Date 12/9 12/15 12/22 12/29 1/5/22 1/12 1/20 1/26 1/31 2/3 2/7 2/10 2/16 2/23   Total WeeklyDose 27.5mg 27.5mg 27.5mg 27.5mg 27.5mg 27.5mg 27.5mg 27.5mg 27.5mg 25mg 25mg 22.5mg 27.5mg 27.5mg   INR 2.5 2.4 2.7 2.5 2.6 2.2 2.5 2.1 3.8 2.6 4.1 2.5 2.7 3.5   Notes call    call   COVID+ dex / azith 1x hold pred 20 BID x5d  1x hold; 1x decr dose;   call call call     Date 3/2 3/9 3/16 3/23 3/30 4/5 4/13 4/20 4/27 5/4 5/11 5/18 5/25 6/1   Total WeeklyDose 25mg 27.5mg 27.5mg 27.5mg 27.5mg 27.5mg 27.5 mg 27.5mg 27.5 mg 27.5 mg 27.5 mg 27.5 mg 27.5 mg 27.5mg   INR 2.5 2.9 2.2 2.8 2.7 2.4 2.5 2.3 2.5 2.8 3.1 2.6 2.5 2.3   Notes 1x decr  call call    call    call Call  apap rec 5/19       Date 6/8 6/15  6/22 6/29 7/6 7/14 7/20 7/27 8/3 8/10 8/17 8/25 8/31 9/7   Total Weekly Dose 27.5 mg 27.5 mg 27.5 mg 27.5 mg 27.5 mg 27.5 mg 27.5 mg 30 mg 27.5 mg 27.5mg 27.5 mg 27.5 mg 27.5 mg 27.5 mg   INR 2.5 2.5 2.7 2.5 2.9 2.4 1.8 2.7 2.8 2.8 2.8 3.1 2.6 3.5   Notes call    call  call 12/71x boost   call Call  Dec GLV  call     Date 9/14 9/21 9/28 10/5 10/12 10/19 10/26 11/2 11/9 11/16 11/23 11/30 12/7   Total WeeklyDose 27.5mg 25 mg 25 mg 25 mg 25 mg  25 mg 25 mg 25 mg 25 mg 25 mg 25 mg 25 mg 25 mg   INR 3.1 2.9 2.5 2.9 2.2 2.4 2.3 2.1 2.3 2.3 2.1 2.2 1.9   Notes 1x hold inc GLV call   Call     call    Call  INC GLV     Date 12/14 12/22 12/28 1/4/23 1/11 1/18 1/25 2/1 2/8 2/16/23 2/22 3/1/23   Total WeeklyDose 27.5 mg 25 mg  20 mg 27.5 mg 27.5 mg 27.5 mg 27.5 mg 27.5 mg 27.5 mg 27.5 mg 27.5mg 27.5 mg    INR 2.8 2.1 1.9 1.9 1.8 2.1 2.2 2.0 2.7 2.5 2.6 2.8   Notes 1x boost  rec 12/15 Call  Call 1x miss call call Call  Call    Call        Date 3/9 3/16 3/22 3/29 4/5 4/12 4/19 4/26 5/3 5/10/23 5/17 5/24   Total WeeklyDose 27.5 mg 27.5 mg 27.5 mg 25mg 27.5 mg 27.5 mg 27.5 mg 27.5 mg 27.5 mg  27.5 mg  27.5 mg 27.5 mg   INR 2.6  2.6 4.1 2.7 2.6 2.7 2.9 2.7 2.5 2.8 2.0 2.4   Notes  call call call call    call        Date 5/31 6/7/23 6/14 6/21/23 6/28 7/5 7/13 7/19/23 7/26/23 8/2 08/10 8/16/23   Total WeeklyDose 27.5 mg 27.5 mg  27.5 mg 27.5 mg 27.5 mg 25mg 25 mg 25 mg 25 mg 25 mg 25 mg 25 mg   INR 2.8 2.5 3.2 2.9 3.8 3.2 2.9 2.3 2.6 2.9 2.8 2.9   Notes call  Call  Dec GLV   Hold x1; moving; call     call      Date 08/23 8/30/23 9/8 9/14 09/21 09/28 10/05 10/13/23 10/19 10/26 11/3 11/9   Total WeeklyDose 25 mg 25 mg 25 mg 25 mg 25 mg 25 mg 25 mg 25 mg 27.5 mg 25 mg  27.5 mg 27.5 mg   INR 2.9 2.0 2.4 2.4 2.7 2.8 2.3 1.9 2.4 1.8 2.0 3.1   Notes   call    call Call  1x boost   call     Date 11/16 11/22 11/29 12/06 12/14 12/20 12/27 1/3/24 1/10/24 1/17/24 1/24     Total Weekly Dose 25 mg 25 mg 25 mg 25 mg 25 mg 25 mg 25mg 22.5  mg 25 mg   25 mg  25 mg     INR 2.4 2.3 2.5 2.6 2.6 2.5 3.6 2.4 2.6 2.3 2.1     Notes   Call  Rec'd 12/07  Call   1x red   Call         Phone Interview:  Tablet Strength: 5 mg (1/5/22)  Patient Contact Info: [phone] (home) preferred; [phone] (cell)  Verbal Release Auth: signed 5/25/21 -- May speak w/Femi Ngo, ; May leave voicemail on home phone  Lab Contact Info: [facility]  *Will call once monthly or if INR out of range*    Patient Findings  Comments: Patient not contacted during this encounter.       Plan:  INR is therapeutic today at 2.1 (goal 2.0-3.0).  Ms. Ngo to continue warfarin 2.5 mg oral daily except  5 mg SunTuesThurs until recheck (25 mg/week).  Repeat INR in one week, 1/31/24  Marianela Ngo understands the importance of calling the [facility] if she notices any s/sx of bleeding, stroke, or abnormal bruising, if any changes are made to her medications or medication doses (Rx, OTC, herbal), or if any upcoming procedures are scheduled. Marianela Ngo will likewise let us know if any other changes, questions, or concerns arise regarding anticoagulation therapy. she understands the importance of seeking medical attention immediately if she experiences any falls, vehicle accidents, or abnormal bleeding or bruising. Marianela Ngo voiced understanding of this information and confirms that she has the [facility]'s contact information. Otherwise, we will plan to contact the patient once monthly or if her INR is out of range.        Mane Jaime   [facility]  1/24/2024 12:20 EST    Radha HAQUE, PharmD, have reviewed the note in full and agree with the assessment and plan.  01/24/24  13:55 EST

## 2024-01-31 ENCOUNTER — ANTICOAGULATION VISIT (OUTPATIENT)
Dept: PHARMACY | Facility: HOSPITAL | Age: 79
End: 2024-01-31
Payer: MEDICARE

## 2024-01-31 DIAGNOSIS — I48.0 PAROXYSMAL ATRIAL FIBRILLATION: Primary | ICD-10-CM

## 2024-01-31 LAB — INR PPP: 2.3

## 2024-01-31 NOTE — PROGRESS NOTES
Anticoagulation Clinic - Remote Progress Note  mdINR Home Monitor  Testing frequency: weekly    Indication: paroxysmal afib  Referring Provider: Susan [next 10/25/23]  Initial Warfarin Start Date: 2020? 2019?  Goal INR: 2.0-3.0  Current Drug Interactions: hydrochlorothiazide   [id number]: 4 (Age ?74, Sex, HTN) [estimated 5/20/21]  Bleed Risk: self reports negative history for GI bleed  Other: DOAC too expensive    Diet: green beans, peas, zucchini, or priyanka salads 2-3x/week (7/6/2023)  Alcohol: none  Tobacco: none  OTC Pain Medication: APAP only    INR History:  Date 5/14 5/18 5/25 6/1 6/15 7/6 7/12 7/22 7/28 8/4 8/11 8/18 8/23 8/27   Total WeeklyDose  22.5mg 25mg 25mg 25mg 25mg 25mg 25mg 25mg 25mg 25mg 27.5mg 27.5mg 27.5mg   INR 3.5 1.9 2.3 2.3 2.8 2.7 2.1 2.2 1.9 2.0 1.8 2.3 2.2 1.8   Notes [place] Cards 1x hold; incr GLV     HM  incr GLV  no GLV incr GLV cefdinir cefdinir     Date 9/1 9/8 9/15 9/22 9/29 10/6 10/13 10/20 10/27 11/3 11/10 11/17 11/24 12/1   Total WeeklyDose 27.5mg 27.5mg 27.5mg 27.5mg 27.5mg 27.5mg 27.5mg 30mg 27.5mg 27.5mg 27.5mg 25mg 27.5mg 27.5mg   INR 2.1 2.3 3.3 2.3 2.2 2.0 1.9 2.6 3.2 2.4 3.4 1.6 2.4 2.3   Notes cefdinir  decr GLV?  keflex   1x incr dose   decr GLV? 1x decr dose;   incr GLV?       Date 12/9 12/15 12/22 12/29 1/5/22 1/12 1/20 1/26 1/31 2/3 2/7 2/10 2/16 2/23   Total WeeklyDose 27.5mg 27.5mg 27.5mg 27.5mg 27.5mg 27.5mg 27.5mg 27.5mg 27.5mg 25mg 25mg 22.5mg 27.5mg 27.5mg   INR 2.5 2.4 2.7 2.5 2.6 2.2 2.5 2.1 3.8 2.6 4.1 2.5 2.7 3.5   Notes call    call   COVID+ dex / azith 1x hold pred 20 BID x5d  1x hold; 1x decr dose;   call call call     Date 3/2 3/9 3/16 3/23 3/30 4/5 4/13 4/20 4/27 5/4 5/11 5/18 5/25 6/1   Total WeeklyDose 25mg 27.5mg 27.5mg 27.5mg 27.5mg 27.5mg 27.5 mg 27.5mg 27.5 mg 27.5 mg 27.5 mg 27.5 mg 27.5 mg 27.5mg   INR 2.5 2.9 2.2 2.8 2.7 2.4 2.5 2.3 2.5 2.8 3.1 2.6 2.5 2.3   Notes 1x decr  call call    call    call Call  apap rec 5/19       Date 6/8 6/15  6/22 6/29 7/6 7/14 7/20 7/27 8/3 8/10 8/17 8/25 8/31 9/7   Total Weekly Dose 27.5 mg 27.5 mg 27.5 mg 27.5 mg 27.5 mg 27.5 mg 27.5 mg 30 mg 27.5 mg 27.5mg 27.5 mg 27.5 mg 27.5 mg 27.5 mg   INR 2.5 2.5 2.7 2.5 2.9 2.4 1.8 2.7 2.8 2.8 2.8 3.1 2.6 3.5   Notes call    call  call 12/71x boost   call Call  Dec GLV  call     Date 9/14 9/21 9/28 10/5 10/12 10/19 10/26 11/2 11/9 11/16 11/23 11/30 12/7   Total WeeklyDose 27.5mg 25 mg 25 mg 25 mg 25 mg  25 mg 25 mg 25 mg 25 mg 25 mg 25 mg 25 mg 25 mg   INR 3.1 2.9 2.5 2.9 2.2 2.4 2.3 2.1 2.3 2.3 2.1 2.2 1.9   Notes 1x hold inc GLV call   Call     call    Call  INC GLV     Date 12/14 12/22 12/28 1/4/23 1/11 1/18 1/25 2/1 2/8 2/16/23 2/22 3/1/23   Total WeeklyDose 27.5 mg 25 mg  20 mg 27.5 mg 27.5 mg 27.5 mg 27.5 mg 27.5 mg 27.5 mg 27.5 mg 27.5mg 27.5 mg    INR 2.8 2.1 1.9 1.9 1.8 2.1 2.2 2.0 2.7 2.5 2.6 2.8   Notes 1x boost  rec 12/15 Call  Call 1x miss call call Call  Call    Call        Date 3/9 3/16 3/22 3/29 4/5 4/12 4/19 4/26 5/3 5/10/23 5/17 5/24   Total WeeklyDose 27.5 mg 27.5 mg 27.5 mg 25mg 27.5 mg 27.5 mg 27.5 mg 27.5 mg 27.5 mg  27.5 mg  27.5 mg 27.5 mg   INR 2.6  2.6 4.1 2.7 2.6 2.7 2.9 2.7 2.5 2.8 2.0 2.4   Notes  call call call call    call        Date 5/31 6/7/23 6/14 6/21/23 6/28 7/5 7/13 7/19/23 7/26/23 8/2 08/10 8/16/23   Total WeeklyDose 27.5 mg 27.5 mg  27.5 mg 27.5 mg 27.5 mg 25mg 25 mg 25 mg 25 mg 25 mg 25 mg 25 mg   INR 2.8 2.5 3.2 2.9 3.8 3.2 2.9 2.3 2.6 2.9 2.8 2.9   Notes call  Call  Dec GLV   Hold x1; moving; call     call      Date 08/23 8/30/23 9/8 9/14 09/21 09/28 10/05 10/13/23 10/19 10/26 11/3 11/9   Total WeeklyDose 25 mg 25 mg 25 mg 25 mg 25 mg 25 mg 25 mg 25 mg 27.5 mg 25 mg  27.5 mg 27.5 mg   INR 2.9 2.0 2.4 2.4 2.7 2.8 2.3 1.9 2.4 1.8 2.0 3.1   Notes   call    call Call  1x boost   call     Date 11/16 11/22 11/29 12/06 12/14 12/20 12/27 1/3/24 1/10/24 1/17/24 1/24 1/31/24    Total Weekly Dose 25 mg 25 mg 25 mg 25 mg 25 mg 25 mg 25mg 22.5  mg  25 mg  25 mg  25 mg  25 mg    INR 2.4 2.3 2.5 2.6 2.6 2.5 3.6 2.4 2.6 2.3 2.1 2.3    Notes   Call  Rec'd 12/07  Call   1x red   Call    call     Phone Interview:  Tablet Strength: 5 mg (1/5/22)  Patient Contact Info: [phone] (home) preferred; [phone] (cell)  Verbal Release Auth: signed 5/25/21 -- May speak w/Femi Ngo, ; May leave voicemail on home phone  Lab Contact Info: [facility]  *Will call once monthly or if INR out of range*      Patient Findings  Comments: Patient not contacted during this encounter.       Plan:  INR is therapeutic today at 2.3 (goal 2.0-3.0).  Ms. Ngo to continue warfarin 2.5 mg oral daily except  5 mg SunTuesThurs until recheck (25 mg/week).  Repeat INR in one week, 2/7/24  Marianela Ngo understands the importance of calling the [facility] if she notices any s/sx of bleeding, stroke, or abnormal bruising, if any changes are made to her medications or medication doses (Rx, OTC, herbal), or if any upcoming procedures are scheduled. Marianela Ngo will likewise let us know if any other changes, questions, or concerns arise regarding anticoagulation therapy. she understands the importance of seeking medical attention immediately if she experiences any falls, vehicle accidents, or abnormal bleeding or bruising. Marianela Ngo voiced understanding of this information and confirms that she has the [facility]'s contact information. Otherwise, we will plan to contact the patient once monthly or if her INR is out of range.      Chicho Shine, Pharmacy Technician  1/31/2024  12:24 EST    Radha HAQUE, PharmD, have reviewed the note in full and agree with the assessment and plan.  01/31/24  14:39 EST

## 2024-02-07 ENCOUNTER — ANTICOAGULATION VISIT (OUTPATIENT)
Dept: PHARMACY | Facility: HOSPITAL | Age: 79
End: 2024-02-07
Payer: MEDICARE

## 2024-02-07 DIAGNOSIS — I48.0 PAROXYSMAL ATRIAL FIBRILLATION: Primary | ICD-10-CM

## 2024-02-07 LAB — INR PPP: 2.8

## 2024-02-07 NOTE — PROGRESS NOTES
Anticoagulation Clinic - Remote Progress Note  mdINR Home Monitor  Testing frequency: weekly    Indication: paroxysmal afib  Referring Provider: Susan [next 10/25/23]  Initial Warfarin Start Date: 2020? 2019?  Goal INR: 2.0-3.0  Current Drug Interactions: hydrochlorothiazide   [id number]: 4 (Age ?74, Sex, HTN) [estimated 5/20/21]  Bleed Risk: self reports negative history for GI bleed  Other: DOAC too expensive    Diet: green beans, peas, zucchini, or priyanka salads 2-3x/week (7/6/2023)  Alcohol: none  Tobacco: none  OTC Pain Medication: APAP only    INR History:  Date 5/14 5/18 5/25 6/1 6/15 7/6 7/12 7/22 7/28 8/4 8/11 8/18 8/23 8/27   Total WeeklyDose  22.5mg 25mg 25mg 25mg 25mg 25mg 25mg 25mg 25mg 25mg 27.5mg 27.5mg 27.5mg   INR 3.5 1.9 2.3 2.3 2.8 2.7 2.1 2.2 1.9 2.0 1.8 2.3 2.2 1.8   Notes [place] Cards 1x hold; incr GLV     HM  incr GLV  no GLV incr GLV cefdinir cefdinir     Date 9/1 9/8 9/15 9/22 9/29 10/6 10/13 10/20 10/27 11/3 11/10 11/17 11/24 12/1   Total WeeklyDose 27.5mg 27.5mg 27.5mg 27.5mg 27.5mg 27.5mg 27.5mg 30mg 27.5mg 27.5mg 27.5mg 25mg 27.5mg 27.5mg   INR 2.1 2.3 3.3 2.3 2.2 2.0 1.9 2.6 3.2 2.4 3.4 1.6 2.4 2.3   Notes cefdinir  decr GLV?  keflex   1x incr dose   decr GLV? 1x decr dose;   incr GLV?       Date 12/9 12/15 12/22 12/29 1/5/22 1/12 1/20 1/26 1/31 2/3 2/7 2/10 2/16 2/23   Total WeeklyDose 27.5mg 27.5mg 27.5mg 27.5mg 27.5mg 27.5mg 27.5mg 27.5mg 27.5mg 25mg 25mg 22.5mg 27.5mg 27.5mg   INR 2.5 2.4 2.7 2.5 2.6 2.2 2.5 2.1 3.8 2.6 4.1 2.5 2.7 3.5   Notes call    call   COVID+ dex / azith 1x hold pred 20 BID x5d  1x hold; 1x decr dose;   call call call     Date 3/2 3/9 3/16 3/23 3/30 4/5 4/13 4/20 4/27 5/4 5/11 5/18 5/25 6/1   Total WeeklyDose 25mg 27.5mg 27.5mg 27.5mg 27.5mg 27.5mg 27.5 mg 27.5mg 27.5 mg 27.5 mg 27.5 mg 27.5 mg 27.5 mg 27.5mg   INR 2.5 2.9 2.2 2.8 2.7 2.4 2.5 2.3 2.5 2.8 3.1 2.6 2.5 2.3   Notes 1x decr  call call    call    call Call  apap rec 5/19       Date 6/8 6/15  6/22 6/29 7/6 7/14 7/20 7/27 8/3 8/10 8/17 8/25 8/31 9/7   Total Weekly Dose 27.5 mg 27.5 mg 27.5 mg 27.5 mg 27.5 mg 27.5 mg 27.5 mg 30 mg 27.5 mg 27.5mg 27.5 mg 27.5 mg 27.5 mg 27.5 mg   INR 2.5 2.5 2.7 2.5 2.9 2.4 1.8 2.7 2.8 2.8 2.8 3.1 2.6 3.5   Notes call    call  call 12/71x boost   call Call  Dec GLV  call     Date 9/14 9/21 9/28 10/5 10/12 10/19 10/26 11/2 11/9 11/16 11/23 11/30 12/7   Total WeeklyDose 27.5mg 25 mg 25 mg 25 mg 25 mg  25 mg 25 mg 25 mg 25 mg 25 mg 25 mg 25 mg 25 mg   INR 3.1 2.9 2.5 2.9 2.2 2.4 2.3 2.1 2.3 2.3 2.1 2.2 1.9   Notes 1x hold inc GLV call   Call     call    Call  INC GLV     Date 12/14 12/22 12/28 1/4/23 1/11 1/18 1/25 2/1 2/8 2/16/23 2/22 3/1/23   Total WeeklyDose 27.5 mg 25 mg  20 mg 27.5 mg 27.5 mg 27.5 mg 27.5 mg 27.5 mg 27.5 mg 27.5 mg 27.5mg 27.5 mg    INR 2.8 2.1 1.9 1.9 1.8 2.1 2.2 2.0 2.7 2.5 2.6 2.8   Notes 1x boost  rec 12/15 Call  Call 1x miss call call Call  Call    Call        Date 3/9 3/16 3/22 3/29 4/5 4/12 4/19 4/26 5/3 5/10/23 5/17 5/24   Total WeeklyDose 27.5 mg 27.5 mg 27.5 mg 25mg 27.5 mg 27.5 mg 27.5 mg 27.5 mg 27.5 mg  27.5 mg  27.5 mg 27.5 mg   INR 2.6  2.6 4.1 2.7 2.6 2.7 2.9 2.7 2.5 2.8 2.0 2.4   Notes  call call call call    call        Date 5/31 6/7/23 6/14 6/21/23 6/28 7/5 7/13 7/19/23 7/26/23 8/2 08/10 8/16/23   Total WeeklyDose 27.5 mg 27.5 mg  27.5 mg 27.5 mg 27.5 mg 25mg 25 mg 25 mg 25 mg 25 mg 25 mg 25 mg   INR 2.8 2.5 3.2 2.9 3.8 3.2 2.9 2.3 2.6 2.9 2.8 2.9   Notes call  Call  Dec GLV   Hold x1; moving; call     call      Date 08/23 8/30/23 9/8 9/14 09/21 09/28 10/05 10/13/23 10/19 10/26 11/3 11/9   Total WeeklyDose 25 mg 25 mg 25 mg 25 mg 25 mg 25 mg 25 mg 25 mg 27.5 mg 25 mg  27.5 mg 27.5 mg   INR 2.9 2.0 2.4 2.4 2.7 2.8 2.3 1.9 2.4 1.8 2.0 3.1   Notes   call    call Call  1x boost   call     Date 11/16 11/22 11/29 12/06 12/14 12/20 12/27 1/3/24 1/10/24 1/17/24 1/24 1/31/24 2/7/24   Total Weekly Dose 25 mg 25 mg 25 mg 25 mg 25 mg 25 mg 25mg  22.5  mg 25 mg  25 mg  25 mg  25 mg 25 mg   INR 2.4 2.3 2.5 2.6 2.6 2.5 3.6 2.4 2.6 2.3 2.1 2.3 2.8   Notes   Call  Rec'd 12/07  Call   1x red   Call         Date               Total WeeklyDose               INR               Notes call                Phone Interview:  Tablet Strength: 5 mg (1/5/22)  Patient Contact Info: [phone] (home) preferred; [phone] (cell)  Verbal Release Auth: signed 5/25/21 -- May speak w/Femi Ngo, ; May leave voicemail on home phone  Lab Contact Info: [facility]  *Will call once monthly or if INR out of range*      Patient Findings  Comments: Patient not contacted during this encounter.       Plan:  INR is therapeutic today at 2.8 (goal 2.0-3.0).  Ms. Ngo will continue warfarin 2.5 mg oral daily except  5 mg SunTuesThurs until recheck (25 mg/week).  Repeat INR in one week, 2/14/24  Marianela Ngo understands the importance of calling the [facility] if she notices any s/sx of bleeding, stroke, or abnormal bruising, if any changes are made to her medications or medication doses (Rx, OTC, herbal), or if any upcoming procedures are scheduled. Marianela Ngo will likewise let us know if any other changes, questions, or concerns arise regarding anticoagulation therapy. she understands the importance of seeking medical attention immediately if she experiences any falls, vehicle accidents, or abnormal bleeding or bruising. Marianela Ngo voiced understanding of this information and confirms that she has the [facility]'s contact information. Otherwise, we will plan to contact the patient once monthly or if her INR is out of range.      Chicho Shine, Pharmacy Technician  2/7/2024  15:20 Alisia MORAES, [facility], have reviewed the note in full and agree with the assessment and plan.  02/07/24  16:02 EST

## 2024-02-09 ENCOUNTER — OFFICE VISIT (OUTPATIENT)
Dept: FAMILY MEDICINE CLINIC | Facility: CLINIC | Age: 79
End: 2024-02-09
Payer: MEDICARE

## 2024-02-09 VITALS
OXYGEN SATURATION: 98 % | RESPIRATION RATE: 18 BRPM | BODY MASS INDEX: 30.08 KG/M2 | HEART RATE: 70 BPM | HEIGHT: 65 IN | SYSTOLIC BLOOD PRESSURE: 130 MMHG | DIASTOLIC BLOOD PRESSURE: 82 MMHG | WEIGHT: 180.56 LBS

## 2024-02-09 DIAGNOSIS — R07.81 RIB PAIN ON RIGHT SIDE: Primary | ICD-10-CM

## 2024-02-09 DIAGNOSIS — B37.2 CANDIDAL SKIN INFECTION: ICD-10-CM

## 2024-02-09 PROCEDURE — 1159F MED LIST DOCD IN RCRD: CPT | Performed by: NURSE PRACTITIONER

## 2024-02-09 PROCEDURE — 3079F DIAST BP 80-89 MM HG: CPT | Performed by: NURSE PRACTITIONER

## 2024-02-09 PROCEDURE — 3075F SYST BP GE 130 - 139MM HG: CPT | Performed by: NURSE PRACTITIONER

## 2024-02-09 PROCEDURE — 99214 OFFICE O/P EST MOD 30 MIN: CPT | Performed by: NURSE PRACTITIONER

## 2024-02-09 PROCEDURE — 1160F RVW MEDS BY RX/DR IN RCRD: CPT | Performed by: NURSE PRACTITIONER

## 2024-02-09 RX ORDER — NYSTATIN 100000 U/G
1 CREAM TOPICAL 2 TIMES DAILY
Qty: 30 G | Refills: 3 | Status: SHIPPED | OUTPATIENT
Start: 2024-02-09

## 2024-02-09 NOTE — PROGRESS NOTES
"Chief Complaint  Rib Injury and Fall (Patient fell last Thursday. She was walking down the driveway and she tripped over her Grandson. She has had right sided rib pain since. It is difficult for her to breathe deep and cough. )    Subjective          Marianela Ngo presents to [facility]  History of Present Illness  Pt tripped and fell last week and landed on her right ribs. She has had pain since then. She has had redness/irritation under her breasts for the past few weeks. She has had recurrent problems with this for years.   Rib Injury  Pertinent negatives include no arthralgias, chest pain, fatigue, fever or neck pain.   Fall  Pertinent negatives include no fever.       Objective   Vital Signs:   /82 (BP Location: Left arm, Patient Position: Sitting, Cuff Size: Adult)   Pulse 70   Resp 18   Ht 165.1 cm (65\")   Wt 81.9 kg (180 lb 9 oz)   SpO2 98%   BMI 30.05 kg/m²     Body mass index is 30.05 kg/m².    Review of Systems   Constitutional:  Negative for fatigue and fever.   Respiratory:  Negative for shortness of breath.    Cardiovascular:  Negative for chest pain, palpitations and leg swelling.   Musculoskeletal:  Negative for arthralgias, back pain and neck pain.   Neurological:  Negative for syncope.   Psychiatric/Behavioral:  The patient is not nervous/anxious.           Current Outpatient Medications:     albuterol (PROVENTIL) (2.5 MG/3ML) 0.083% nebulizer solution, Use one vial in nebulizer every 4 to 6 hours as needed, Disp: 180 each, Rfl: 5    amLODIPine (NORVASC) 5 MG tablet, Take 1 tablet by mouth Daily., Disp: 90 tablet, Rfl: 3    atorvastatin (LIPITOR) 20 MG tablet, Take 1 tablet by mouth Daily., Disp: 90 tablet, Rfl: 3    budesonide (PULMICORT) 0.5 MG/2ML nebulizer solution, Use one vial in nebulizer daily- rinse mouth after use, Disp: 30 each, Rfl: 5    lisinopril-hydrochlorothiazide (PRINZIDE,ZESTORETIC) 20-12.5 MG per tablet, Take 1 tablet by mouth Daily., " Disp: 90 tablet, Rfl: 3    sotalol (BETAPACE) 80 MG tablet, Take 1/2 in am and 1 whole tab in the pm, Disp: 180 tablet, Rfl: 3    warfarin (COUMADIN) 5 MG tablet, Take 1 tablet by mouth Daily., Disp: 180 tablet, Rfl: 3    nystatin (MYCOSTATIN) [id number] UNIT/GM cream, Apply 1 Application topically to the appropriate area as directed 2 (Two) Times a Day., Disp: 30 g, Rfl: 3      Allergies: Patient has no known allergies.    Physical Exam  Constitutional:       Appearance: Normal appearance.   HENT:      Head: Normocephalic.   Eyes:      Conjunctiva/sclera: Conjunctivae normal.      Pupils: Pupils are equal, round, and reactive to light.   Cardiovascular:      Rate and Rhythm: Normal rate and regular rhythm.      Heart sounds: Normal heart sounds.   Pulmonary:      Effort: Pulmonary effort is normal.      Breath sounds: Normal breath sounds.   Abdominal:      Tenderness: There is no abdominal tenderness.   Musculoskeletal:         General: Normal range of motion.      Comments: Right rib pain   Skin:     General: Skin is warm and dry.      Capillary Refill: Capillary refill takes less than 2 seconds.      Comments: Erythema under breasts   Neurological:      General: No focal deficit present.      Mental Status: She is alert and oriented to person, place, and time.   Psychiatric:         Mood and Affect: Mood normal.         Behavior: Behavior normal.         Thought Content: Thought content normal.         Judgment: Judgment normal.          Result Review :                   Assessment and Plan    Diagnoses and all orders for this visit:    1. Rib pain on right side (Primary)  Comments:  XRs done. Apply ice to painful areas. Tylenol PRN pain. RTC if not improving in 1 week.  Orders:  -     XR Ribs Right With PA Chest; Future    2. Candidal skin infection  Comments:  Nystatin as directed. Keep area clean and dry. RTC if not improving in 1 week.  Orders:  -     nystatin (MYCOSTATIN) [id number] UNIT/GM cream; Apply 1  Application topically to the appropriate area as directed 2 (Two) Times a Day.  Dispense: 30 g; Refill: 3                Follow Up   Return in about 1 week (around 2/16/2024) for if not improving or sooner if symptoms worsen.  Patient was given instructions and counseling regarding her condition or for health maintenance advice. Please see specific information pulled into the AVS if appropriate.     TIFFANI Parekh

## 2024-02-12 ENCOUNTER — TELEPHONE (OUTPATIENT)
Dept: FAMILY MEDICINE CLINIC | Facility: CLINIC | Age: 79
End: 2024-02-12
Payer: MEDICARE

## 2024-02-15 ENCOUNTER — ANTICOAGULATION VISIT (OUTPATIENT)
Dept: PHARMACY | Facility: HOSPITAL | Age: 79
End: 2024-02-15
Payer: MEDICARE

## 2024-02-15 DIAGNOSIS — I48.0 PAROXYSMAL ATRIAL FIBRILLATION: Primary | ICD-10-CM

## 2024-02-15 LAB — INR PPP: 2.7

## 2024-02-22 ENCOUNTER — ANTICOAGULATION VISIT (OUTPATIENT)
Dept: PHARMACY | Facility: HOSPITAL | Age: 79
End: 2024-02-22
Payer: MEDICARE

## 2024-02-22 DIAGNOSIS — I48.0 PAROXYSMAL ATRIAL FIBRILLATION: Primary | ICD-10-CM

## 2024-02-22 LAB — INR PPP: 2.8

## 2024-02-22 NOTE — PROGRESS NOTES
Anticoagulation Clinic - Remote Progress Note  mdINR Home Monitor  Testing frequency: Weekly    Indication: Paroxysmal afib  Referring Provider: Susan [Last 10/25/23]  Initial Warfarin Start Date: 2020? 2019?  Goal INR: 2.0-3.0  Current Drug Interactions: hydrochlorothiazide   [id number]: 4 (Age ?74, Sex, HTN) [estimated 5/20/21]  Bleed Risk: self reports negative history for GI bleed  Other: DOAC too expensive    Diet: Green beans, peas, zucchini, or priyanka salads 2-3x/week (7/6/2023)  Alcohol: none  Tobacco: none  OTC Pain Medication: APAP only    INR History:  Date 5/14 5/18 5/25 6/1 6/15 7/6 7/12 7/22 7/28 8/4 8/11 8/18 8/23 8/27   Total WeeklyDose  22.5mg 25mg 25mg 25mg 25mg 25mg 25mg 25mg 25mg 25mg 27.5mg 27.5mg 27.5mg   INR 3.5 1.9 2.3 2.3 2.8 2.7 2.1 2.2 1.9 2.0 1.8 2.3 2.2 1.8   Notes [place] Cards 1x hold; incr GLV     HM  incr GLV  no GLV incr GLV cefdinir cefdinir     Date 9/1 9/8 9/15 9/22 9/29 10/6 10/13 10/20 10/27 11/3 11/10 11/17 11/24 12/1   Total WeeklyDose 27.5mg 27.5mg 27.5mg 27.5mg 27.5mg 27.5mg 27.5mg 30mg 27.5mg 27.5mg 27.5mg 25mg 27.5mg 27.5mg   INR 2.1 2.3 3.3 2.3 2.2 2.0 1.9 2.6 3.2 2.4 3.4 1.6 2.4 2.3   Notes cefdinir  decr GLV?  keflex   1x incr dose   decr GLV? 1x decr dose;   incr GLV?       Date 12/9 12/15 12/22 12/29 1/5/22 1/12 1/20 1/26 1/31 2/3 2/7 2/10 2/16 2/23   Total WeeklyDose 27.5mg 27.5mg 27.5mg 27.5mg 27.5mg 27.5mg 27.5mg 27.5mg 27.5mg 25mg 25mg 22.5mg 27.5mg 27.5mg   INR 2.5 2.4 2.7 2.5 2.6 2.2 2.5 2.1 3.8 2.6 4.1 2.5 2.7 3.5   Notes call    call   COVID+ dex / azith 1x hold pred 20 BID x5d  1x hold; 1x decr dose;   call call call     Date 3/2 3/9 3/16 3/23 3/30 4/5 4/13 4/20 4/27 5/4 5/11 5/18 5/25 6/1   Total WeeklyDose 25mg 27.5mg 27.5mg 27.5mg 27.5mg 27.5mg 27.5 mg 27.5mg 27.5 mg 27.5 mg 27.5 mg 27.5 mg 27.5 mg 27.5mg   INR 2.5 2.9 2.2 2.8 2.7 2.4 2.5 2.3 2.5 2.8 3.1 2.6 2.5 2.3   Notes 1x decr  call call    call    call Call  apap rec 5/19       Date 6/8 6/15  6/22 6/29 7/6 7/14 7/20 7/27 8/3 8/10 8/17 8/25 8/31 9/7   Total Weekly Dose 27.5 mg 27.5 mg 27.5 mg 27.5 mg 27.5 mg 27.5 mg 27.5 mg 30 mg 27.5 mg 27.5mg 27.5 mg 27.5 mg 27.5 mg 27.5 mg   INR 2.5 2.5 2.7 2.5 2.9 2.4 1.8 2.7 2.8 2.8 2.8 3.1 2.6 3.5   Notes call    call  call 12/71x boost   call Call  Dec GLV  call     Date 9/14 9/21 9/28 10/5 10/12 10/19 10/26 11/2 11/9 11/16 11/23 11/30 12/7   Total WeeklyDose 27.5mg 25 mg 25 mg 25 mg 25 mg  25 mg 25 mg 25 mg 25 mg 25 mg 25 mg 25 mg 25 mg   INR 3.1 2.9 2.5 2.9 2.2 2.4 2.3 2.1 2.3 2.3 2.1 2.2 1.9   Notes 1x hold inc GLV call   Call     call    Call  INC GLV     Date 12/14 12/22 12/28 1/4/23 1/11 1/18 1/25 2/1 2/8 2/16/23 2/22 3/1/23   Total WeeklyDose 27.5 mg 25 mg  20 mg 27.5 mg 27.5 mg 27.5 mg 27.5 mg 27.5 mg 27.5 mg 27.5 mg 27.5mg 27.5 mg    INR 2.8 2.1 1.9 1.9 1.8 2.1 2.2 2.0 2.7 2.5 2.6 2.8   Notes 1x boost  rec 12/15 Call  Call 1x miss call call Call  Call    Call        Date 3/9 3/16 3/22 3/29 4/5 4/12 4/19 4/26 5/3 5/10/23 5/17 5/24   Total WeeklyDose 27.5 mg 27.5 mg 27.5 mg 25mg 27.5 mg 27.5 mg 27.5 mg 27.5 mg 27.5 mg  27.5 mg  27.5 mg 27.5 mg   INR 2.6  2.6 4.1 2.7 2.6 2.7 2.9 2.7 2.5 2.8 2.0 2.4   Notes  call call call call    call        Date 5/31 6/7/23 6/14 6/21/23 6/28 7/5 7/13 7/19/23 7/26/23 8/2 08/10 8/16/23   Total WeeklyDose 27.5 mg 27.5 mg  27.5 mg 27.5 mg 27.5 mg 25mg 25 mg 25 mg 25 mg 25 mg 25 mg 25 mg   INR 2.8 2.5 3.2 2.9 3.8 3.2 2.9 2.3 2.6 2.9 2.8 2.9   Notes call  Call  Dec GLV   Hold x1; moving; call     call      Date 08/23 8/30/23 9/8 9/14 09/21 09/28 10/05 10/13/23 10/19 10/26 11/3 11/9   Total WeeklyDose 25 mg 25 mg 25 mg 25 mg 25 mg 25 mg 25 mg 25 mg 27.5 mg 25 mg  27.5 mg 27.5 mg   INR 2.9 2.0 2.4 2.4 2.7 2.8 2.3 1.9 2.4 1.8 2.0 3.1   Notes   call    call Call  1x boost   call     Date 11/16 11/22 11/29 12/06 12/14 12/20 12/27 1/3/24 1/10/24 1/17/24 1/24 1/31/24 2/7/24   Total Weekly Dose 25 mg 25 mg 25 mg 25 mg 25 mg 25 mg 25mg  22.5  mg 25 mg  25 mg  25 mg  25 mg 25 mg   INR 2.4 2.3 2.5 2.6 2.6 2.5 3.6 2.4 2.6 2.3 2.1 2.3 2.8   Notes   Call  Rec'd 12/07  Call   1x red   Call         Date 2/15 2/22             Total WeeklyDose 25 mg  25 mg             INR 2.7 2.8             Notes call                Phone Interview:  Tablet Strength: 5 mg (1/5/22)  Patient Contact Info: [phone] (home) preferred; [phone] (cell)  Verbal Release Auth: signed 5/25/21 -- May speak w/Femi Ngo, ; May leave voicemail on home phone  Lab Contact Info: [facility]  *Will call once monthly or if INR out of range*      Patient Findings    Comments: Patient not contacted during this encounter.             Plan:  INR is therapeutic today at 2.8 (goal 2.0-3.0). Instructed Ms. Ngo to continue warfarin 2.5 mg oral daily except  5 mg SunTuesThurs until recheck (25 mg/week).  Repeat INR in one week, 2/29/24  Marianela Ngo understands the importance of calling the [facility] if she notices any s/sx of bleeding, stroke, or abnormal bruising, if any changes are made to her medications or medication doses (Rx, OTC, herbal), or if any upcoming procedures are scheduled. Marianela Ngo will likewise let us know if any other changes, questions, or concerns arise regarding anticoagulation therapy. she understands the importance of seeking medical attention immediately if she experiences any falls, vehicle accidents, or abnormal bleeding or bruising. Marianela Ngo voiced understanding of this information and confirms that she has the [facility]'s contact information. Otherwise, we will plan to contact the patient once monthly or if her INR is out of range.      Mane Jaime   [facility]  2/22/2024 09:38 EST    I, Stefanie Harrison [facility], have reviewed the note in full and agree with the assessment and plan.  02/22/24  10:42 EST

## 2024-02-29 ENCOUNTER — ANTICOAGULATION VISIT (OUTPATIENT)
Dept: PHARMACY | Facility: HOSPITAL | Age: 79
End: 2024-02-29
Payer: MEDICARE

## 2024-02-29 DIAGNOSIS — I48.0 PAROXYSMAL ATRIAL FIBRILLATION: Primary | ICD-10-CM

## 2024-02-29 LAB — INR PPP: 2.5

## 2024-02-29 NOTE — PROGRESS NOTES
Anticoagulation Clinic - Remote Progress Note  mdINR Home Monitor  Testing frequency: Weekly    Indication: Paroxysmal afib  Referring Provider: Susan [Last 10/25/23]  Initial Warfarin Start Date: 2020? 2019?  Goal INR: 2.0-3.0  Current Drug Interactions: hydrochlorothiazide   [id number]: 4 (Age ?74, Sex, HTN) [estimated 5/20/21]  Bleed Risk: self reports negative history for GI bleed  Other: DOAC too expensive    Diet: Green beans, peas, zucchini, or priyanka salads 2-3x/week (7/6/2023)  Alcohol: none  Tobacco: none  OTC Pain Medication: APAP only    INR History:  Date 5/14 5/18 5/25 6/1 6/15 7/6 7/12 7/22 7/28 8/4 8/11 8/18 8/23 8/27   Total WeeklyDose  22.5mg 25mg 25mg 25mg 25mg 25mg 25mg 25mg 25mg 25mg 27.5mg 27.5mg 27.5mg   INR 3.5 1.9 2.3 2.3 2.8 2.7 2.1 2.2 1.9 2.0 1.8 2.3 2.2 1.8   Notes [place] Cards 1x hold; incr GLV     HM  incr GLV  no GLV incr GLV cefdinir cefdinir     Date 9/1 9/8 9/15 9/22 9/29 10/6 10/13 10/20 10/27 11/3 11/10 11/17 11/24 12/1   Total WeeklyDose 27.5mg 27.5mg 27.5mg 27.5mg 27.5mg 27.5mg 27.5mg 30mg 27.5mg 27.5mg 27.5mg 25mg 27.5mg 27.5mg   INR 2.1 2.3 3.3 2.3 2.2 2.0 1.9 2.6 3.2 2.4 3.4 1.6 2.4 2.3   Notes cefdinir  decr GLV?  keflex   1x incr dose   decr GLV? 1x decr dose;   incr GLV?       Date 12/9 12/15 12/22 12/29 1/5/22 1/12 1/20 1/26 1/31 2/3 2/7 2/10 2/16 2/23   Total WeeklyDose 27.5mg 27.5mg 27.5mg 27.5mg 27.5mg 27.5mg 27.5mg 27.5mg 27.5mg 25mg 25mg 22.5mg 27.5mg 27.5mg   INR 2.5 2.4 2.7 2.5 2.6 2.2 2.5 2.1 3.8 2.6 4.1 2.5 2.7 3.5   Notes call    call   COVID+ dex / azith 1x hold pred 20 BID x5d  1x hold; 1x decr dose;   call call call     Date 3/2 3/9 3/16 3/23 3/30 4/5 4/13 4/20 4/27 5/4 5/11 5/18 5/25 6/1   Total WeeklyDose 25mg 27.5mg 27.5mg 27.5mg 27.5mg 27.5mg 27.5 mg 27.5mg 27.5 mg 27.5 mg 27.5 mg 27.5 mg 27.5 mg 27.5mg   INR 2.5 2.9 2.2 2.8 2.7 2.4 2.5 2.3 2.5 2.8 3.1 2.6 2.5 2.3   Notes 1x decr  call call    call    call Call  apap rec 5/19       Date 6/8 6/15  6/22 6/29 7/6 7/14 7/20 7/27 8/3 8/10 8/17 8/25 8/31 9/7   Total Weekly Dose 27.5 mg 27.5 mg 27.5 mg 27.5 mg 27.5 mg 27.5 mg 27.5 mg 30 mg 27.5 mg 27.5mg 27.5 mg 27.5 mg 27.5 mg 27.5 mg   INR 2.5 2.5 2.7 2.5 2.9 2.4 1.8 2.7 2.8 2.8 2.8 3.1 2.6 3.5   Notes call    call  call 12/71x boost   call Call  Dec GLV  call     Date 9/14 9/21 9/28 10/5 10/12 10/19 10/26 11/2 11/9 11/16 11/23 11/30 12/7   Total WeeklyDose 27.5mg 25 mg 25 mg 25 mg 25 mg  25 mg 25 mg 25 mg 25 mg 25 mg 25 mg 25 mg 25 mg   INR 3.1 2.9 2.5 2.9 2.2 2.4 2.3 2.1 2.3 2.3 2.1 2.2 1.9   Notes 1x hold inc GLV call   Call     call    Call  INC GLV     Date 12/14 12/22 12/28 1/4/23 1/11 1/18 1/25 2/1 2/8 2/16/23 2/22 3/1/23   Total WeeklyDose 27.5 mg 25 mg  20 mg 27.5 mg 27.5 mg 27.5 mg 27.5 mg 27.5 mg 27.5 mg 27.5 mg 27.5mg 27.5 mg    INR 2.8 2.1 1.9 1.9 1.8 2.1 2.2 2.0 2.7 2.5 2.6 2.8   Notes 1x boost  rec 12/15 Call  Call 1x miss call call Call  Call    Call        Date 3/9 3/16 3/22 3/29 4/5 4/12 4/19 4/26 5/3 5/10/23 5/17 5/24   Total WeeklyDose 27.5 mg 27.5 mg 27.5 mg 25mg 27.5 mg 27.5 mg 27.5 mg 27.5 mg 27.5 mg  27.5 mg  27.5 mg 27.5 mg   INR 2.6  2.6 4.1 2.7 2.6 2.7 2.9 2.7 2.5 2.8 2.0 2.4   Notes  call call call call    call        Date 5/31 6/7/23 6/14 6/21/23 6/28 7/5 7/13 7/19/23 7/26/23 8/2 08/10 8/16/23   Total WeeklyDose 27.5 mg 27.5 mg  27.5 mg 27.5 mg 27.5 mg 25mg 25 mg 25 mg 25 mg 25 mg 25 mg 25 mg   INR 2.8 2.5 3.2 2.9 3.8 3.2 2.9 2.3 2.6 2.9 2.8 2.9   Notes call  Call  Dec GLV   Hold x1; moving; call     call      Date 08/23 8/30/23 9/8 9/14 09/21 09/28 10/05 10/13/23 10/19 10/26 11/3 11/9   Total WeeklyDose 25 mg 25 mg 25 mg 25 mg 25 mg 25 mg 25 mg 25 mg 27.5 mg 25 mg  27.5 mg 27.5 mg   INR 2.9 2.0 2.4 2.4 2.7 2.8 2.3 1.9 2.4 1.8 2.0 3.1   Notes   call    call Call  1x boost   call     Date 11/16 11/22 11/29 12/06 12/14 12/20 12/27 1/3/24 1/10/24 1/17/24 1/24 1/31/24 2/7/24   Total Weekly Dose 25 mg 25 mg 25 mg 25 mg 25 mg 25 mg 25mg  22.5  mg 25 mg  25 mg  25 mg  25 mg 25 mg   INR 2.4 2.3 2.5 2.6 2.6 2.5 3.6 2.4 2.6 2.3 2.1 2.3 2.8   Notes   Call  Rec'd 12/07  Call   1x red   Call         Date 2/15 2/22 2/29/24            Total WeeklyDose 25 mg  25 mg 25 mg            INR 2.7 2.8 2.5            Notes call                Phone Interview:  Tablet Strength: 5 mg (1/5/22)  Patient Contact Info: [phone] (home) preferred; [phone] (cell)  Verbal Release Auth: signed 5/25/21 -- May speak w/Femi Ngo, ; May leave voicemail on home phone  Lab Contact Info: [facility]  *Will call once monthly or if INR out of range*      Patient Findings  Comments: Patient not contacted during this encounter.       Plan:  INR is therapeutic today at 2.5 (goal 2.0-3.0). Ms. Ngo will continue warfarin 2.5 mg oral daily except  5 mg SunTuesThurs until recheck (25 mg/week).  Repeat INR in one week, 3/7/24  Marianela Ngo understands the importance of calling the [facility] if she notices any s/sx of bleeding, stroke, or abnormal bruising, if any changes are made to her medications or medication doses (Rx, OTC, herbal), or if any upcoming procedures are scheduled. Marianela Ngo will likewise let us know if any other changes, questions, or concerns arise regarding anticoagulation therapy. she understands the importance of seeking medical attention immediately if she experiences any falls, vehicle accidents, or abnormal bleeding or bruising. Marianela Ngo voiced understanding of this information and confirms that she has the [facility]'s contact information. Otherwise, we will plan to contact the patient once monthly or if her INR is out of range.      Chicho Shine, Pharmacy Technician  2/29/2024  15:50 Stefanie MORAES, [facility], have reviewed the note in full and agree with the assessment and plan.  02/29/24  16:02 EST

## 2024-03-07 ENCOUNTER — ANTICOAGULATION VISIT (OUTPATIENT)
Dept: PHARMACY | Facility: HOSPITAL | Age: 79
End: 2024-03-07
Payer: MEDICARE

## 2024-03-07 DIAGNOSIS — I48.0 PAROXYSMAL ATRIAL FIBRILLATION: Primary | ICD-10-CM

## 2024-03-07 LAB — INR PPP: 2.5

## 2024-03-07 NOTE — PROGRESS NOTES
Anticoagulation Clinic - Remote Progress Note  mdINR Home Monitor  Testing frequency: Weekly    Indication: Paroxysmal afib  Referring Provider: Susan [Last 10/25/23]  Initial Warfarin Start Date: 2020? 2019?  Goal INR: 2.0-3.0  Current Drug Interactions: hydrochlorothiazide   [id number]: 4 (Age ?74, Sex, HTN) [estimated 5/20/21]  Bleed Risk: self reports negative history for GI bleed  Other: DOAC too expensive    Diet: Green beans, peas, zucchini, or priyanka salads 2-3x/week (7/6/2023)  Alcohol: none  Tobacco: none  OTC Pain Medication: APAP only    INR History:  Date 5/14 5/18 5/25 6/1 6/15 7/6 7/12 7/22 7/28 8/4 8/11 8/18 8/23 8/27   Total WeeklyDose  22.5mg 25mg 25mg 25mg 25mg 25mg 25mg 25mg 25mg 25mg 27.5mg 27.5mg 27.5mg   INR 3.5 1.9 2.3 2.3 2.8 2.7 2.1 2.2 1.9 2.0 1.8 2.3 2.2 1.8   Notes [facility] Cards 1x hold; incr GLV     HM  incr GLV  no GLV incr GLV cefdinir cefdinir     Date 9/1 9/8 9/15 9/22 9/29 10/6 10/13 10/20 10/27 11/3 11/10 11/17 11/24 12/1   Total WeeklyDose 27.5mg 27.5mg 27.5mg 27.5mg 27.5mg 27.5mg 27.5mg 30mg 27.5mg 27.5mg 27.5mg 25mg 27.5mg 27.5mg   INR 2.1 2.3 3.3 2.3 2.2 2.0 1.9 2.6 3.2 2.4 3.4 1.6 2.4 2.3   Notes cefdinir  decr GLV?  keflex   1x incr dose   decr GLV? 1x decr dose;   incr GLV?       Date 12/9 12/15 12/22 12/29 1/5/22 1/12 1/20 1/26 1/31 2/3 2/7 2/10 2/16 2/23   Total WeeklyDose 27.5mg 27.5mg 27.5mg 27.5mg 27.5mg 27.5mg 27.5mg 27.5mg 27.5mg 25mg 25mg 22.5mg 27.5mg 27.5mg   INR 2.5 2.4 2.7 2.5 2.6 2.2 2.5 2.1 3.8 2.6 4.1 2.5 2.7 3.5   Notes call    call   COVID+ dex / azith 1x hold pred 20 BID x5d  1x hold; 1x decr dose;   call call call     Date 3/2 3/9 3/16 3/23 3/30 4/5 4/13 4/20 4/27 5/4 5/11 5/18 5/25 6/1   Total WeeklyDose 25mg 27.5mg 27.5mg 27.5mg 27.5mg 27.5mg 27.5 mg 27.5mg 27.5 mg 27.5 mg 27.5 mg 27.5 mg 27.5 mg 27.5mg   INR 2.5 2.9 2.2 2.8 2.7 2.4 2.5 2.3 2.5 2.8 3.1 2.6 2.5 2.3   Notes 1x decr  call call    call    call Call  apap rec 5/19       Date 6/8 6/15  6/22 6/29 7/6 7/14 7/20 7/27 8/3 8/10 8/17 8/25 8/31 9/7   Total Weekly Dose 27.5 mg 27.5 mg 27.5 mg 27.5 mg 27.5 mg 27.5 mg 27.5 mg 30 mg 27.5 mg 27.5mg 27.5 mg 27.5 mg 27.5 mg 27.5 mg   INR 2.5 2.5 2.7 2.5 2.9 2.4 1.8 2.7 2.8 2.8 2.8 3.1 2.6 3.5   Notes call    call  call 12/71x boost   call Call  Dec GLV  call     Date 9/14 9/21 9/28 10/5 10/12 10/19 10/26 11/2 11/9 11/16 11/23 11/30 12/7   Total WeeklyDose 27.5mg 25 mg 25 mg 25 mg 25 mg  25 mg 25 mg 25 mg 25 mg 25 mg 25 mg 25 mg 25 mg   INR 3.1 2.9 2.5 2.9 2.2 2.4 2.3 2.1 2.3 2.3 2.1 2.2 1.9   Notes 1x hold inc GLV call   Call     call    Call  INC GLV     Date 12/14 12/22 12/28 1/4/23 1/11 1/18 1/25 2/1 2/8 2/16/23 2/22 3/1/23   Total WeeklyDose 27.5 mg 25 mg  20 mg 27.5 mg 27.5 mg 27.5 mg 27.5 mg 27.5 mg 27.5 mg 27.5 mg 27.5mg 27.5 mg    INR 2.8 2.1 1.9 1.9 1.8 2.1 2.2 2.0 2.7 2.5 2.6 2.8   Notes 1x boost  rec 12/15 Call  Call 1x miss call call Call  Call    Call        Date 3/9 3/16 3/22 3/29 4/5 4/12 4/19 4/26 5/3 5/10/23 5/17 5/24   Total WeeklyDose 27.5 mg 27.5 mg 27.5 mg 25mg 27.5 mg 27.5 mg 27.5 mg 27.5 mg 27.5 mg  27.5 mg  27.5 mg 27.5 mg   INR 2.6  2.6 4.1 2.7 2.6 2.7 2.9 2.7 2.5 2.8 2.0 2.4   Notes  call call call call    call        Date 5/31 6/7/23 6/14 6/21/23 6/28 7/5 7/13 7/19/23 7/26/23 8/2 08/10 8/16/23   Total WeeklyDose 27.5 mg 27.5 mg  27.5 mg 27.5 mg 27.5 mg 25mg 25 mg 25 mg 25 mg 25 mg 25 mg 25 mg   INR 2.8 2.5 3.2 2.9 3.8 3.2 2.9 2.3 2.6 2.9 2.8 2.9   Notes call  Call  Dec GLV   Hold x1; moving; call     call      Date 08/23 8/30/23 9/8 9/14 09/21 09/28 10/05 10/13/23 10/19 10/26 11/3 11/9   Total WeeklyDose 25 mg 25 mg 25 mg 25 mg 25 mg 25 mg 25 mg 25 mg 27.5 mg 25 mg  27.5 mg 27.5 mg   INR 2.9 2.0 2.4 2.4 2.7 2.8 2.3 1.9 2.4 1.8 2.0 3.1   Notes   call    call Call  1x boost   call     Date 11/16 11/22 11/29 12/06 12/14 12/20 12/27 1/3/24 1/10/24 1/17/24 1/24 1/31/24 2/7/24   Total Weekly Dose 25 mg 25 mg 25 mg 25 mg 25 mg 25 mg 25mg  22.5  mg 25 mg  25 mg  25 mg  25 mg 25 mg   INR 2.4 2.3 2.5 2.6 2.6 2.5 3.6 2.4 2.6 2.3 2.1 2.3 2.8   Notes   Call  Rec'd 12/07  Call   1x red   Call         Date 2/15 2/22 2/29/24 3/7           Total WeeklyDose 25 mg  25 mg 25 mg 25 mg           INR 2.7 2.8 2.5 2.5           Notes call    Call            Phone Interview:  Tablet Strength: 5 mg (1/5/22)  Patient Contact Info: [phone] (home) preferred; [phone] (cell)  Verbal Release Auth: signed 5/25/21 -- May speak w/Femi Ngo, ; May leave voicemail on home phone  Lab Contact Info: [facility]  *Will call once monthly or if INR out of range*    Patient Findings    Comments: Patient not contacted during this encounter.       Plan:  INR is therapeutic today at 2.5 (goal 2.0-3.0). Ms. Ngo will continue warfarin 2.5 mg oral daily except  5 mg SunTuesThurs until recheck (25 mg/week).  Repeat INR in one week, 3/14/24  Marianela Ngo understands the importance of calling the [facility] if she notices any s/sx of bleeding, stroke, or abnormal bruising, if any changes are made to her medications or medication doses (Rx, OTC, herbal), or if any upcoming procedures are scheduled. Marianela Ngo will likewise let us know if any other changes, questions, or concerns arise regarding anticoagulation therapy. she understands the importance of seeking medical attention immediately if she experiences any falls, vehicle accidents, or abnormal bleeding or bruising. Marianela Ngo voiced understanding of this information and confirms that she has the [facility]'s contact information. Otherwise, we will plan to contact the patient once monthly or if her INR is out of range.      Mane Jaime   [facility]  3/7/2024 09:27 EST    ICole, PharmD, have reviewed the note in full and agree with the assessment and plan.  03/07/24  12:59 EST

## 2024-03-08 ENCOUNTER — TELEPHONE (OUTPATIENT)
Dept: PHARMACY | Facility: HOSPITAL | Age: 79
End: 2024-03-08

## 2024-03-08 ENCOUNTER — OFFICE VISIT (OUTPATIENT)
Dept: FAMILY MEDICINE CLINIC | Facility: CLINIC | Age: 79
End: 2024-03-08
Payer: MEDICARE

## 2024-03-08 VITALS
DIASTOLIC BLOOD PRESSURE: 82 MMHG | SYSTOLIC BLOOD PRESSURE: 130 MMHG | HEIGHT: 65 IN | TEMPERATURE: 98.5 F | BODY MASS INDEX: 29.32 KG/M2 | WEIGHT: 176 LBS | HEART RATE: 73 BPM | OXYGEN SATURATION: 97 %

## 2024-03-08 DIAGNOSIS — R05.1 ACUTE COUGH: Primary | ICD-10-CM

## 2024-03-08 DIAGNOSIS — J43.2 CENTRILOBULAR EMPHYSEMA: ICD-10-CM

## 2024-03-08 DIAGNOSIS — U07.1 COVID-19: ICD-10-CM

## 2024-03-08 DIAGNOSIS — J40 BRONCHITIS: ICD-10-CM

## 2024-03-08 LAB
EXPIRATION DATE: ABNORMAL
EXPIRATION DATE: NORMAL
FLUAV AG NPH QL: NEGATIVE
FLUBV AG NPH QL: NEGATIVE
INTERNAL CONTROL: ABNORMAL
INTERNAL CONTROL: NORMAL
Lab: ABNORMAL
Lab: NORMAL
SARS-COV-2 AG UPPER RESP QL IA.RAPID: DETECTED

## 2024-03-08 RX ORDER — DOXYCYCLINE HYCLATE 100 MG/1
100 CAPSULE ORAL 2 TIMES DAILY
Qty: 20 CAPSULE | Refills: 0 | Status: SHIPPED | OUTPATIENT
Start: 2024-03-08

## 2024-03-08 NOTE — TELEPHONE ENCOUNTER
Patient called to inform the clinic she is starting Atridox ( Doxycylcine Hyclate ) 100 mg BID for ten days. Per Saravanan Greenberg, PharmD instructed Ms. Ngo to continue regimen, but recheck sooner on 3/11/24.    .Chicho Shine, Pharmacy Technician  3/8/2024  12:00 EST

## 2024-03-08 NOTE — PROGRESS NOTES
"Chief Complaint  Cough (Cough, runny nose, wheezing, sob x1.5 weeks)    Subjective          Marianela Ngo presents to [facility]  History of Present Illness  Pt has had cough, congestion, and wheezing x 10 days. She denies fever, chills, or myalgias.  Cough  Associated symptoms include wheezing. Pertinent negatives include no chest pain, fever or shortness of breath.       Objective   Vital Signs:   /82   Pulse 73   Temp 98.5 °F (36.9 °C) (Oral)   Ht 165.1 cm (65\")   Wt 79.8 kg (176 lb)   SpO2 97%   BMI 29.29 kg/m²     Body mass index is 29.29 kg/m².    Review of Systems   Constitutional:  Negative for fatigue and fever.   HENT:  Positive for congestion.    Respiratory:  Positive for cough and wheezing. Negative for shortness of breath.    Cardiovascular:  Negative for chest pain, palpitations and leg swelling.   Neurological:  Negative for syncope.   Psychiatric/Behavioral:  The patient is not nervous/anxious.           Current Outpatient Medications:     albuterol (PROVENTIL) (2.5 MG/3ML) 0.083% nebulizer solution, Use one vial in nebulizer every 4 to 6 hours as needed, Disp: 180 each, Rfl: 5    amLODIPine (NORVASC) 5 MG tablet, Take 1 tablet by mouth Daily., Disp: 90 tablet, Rfl: 3    atorvastatin (LIPITOR) 20 MG tablet, Take 1 tablet by mouth Daily., Disp: 90 tablet, Rfl: 3    budesonide (PULMICORT) 0.5 MG/2ML nebulizer solution, Use one vial in nebulizer daily- rinse mouth after use, Disp: 30 each, Rfl: 5    lisinopril-hydrochlorothiazide (PRINZIDE,ZESTORETIC) 20-12.5 MG per tablet, Take 1 tablet by mouth Daily., Disp: 90 tablet, Rfl: 3    nystatin (MYCOSTATIN) 677609 UNIT/GM cream, Apply 1 Application topically to the appropriate area as directed 2 (Two) Times a Day., Disp: 30 g, Rfl: 3    sotalol (BETAPACE) 80 MG tablet, Take 1/2 in am and 1 whole tab in the pm, Disp: 180 tablet, Rfl: 3    warfarin (COUMADIN) 5 MG tablet, Take 1 tablet by mouth Daily., Disp: 180 " tablet, Rfl: 3    doxycycline (VIBRAMYCIN) 100 MG capsule, Take 1 capsule by mouth 2 (Two) Times a Day., Disp: 20 capsule, Rfl: 0      Allergies: Patient has no known allergies.    Physical Exam  Constitutional:       Appearance: Normal appearance.   HENT:      Right Ear: Tympanic membrane and ear canal normal.      Left Ear: Tympanic membrane and ear canal normal.      Nose: Nose normal.      Mouth/Throat:      Pharynx: No posterior oropharyngeal erythema.   Eyes:      Extraocular Movements: Extraocular movements intact.      Conjunctiva/sclera: Conjunctivae normal.      Pupils: Pupils are equal, round, and reactive to light.   Cardiovascular:      Rate and Rhythm: Normal rate and regular rhythm.      Heart sounds: Normal heart sounds.   Pulmonary:      Effort: Pulmonary effort is normal. No accessory muscle usage.      Breath sounds: Normal breath sounds.      Comments: Mild wheezing  Lymphadenopathy:      Cervical: No cervical adenopathy.   Skin:     General: Skin is warm and dry.   Neurological:      General: No focal deficit present.      Mental Status: She is alert.   Psychiatric:         Mood and Affect: Mood normal.         Behavior: Behavior normal.         Thought Content: Thought content normal.         Judgment: Judgment normal.          Result Review :                   Assessment and Plan    Diagnoses and all orders for this visit:    1. Acute cough (Primary)  -     POC Influenza A / B  -     POCT SILVIA SARS-CoV-2 Antigen YELITZA    2. COVID-19  Comments:  Finish antibiotics. Albuterol PRN wheezing. Mucinex PRN cough. RTC if not improving in 1 week or sooner for worsened.    3. Bronchitis  Comments:  Finish antibiotics. Albuterol PRN wheezing. Mucinex PRN cough. RTC if not improving in 1 week or sooner for worsened.  Orders:  -     doxycycline (VIBRAMYCIN) 100 MG capsule; Take 1 capsule by mouth 2 (Two) Times a Day.  Dispense: 20 capsule; Refill: 0    4. Centrilobular emphysema  Comments:  Continue  nebs.                Follow Up   Return in about 1 week (around 3/15/2024) for if not improving or sooner if symptoms worsen.  Patient was given instructions and counseling regarding her condition or for health maintenance advice. Please see specific information pulled into the AVS if appropriate.     Elana Guzman, APRN

## 2024-03-11 ENCOUNTER — ANTICOAGULATION VISIT (OUTPATIENT)
Dept: PHARMACY | Facility: HOSPITAL | Age: 79
End: 2024-03-11
Payer: MEDICARE

## 2024-03-11 DIAGNOSIS — I48.0 PAROXYSMAL ATRIAL FIBRILLATION: Primary | ICD-10-CM

## 2024-03-11 LAB — INR PPP: 2.2

## 2024-03-11 NOTE — PROGRESS NOTES
Anticoagulation Clinic - Remote Progress Note  mdINR Home Monitor  Testing frequency: Weekly    Indication: Paroxysmal afib  Referring Provider: Susan [Last 10/25/23]  Initial Warfarin Start Date: 2020? 2019?  Goal INR: 2.0-3.0  Current Drug Interactions: hydrochlorothiazide   [id number]: 4 (Age ?74, Sex, HTN) [estimated 5/20/21]  Bleed Risk: self reports negative history for GI bleed  Other: DOAC too expensive    Diet: Green beans, peas, zucchini, or priyanka salads 2-3x/week (3/11/24)  Alcohol: None  Tobacco: None  OTC Pain Medication: APAP only    INR History:  Date 5/14 5/18 5/25 6/1 6/15 7/6 7/12 7/22 7/28 8/4 8/11 8/18 8/23 8/27   Total WeeklyDose  22.5mg 25mg 25mg 25mg 25mg 25mg 25mg 25mg 25mg 25mg 27.5mg 27.5mg 27.5mg   INR 3.5 1.9 2.3 2.3 2.8 2.7 2.1 2.2 1.9 2.0 1.8 2.3 2.2 1.8   Notes [place] Cards 1x hold; incr GLV     HM  incr GLV  no GLV incr GLV cefdinir cefdinir     Date 9/1 9/8 9/15 9/22 9/29 10/6 10/13 10/20 10/27 11/3 11/10 11/17 11/24 12/1   Total WeeklyDose 27.5mg 27.5mg 27.5mg 27.5mg 27.5mg 27.5mg 27.5mg 30mg 27.5mg 27.5mg 27.5mg 25mg 27.5mg 27.5mg   INR 2.1 2.3 3.3 2.3 2.2 2.0 1.9 2.6 3.2 2.4 3.4 1.6 2.4 2.3   Notes cefdinir  decr GLV?  keflex   1x incr dose   decr GLV? 1x decr dose;   incr GLV?       Date 12/9 12/15 12/22 12/29 1/5/22 1/12 1/20 1/26 1/31 2/3 2/7 2/10 2/16 2/23   Total WeeklyDose 27.5mg 27.5mg 27.5mg 27.5mg 27.5mg 27.5mg 27.5mg 27.5mg 27.5mg 25mg 25mg 22.5mg 27.5mg 27.5mg   INR 2.5 2.4 2.7 2.5 2.6 2.2 2.5 2.1 3.8 2.6 4.1 2.5 2.7 3.5   Notes call    call   COVID+ dex / azith 1x hold pred 20 BID x5d  1x hold; 1x decr dose;   call call call     Date 3/2 3/9 3/16 3/23 3/30 4/5 4/13 4/20 4/27 5/4 5/11 5/18 5/25 6/1   Total WeeklyDose 25mg 27.5mg 27.5mg 27.5mg 27.5mg 27.5mg 27.5 mg 27.5mg 27.5 mg 27.5 mg 27.5 mg 27.5 mg 27.5 mg 27.5mg   INR 2.5 2.9 2.2 2.8 2.7 2.4 2.5 2.3 2.5 2.8 3.1 2.6 2.5 2.3   Notes 1x decr  call call    call    call Call  apap rec 5/19       Date 6/8 6/15  6/22 6/29 7/6 7/14 7/20 7/27 8/3 8/10 8/17 8/25 8/31 9/7   Total Weekly Dose 27.5 mg 27.5 mg 27.5 mg 27.5 mg 27.5 mg 27.5 mg 27.5 mg 30 mg 27.5 mg 27.5mg 27.5 mg 27.5 mg 27.5 mg 27.5 mg   INR 2.5 2.5 2.7 2.5 2.9 2.4 1.8 2.7 2.8 2.8 2.8 3.1 2.6 3.5   Notes call    call  call 12/71x boost   call Call  Dec GLV  call     Date 9/14 9/21 9/28 10/5 10/12 10/19 10/26 11/2 11/9 11/16 11/23 11/30 12/7   Total WeeklyDose 27.5mg 25 mg 25 mg 25 mg 25 mg  25 mg 25 mg 25 mg 25 mg 25 mg 25 mg 25 mg 25 mg   INR 3.1 2.9 2.5 2.9 2.2 2.4 2.3 2.1 2.3 2.3 2.1 2.2 1.9   Notes 1x hold inc GLV call   Call     call    Call  INC GLV     Date 12/14 12/22 12/28 1/4/23 1/11 1/18 1/25 2/1 2/8 2/16/23 2/22 3/1/23   Total WeeklyDose 27.5 mg 25 mg  20 mg 27.5 mg 27.5 mg 27.5 mg 27.5 mg 27.5 mg 27.5 mg 27.5 mg 27.5mg 27.5 mg    INR 2.8 2.1 1.9 1.9 1.8 2.1 2.2 2.0 2.7 2.5 2.6 2.8   Notes 1x boost  rec 12/15 Call  Call 1x miss call call Call  Call    Call        Date 3/9 3/16 3/22 3/29 4/5 4/12 4/19 4/26 5/3 5/10/23 5/17 5/24   Total WeeklyDose 27.5 mg 27.5 mg 27.5 mg 25mg 27.5 mg 27.5 mg 27.5 mg 27.5 mg 27.5 mg  27.5 mg  27.5 mg 27.5 mg   INR 2.6  2.6 4.1 2.7 2.6 2.7 2.9 2.7 2.5 2.8 2.0 2.4   Notes  call call call call    call        Date 5/31 6/7/23 6/14 6/21/23 6/28 7/5 7/13 7/19/23 7/26/23 8/2 08/10 8/16/23   Total WeeklyDose 27.5 mg 27.5 mg  27.5 mg 27.5 mg 27.5 mg 25mg 25 mg 25 mg 25 mg 25 mg 25 mg 25 mg   INR 2.8 2.5 3.2 2.9 3.8 3.2 2.9 2.3 2.6 2.9 2.8 2.9   Notes call  Call  Dec GLV   Hold x1; moving; call     call      Date 08/23 8/30/23 9/8 9/14 09/21 09/28 10/05 10/13/23 10/19 10/26 11/3 11/9   Total WeeklyDose 25 mg 25 mg 25 mg 25 mg 25 mg 25 mg 25 mg 25 mg 27.5 mg 25 mg  27.5 mg 27.5 mg   INR 2.9 2.0 2.4 2.4 2.7 2.8 2.3 1.9 2.4 1.8 2.0 3.1   Notes   call    call Call  1x boost   call     Date 11/16 11/22 11/29 12/06 12/14 12/20 12/27 1/3/24 1/10/24 1/17/24 1/24 1/31/24 2/7/24   Total Weekly Dose 25 mg 25 mg 25 mg 25 mg 25 mg 25 mg 25mg  22.5  mg 25 mg  25 mg  25 mg  25 mg 25 mg   INR 2.4 2.3 2.5 2.6 2.6 2.5 3.6 2.4 2.6 2.3 2.1 2.3 2.8   Notes   Call  Rec'd 12/07  Call   1x red   Call         Date 2/15 2/22 2/29/24 3/7 3/11          Total WeeklyDose 25 mg  25 mg 25 mg 25 mg 25 mg           INR 2.7 2.8 2.5 2.5 2.2          Notes call    Call            Phone Interview:  Tablet Strength: 5 mg (1/5/22)  Patient Contact Info: [phone] (home) preferred; [phone] (cell)  Verbal Release Auth: signed 5/25/21 -- May speak w/Femi Ngo, ; May leave voicemail on home phone  Lab Contact Info: [facility]  *Will call once monthly or if INR out of range*      Patient Findings    Positives: Change in medications   Negatives: Signs/symptoms of thrombosis, Signs/symptoms of bleeding, Laboratory test error suspected, Change in health, Change in alcohol use, Change in activity, Upcoming invasive procedure, Emergency department visit, Upcoming dental procedure, Missed doses, Extra doses, Change in diet/appetite, Hospital admission, Bruising, Other complaints   Comments: Ms. Ngo started Doxycycline on 3/8 for an URTI for a 10 day course--last dose should be on Sunday, 3/17. She stated that she is feeling a lot better and has been consistent with her GLV intake. She denies all the other above listed findings.       Plan:  INR is therapeutic today at 2.2 (goal 2.0-3.0). Ms. Ngo will continue warfarin 2.5 mg oral daily except  5 mg SunTuesThurs until recheck (25 mg/week).  Repeat INR in one week, 3/18/24  Marianela Ngo understands the importance of calling the [facility] if she notices any s/sx of bleeding, stroke, or abnormal bruising, if any changes are made to her medications or medication doses (Rx, OTC, herbal), or if any upcoming procedures are scheduled. Marianela Ngo will likewise let us know if any other changes, questions, or concerns arise regarding anticoagulation therapy. she understands the importance of  seeking medical attention immediately if she experiences any falls, vehicle accidents, or abnormal bleeding or bruising. Marianela Ngo voiced understanding of this information and confirms that she has the [facility]'s contact information. Otherwise, we will plan to contact the patient once monthly or if her INR is out of range.      Alisia Cunningham, PharmD  3/11/2024   16:22 EDT

## 2024-03-18 ENCOUNTER — ANTICOAGULATION VISIT (OUTPATIENT)
Dept: PHARMACY | Facility: HOSPITAL | Age: 79
End: 2024-03-18
Payer: MEDICARE

## 2024-03-18 DIAGNOSIS — I48.0 PAROXYSMAL ATRIAL FIBRILLATION: Primary | ICD-10-CM

## 2024-03-18 LAB — INR PPP: 4

## 2024-03-18 NOTE — PROGRESS NOTES
Anticoagulation Clinic - Remote Progress Note  mdINR Home Monitor  Testing frequency: Weekly    Indication: Paroxysmal afib  Referring Provider: Susan [Last 10/25/23]  Initial Warfarin Start Date: 2020? 2019?  Goal INR: 2.0-3.0  Current Drug Interactions: hydrochlorothiazide   [id number]: 4 (Age ?74, Sex, HTN) [estimated 5/20/21]  Bleed Risk: self reports negative history for GI bleed  Other: DOAC too expensive    Diet: Green beans, peas, zucchini, or priyanka salads 2-3x/week (3/11/24)  Alcohol: None  Tobacco: None  OTC Pain Medication: APAP only    INR History:  Date 5/14 5/18 5/25 6/1 6/15 7/6 7/12 7/22 7/28 8/4 8/11 8/18 8/23 8/27   Total WeeklyDose  22.5mg 25mg 25mg 25mg 25mg 25mg 25mg 25mg 25mg 25mg 27.5mg 27.5mg 27.5mg   INR 3.5 1.9 2.3 2.3 2.8 2.7 2.1 2.2 1.9 2.0 1.8 2.3 2.2 1.8   Notes [place] Cards 1x hold; incr GLV     HM  incr GLV  no GLV incr GLV cefdinir cefdinir     Date 9/1 9/8 9/15 9/22 9/29 10/6 10/13 10/20 10/27 11/3 11/10 11/17 11/24 12/1   Total WeeklyDose 27.5mg 27.5mg 27.5mg 27.5mg 27.5mg 27.5mg 27.5mg 30mg 27.5mg 27.5mg 27.5mg 25mg 27.5mg 27.5mg   INR 2.1 2.3 3.3 2.3 2.2 2.0 1.9 2.6 3.2 2.4 3.4 1.6 2.4 2.3   Notes cefdinir  decr GLV?  keflex   1x incr dose   decr GLV? 1x decr dose;   incr GLV?       Date 12/9 12/15 12/22 12/29 1/5/22 1/12 1/20 1/26 1/31 2/3 2/7 2/10 2/16 2/23   Total WeeklyDose 27.5mg 27.5mg 27.5mg 27.5mg 27.5mg 27.5mg 27.5mg 27.5mg 27.5mg 25mg 25mg 22.5mg 27.5mg 27.5mg   INR 2.5 2.4 2.7 2.5 2.6 2.2 2.5 2.1 3.8 2.6 4.1 2.5 2.7 3.5   Notes call    call   COVID+ dex / azith 1x hold pred 20 BID x5d  1x hold; 1x decr dose;   call call call     Date 3/2 3/9 3/16 3/23 3/30 4/5 4/13 4/20 4/27 5/4 5/11 5/18 5/25 6/1   Total WeeklyDose 25mg 27.5mg 27.5mg 27.5mg 27.5mg 27.5mg 27.5 mg 27.5mg 27.5 mg 27.5 mg 27.5 mg 27.5 mg 27.5 mg 27.5mg   INR 2.5 2.9 2.2 2.8 2.7 2.4 2.5 2.3 2.5 2.8 3.1 2.6 2.5 2.3   Notes 1x decr  call call    call    call Call  apap rec 5/19       Date 6/8 6/15  6/22 6/29 7/6 7/14 7/20 7/27 8/3 8/10 8/17 8/25 8/31 9/7   Total Weekly Dose 27.5 mg 27.5 mg 27.5 mg 27.5 mg 27.5 mg 27.5 mg 27.5 mg 30 mg 27.5 mg 27.5mg 27.5 mg 27.5 mg 27.5 mg 27.5 mg   INR 2.5 2.5 2.7 2.5 2.9 2.4 1.8 2.7 2.8 2.8 2.8 3.1 2.6 3.5   Notes call    call  call 12/71x boost   call Call  Dec GLV  call     Date 9/14 9/21 9/28 10/5 10/12 10/19 10/26 11/2 11/9 11/16 11/23 11/30 12/7   Total WeeklyDose 27.5mg 25 mg 25 mg 25 mg 25 mg  25 mg 25 mg 25 mg 25 mg 25 mg 25 mg 25 mg 25 mg   INR 3.1 2.9 2.5 2.9 2.2 2.4 2.3 2.1 2.3 2.3 2.1 2.2 1.9   Notes 1x hold inc GLV call   Call     call    Call  INC GLV     Date 12/14 12/22 12/28 1/4/23 1/11 1/18 1/25 2/1 2/8 2/16/23 2/22 3/1/23   Total WeeklyDose 27.5 mg 25 mg  20 mg 27.5 mg 27.5 mg 27.5 mg 27.5 mg 27.5 mg 27.5 mg 27.5 mg 27.5mg 27.5 mg    INR 2.8 2.1 1.9 1.9 1.8 2.1 2.2 2.0 2.7 2.5 2.6 2.8   Notes 1x boost  rec 12/15 Call  Call 1x miss call call Call  Call    Call        Date 3/9 3/16 3/22 3/29 4/5 4/12 4/19 4/26 5/3 5/10/23 5/17 5/24   Total WeeklyDose 27.5 mg 27.5 mg 27.5 mg 25mg 27.5 mg 27.5 mg 27.5 mg 27.5 mg 27.5 mg  27.5 mg  27.5 mg 27.5 mg   INR 2.6  2.6 4.1 2.7 2.6 2.7 2.9 2.7 2.5 2.8 2.0 2.4   Notes  call call call call    call        Date 5/31 6/7/23 6/14 6/21/23 6/28 7/5 7/13 7/19/23 7/26/23 8/2 08/10 8/16/23   Total WeeklyDose 27.5 mg 27.5 mg  27.5 mg 27.5 mg 27.5 mg 25mg 25 mg 25 mg 25 mg 25 mg 25 mg 25 mg   INR 2.8 2.5 3.2 2.9 3.8 3.2 2.9 2.3 2.6 2.9 2.8 2.9   Notes call  Call  Dec GLV   Hold x1; moving; call     call      Date 08/23 8/30/23 9/8 9/14 09/21 09/28 10/05 10/13/23 10/19 10/26 11/3 11/9   Total WeeklyDose 25 mg 25 mg 25 mg 25 mg 25 mg 25 mg 25 mg 25 mg 27.5 mg 25 mg  27.5 mg 27.5 mg   INR 2.9 2.0 2.4 2.4 2.7 2.8 2.3 1.9 2.4 1.8 2.0 3.1   Notes   call    call Call  1x boost   call     Date 11/16 11/22 11/29 12/06 12/14 12/20 12/27 1/3/24 1/10/24 1/17/24 1/24 1/31/24 2/7/24   Total Weekly Dose 25 mg 25 mg 25 mg 25 mg 25 mg 25 mg 25mg  22.5  mg 25 mg  25 mg  25 mg  25 mg 25 mg   INR 2.4 2.3 2.5 2.6 2.6 2.5 3.6 2.4 2.6 2.3 2.1 2.3 2.8   Notes   Call  Rec'd 12/07  Call   1x red   Call         Date 2/15 2/22 2/29/24 3/7 3/11 3/18         Total WeeklyDose 25 mg  25 mg 25 mg 25 mg 25 mg  25 mg         INR 2.7 2.8 2.5 2.5 2.2 4.0         Notes call    Call doxy           Phone Interview:  Tablet Strength: 5 mg (1/5/22)  Patient Contact Info: [phone] (home) preferred; [phone] (cell)  Verbal Release Auth: signed 5/25/21 -- May speak w/Femi Ngo, ; May leave voicemail on home phone  Lab Contact Info: [facility]  *Will call once monthly or if INR out of range*      Patient Findings  Negatives: Signs/symptoms of thrombosis, Signs/symptoms of bleeding, Laboratory test error suspected, Change in health, Change in alcohol use, Change in activity, Upcoming invasive procedure, Emergency department visit, Upcoming dental procedure, Missed doses, Extra doses, Change in medications, Change in diet/appetite, Hospital admission, Bruising, Other complaints   Comments: Had 1/2 can of peas this AM, finished 10 days of doxycycline yesterday.     Plan:  INR is supratherapeutic today at 4.0 (goal 2.0-3.0). Ms. Ngo will take a decreased dose of warfarin 2.5 mg tomorrow, and otherwise continue warfarin 2.5 mg oral daily except 5 mg SunTuesThurs until recheck (25 mg/week). Patient has 5 mg tablets and am unable to have any further decreased dose than 2.5 mg today without a hold.  Repeat INR in one week, 3/25.  Marianela Ngo understands the importance of calling the [facility] if she notices any s/sx of bleeding, stroke, or abnormal bruising, if any changes are made to her medications or medication doses (Rx, OTC, herbal), or if any upcoming procedures are scheduled. Marianela Ngo will likewise let us know if any other changes, questions, or concerns arise regarding anticoagulation therapy. she understands the importance of  seeking medical attention immediately if she experiences any falls, vehicle accidents, or abnormal bleeding or bruising. Marianela Ngo voiced understanding of this information and confirms that she has the [facility]'s contact information. Otherwise, we will plan to contact the patient once monthly or if her INR is out of range.    Jean Paul Chatman, PharmD, BCPS  3/18/2024  12:20 EDT

## 2024-03-25 ENCOUNTER — ANTICOAGULATION VISIT (OUTPATIENT)
Dept: PHARMACY | Facility: HOSPITAL | Age: 79
End: 2024-03-25
Payer: MEDICARE

## 2024-03-25 DIAGNOSIS — I48.0 PAROXYSMAL ATRIAL FIBRILLATION: Primary | ICD-10-CM

## 2024-03-25 LAB — INR PPP: 2.4

## 2024-03-25 NOTE — PROGRESS NOTES
Anticoagulation Clinic - Remote Progress Note  mdINR Home Monitor  Testing frequency: Weekly    Indication: Paroxysmal afib  Referring Provider: Susan [Last 10/25/23]  Initial Warfarin Start Date: 2020? 2019?  Goal INR: 2.0-3.0  Current Drug Interactions: hydrochlorothiazide   [id number]: 4 (Age ?74, Sex, HTN) [estimated 5/20/21]  Bleed Risk: self reports negative history for GI bleed  Other: DOAC too expensive    Diet: Green beans, peas, zucchini, or priyanka salads 2-3x/week (3/11/24)  Alcohol: None  Tobacco: None  OTC Pain Medication: APAP only    INR History:  Date 5/14 5/18 5/25 6/1 6/15 7/6 7/12 7/22 7/28 8/4 8/11 8/18 8/23 8/27   Total WeeklyDose  22.5mg 25mg 25mg 25mg 25mg 25mg 25mg 25mg 25mg 25mg 27.5mg 27.5mg 27.5mg   INR 3.5 1.9 2.3 2.3 2.8 2.7 2.1 2.2 1.9 2.0 1.8 2.3 2.2 1.8   Notes [place] Cards 1x hold; incr GLV     HM  incr GLV  no GLV incr GLV cefdinir cefdinir     Date 9/1 9/8 9/15 9/22 9/29 10/6 10/13 10/20 10/27 11/3 11/10 11/17 11/24 12/1   Total WeeklyDose 27.5mg 27.5mg 27.5mg 27.5mg 27.5mg 27.5mg 27.5mg 30mg 27.5mg 27.5mg 27.5mg 25mg 27.5mg 27.5mg   INR 2.1 2.3 3.3 2.3 2.2 2.0 1.9 2.6 3.2 2.4 3.4 1.6 2.4 2.3   Notes cefdinir  decr GLV?  keflex   1x incr dose   decr GLV? 1x decr dose;   incr GLV?       Date 12/9 12/15 12/22 12/29 1/5/22 1/12 1/20 1/26 1/31 2/3 2/7 2/10 2/16 2/23   Total WeeklyDose 27.5mg 27.5mg 27.5mg 27.5mg 27.5mg 27.5mg 27.5mg 27.5mg 27.5mg 25mg 25mg 22.5mg 27.5mg 27.5mg   INR 2.5 2.4 2.7 2.5 2.6 2.2 2.5 2.1 3.8 2.6 4.1 2.5 2.7 3.5   Notes call    call   COVID+ dex / azith 1x hold pred 20 BID x5d  1x hold; 1x decr dose;   call call call     Date 3/2 3/9 3/16 3/23 3/30 4/5 4/13 4/20 4/27 5/4 5/11 5/18 5/25 6/1   Total WeeklyDose 25mg 27.5mg 27.5mg 27.5mg 27.5mg 27.5mg 27.5 mg 27.5mg 27.5 mg 27.5 mg 27.5 mg 27.5 mg 27.5 mg 27.5mg   INR 2.5 2.9 2.2 2.8 2.7 2.4 2.5 2.3 2.5 2.8 3.1 2.6 2.5 2.3   Notes 1x decr  call call    call    call Call  apap rec 5/19       Date 6/8 6/15  6/22 6/29 7/6 7/14 7/20 7/27 8/3 8/10 8/17 8/25 8/31 9/7   Total Weekly Dose 27.5 mg 27.5 mg 27.5 mg 27.5 mg 27.5 mg 27.5 mg 27.5 mg 30 mg 27.5 mg 27.5mg 27.5 mg 27.5 mg 27.5 mg 27.5 mg   INR 2.5 2.5 2.7 2.5 2.9 2.4 1.8 2.7 2.8 2.8 2.8 3.1 2.6 3.5   Notes call    call  call 12/71x boost   call Call  Dec GLV  call     Date 9/14 9/21 9/28 10/5 10/12 10/19 10/26 11/2 11/9 11/16 11/23 11/30 12/7   Total WeeklyDose 27.5mg 25 mg 25 mg 25 mg 25 mg  25 mg 25 mg 25 mg 25 mg 25 mg 25 mg 25 mg 25 mg   INR 3.1 2.9 2.5 2.9 2.2 2.4 2.3 2.1 2.3 2.3 2.1 2.2 1.9   Notes 1x hold inc GLV call   Call     call    Call  INC GLV     Date 12/14 12/22 12/28 1/4/23 1/11 1/18 1/25 2/1 2/8 2/16/23 2/22 3/1/23   Total WeeklyDose 27.5 mg 25 mg  20 mg 27.5 mg 27.5 mg 27.5 mg 27.5 mg 27.5 mg 27.5 mg 27.5 mg 27.5mg 27.5 mg    INR 2.8 2.1 1.9 1.9 1.8 2.1 2.2 2.0 2.7 2.5 2.6 2.8   Notes 1x boost  rec 12/15 Call  Call 1x miss call call Call  Call    Call        Date 3/9 3/16 3/22 3/29 4/5 4/12 4/19 4/26 5/3 5/10/23 5/17 5/24   Total WeeklyDose 27.5 mg 27.5 mg 27.5 mg 25mg 27.5 mg 27.5 mg 27.5 mg 27.5 mg 27.5 mg  27.5 mg  27.5 mg 27.5 mg   INR 2.6  2.6 4.1 2.7 2.6 2.7 2.9 2.7 2.5 2.8 2.0 2.4   Notes  call call call call    call        Date 5/31 6/7/23 6/14 6/21/23 6/28 7/5 7/13 7/19/23 7/26/23 8/2 08/10 8/16/23   Total WeeklyDose 27.5 mg 27.5 mg  27.5 mg 27.5 mg 27.5 mg 25mg 25 mg 25 mg 25 mg 25 mg 25 mg 25 mg   INR 2.8 2.5 3.2 2.9 3.8 3.2 2.9 2.3 2.6 2.9 2.8 2.9   Notes call  Call  Dec GLV   Hold x1; moving; call     call      Date 08/23 8/30/23 9/8 9/14 09/21 09/28 10/05 10/13/23 10/19 10/26 11/3 11/9   Total WeeklyDose 25 mg 25 mg 25 mg 25 mg 25 mg 25 mg 25 mg 25 mg 27.5 mg 25 mg  27.5 mg 27.5 mg   INR 2.9 2.0 2.4 2.4 2.7 2.8 2.3 1.9 2.4 1.8 2.0 3.1   Notes   call    call Call  1x boost   call     Date 11/16 11/22 11/29 12/06 12/14 12/20 12/27 1/3/24 1/10/24 1/17/24 1/24 1/31/24 2/7/24   Total Weekly Dose 25 mg 25 mg 25 mg 25 mg 25 mg 25 mg 25mg  22.5  mg 25 mg  25 mg  25 mg  25 mg 25 mg   INR 2.4 2.3 2.5 2.6 2.6 2.5 3.6 2.4 2.6 2.3 2.1 2.3 2.8   Notes   Call  Rec'd 12/07  Call   1x red   Call         Date 2/15 2/22 2/29/24 3/7 3/11 3/18 3/25/24        Total WeeklyDose 25 mg  25 mg 25 mg 25 mg 25 mg  25 mg 22.5 mg         INR 2.7 2.8 2.5 2.5 2.2 4.0 2.4        Notes call    Call doxy 1x reduced   Call           Phone Interview:  Tablet Strength: 5 mg (1/5/22)  Patient Contact Info: [phone] (home) preferred; [phone] (cell)  Verbal Release Auth: signed 5/25/21 -- May speak w/Femi Ngo, ; May leave voicemail on home phone  Lab Contact Info: [facility]  *Will call once monthly or if INR out of range*    Patient Findings    Negatives: Signs/symptoms of thrombosis, Signs/symptoms of bleeding, Laboratory test error suspected, Change in health, Change in alcohol use, Change in activity, Upcoming invasive procedure, Emergency department visit, Upcoming dental procedure, Missed doses, Extra doses, Change in medications, Change in diet/appetite, Hospital admission, Bruising, Other complaints   Comments: She took last week's dose as instructed.       Plan:  INR is therapeutic today at 2.4 (goal 2.0-3.0). Instructed Ms. Ngo to continue warfarin 2.5 mg oral daily except 5 mg SunTuesThurs until recheck (25 mg/week).   Repeat INR in one week, 4/1  Marianela Ngo understands the importance of calling the [facility] if she notices any s/sx of bleeding, stroke, or abnormal bruising, if any changes are made to her medications or medication doses (Rx, OTC, herbal), or if any upcoming procedures are scheduled. Marianela Ngo will likewise let us know if any other changes, questions, or concerns arise regarding anticoagulation therapy. she understands the importance of seeking medical attention immediately if she experiences any falls, vehicle accidents, or abnormal bleeding or bruising. Marianela Ngo voiced understanding of this  information and confirms that she has the [facility]'s contact information. Otherwise, we will plan to contact the patient once monthly or if her INR is out of range.      Saravanan Greenberg, PharmD  3/25/2024  10:43 EDT

## 2024-03-29 DIAGNOSIS — I48.0 PAROXYSMAL ATRIAL FIBRILLATION: ICD-10-CM

## 2024-04-01 ENCOUNTER — ANTICOAGULATION VISIT (OUTPATIENT)
Dept: PHARMACY | Facility: HOSPITAL | Age: 79
End: 2024-04-01
Payer: MEDICARE

## 2024-04-01 DIAGNOSIS — I48.0 PAROXYSMAL ATRIAL FIBRILLATION: Primary | ICD-10-CM

## 2024-04-01 LAB — INR PPP: 2.4

## 2024-04-01 RX ORDER — SOTALOL HYDROCHLORIDE 80 MG/1
TABLET ORAL
Qty: 135 TABLET | Refills: 0 | Status: SHIPPED | OUTPATIENT
Start: 2024-04-01

## 2024-04-01 NOTE — PROGRESS NOTES
Anticoagulation Clinic - Remote Progress Note  mdINR Home Monitor  Testing frequency: Weekly    Indication: Paroxysmal afib  Referring Provider: Susan [Last 10/25/23]  Initial Warfarin Start Date: 2020? 2019?  Goal INR: 2.0-3.0  Current Drug Interactions: hydrochlorothiazide   [id number]: 4 (Age ?74, Sex, HTN) [estimated 5/20/21]  Bleed Risk: self reports negative history for GI bleed  Other: DOAC too expensive    Diet: Green beans, peas, zucchini, or priyanka salads 2-3x/week (3/11/24)  Alcohol: None  Tobacco: None  OTC Pain Medication: APAP only    INR History:  Date 5/14 5/18 5/25 6/1 6/15 7/6 7/12 7/22 7/28 8/4 8/11 8/18 8/23 8/27   Total WeeklyDose  22.5mg 25mg 25mg 25mg 25mg 25mg 25mg 25mg 25mg 25mg 27.5mg 27.5mg 27.5mg   INR 3.5 1.9 2.3 2.3 2.8 2.7 2.1 2.2 1.9 2.0 1.8 2.3 2.2 1.8   Notes [place] Cards 1x hold; incr GLV     HM  incr GLV  no GLV incr GLV cefdinir cefdinir     Date 9/1 9/8 9/15 9/22 9/29 10/6 10/13 10/20 10/27 11/3 11/10 11/17 11/24 12/1   Total WeeklyDose 27.5mg 27.5mg 27.5mg 27.5mg 27.5mg 27.5mg 27.5mg 30mg 27.5mg 27.5mg 27.5mg 25mg 27.5mg 27.5mg   INR 2.1 2.3 3.3 2.3 2.2 2.0 1.9 2.6 3.2 2.4 3.4 1.6 2.4 2.3   Notes cefdinir  decr GLV?  keflex   1x incr dose   decr GLV? 1x decr dose;   incr GLV?       Date 12/9 12/15 12/22 12/29 1/5/22 1/12 1/20 1/26 1/31 2/3 2/7 2/10 2/16 2/23   Total WeeklyDose 27.5mg 27.5mg 27.5mg 27.5mg 27.5mg 27.5mg 27.5mg 27.5mg 27.5mg 25mg 25mg 22.5mg 27.5mg 27.5mg   INR 2.5 2.4 2.7 2.5 2.6 2.2 2.5 2.1 3.8 2.6 4.1 2.5 2.7 3.5   Notes call    call   COVID+ dex / azith 1x hold pred 20 BID x5d  1x hold; 1x decr dose;   call call call     Date 3/2 3/9 3/16 3/23 3/30 4/5 4/13 4/20 4/27 5/4 5/11 5/18 5/25 6/1   Total WeeklyDose 25mg 27.5mg 27.5mg 27.5mg 27.5mg 27.5mg 27.5 mg 27.5mg 27.5 mg 27.5 mg 27.5 mg 27.5 mg 27.5 mg 27.5mg   INR 2.5 2.9 2.2 2.8 2.7 2.4 2.5 2.3 2.5 2.8 3.1 2.6 2.5 2.3   Notes 1x decr  call call    call    call Call  apap rec 5/19       Date 6/8 6/15  6/22 6/29 7/6 7/14 7/20 7/27 8/3 8/10 8/17 8/25 8/31 9/7   Total Weekly Dose 27.5 mg 27.5 mg 27.5 mg 27.5 mg 27.5 mg 27.5 mg 27.5 mg 30 mg 27.5 mg 27.5mg 27.5 mg 27.5 mg 27.5 mg 27.5 mg   INR 2.5 2.5 2.7 2.5 2.9 2.4 1.8 2.7 2.8 2.8 2.8 3.1 2.6 3.5   Notes call    call  call 12/71x boost   call Call  Dec GLV  call     Date 9/14 9/21 9/28 10/5 10/12 10/19 10/26 11/2 11/9 11/16 11/23 11/30 12/7   Total WeeklyDose 27.5mg 25 mg 25 mg 25 mg 25 mg  25 mg 25 mg 25 mg 25 mg 25 mg 25 mg 25 mg 25 mg   INR 3.1 2.9 2.5 2.9 2.2 2.4 2.3 2.1 2.3 2.3 2.1 2.2 1.9   Notes 1x hold inc GLV call   Call     call    Call  INC GLV     Date 12/14 12/22 12/28 1/4/23 1/11 1/18 1/25 2/1 2/8 2/16/23 2/22 3/1/23   Total WeeklyDose 27.5 mg 25 mg  20 mg 27.5 mg 27.5 mg 27.5 mg 27.5 mg 27.5 mg 27.5 mg 27.5 mg 27.5mg 27.5 mg    INR 2.8 2.1 1.9 1.9 1.8 2.1 2.2 2.0 2.7 2.5 2.6 2.8   Notes 1x boost  rec 12/15 Call  Call 1x miss call call Call  Call    Call        Date 3/9 3/16 3/22 3/29 4/5 4/12 4/19 4/26 5/3 5/10/23 5/17 5/24   Total WeeklyDose 27.5 mg 27.5 mg 27.5 mg 25mg 27.5 mg 27.5 mg 27.5 mg 27.5 mg 27.5 mg  27.5 mg  27.5 mg 27.5 mg   INR 2.6  2.6 4.1 2.7 2.6 2.7 2.9 2.7 2.5 2.8 2.0 2.4   Notes  call call call call    call        Date 5/31 6/7/23 6/14 6/21/23 6/28 7/5 7/13 7/19/23 7/26/23 8/2 08/10 8/16/23   Total WeeklyDose 27.5 mg 27.5 mg  27.5 mg 27.5 mg 27.5 mg 25mg 25 mg 25 mg 25 mg 25 mg 25 mg 25 mg   INR 2.8 2.5 3.2 2.9 3.8 3.2 2.9 2.3 2.6 2.9 2.8 2.9   Notes call  Call  Dec GLV   Hold x1; moving; call     call      Date 08/23 8/30/23 9/8 9/14 09/21 09/28 10/05 10/13/23 10/19 10/26 11/3 11/9   Total WeeklyDose 25 mg 25 mg 25 mg 25 mg 25 mg 25 mg 25 mg 25 mg 27.5 mg 25 mg  27.5 mg 27.5 mg   INR 2.9 2.0 2.4 2.4 2.7 2.8 2.3 1.9 2.4 1.8 2.0 3.1   Notes   call    call Call  1x boost   call     Date 11/16 11/22 11/29 12/06 12/14 12/20 12/27 1/3/24 1/10/24 1/17/24 1/24 1/31/24 2/7/24   Total Weekly Dose 25 mg 25 mg 25 mg 25 mg 25 mg 25 mg 25mg  22.5  mg 25 mg  25 mg  25 mg  25 mg 25 mg   INR 2.4 2.3 2.5 2.6 2.6 2.5 3.6 2.4 2.6 2.3 2.1 2.3 2.8   Notes   Call  Rec'd 12/07  Call   1x red   Call         Date 2/15 2/22 2/29/24 3/7 3/11 3/18 3/25/24 4/1       Total WeeklyDose 25 mg  25 mg 25 mg 25 mg 25 mg  25 mg 22.5 mg  25 mg       INR 2.7 2.8 2.5 2.5 2.2 4.0 2.4 2.4       Notes call    Call doxy 1x reduced   Call           Phone Interview:  Tablet Strength: 5 mg (1/5/22)  Patient Contact Info: [phone] (home) preferred; [phone] (cell)  Verbal Release Auth: signed 5/25/21 -- May speak w/Femi Ngo, ; May leave voicemail on home phone  Lab Contact Info: [facility]  *Will call once monthly or if INR out of range*    Patient Findings    Comments: Patient not contacted at this encounter.     Plan:  INR is therapeutic today at 2.4 (goal 2.0-3.0). Ms. Ngo will continue warfarin 2.5 mg oral daily except 5 mg SunTuesThurs until recheck (25 mg/week).   Repeat INR in one week, 4/8/24.  Marianela Ngo understands the importance of calling the [facility] if she notices any s/sx of bleeding, stroke, or abnormal bruising, if any changes are made to her medications or medication doses (Rx, OTC, herbal), or if any upcoming procedures are scheduled. Marianela Ngo will likewise let us know if any other changes, questions, or concerns arise regarding anticoagulation therapy. she understands the importance of seeking medical attention immediately if she experiences any falls, vehicle accidents, or abnormal bleeding or bruising. Marianela Ngo voiced understanding of this information and confirms that she has the [facility]'s contact information. Otherwise, we will plan to contact the patient once monthly or if her INR is out of range.    Scooby Garcia CPhT  4/1/2024  15:42 EDT     I, Yuridia Little, [facility], have reviewed the note in full and agree with the assessment and plan.  04/01/24  15:55 EDT

## 2024-04-08 ENCOUNTER — ANTICOAGULATION VISIT (OUTPATIENT)
Dept: PHARMACY | Facility: HOSPITAL | Age: 79
End: 2024-04-08
Payer: MEDICARE

## 2024-04-08 DIAGNOSIS — I48.0 PAROXYSMAL ATRIAL FIBRILLATION: Primary | ICD-10-CM

## 2024-04-08 LAB — INR PPP: 2.2

## 2024-04-08 NOTE — PROGRESS NOTES
Anticoagulation Clinic - Remote Progress Note  mdINR Home Monitor  Testing frequency: Weekly    Indication: Paroxysmal afib  Referring Provider: Susan [Last 10/25/23]  Initial Warfarin Start Date: 2020? 2019?  Goal INR: 2.0-3.0  Current Drug Interactions: hydrochlorothiazide   [id number]: 4 (Age ?74, Sex, HTN) [estimated 5/20/21]  Bleed Risk: self reports negative history for GI bleed  Other: DOAC too expensive    Diet: Green beans, peas, zucchini, or priyanka salads 2-3x/week (3/11/24)  Alcohol: None  Tobacco: None  OTC Pain Medication: APAP only    INR History:  Date 5/14 5/18 5/25 6/1 6/15 7/6 7/12 7/22 7/28 8/4 8/11 8/18 8/23 8/27   Total WeeklyDose  22.5mg 25mg 25mg 25mg 25mg 25mg 25mg 25mg 25mg 25mg 27.5mg 27.5mg 27.5mg   INR 3.5 1.9 2.3 2.3 2.8 2.7 2.1 2.2 1.9 2.0 1.8 2.3 2.2 1.8   Notes [place] Cards 1x hold; incr GLV     HM  incr GLV  no GLV incr GLV cefdinir cefdinir     Date 9/1 9/8 9/15 9/22 9/29 10/6 10/13 10/20 10/27 11/3 11/10 11/17 11/24 12/1   Total WeeklyDose 27.5mg 27.5mg 27.5mg 27.5mg 27.5mg 27.5mg 27.5mg 30mg 27.5mg 27.5mg 27.5mg 25mg 27.5mg 27.5mg   INR 2.1 2.3 3.3 2.3 2.2 2.0 1.9 2.6 3.2 2.4 3.4 1.6 2.4 2.3   Notes cefdinir  decr GLV?  keflex   1x incr dose   decr GLV? 1x decr dose;   incr GLV?       Date 12/9 12/15 12/22 12/29 1/5/22 1/12 1/20 1/26 1/31 2/3 2/7 2/10 2/16 2/23   Total WeeklyDose 27.5mg 27.5mg 27.5mg 27.5mg 27.5mg 27.5mg 27.5mg 27.5mg 27.5mg 25mg 25mg 22.5mg 27.5mg 27.5mg   INR 2.5 2.4 2.7 2.5 2.6 2.2 2.5 2.1 3.8 2.6 4.1 2.5 2.7 3.5   Notes call    call   COVID+ dex / azith 1x hold pred 20 BID x5d  1x hold; 1x decr dose;   call call call     Date 3/2 3/9 3/16 3/23 3/30 4/5 4/13 4/20 4/27 5/4 5/11 5/18 5/25 6/1   Total WeeklyDose 25mg 27.5mg 27.5mg 27.5mg 27.5mg 27.5mg 27.5 mg 27.5mg 27.5 mg 27.5 mg 27.5 mg 27.5 mg 27.5 mg 27.5mg   INR 2.5 2.9 2.2 2.8 2.7 2.4 2.5 2.3 2.5 2.8 3.1 2.6 2.5 2.3   Notes 1x decr  call call    call    call Call  apap rec 5/19       Date 6/8 6/15  6/22 6/29 7/6 7/14 7/20 7/27 8/3 8/10 8/17 8/25 8/31 9/7   Total Weekly Dose 27.5 mg 27.5 mg 27.5 mg 27.5 mg 27.5 mg 27.5 mg 27.5 mg 30 mg 27.5 mg 27.5mg 27.5 mg 27.5 mg 27.5 mg 27.5 mg   INR 2.5 2.5 2.7 2.5 2.9 2.4 1.8 2.7 2.8 2.8 2.8 3.1 2.6 3.5   Notes call    call  call 12/71x boost   call Call  Dec GLV  call     Date 9/14 9/21 9/28 10/5 10/12 10/19 10/26 11/2 11/9 11/16 11/23 11/30 12/7   Total WeeklyDose 27.5mg 25 mg 25 mg 25 mg 25 mg  25 mg 25 mg 25 mg 25 mg 25 mg 25 mg 25 mg 25 mg   INR 3.1 2.9 2.5 2.9 2.2 2.4 2.3 2.1 2.3 2.3 2.1 2.2 1.9   Notes 1x hold inc GLV call   Call     call    Call  INC GLV     Date 12/14 12/22 12/28 1/4/23 1/11 1/18 1/25 2/1 2/8 2/16/23 2/22 3/1/23   Total WeeklyDose 27.5 mg 25 mg  20 mg 27.5 mg 27.5 mg 27.5 mg 27.5 mg 27.5 mg 27.5 mg 27.5 mg 27.5mg 27.5 mg    INR 2.8 2.1 1.9 1.9 1.8 2.1 2.2 2.0 2.7 2.5 2.6 2.8   Notes 1x boost  rec 12/15 Call  Call 1x miss call call Call  Call    Call        Date 3/9 3/16 3/22 3/29 4/5 4/12 4/19 4/26 5/3 5/10/23 5/17 5/24   Total WeeklyDose 27.5 mg 27.5 mg 27.5 mg 25mg 27.5 mg 27.5 mg 27.5 mg 27.5 mg 27.5 mg  27.5 mg  27.5 mg 27.5 mg   INR 2.6  2.6 4.1 2.7 2.6 2.7 2.9 2.7 2.5 2.8 2.0 2.4   Notes  call call call call    call        Date 5/31 6/7/23 6/14 6/21/23 6/28 7/5 7/13 7/19/23 7/26/23 8/2 08/10 8/16/23   Total WeeklyDose 27.5 mg 27.5 mg  27.5 mg 27.5 mg 27.5 mg 25mg 25 mg 25 mg 25 mg 25 mg 25 mg 25 mg   INR 2.8 2.5 3.2 2.9 3.8 3.2 2.9 2.3 2.6 2.9 2.8 2.9   Notes call  Call  Dec GLV   Hold x1; moving; call     call      Date 08/23 8/30/23 9/8 9/14 09/21 09/28 10/05 10/13/23 10/19 10/26 11/3 11/9   Total WeeklyDose 25 mg 25 mg 25 mg 25 mg 25 mg 25 mg 25 mg 25 mg 27.5 mg 25 mg  27.5 mg 27.5 mg   INR 2.9 2.0 2.4 2.4 2.7 2.8 2.3 1.9 2.4 1.8 2.0 3.1   Notes   call    call Call  1x boost   call     Date 11/16 11/22 11/29 12/06 12/14 12/20 12/27 1/3/24 1/10/24 1/17/24 1/24 1/31/24 2/7/24   Total Weekly Dose 25 mg 25 mg 25 mg 25 mg 25 mg 25 mg 25mg  22.5  mg 25 mg  25 mg  25 mg  25 mg 25 mg   INR 2.4 2.3 2.5 2.6 2.6 2.5 3.6 2.4 2.6 2.3 2.1 2.3 2.8   Notes   Call  Rec'd 12/07  Call   1x red   Call         Date 2/15 2/22 2/29/24 3/7 3/11 3/18 3/25/24 4/1 4/8/24      Total WeeklyDose 25 mg  25 mg 25 mg 25 mg 25 mg  25 mg 22.5 mg  25 mg 25 mg      INR 2.7 2.8 2.5 2.5 2.2 4.0 2.4 2.4 2.2      Notes call    Call doxy 1x reduced   Call           Phone Interview:  Tablet Strength: 5 mg (1/5/22)  Patient Contact Info: [phone] (home) preferred; [phone] (cell)  Verbal Release Auth: signed 5/25/21 -- May speak w/Femi Ngo, ; May leave voicemail on home phone  Lab Contact Info: [facility]  *Will call once monthly or if INR out of range*      Patient Findings  Comments: Patient not contacted at this encounter.       Plan:  INR is therapeutic today at 2.2 (goal 2.0-3.0). Ms. Ngo will continue warfarin 2.5 mg oral daily except 5 mg SunTuesThurs until recheck (25 mg/week).   Repeat INR in one week, 4/15/24.  Marianela Ngo understands the importance of calling the [facility] if she notices any s/sx of bleeding, stroke, or abnormal bruising, if any changes are made to her medications or medication doses (Rx, OTC, herbal), or if any upcoming procedures are scheduled. Marianela Ngo will likewise let us know if any other changes, questions, or concerns arise regarding anticoagulation therapy. she understands the importance of seeking medical attention immediately if she experiences any falls, vehicle accidents, or abnormal bleeding or bruising. Marianela Ngo voiced understanding of this information and confirms that she has the [facility]'s contact information. Otherwise, we will plan to contact the patient once monthly or if her INR is out of range.    Chicho Shine, Pharmacy Technician  4/8/2024  12:45 EDT      I, Alisia Cunningham, [facility], have reviewed the note in full and agree with the assessment and  plan.  04/08/24  14:58 EDT

## 2024-04-09 DIAGNOSIS — I48.0 PAROXYSMAL ATRIAL FIBRILLATION: ICD-10-CM

## 2024-04-09 DIAGNOSIS — I10 ESSENTIAL HYPERTENSION: ICD-10-CM

## 2024-04-09 RX ORDER — ATORVASTATIN CALCIUM 20 MG/1
20 TABLET, FILM COATED ORAL DAILY
Qty: 90 TABLET | Refills: 3 | Status: SHIPPED | OUTPATIENT
Start: 2024-04-09

## 2024-04-09 RX ORDER — AMLODIPINE BESYLATE 5 MG/1
5 TABLET ORAL DAILY
Qty: 90 TABLET | Refills: 3 | Status: SHIPPED | OUTPATIENT
Start: 2024-04-09

## 2024-04-09 RX ORDER — WARFARIN SODIUM 5 MG/1
5 TABLET ORAL
Qty: 90 TABLET | Refills: 3 | Status: SHIPPED | OUTPATIENT
Start: 2024-04-09

## 2024-04-09 RX ORDER — LISINOPRIL AND HYDROCHLOROTHIAZIDE 20; 12.5 MG/1; MG/1
1 TABLET ORAL DAILY
Qty: 90 TABLET | Refills: 3 | Status: SHIPPED | OUTPATIENT
Start: 2024-04-09

## 2024-04-16 ENCOUNTER — ANTICOAGULATION VISIT (OUTPATIENT)
Dept: PHARMACY | Facility: HOSPITAL | Age: 79
End: 2024-04-16
Payer: MEDICARE

## 2024-04-16 DIAGNOSIS — I48.0 PAROXYSMAL ATRIAL FIBRILLATION: Primary | ICD-10-CM

## 2024-04-16 LAB — INR PPP: 2

## 2024-04-16 NOTE — PROGRESS NOTES
Anticoagulation Clinic - Remote Progress Note  mdINR Home Monitor  Testing frequency: Weekly    Indication: Paroxysmal afib  Referring Provider: Susan [Last 10/25/23]  Initial Warfarin Start Date: 2020? 2019?  Goal INR: 2.0-3.0  Current Drug Interactions: hydrochlorothiazide   [id number]: 4 (Age ?74, Sex, HTN) [estimated 5/20/21]  Bleed Risk: self reports negative history for GI bleed  Other: DOAC too expensive    Diet: Green beans, peas, zucchini, or priyanka salads 2-3x/week (3/11/24)  Alcohol: None  Tobacco: None  OTC Pain Medication: APAP only    INR History:  Date 5/14 5/18 5/25 6/1 6/15 7/6 7/12 7/22 7/28 8/4 8/11 8/18 8/23 8/27   Total WeeklyDose  22.5mg 25mg 25mg 25mg 25mg 25mg 25mg 25mg 25mg 25mg 27.5mg 27.5mg 27.5mg   INR 3.5 1.9 2.3 2.3 2.8 2.7 2.1 2.2 1.9 2.0 1.8 2.3 2.2 1.8   Notes [place] Cards 1x hold; incr GLV     HM  incr GLV  no GLV incr GLV cefdinir cefdinir     Date 9/1 9/8 9/15 9/22 9/29 10/6 10/13 10/20 10/27 11/3 11/10 11/17 11/24 12/1   Total WeeklyDose 27.5mg 27.5mg 27.5mg 27.5mg 27.5mg 27.5mg 27.5mg 30mg 27.5mg 27.5mg 27.5mg 25mg 27.5mg 27.5mg   INR 2.1 2.3 3.3 2.3 2.2 2.0 1.9 2.6 3.2 2.4 3.4 1.6 2.4 2.3   Notes cefdinir  decr GLV?  keflex   1x incr dose   decr GLV? 1x decr dose;   incr GLV?       Date 12/9 12/15 12/22 12/29 1/5/22 1/12 1/20 1/26 1/31 2/3 2/7 2/10 2/16 2/23   Total WeeklyDose 27.5mg 27.5mg 27.5mg 27.5mg 27.5mg 27.5mg 27.5mg 27.5mg 27.5mg 25mg 25mg 22.5mg 27.5mg 27.5mg   INR 2.5 2.4 2.7 2.5 2.6 2.2 2.5 2.1 3.8 2.6 4.1 2.5 2.7 3.5   Notes call    call   COVID+ dex / azith 1x hold pred 20 BID x5d  1x hold; 1x decr dose;   call call call     Date 3/2 3/9 3/16 3/23 3/30 4/5 4/13 4/20 4/27 5/4 5/11 5/18 5/25 6/1   Total WeeklyDose 25mg 27.5mg 27.5mg 27.5mg 27.5mg 27.5mg 27.5 mg 27.5mg 27.5 mg 27.5 mg 27.5 mg 27.5 mg 27.5 mg 27.5mg   INR 2.5 2.9 2.2 2.8 2.7 2.4 2.5 2.3 2.5 2.8 3.1 2.6 2.5 2.3   Notes 1x decr  call call    call    call Call  apap rec 5/19       Date 6/8 6/15  6/22 6/29 7/6 7/14 7/20 7/27 8/3 8/10 8/17 8/25 8/31 9/7   Total Weekly Dose 27.5 mg 27.5 mg 27.5 mg 27.5 mg 27.5 mg 27.5 mg 27.5 mg 30 mg 27.5 mg 27.5mg 27.5 mg 27.5 mg 27.5 mg 27.5 mg   INR 2.5 2.5 2.7 2.5 2.9 2.4 1.8 2.7 2.8 2.8 2.8 3.1 2.6 3.5   Notes call    call  call 12/71x boost   call Call  Dec GLV  call     Date 9/14 9/21 9/28 10/5 10/12 10/19 10/26 11/2 11/9 11/16 11/23 11/30 12/7   Total WeeklyDose 27.5mg 25 mg 25 mg 25 mg 25 mg  25 mg 25 mg 25 mg 25 mg 25 mg 25 mg 25 mg 25 mg   INR 3.1 2.9 2.5 2.9 2.2 2.4 2.3 2.1 2.3 2.3 2.1 2.2 1.9   Notes 1x hold inc GLV call   Call     call    Call  INC GLV     Date 12/14 12/22 12/28 1/4/23 1/11 1/18 1/25 2/1 2/8 2/16/23 2/22 3/1/23   Total WeeklyDose 27.5 mg 25 mg  20 mg 27.5 mg 27.5 mg 27.5 mg 27.5 mg 27.5 mg 27.5 mg 27.5 mg 27.5mg 27.5 mg    INR 2.8 2.1 1.9 1.9 1.8 2.1 2.2 2.0 2.7 2.5 2.6 2.8   Notes 1x boost  rec 12/15 Call  Call 1x miss call call Call  Call    Call        Date 3/9 3/16 3/22 3/29 4/5 4/12 4/19 4/26 5/3 5/10/23 5/17 5/24   Total WeeklyDose 27.5 mg 27.5 mg 27.5 mg 25mg 27.5 mg 27.5 mg 27.5 mg 27.5 mg 27.5 mg  27.5 mg  27.5 mg 27.5 mg   INR 2.6  2.6 4.1 2.7 2.6 2.7 2.9 2.7 2.5 2.8 2.0 2.4   Notes  call call call call    call        Date 5/31 6/7/23 6/14 6/21/23 6/28 7/5 7/13 7/19/23 7/26/23 8/2 08/10 8/16/23   Total WeeklyDose 27.5 mg 27.5 mg  27.5 mg 27.5 mg 27.5 mg 25mg 25 mg 25 mg 25 mg 25 mg 25 mg 25 mg   INR 2.8 2.5 3.2 2.9 3.8 3.2 2.9 2.3 2.6 2.9 2.8 2.9   Notes call  Call  Dec GLV   Hold x1; moving; call     call      Date 08/23 8/30/23 9/8 9/14 09/21 09/28 10/05 10/13/23 10/19 10/26 11/3 11/9   Total WeeklyDose 25 mg 25 mg 25 mg 25 mg 25 mg 25 mg 25 mg 25 mg 27.5 mg 25 mg  27.5 mg 27.5 mg   INR 2.9 2.0 2.4 2.4 2.7 2.8 2.3 1.9 2.4 1.8 2.0 3.1   Notes   call    call Call  1x boost   call     Date 11/16 11/22 11/29 12/06 12/14 12/20 12/27 1/3/24 1/10/24 1/17/24 1/24 1/31/24 2/7/24   Total Weekly Dose 25 mg 25 mg 25 mg 25 mg 25 mg 25 mg 25mg  22.5  mg 25 mg  25 mg  25 mg  25 mg 25 mg   INR 2.4 2.3 2.5 2.6 2.6 2.5 3.6 2.4 2.6 2.3 2.1 2.3 2.8   Notes   Call  Rec'd 12/07  Call   1x red   Call         Date 2/15 2/22 2/29/24 3/7 3/11 3/18 3/25/24 4/1 4/8/24 4/16     Total WeeklyDose 25 mg  25 mg 25 mg 25 mg 25 mg  25 mg 22.5 mg  25 mg 25 mg 25 mg     INR 2.7 2.8 2.5 2.5 2.2 4.0 2.4 2.4 2.2 2.0     Notes call    Call doxy 1x reduced   Call           Phone Interview:  Tablet Strength: 5 mg (1/5/22)  Patient Contact Info: [phone] (home) preferred; [phone] (cell)  Verbal Release Auth: signed 5/25/21 -- May speak w/Femi Ngo, ; May leave voicemail on home phone  Lab Contact Info: [facility]  *Will call once monthly or if INR out of range*    Patient Findings    Negatives: Signs/symptoms of thrombosis, Signs/symptoms of bleeding, Laboratory test error suspected, Change in health, Change in alcohol use, Change in activity, Upcoming invasive procedure, Emergency department visit, Upcoming dental procedure, Missed doses, Extra doses, Change in medications, Change in diet/appetite, Hospital admission, Bruising, Other complaints   Comments: Patient not contacted for this encounter.     Plan:  INR is therapeutic today at 2.0 (goal 2.0-3.0). Ms. Ngo will continue warfarin 2.5 mg oral daily except 5 mg SunTuesThurs until recheck (25 mg/week).   Repeat INR in one week, 4/23/24.  Marianela Ngo understands the importance of calling the [facility] if she notices any s/sx of bleeding, stroke, or abnormal bruising, if any changes are made to her medications or medication doses (Rx, OTC, herbal), or if any upcoming procedures are scheduled. Marianela Ngo will likewise let us know if any other changes, questions, or concerns arise regarding anticoagulation therapy. she understands the importance of seeking medical attention immediately if she experiences any falls, vehicle accidents, or abnormal bleeding or bruising. Marianela  Huber voiced understanding of this information and confirms that she has the [facility]'s contact information. Otherwise, we will plan to contact the patient once monthly or if her INR is out of range.      Carol Contreras CPhT   08:54 EDT 4/16/2024      IYuridia, [facility], have reviewed the note in full and agree with the assessment and plan.  04/16/24  11:05 EDT

## 2024-04-22 ENCOUNTER — ANTICOAGULATION VISIT (OUTPATIENT)
Dept: PHARMACY | Facility: HOSPITAL | Age: 79
End: 2024-04-22
Payer: MEDICARE

## 2024-04-22 DIAGNOSIS — I48.0 PAROXYSMAL ATRIAL FIBRILLATION: Primary | ICD-10-CM

## 2024-04-22 LAB — INR PPP: 1.5

## 2024-04-22 NOTE — PROGRESS NOTES
Anticoagulation Clinic - Remote Progress Note  mdINR Home Monitor  Testing frequency: Weekly    Indication: Paroxysmal afib  Referring Provider: Susan [Last 10/25/23]  Initial Warfarin Start Date: 2020? 2019?  Goal INR: 2.0-3.0  Current Drug Interactions: hydrochlorothiazide   [id number]: 4 (Age ?74, Sex, HTN) [estimated 5/20/21]  Bleed Risk: self reports negative history for GI bleed  Other: DOAC too expensive    Diet: Green beans, peas, zucchini, or priyanka salads 2-3x/week (3/11/24)  Alcohol: None  Tobacco: None  OTC Pain Medication: APAP only    INR History:  Date 5/14 5/18 5/25 6/1 6/15 7/6 7/12 7/22 7/28 8/4 8/11 8/18 8/23 8/27   Total WeeklyDose  22.5mg 25mg 25mg 25mg 25mg 25mg 25mg 25mg 25mg 25mg 27.5mg 27.5mg 27.5mg   INR 3.5 1.9 2.3 2.3 2.8 2.7 2.1 2.2 1.9 2.0 1.8 2.3 2.2 1.8   Notes [place] Cards 1x hold; incr GLV     HM  incr GLV  no GLV incr GLV cefdinir cefdinir     Date 9/1 9/8 9/15 9/22 9/29 10/6 10/13 10/20 10/27 11/3 11/10 11/17 11/24 12/1   Total WeeklyDose 27.5mg 27.5mg 27.5mg 27.5mg 27.5mg 27.5mg 27.5mg 30mg 27.5mg 27.5mg 27.5mg 25mg 27.5mg 27.5mg   INR 2.1 2.3 3.3 2.3 2.2 2.0 1.9 2.6 3.2 2.4 3.4 1.6 2.4 2.3   Notes cefdinir  decr GLV?  keflex   1x incr dose   decr GLV? 1x decr dose;   incr GLV?       Date 12/9 12/15 12/22 12/29 1/5/22 1/12 1/20 1/26 1/31 2/3 2/7 2/10 2/16 2/23   Total WeeklyDose 27.5mg 27.5mg 27.5mg 27.5mg 27.5mg 27.5mg 27.5mg 27.5mg 27.5mg 25mg 25mg 22.5mg 27.5mg 27.5mg   INR 2.5 2.4 2.7 2.5 2.6 2.2 2.5 2.1 3.8 2.6 4.1 2.5 2.7 3.5   Notes call    call   COVID+ dex / azith 1x hold pred 20 BID x5d  1x hold; 1x decr dose;   call call call     Date 3/2 3/9 3/16 3/23 3/30 4/5 4/13 4/20 4/27 5/4 5/11 5/18 5/25 6/1   Total WeeklyDose 25mg 27.5mg 27.5mg 27.5mg 27.5mg 27.5mg 27.5 mg 27.5mg 27.5 mg 27.5 mg 27.5 mg 27.5 mg 27.5 mg 27.5mg   INR 2.5 2.9 2.2 2.8 2.7 2.4 2.5 2.3 2.5 2.8 3.1 2.6 2.5 2.3   Notes 1x decr  call call    call    call Call  apap rec 5/19       Date 6/8 6/15  6/22 6/29 7/6 7/14 7/20 7/27 8/3 8/10 8/17 8/25 8/31 9/7   Total Weekly Dose 27.5 mg 27.5 mg 27.5 mg 27.5 mg 27.5 mg 27.5 mg 27.5 mg 30 mg 27.5 mg 27.5mg 27.5 mg 27.5 mg 27.5 mg 27.5 mg   INR 2.5 2.5 2.7 2.5 2.9 2.4 1.8 2.7 2.8 2.8 2.8 3.1 2.6 3.5   Notes call    call  call 12/71x boost   call Call  Dec GLV  call     Date 9/14 9/21 9/28 10/5 10/12 10/19 10/26 11/2 11/9 11/16 11/23 11/30 12/7   Total WeeklyDose 27.5mg 25 mg 25 mg 25 mg 25 mg  25 mg 25 mg 25 mg 25 mg 25 mg 25 mg 25 mg 25 mg   INR 3.1 2.9 2.5 2.9 2.2 2.4 2.3 2.1 2.3 2.3 2.1 2.2 1.9   Notes 1x hold inc GLV call   Call     call    Call  INC GLV     Date 12/14 12/22 12/28 1/4/23 1/11 1/18 1/25 2/1 2/8 2/16/23 2/22 3/1/23   Total WeeklyDose 27.5 mg 25 mg  20 mg 27.5 mg 27.5 mg 27.5 mg 27.5 mg 27.5 mg 27.5 mg 27.5 mg 27.5mg 27.5 mg    INR 2.8 2.1 1.9 1.9 1.8 2.1 2.2 2.0 2.7 2.5 2.6 2.8   Notes 1x boost  rec 12/15 Call  Call 1x miss call call Call  Call    Call        Date 3/9 3/16 3/22 3/29 4/5 4/12 4/19 4/26 5/3 5/10/23 5/17 5/24   Total WeeklyDose 27.5 mg 27.5 mg 27.5 mg 25mg 27.5 mg 27.5 mg 27.5 mg 27.5 mg 27.5 mg  27.5 mg  27.5 mg 27.5 mg   INR 2.6  2.6 4.1 2.7 2.6 2.7 2.9 2.7 2.5 2.8 2.0 2.4   Notes  call call call call    call        Date 5/31 6/7/23 6/14 6/21/23 6/28 7/5 7/13 7/19/23 7/26/23 8/2 08/10 8/16/23   Total WeeklyDose 27.5 mg 27.5 mg  27.5 mg 27.5 mg 27.5 mg 25mg 25 mg 25 mg 25 mg 25 mg 25 mg 25 mg   INR 2.8 2.5 3.2 2.9 3.8 3.2 2.9 2.3 2.6 2.9 2.8 2.9   Notes call  Call  Dec GLV   Hold x1; moving; call     call      Date 08/23 8/30/23 9/8 9/14 09/21 09/28 10/05 10/13/23 10/19 10/26 11/3 11/9   Total WeeklyDose 25 mg 25 mg 25 mg 25 mg 25 mg 25 mg 25 mg 25 mg 27.5 mg 25 mg  27.5 mg 27.5 mg   INR 2.9 2.0 2.4 2.4 2.7 2.8 2.3 1.9 2.4 1.8 2.0 3.1   Notes   call    call Call  1x boost   call     Date 11/16 11/22 11/29 12/06 12/14 12/20 12/27 1/3/24 1/10/24 1/17/24 1/24 1/31/24 2/7/24   Total Weekly Dose 25 mg 25 mg 25 mg 25 mg 25 mg 25 mg 25mg  22.5  mg 25 mg  25 mg  25 mg  25 mg 25 mg   INR 2.4 2.3 2.5 2.6 2.6 2.5 3.6 2.4 2.6 2.3 2.1 2.3 2.8   Notes   Call  Rec'd 12/07  Call   1x red   Call         Date 2/15 2/22 2/29/24 3/7 3/11 3/18 3/25/24 4/1 4/8/24 4/16 4/29    Total WeeklyDose 25 mg  25 mg 25 mg 25 mg 25 mg  25 mg 22.5 mg  25 mg 25 mg 25 mg 25 mg    INR 2.7 2.8 2.5 2.5 2.2 4.0 2.4 2.4 2.2 2.0 1.5    Notes call    Call doxy 1x reduced   Call     Call; Maybe a little more GLV? Boost x1     Phone Interview:  Tablet Strength: 5 mg (1/5/22)  Patient Contact Info: [phone] (home) preferred; [phone] (cell)  Verbal Release Auth: signed 5/25/21 -- May speak w/Femi Ngo, ; May leave voicemail on home phone  Lab Contact Info: [facility]  *Will call once monthly or if INR out of range*      Patient Findings    Positives: Change in diet/appetite   Negatives: Signs/symptoms of thrombosis, Signs/symptoms of bleeding, Laboratory test error suspected, Change in health, Change in alcohol use, Change in activity, Upcoming invasive procedure, Emergency department visit, Upcoming dental procedure, Missed doses, Extra doses, Change in medications, Hospital admission, Bruising, Other complaints   Comments: Ms Ngo said that nothing has changed. She stated she had a salad on Saturday and had a little more than usual but she typically has a few salads a week.     Plan:  INR is subtherapeutic today at 1.5 (goal 2.0-3.0). Ms. Ngo will boost warfarin to 5mg today continue warfarin 2.5 mg oral daily except 5 mg SunTuesThurs until recheck (25 mg/week).   Repeat INR in one week, 4/29/24.  Marianela Ngo understands the importance of calling the [facility] if she notices any s/sx of bleeding, stroke, or abnormal bruising, if any changes are made to her medications or medication doses (Rx, OTC, herbal), or if any upcoming procedures are scheduled. Marianela Ngo will likewise let us know if any other changes, questions, or concerns  arise regarding anticoagulation therapy. she understands the importance of seeking medical attention immediately if she experiences any falls, vehicle accidents, or abnormal bleeding or bruising. Marianela Ngo voiced understanding of this information and confirms that she has the [facility]'s contact information. Otherwise, we will plan to contact the patient once monthly or if her INR is out of range.    Thank you,    Fady You PharmD, KARENA, BCPS  4/22/2024  10:52 EDT

## 2024-04-26 ENCOUNTER — CLINICAL SUPPORT (OUTPATIENT)
Dept: CARDIOLOGY | Facility: CLINIC | Age: 79
End: 2024-04-26
Payer: MEDICARE

## 2024-04-26 DIAGNOSIS — E78.5 HYPERLIPIDEMIA LDL GOAL <100: ICD-10-CM

## 2024-04-26 DIAGNOSIS — I10 ESSENTIAL HYPERTENSION: Primary | ICD-10-CM

## 2024-04-26 PROCEDURE — 36415 COLL VENOUS BLD VENIPUNCTURE: CPT | Performed by: INTERNAL MEDICINE

## 2024-04-26 NOTE — PROGRESS NOTES
Venipuncture Blood Specimen Collection  Venipuncture performed in clinic by Jennifer Veliz MA with good hemostasis. Patient tolerated the procedure well without complications.   04/26/24   Jennifer Veliz MA

## 2024-04-29 ENCOUNTER — ANTICOAGULATION VISIT (OUTPATIENT)
Dept: PHARMACY | Facility: HOSPITAL | Age: 79
End: 2024-04-29
Payer: MEDICARE

## 2024-04-29 DIAGNOSIS — I48.0 PAROXYSMAL ATRIAL FIBRILLATION: Primary | ICD-10-CM

## 2024-04-29 LAB
ALBUMIN SERPL-MCNC: 4.1 G/DL (ref 3.8–4.8)
ALBUMIN/GLOB SERPL: 1.8 {RATIO} (ref 1.2–2.2)
ALP SERPL-CCNC: 84 IU/L (ref 44–121)
ALT SERPL-CCNC: 13 IU/L (ref 0–32)
APO B SERPL-MCNC: 48 MG/DL
AST SERPL-CCNC: 17 IU/L (ref 0–40)
BASOPHILS # BLD AUTO: 0 X10E3/UL (ref 0–0.2)
BASOPHILS NFR BLD AUTO: 0 %
BILIRUB SERPL-MCNC: 0.5 MG/DL (ref 0–1.2)
BUN SERPL-MCNC: 19 MG/DL (ref 8–27)
BUN/CREAT SERPL: 21 (ref 12–28)
CALCIUM SERPL-MCNC: 9.3 MG/DL (ref 8.7–10.3)
CHLORIDE SERPL-SCNC: 103 MMOL/L (ref 96–106)
CHOLEST SERPL-MCNC: 116 MG/DL (ref 100–199)
CK SERPL-CCNC: 52 U/L (ref 32–182)
CO2 SERPL-SCNC: 25 MMOL/L (ref 20–29)
CREAT SERPL-MCNC: 0.9 MG/DL (ref 0.57–1)
EGFRCR SERPLBLD CKD-EPI 2021: 65 ML/MIN/1.73
EOSINOPHIL # BLD AUTO: 0.2 X10E3/UL (ref 0–0.4)
EOSINOPHIL NFR BLD AUTO: 3 %
ERYTHROCYTE [DISTWIDTH] IN BLOOD BY AUTOMATED COUNT: 13.7 % (ref 11.7–15.4)
GLOBULIN SER CALC-MCNC: 2.3 G/DL (ref 1.5–4.5)
GLUCOSE SERPL-MCNC: 92 MG/DL (ref 70–99)
HCT VFR BLD AUTO: 38.3 % (ref 34–46.6)
HDLC SERPL-MCNC: 53 MG/DL
HGB BLD-MCNC: 13.1 G/DL (ref 11.1–15.9)
IMM GRANULOCYTES # BLD AUTO: 0 X10E3/UL (ref 0–0.1)
IMM GRANULOCYTES NFR BLD AUTO: 0 %
INR PPP: 1.8
LDLC SERPL CALC-MCNC: 50 MG/DL (ref 0–99)
LPA SERPL-SCNC: 21.9 NMOL/L
LYMPHOCYTES # BLD AUTO: 1.2 X10E3/UL (ref 0.7–3.1)
LYMPHOCYTES NFR BLD AUTO: 20 %
MCH RBC QN AUTO: 31 PG (ref 26.6–33)
MCHC RBC AUTO-ENTMCNC: 34.2 G/DL (ref 31.5–35.7)
MCV RBC AUTO: 91 FL (ref 79–97)
MONOCYTES # BLD AUTO: 0.6 X10E3/UL (ref 0.1–0.9)
MONOCYTES NFR BLD AUTO: 10 %
NEUTROPHILS # BLD AUTO: 3.9 X10E3/UL (ref 1.4–7)
NEUTROPHILS NFR BLD AUTO: 67 %
PLATELET # BLD AUTO: 217 X10E3/UL (ref 150–450)
POTASSIUM SERPL-SCNC: 4.2 MMOL/L (ref 3.5–5.2)
PROT SERPL-MCNC: 6.4 G/DL (ref 6–8.5)
RBC # BLD AUTO: 4.22 X10E6/UL (ref 3.77–5.28)
SODIUM SERPL-SCNC: 139 MMOL/L (ref 134–144)
TRIGL SERPL-MCNC: 56 MG/DL (ref 0–149)
VLDLC SERPL CALC-MCNC: 13 MG/DL (ref 5–40)
WBC # BLD AUTO: 5.9 X10E3/UL (ref 3.4–10.8)

## 2024-04-29 NOTE — PROGRESS NOTES
Anticoagulation Clinic - Remote Progress Note  mdINR Home Monitor  Testing frequency: Weekly    Indication: Paroxysmal afib  Referring Provider: Susan [Last 10/25/23]  Initial Warfarin Start Date: 2020? 2019?  Goal INR: 2.0-3.0  Current Drug Interactions: hydrochlorothiazide   [id number]: 4 (Age ?74, Sex, HTN) [estimated 5/20/21]  Bleed Risk: self reports negative history for GI bleed  Other: DOAC too expensive    Diet: Green beans, peas, zucchini, or priyanka salads 2-3x/week (3/11/24)  Alcohol: None  Tobacco: None  OTC Pain Medication: APAP only    INR History:  Date 5/14 5/18 5/25 6/1 6/15 7/6 7/12 7/22 7/28 8/4 8/11 8/18 8/23 8/27   Total WeeklyDose  22.5mg 25mg 25mg 25mg 25mg 25mg 25mg 25mg 25mg 25mg 27.5mg 27.5mg 27.5mg   INR 3.5 1.9 2.3 2.3 2.8 2.7 2.1 2.2 1.9 2.0 1.8 2.3 2.2 1.8   Notes [place] Cards 1x hold; incr GLV     HM  incr GLV  no GLV incr GLV cefdinir cefdinir     Date 9/1 9/8 9/15 9/22 9/29 10/6 10/13 10/20 10/27 11/3 11/10 11/17 11/24 12/1   Total WeeklyDose 27.5mg 27.5mg 27.5mg 27.5mg 27.5mg 27.5mg 27.5mg 30mg 27.5mg 27.5mg 27.5mg 25mg 27.5mg 27.5mg   INR 2.1 2.3 3.3 2.3 2.2 2.0 1.9 2.6 3.2 2.4 3.4 1.6 2.4 2.3   Notes cefdinir  decr GLV?  keflex   1x incr dose   decr GLV? 1x decr dose;   incr GLV?       Date 12/9 12/15 12/22 12/29 1/5/22 1/12 1/20 1/26 1/31 2/3 2/7 2/10 2/16 2/23   Total WeeklyDose 27.5mg 27.5mg 27.5mg 27.5mg 27.5mg 27.5mg 27.5mg 27.5mg 27.5mg 25mg 25mg 22.5mg 27.5mg 27.5mg   INR 2.5 2.4 2.7 2.5 2.6 2.2 2.5 2.1 3.8 2.6 4.1 2.5 2.7 3.5   Notes call    call   COVID+ dex / azith 1x hold pred 20 BID x5d  1x hold; 1x decr dose;   call call call     Date 3/2 3/9 3/16 3/23 3/30 4/5 4/13 4/20 4/27 5/4 5/11 5/18 5/25 6/1   Total WeeklyDose 25mg 27.5mg 27.5mg 27.5mg 27.5mg 27.5mg 27.5 mg 27.5mg 27.5 mg 27.5 mg 27.5 mg 27.5 mg 27.5 mg 27.5mg   INR 2.5 2.9 2.2 2.8 2.7 2.4 2.5 2.3 2.5 2.8 3.1 2.6 2.5 2.3   Notes 1x decr  call call    call    call Call  apap rec 5/19       Date 6/8 6/15  6/22 6/29 7/6 7/14 7/20 7/27 8/3 8/10 8/17 8/25 8/31 9/7   Total Weekly Dose 27.5 mg 27.5 mg 27.5 mg 27.5 mg 27.5 mg 27.5 mg 27.5 mg 30 mg 27.5 mg 27.5mg 27.5 mg 27.5 mg 27.5 mg 27.5 mg   INR 2.5 2.5 2.7 2.5 2.9 2.4 1.8 2.7 2.8 2.8 2.8 3.1 2.6 3.5   Notes call    call  call 12/71x boost   call Call  Dec GLV  call     Date 9/14 9/21 9/28 10/5 10/12 10/19 10/26 11/2 11/9 11/16 11/23 11/30 12/7   Total WeeklyDose 27.5mg 25 mg 25 mg 25 mg 25 mg  25 mg 25 mg 25 mg 25 mg 25 mg 25 mg 25 mg 25 mg   INR 3.1 2.9 2.5 2.9 2.2 2.4 2.3 2.1 2.3 2.3 2.1 2.2 1.9   Notes 1x hold inc GLV call   Call     call    Call  INC GLV     Date 12/14 12/22 12/28 1/4/23 1/11 1/18 1/25 2/1 2/8 2/16/23 2/22 3/1/23   Total WeeklyDose 27.5 mg 25 mg  20 mg 27.5 mg 27.5 mg 27.5 mg 27.5 mg 27.5 mg 27.5 mg 27.5 mg 27.5mg 27.5 mg    INR 2.8 2.1 1.9 1.9 1.8 2.1 2.2 2.0 2.7 2.5 2.6 2.8   Notes 1x boost  rec 12/15 Call  Call 1x miss call call Call  Call    Call        Date 3/9 3/16 3/22 3/29 4/5 4/12 4/19 4/26 5/3 5/10/23 5/17 5/24   Total WeeklyDose 27.5 mg 27.5 mg 27.5 mg 25mg 27.5 mg 27.5 mg 27.5 mg 27.5 mg 27.5 mg  27.5 mg  27.5 mg 27.5 mg   INR 2.6  2.6 4.1 2.7 2.6 2.7 2.9 2.7 2.5 2.8 2.0 2.4   Notes  call call call call    call        Date 5/31 6/7/23 6/14 6/21/23 6/28 7/5 7/13 7/19/23 7/26/23 8/2 08/10 8/16/23   Total WeeklyDose 27.5 mg 27.5 mg  27.5 mg 27.5 mg 27.5 mg 25mg 25 mg 25 mg 25 mg 25 mg 25 mg 25 mg   INR 2.8 2.5 3.2 2.9 3.8 3.2 2.9 2.3 2.6 2.9 2.8 2.9   Notes call  Call  Dec GLV   Hold x1; moving; call     call      Date 08/23 8/30/23 9/8 9/14 09/21 09/28 10/05 10/13/23 10/19 10/26 11/3 11/9   Total WeeklyDose 25 mg 25 mg 25 mg 25 mg 25 mg 25 mg 25 mg 25 mg 27.5 mg 25 mg  27.5 mg 27.5 mg   INR 2.9 2.0 2.4 2.4 2.7 2.8 2.3 1.9 2.4 1.8 2.0 3.1   Notes   call    call Call  1x boost   call     Date 11/16 11/22 11/29 12/06 12/14 12/20 12/27 1/3/24 1/10/24 1/17/24 1/24 1/31/24 2/7/24   Total Weekly Dose 25 mg 25 mg 25 mg 25 mg 25 mg 25 mg 25mg  22.5  mg 25 mg  25 mg  25 mg  25 mg 25 mg   INR 2.4 2.3 2.5 2.6 2.6 2.5 3.6 2.4 2.6 2.3 2.1 2.3 2.8   Notes   Call  Rec'd 12/07  Call   1x red   Call         Date 2/15 2/22 2/29/24 3/7 3/11 3/18 3/25/24 4/1 4/8/24 4/16 4/22 4/29   Total WeeklyDose 25 mg  25 mg 25 mg 25 mg 25 mg  25 mg 22.5 mg  25 mg 25 mg 25 mg 25 mg 27.5 mg   INR 2.7 2.8 2.5 2.5 2.2 4.0 2.4 2.4 2.2 2.0 1.5 1.8   Notes call    Call doxy 1x reduced   Call     Call; Maybe a little more GLV? Boost x1  call     Date               Total Weekly Dose               INR               Notes Boost x 1  call                  Phone Interview:  Tablet Strength: 5 mg (1/5/22)  Patient Contact Info: [phone] (home) preferred; [phone] (cell)  Verbal Release Auth: signed 5/25/21 -- May speak w/Femi Ngo, ; May leave voicemail on home phone  Lab Contact Info: [facility]  *Will call once monthly or if INR out of range*    Patient Findings    Negatives: Signs/symptoms of thrombosis, Signs/symptoms of bleeding, Laboratory test error suspected, Change in health, Change in alcohol use, Change in activity, Upcoming invasive procedure, Emergency department visit, Upcoming dental procedure, Missed doses, Extra doses, Change in medications, Change in diet/appetite, Hospital admission, Bruising, Other complaints   Comments: All findings negative per patient. She has returned to her normal GLV intake and not extra like last week         Plan:  INR is still subtherapeutic today at 1.8 (goal 2.0-3.0). Instructed Ms. Ngo to boost warfarin to 5mg today then continue warfarin 2.5 mg oral daily except 5 mg SunTuesThurs until recheck.   Repeat INR on 5/3/24.  Marianela Ngo understands the importance of calling the [facility] if she notices any s/sx of bleeding, stroke, or abnormal bruising, if any changes are made to her medications or medication doses (Rx, OTC, herbal), or if any upcoming procedures are scheduled. Marianela Ngo will  likewise let us know if any other changes, questions, or concerns arise regarding anticoagulation therapy. she understands the importance of seeking medical attention immediately if she experiences any falls, vehicle accidents, or abnormal bleeding or bruising. Marianela Huber voiced understanding of this information and confirms that she has the [facility]'s contact information. Otherwise, we will plan to contact the patient once monthly or if her INR is out of range.    Yuridia Little, PharmD  4/29/2024  10:56 EDT

## 2024-05-03 ENCOUNTER — ANTICOAGULATION VISIT (OUTPATIENT)
Dept: PHARMACY | Facility: HOSPITAL | Age: 79
End: 2024-05-03
Payer: MEDICARE

## 2024-05-03 ENCOUNTER — OFFICE VISIT (OUTPATIENT)
Dept: CARDIOLOGY | Facility: CLINIC | Age: 79
End: 2024-05-03
Payer: MEDICARE

## 2024-05-03 VITALS
OXYGEN SATURATION: 99 % | HEIGHT: 65 IN | RESPIRATION RATE: 16 BRPM | DIASTOLIC BLOOD PRESSURE: 72 MMHG | WEIGHT: 173 LBS | SYSTOLIC BLOOD PRESSURE: 124 MMHG | BODY MASS INDEX: 28.82 KG/M2 | HEART RATE: 58 BPM

## 2024-05-03 DIAGNOSIS — I10 ESSENTIAL HYPERTENSION: Primary | ICD-10-CM

## 2024-05-03 DIAGNOSIS — I48.0 PAROXYSMAL ATRIAL FIBRILLATION: Primary | ICD-10-CM

## 2024-05-03 DIAGNOSIS — E78.2 MIXED HYPERLIPIDEMIA: ICD-10-CM

## 2024-05-03 DIAGNOSIS — I48.0 PAROXYSMAL ATRIAL FIBRILLATION: ICD-10-CM

## 2024-05-03 LAB — INR PPP: 2.2

## 2024-05-03 PROCEDURE — 3074F SYST BP LT 130 MM HG: CPT | Performed by: INTERNAL MEDICINE

## 2024-05-03 PROCEDURE — 1159F MED LIST DOCD IN RCRD: CPT | Performed by: INTERNAL MEDICINE

## 2024-05-03 PROCEDURE — 1160F RVW MEDS BY RX/DR IN RCRD: CPT | Performed by: INTERNAL MEDICINE

## 2024-05-03 PROCEDURE — 3078F DIAST BP <80 MM HG: CPT | Performed by: INTERNAL MEDICINE

## 2024-05-03 PROCEDURE — 99214 OFFICE O/P EST MOD 30 MIN: CPT | Performed by: INTERNAL MEDICINE

## 2024-05-03 PROCEDURE — 93000 ELECTROCARDIOGRAM COMPLETE: CPT | Performed by: INTERNAL MEDICINE

## 2024-05-03 NOTE — PROGRESS NOTES
Anticoagulation Clinic - Remote Progress Note  mdINR Home Monitor  Testing frequency: Weekly    Indication: Paroxysmal afib  Referring Provider: Susan [Last 10/25/23]  Initial Warfarin Start Date: 2020? 2019?  Goal INR: 2.0-3.0  Current Drug Interactions: hydrochlorothiazide   [id number]: 4 (Age ?74, Sex, HTN) [estimated 5/20/21]  Bleed Risk: self reports negative history for GI bleed  Other: DOAC too expensive    Diet: Green beans, peas, zucchini, or priyanka salads 2-3x/week (3/11/24)  Alcohol: None  Tobacco: None  OTC Pain Medication: APAP only    INR History:  Date 5/14 5/18 5/25 6/1 6/15 7/6 7/12 7/22 7/28 8/4 8/11 8/18 8/23 8/27   Total WeeklyDose  22.5mg 25mg 25mg 25mg 25mg 25mg 25mg 25mg 25mg 25mg 27.5mg 27.5mg 27.5mg   INR 3.5 1.9 2.3 2.3 2.8 2.7 2.1 2.2 1.9 2.0 1.8 2.3 2.2 1.8   Notes [place] Cards 1x hold; incr GLV     HM  incr GLV  no GLV incr GLV cefdinir cefdinir     Date 9/1 9/8 9/15 9/22 9/29 10/6 10/13 10/20 10/27 11/3 11/10 11/17 11/24 12/1   Total WeeklyDose 27.5mg 27.5mg 27.5mg 27.5mg 27.5mg 27.5mg 27.5mg 30mg 27.5mg 27.5mg 27.5mg 25mg 27.5mg 27.5mg   INR 2.1 2.3 3.3 2.3 2.2 2.0 1.9 2.6 3.2 2.4 3.4 1.6 2.4 2.3   Notes cefdinir  decr GLV?  keflex   1x incr dose   decr GLV? 1x decr dose;   incr GLV?       Date 12/9 12/15 12/22 12/29 1/5/22 1/12 1/20 1/26 1/31 2/3 2/7 2/10 2/16 2/23   Total WeeklyDose 27.5mg 27.5mg 27.5mg 27.5mg 27.5mg 27.5mg 27.5mg 27.5mg 27.5mg 25mg 25mg 22.5mg 27.5mg 27.5mg   INR 2.5 2.4 2.7 2.5 2.6 2.2 2.5 2.1 3.8 2.6 4.1 2.5 2.7 3.5   Notes call    call   COVID+ dex / azith 1x hold pred 20 BID x5d  1x hold; 1x decr dose;   call call call     Date 3/2 3/9 3/16 3/23 3/30 4/5 4/13 4/20 4/27 5/4 5/11 5/18 5/25 6/1   Total WeeklyDose 25mg 27.5mg 27.5mg 27.5mg 27.5mg 27.5mg 27.5 mg 27.5mg 27.5 mg 27.5 mg 27.5 mg 27.5 mg 27.5 mg 27.5mg   INR 2.5 2.9 2.2 2.8 2.7 2.4 2.5 2.3 2.5 2.8 3.1 2.6 2.5 2.3   Notes 1x decr  call call    call    call Call  apap rec 5/19       Date 6/8 6/15  6/22 6/29 7/6 7/14 7/20 7/27 8/3 8/10 8/17 8/25 8/31 9/7   Total Weekly Dose 27.5 mg 27.5 mg 27.5 mg 27.5 mg 27.5 mg 27.5 mg 27.5 mg 30 mg 27.5 mg 27.5mg 27.5 mg 27.5 mg 27.5 mg 27.5 mg   INR 2.5 2.5 2.7 2.5 2.9 2.4 1.8 2.7 2.8 2.8 2.8 3.1 2.6 3.5   Notes call    call  call 12/71x boost   call Call  Dec GLV  call     Date 9/14 9/21 9/28 10/5 10/12 10/19 10/26 11/2 11/9 11/16 11/23 11/30 12/7   Total WeeklyDose 27.5mg 25 mg 25 mg 25 mg 25 mg  25 mg 25 mg 25 mg 25 mg 25 mg 25 mg 25 mg 25 mg   INR 3.1 2.9 2.5 2.9 2.2 2.4 2.3 2.1 2.3 2.3 2.1 2.2 1.9   Notes 1x hold inc GLV call   Call     call    Call  INC GLV     Date 12/14 12/22 12/28 1/4/23 1/11 1/18 1/25 2/1 2/8 2/16/23 2/22 3/1/23   Total WeeklyDose 27.5 mg 25 mg  20 mg 27.5 mg 27.5 mg 27.5 mg 27.5 mg 27.5 mg 27.5 mg 27.5 mg 27.5mg 27.5 mg    INR 2.8 2.1 1.9 1.9 1.8 2.1 2.2 2.0 2.7 2.5 2.6 2.8   Notes 1x boost  rec 12/15 Call  Call 1x miss call call Call  Call    Call        Date 3/9 3/16 3/22 3/29 4/5 4/12 4/19 4/26 5/3 5/10/23 5/17 5/24   Total WeeklyDose 27.5 mg 27.5 mg 27.5 mg 25mg 27.5 mg 27.5 mg 27.5 mg 27.5 mg 27.5 mg  27.5 mg  27.5 mg 27.5 mg   INR 2.6  2.6 4.1 2.7 2.6 2.7 2.9 2.7 2.5 2.8 2.0 2.4   Notes  call call call call    call        Date 5/31 6/7/23 6/14 6/21/23 6/28 7/5 7/13 7/19/23 7/26/23 8/2 08/10 8/16/23   Total WeeklyDose 27.5 mg 27.5 mg  27.5 mg 27.5 mg 27.5 mg 25mg 25 mg 25 mg 25 mg 25 mg 25 mg 25 mg   INR 2.8 2.5 3.2 2.9 3.8 3.2 2.9 2.3 2.6 2.9 2.8 2.9   Notes call  Call  Dec GLV   Hold x1; moving; call     call      Date 08/23 8/30/23 9/8 9/14 09/21 09/28 10/05 10/13/23 10/19 10/26 11/3 11/9   Total WeeklyDose 25 mg 25 mg 25 mg 25 mg 25 mg 25 mg 25 mg 25 mg 27.5 mg 25 mg  27.5 mg 27.5 mg   INR 2.9 2.0 2.4 2.4 2.7 2.8 2.3 1.9 2.4 1.8 2.0 3.1   Notes   call    call Call  1x boost   call     Date 11/16 11/22 11/29 12/06 12/14 12/20 12/27 1/3/24 1/10/24 1/17/24 1/24 1/31/24 2/7/24   Total Weekly Dose 25 mg 25 mg 25 mg 25 mg 25 mg 25 mg 25mg  22.5  mg 25 mg  25 mg  25 mg  25 mg 25 mg   INR 2.4 2.3 2.5 2.6 2.6 2.5 3.6 2.4 2.6 2.3 2.1 2.3 2.8   Notes   Call  Rec'd 12/07  Call   1x red   Call         Date 2/15 2/22 2/29/24 3/7 3/11 3/18 3/25/24 4/1 4/8/24 4/16 4/22 4/29   Total WeeklyDose 25 mg  25 mg 25 mg 25 mg 25 mg  25 mg 22.5 mg  25 mg 25 mg 25 mg 25 mg 27.5 mg   INR 2.7 2.8 2.5 2.5 2.2 4.0 2.4 2.4 2.2 2.0 1.5 1.8   Notes call    Call doxy 1x reduced   Call     Call; Maybe a little more GLV? Boost x1  call     Date 5/3              Total Weekly Dose 27.5 mg 27.5 mg              INR 2.2              Notes Boost x 1  call Call                Phone Interview:  Tablet Strength: 5 mg (1/5/22)  Patient Contact Info: [phone] (home) preferred; [phone] (cell)  Verbal Release Auth: signed 5/25/21 -- May speak w/Femi Ngo, ; May leave voicemail on home phone  Lab Contact Info: [facility]  *Will call once monthly or if INR out of range*      Patient Findings  Negatives: Signs/symptoms of thrombosis, Signs/symptoms of bleeding, Laboratory test error suspected, Change in health, Change in alcohol use, Change in activity, Upcoming invasive procedure, Emergency department visit, Upcoming dental procedure, Missed doses, Extra doses, Change in medications, Change in diet/appetite, Hospital admission, Bruising, Other complaints   Comments: All findings negative per patient       Plan:  INR is therapeutic today at 2.2 (goal 2.0-3.0). Instructed Ms. Ngo to take Warfarin 5 mg oral daily except Warfarin 2.5 on Mon,Wed,&Fri until recheck   Repeat INR on 5/9/24  Marianela Ngo understands the importance of calling the [facility] if she notices any s/sx of bleeding, stroke, or abnormal bruising, if any changes are made to her medications or medication doses (Rx, OTC, herbal), or if any upcoming procedures are scheduled. Marianela Ngo will likewise let us know if any other changes, questions, or concerns arise regarding  anticoagulation therapy. she understands the importance of seeking medical attention immediately if she experiences any falls, vehicle accidents, or abnormal bleeding or bruising. Marianela Ngo voiced understanding of this information and confirms that she has the [facility]'s contact information. Otherwise, we will plan to contact the patient once monthly or if her INR is out of range.    Chicho Shine, Pharmacy Technician  5/3/2024  09:46 EDT    I, Yuridia Little, [facility], have reviewed the note in full and agree with the assessment and plan.  05/03/24  10:09 EDT

## 2024-05-03 NOTE — PROGRESS NOTES
[facility]  [facility]Y  [address] [place] [address]  Dept: [phone]  Dept Fax: [phone]    Marianela Ngo  1945    Follow Up Office Visit Note    History of Present Illness:  Marianela Ngo is a 78 y.o. female who presents to the clinic for Follow-up.PAF - She is asymptomatic, no complaints, no fatigue on Sotalol 8-0/40 EKG sinus bradycardia Hr 47 first degree AV block and also RBBB, will keep sotalol and she wants to try another blood thinner, she states can not adjust well to the diet, will use Eliquis 5mg bid, needs to hold warfarin 2 days before starting Eliquis     The following portions of the patient's history were reviewed and updated as appropriate: allergies, current medications, past family history, past medical history, past social history, past surgical history, and problem list.    Medications:  albuterol  amLODIPine  atorvastatin  budesonide  doxycycline  lisinopril-hydrochlorothiazide  nystatin cream  sotalol  warfarin    Subjective  No Known Allergies     Past Medical History:   Diagnosis Date    Allergies     Bradycardia with 51-60 beats per minute     Chronic atrial fibrillation     Current use of long term anticoagulation     DJD (degenerative joint disease)     Epistaxis     Essential hypertension     High risk medication use     Mild persistent asthma, uncomplicated     Mixed hyperlipidemia     Obstructive sleep apnea on CPAP     Other fatigue     Paroxysmal atrial fibrillation     Pneumonia 08/2015    Shortness of breath     Vitamin deficiency        Past Surgical History:   Procedure Laterality Date    CATARACT EXTRACTION, BILATERAL      HYSTERECTOMY      PARTIAL    REPLACEMENT TOTAL KNEE BILATERAL  2013,2014       Family History   Problem Relation Age of Onset    Cancer Father     Heart disease Sister     Cancer Brother     Diabetes Brother     Hypertension Other         Social History     Socioeconomic History    Marital status:  "   Tobacco Use    Smoking status: Former     Current packs/day: 0.00     Types: Cigarettes     Quit date:      Years since quittin.3    Smokeless tobacco: Never   Vaping Use    Vaping status: Never Used   Substance and Sexual Activity    Alcohol use: Never    Drug use: Never    Sexual activity: Defer       Review of Systems   Constitutional: Negative.    HENT: Negative.     Respiratory: Negative.     Cardiovascular: Negative.    Endocrine: Negative.    Genitourinary: Negative.    Musculoskeletal: Negative.    Skin: Negative.    Allergic/Immunologic: Negative.    Neurological: Negative.    Hematological: Negative.    Psychiatric/Behavioral: Negative.         Cardiovascular Procedures    ECHO/MUGA:  STRESS TESTS:   CARDIAC CATH:   DEVICES:   HOLTER:   CT/MRI:   VASCULAR:   CARDIOTHORACIC:     Objective  Vitals:    24 1044   BP: 124/72   BP Location: Right arm   Patient Position: Lying   Cuff Size: Adult   Pulse: 58   Resp: 16   SpO2: 99%   Weight: 78.5 kg (173 lb)   Height: 165.1 cm (65\")   PainSc: 0-No pain     Body mass index is 28.79 kg/m².     Physical Exam  Vitals reviewed.   Constitutional:       Appearance: Healthy appearance. Not in distress.   Neck:      Vascular: No JVR. JVD normal.   Pulmonary:      Effort: Pulmonary effort is normal.      Breath sounds: Normal breath sounds. No wheezing. No rhonchi. No rales.   Chest:      Chest wall: Not tender to palpatation.   Cardiovascular:      PMI at left midclavicular line. Normal rate. Regular rhythm. Normal S1. Normal S2.       Murmurs: There is no murmur.      No gallop.  No click. No rub.   Pulses:     Intact distal pulses.   Edema:     Peripheral edema absent.   Abdominal:      General: Bowel sounds are normal.      Palpations: Abdomen is soft.      Tenderness: There is no abdominal tenderness.   Musculoskeletal: Normal range of motion.         General: No tenderness. Skin:     General: Skin is warm and dry.   Neurological:      General: " No focal deficit present.      Mental Status: Alert and oriented to person, place and time.        Diagnostic Data    ECG 12 Lead    Date/Time: 5/3/2024 12:18 PM  Performed by: Amos Davis MD    Authorized by: Amos Davis MD  Comparison: compared with previous ECG from 10/25/2023  Similar to previous ECG  Rhythm: sinus rhythm and sinus bradycardia  Rate: bradycardic  BPM: 47  Conduction: right bundle branch block, bifascicular block and 1st degree AV block  QRS axis: normal    Clinical impression: abnormal EKG        Assessment and Plan  Diagnoses and all orders for this visit:    Essential hypertension-BP is 120.60, on Amlodipine 5mg, Lisinopril 20.12.5  -     CBC & Differential; Future  -     CK; Future  -     Comprehensive Metabolic Panel; Future  -     Lipid Panel; Future  -     Apolipoprotein B; Future  -     Lipoprotein A (LPA); Future    Paroxysmal atrial fibrillation- On Sotalol 80 mg bid, and warfarin, will change to Eliquis 5mg bid  -     CBC & Differential; Future  -     CK; Future  -     Comprehensive Metabolic Panel; Future  -     Lipid Panel; Future  -     Apolipoprotein B; Future  -     Lipoprotein A (LPA); Future    Mixed hyperlipidemia- On Lipitor 20 mg, LDL is 50 tolerates well   -     CBC & Differential; Future  -     CK; Future  -     Comprehensive Metabolic Panel; Future  -     Lipid Panel; Future  -     Apolipoprotein B; Future  -     Lipoprotein A (LPA); Future         Return in about 4 months (around 9/3/2024) for Recheck with Dr. Davis.    Amos Davis MD  05/03/2024

## 2024-05-13 RX ORDER — ALBUTEROL SULFATE 2.5 MG/3ML
SOLUTION RESPIRATORY (INHALATION)
Qty: 180 EACH | Refills: 5 | Status: SHIPPED | OUTPATIENT
Start: 2024-05-13

## 2024-05-13 RX ORDER — BUDESONIDE 0.5 MG/2ML
INHALANT ORAL
Qty: 30 EACH | Refills: 5 | Status: SHIPPED | OUTPATIENT
Start: 2024-05-13

## 2024-05-14 RX ORDER — BUDESONIDE 0.5 MG/2ML
INHALANT ORAL
Qty: 30 EACH | Refills: 5 | OUTPATIENT
Start: 2024-05-14

## 2024-05-14 RX ORDER — ALBUTEROL SULFATE 2.5 MG/3ML
SOLUTION RESPIRATORY (INHALATION)
Qty: 180 EACH | Refills: 5 | OUTPATIENT
Start: 2024-05-14

## 2024-05-14 NOTE — TELEPHONE ENCOUNTER
Caller: [facility] [address]. - [phone] PH - [phone] FX    Relationship: Pharmacy    Best call back number: [phone]     Requested Prescriptions:   Requested Prescriptions     Pending Prescriptions Disp Refills    budesonide (PULMICORT) 0.5 MG/2ML nebulizer solution 30 each 5     Sig: Use one vial in nebulizer daily- rinse mouth after use    albuterol (PROVENTIL) (2.5 MG/3ML) 0.083% nebulizer solution 180 each 5     Sig: Use one vial in nebulizer every 4 to 6 hours as needed        Pharmacy where request should be sent: [place] [address] [facility]. - [phone] PH - [phone] FX  [facility] - [place], [place] - [address] [phone] [facility] [phone] FX  [facility] [id number] - [address] [phone] [facility] [phone] FX     Last office visit with prescribing clinician: 8/24/2023   Last telemedicine visit with prescribing clinician: Visit date not found   Next office visit with prescribing clinician: Visit date not found     Additional details provided by patient: PLEASE CALL IN     Does the patient have less than a 3 day supply:  [] Yes  [x] No    Would you like a call back once the refill request has been completed: [] Yes [x] No    If the office needs to give you a call back, can they leave a voicemail: [] Yes [x] No    Philip Masterson   05/14/24 11:06 EDT

## 2024-05-17 ENCOUNTER — TELEPHONE (OUTPATIENT)
Dept: CARDIOLOGY | Facility: CLINIC | Age: 79
End: 2024-05-17
Payer: MEDICARE

## 2024-05-17 NOTE — TELEPHONE ENCOUNTER
"  Caller: Marianela Ngo \"Corry\"    Relationship: Self    Best call back number: [phone]    What is the best time to reach you: ANY    Who are you requesting to speak with (clinical staff, provider,  specific staff member): CLINICAL      What was the call regarding: PT HAS A CO-PAY OF OVER 100$ WITH [facility], BUT THEY STATE THAT SHE WOULD HAVE A 0 DOLLAR CO-PAY FOR PLAVIX AT 90 DAYS IF THAT IS SOMETHING DR. MONTOYA WOULD LIKE HER TO CHANGE TO. IF SO, CAN WE PUT A NEW SCRIPT FOR THAT AND SENT OVER TO [facility]  "

## 2024-05-17 NOTE — TELEPHONE ENCOUNTER
Spoke with Mrs Ngo and explained that plavix will not work for the afib.  Plavix is more antiplatelet and Eliquis and Xarelto are blood thinners.  She is good with staying with the Eliquis.

## 2024-05-23 ENCOUNTER — TELEPHONE (OUTPATIENT)
Dept: CARDIOLOGY | Facility: CLINIC | Age: 79
End: 2024-05-23
Payer: MEDICARE

## 2024-05-23 NOTE — TELEPHONE ENCOUNTER
Caller: GREGORY BEST    Relationship to patient: Emergency Contact    Best call back number:  [phone]    Patient is needing:  A RX FOR ELIQUIS FOR A 30 DAY FREE TRIAL SENT TO THE [facility] [place]  [place], [place]. PLEASE ADVISE THANK YOU

## 2024-05-24 NOTE — TELEPHONE ENCOUNTER
Spoke with patient she is needing the prescription sent to the pharmacy. Per last note, ok to send.

## 2024-06-06 ENCOUNTER — TELEPHONE (OUTPATIENT)
Dept: FAMILY MEDICINE CLINIC | Facility: CLINIC | Age: 79
End: 2024-06-06

## 2024-06-06 RX ORDER — ALBUTEROL SULFATE 2.5 MG/3ML
SOLUTION RESPIRATORY (INHALATION)
Qty: 180 EACH | Refills: 5 | Status: SHIPPED | OUTPATIENT
Start: 2024-06-06

## 2024-06-06 RX ORDER — ALBUTEROL SULFATE 2.5 MG/3ML
SOLUTION RESPIRATORY (INHALATION)
Qty: 180 EACH | Refills: 11 | OUTPATIENT
Start: 2024-06-06

## 2024-06-06 RX ORDER — BUDESONIDE 0.5 MG/2ML
INHALANT ORAL
Qty: 30 EACH | Refills: 5 | Status: SHIPPED | OUTPATIENT
Start: 2024-06-06

## 2024-06-06 RX ORDER — BUDESONIDE 0.5 MG/2ML
INHALANT ORAL
Qty: 30 EACH | Refills: 11 | OUTPATIENT
Start: 2024-06-06

## 2024-06-06 NOTE — TELEPHONE ENCOUNTER
Printed out ov notes from 8-24-23 and 3-8-24  Faxing to [facility] [phone]

## 2024-06-06 NOTE — TELEPHONE ENCOUNTER
Caller: JERO - BJORN    Relationship:     Best call back number: [phone]     What form or medical record are you requesting: CHART NOTES     Who is requesting this form or medical record from you: BJORN     How would you like to receive the form or medical records (pick-up, mail, fax): FAX: [phone]    Additional notes: JERO STATES SHE RECEIVED AN ORDER BUT IS NEEDING CHART NOTES TO FULFILL IT.

## 2024-06-07 RX ORDER — ALBUTEROL SULFATE 2.5 MG/3ML
SOLUTION RESPIRATORY (INHALATION)
Qty: 180 EACH | Refills: 5 | OUTPATIENT
Start: 2024-06-07

## 2024-06-07 RX ORDER — BUDESONIDE 0.5 MG/2ML
INHALANT ORAL
Qty: 30 EACH | Refills: 5 | OUTPATIENT
Start: 2024-06-07

## 2024-06-13 DIAGNOSIS — I48.0 PAROXYSMAL ATRIAL FIBRILLATION: ICD-10-CM

## 2024-06-14 RX ORDER — SOTALOL HYDROCHLORIDE 80 MG/1
TABLET ORAL
Qty: 135 TABLET | Refills: 3 | Status: SHIPPED | OUTPATIENT
Start: 2024-06-14

## 2024-07-09 ENCOUNTER — OFFICE VISIT (OUTPATIENT)
Dept: FAMILY MEDICINE CLINIC | Facility: CLINIC | Age: 79
End: 2024-07-09
Payer: MEDICARE

## 2024-07-09 VITALS
DIASTOLIC BLOOD PRESSURE: 80 MMHG | HEART RATE: 50 BPM | BODY MASS INDEX: 28.99 KG/M2 | SYSTOLIC BLOOD PRESSURE: 130 MMHG | WEIGHT: 174 LBS | OXYGEN SATURATION: 97 % | HEIGHT: 65 IN

## 2024-07-09 DIAGNOSIS — I10 ESSENTIAL HYPERTENSION: ICD-10-CM

## 2024-07-09 DIAGNOSIS — Z00.00 MEDICARE ANNUAL WELLNESS VISIT, SUBSEQUENT: Primary | ICD-10-CM

## 2024-07-09 DIAGNOSIS — Z11.59 ENCOUNTER FOR HEPATITIS C SCREENING TEST FOR LOW RISK PATIENT: ICD-10-CM

## 2024-07-09 DIAGNOSIS — J43.9 PULMONARY EMPHYSEMA, UNSPECIFIED EMPHYSEMA TYPE: ICD-10-CM

## 2024-07-09 PROCEDURE — G0439 PPPS, SUBSEQ VISIT: HCPCS | Performed by: PHYSICIAN ASSISTANT

## 2024-07-09 PROCEDURE — 1170F FXNL STATUS ASSESSED: CPT | Performed by: PHYSICIAN ASSISTANT

## 2024-07-09 PROCEDURE — 3079F DIAST BP 80-89 MM HG: CPT | Performed by: PHYSICIAN ASSISTANT

## 2024-07-09 PROCEDURE — 3075F SYST BP GE 130 - 139MM HG: CPT | Performed by: PHYSICIAN ASSISTANT

## 2024-07-09 PROCEDURE — 1126F AMNT PAIN NOTED NONE PRSNT: CPT | Performed by: PHYSICIAN ASSISTANT

## 2024-07-09 NOTE — PROGRESS NOTES
The ABCs of the Annual Wellness Visit  Subsequent Medicare Wellness Visit    Subjective      Marianela Ngo is a 78 y.o. female who presents for a Subsequent Medicare Wellness Visit.    She states has been feeling well. She sees cardiology for her medications and currently has refills on copd medication. She had some labs drawn through cardiology. Denies recent falls or problems with gait.     The following portions of the patient's history were reviewed and   updated as appropriate: allergies, current medications, past family history, past medical history, past social history, past surgical history, and problem list.    Compared to one year ago, the patient feels her physical   health is the same.    Compared to one year ago, the patient feels her mental   health is better.    Recent Hospitalizations:  She was not admitted to the hospital during the last year.       Current Medical Providers:  Patient Care Team:  Marietta Archibald PA-C as PCP - General (Physician Assistant)  Amos Davis MD as Consulting Physician (Cardiology)    Outpatient Medications Prior to Visit   Medication Sig Dispense Refill    albuterol (PROVENTIL) (2.5 MG/3ML) 0.083% nebulizer solution Use one vial in nebulizer every 4 to 6 hours as needed 180 each 5    amLODIPine (NORVASC) 5 MG tablet TAKE 1 TABLET BY MOUTH DAILY 90 tablet 3    apixaban (ELIQUIS) 5 MG tablet tablet Take 1 tablet by mouth 2 (Two) Times a Day. 180 tablet 1    atorvastatin (LIPITOR) 20 MG tablet TAKE 1 TABLET BY MOUTH DAILY 90 tablet 3    budesonide (PULMICORT) 0.5 MG/2ML nebulizer solution Use one vial in nebulizer daily- rinse mouth after use 30 each 5    lisinopril-hydrochlorothiazide (PRINZIDE,ZESTORETIC) 20-12.5 MG per tablet TAKE 1 TABLET BY MOUTH DAILY 90 tablet 3    sotalol (BETAPACE) 80 MG tablet TAKE ONE-HALF TABLET BY MOUTH IN THE MORNING AND 1 WHOLE TABLET  IN THE EVENING 135 tablet 3     No facility-administered medications prior to visit.       No  "opioid medication identified on active medication list. I have reviewed chart for other potential  high risk medication/s and harmful drug interactions in the elderly.        Aspirin is not on active medication list.  Aspirin use is contraindicated for this patient due to: current use of Eliquis.  .    Patient Active Problem List   Diagnosis    Paroxysmal atrial fibrillation    Essential hypertension    Hyperlipidemia LDL goal <100    Other emphysema    Medicare annual wellness visit, subsequent     Advance Care Planning   Advance Care Planning     Advance Directive is not on file.  ACP discussion was held with the patient during this visit. Patient has an advance directive (not in EMR), copy requested.       Review of Systems   Constitutional:  Negative for fever.   HENT:  Negative for congestion and sore throat.    Respiratory:  Negative for cough and shortness of breath.    Cardiovascular:  Negative for chest pain.   Gastrointestinal:  Negative for abdominal pain, diarrhea, nausea and vomiting.   Genitourinary:  Negative for dysuria and frequency.   Neurological:  Negative for dizziness and headache.      Objective    Vitals:    07/09/24 1029   BP: 130/80   Pulse: 50   SpO2: 97%   Weight: 78.9 kg (174 lb)   Height: 165.1 cm (65\")     Estimated body mass index is 28.96 kg/m² as calculated from the following:    Height as of this encounter: 165.1 cm (65\").    Weight as of this encounter: 78.9 kg (174 lb).       Physical Exam  Constitutional:       Appearance: Normal appearance.   HENT:      Right Ear: Tympanic membrane normal.      Left Ear: Tympanic membrane normal.      Nose: Nose normal.      Mouth/Throat:      Mouth: Mucous membranes are moist.   Eyes:      Pupils: Pupils are equal, round, and reactive to light.   Cardiovascular:      Rate and Rhythm: Normal rate and regular rhythm.      Heart sounds: Normal heart sounds.   Pulmonary:      Effort: Pulmonary effort is normal.      Breath sounds: Normal breath " sounds.   Neurological:      Mental Status: She is alert and oriented to person, place, and time.   Psychiatric:         Mood and Affect: Mood normal.         Behavior: Behavior normal.          Does the patient have evidence of cognitive impairment?   No    Lab Results   Component Value Date    CHLPL 116 2024    TRIG 56 2024    HDL 53 2024    LDL 50 2024    VLDL 13 2024          HEALTH RISK ASSESSMENT    Smoking Status:  Social History     Tobacco Use   Smoking Status Former    Current packs/day: 0.00    Types: Cigarettes    Quit date:     Years since quittin.5   Smokeless Tobacco Never     Alcohol Consumption:  Social History     Substance and Sexual Activity   Alcohol Use Never     Fall Risk Screen:    [facility], and patient is at LOW risk for falls.Assessment completed on:2024    Depression Screenin/9/2024    10:37 AM   PHQ-2/PHQ-9 Depression Screening   Little Interest or Pleasure in Doing Things 0-->not at all   Feeling Down, Depressed or Hopeless 0-->not at all   PHQ-9: Brief Depression Severity Measure Score 0       Health Habits and Functional and Cognitive Screenin/9/2024    10:00 AM   Functional & Cognitive Status   Do you have difficulty preparing food and eating? No   Do you have difficulty bathing yourself, getting dressed or grooming yourself? No   Do you have difficulty using the toilet? No   Do you have difficulty moving around from place to place? No   Do you have trouble with steps or getting out of a bed or a chair? No   Current Diet Well Balanced Diet   Dental Exam Up to date   Eye Exam Not up to date   Exercise (times per week) 5 times per week   Current Exercises Include Walking   Do you need help using the phone?  No   Are you deaf or do you have serious difficulty hearing?  No   Do you need help to go to places out of walking distance? No   Do you need help shopping? No   Do you need help preparing  meals?  No   Do you need help with housework?  No   Do you need help with laundry? No   Do you need help taking your medications? No   Do you need help managing money? No   Do you ever drive or ride in a car without wearing a seat belt? No   Have you felt unusual stress, anger or loneliness in the last month? No   Who do you live with? Spouse   If you need help, do you have trouble finding someone available to you? No   Have you been bothered in the last four weeks by sexual problems? No   Do you have difficulty concentrating, remembering or making decisions? No       Age-appropriate Screening Schedule:  Refer to the list below for future screening recommendations based on patient's age, sex and/or medical conditions. Orders for these recommended tests are listed in the plan section. The patient has been provided with a written plan.    Health Maintenance   Topic Date Due    ZOSTER VACCINE (1 of 2) Never done    DXA SCAN  07/10/2024 (Originally 6/18/2020)    COVID-19 Vaccine (3 - 2023-24 season) 07/11/2024 (Originally 9/1/2023)    RSV Vaccine - Adults (1 - 1-dose 60+ series) 08/09/2024 (Originally 10/9/2004)    Pneumococcal Vaccine 65+ (2 of 2 - PCV) 07/09/2025 (Originally 1/1/2021)    TDAP/TD VACCINES (1 - Tdap) 07/09/2025 (Originally 10/9/1964)    INFLUENZA VACCINE  08/01/2024    LIPID PANEL  04/26/2025    ANNUAL WELLNESS VISIT  07/09/2025    BMI FOLLOWUP  07/09/2025    HEPATITIS C SCREENING  Completed                  CMS Preventative Services Quick Reference  Risk Factors Identified During Encounter:    Immunizations Discussed/Encouraged: Tdap and Shingrix  Vision Screening Recommended    The above risks/problems have been discussed with the patient.  Pertinent information has been shared with the patient in the After Visit Summary.    Diagnoses and all orders for this visit:    1. Medicare annual wellness visit, subsequent (Primary)  Assessment & Plan:  Updated annual wellness visit checklist.  Immunizations  discussed.  Screening up-to-date.  Recommend yearly dental and eye exams. Also discussed monitoring of blood pressure and lipids. We addressed patient self-assessment of health status, frailty, and physical functioning. We reviewed psychosocial risks, behavioral risks, instrumental activities of daily living, and patient health risk assessment. Patient was given a personalized prevention plan.         2. Essential hypertension  -     TSH  Will check TSH with labs- other labs done through cardiology. Continue current medication- discussed pulse rate today- she states has guidance on cardiology when to call with low pulse rate  3. Pulmonary emphysema, unspecified emphysema type  Continue nebulizer as directed.   4. Encounter for hepatitis C screening test for low risk patient  -     Hepatitis C Antibody        Follow Up:   Next Medicare Wellness visit to be scheduled in 1 year.      An After Visit Summary and PPPS were made available to the patient.

## 2024-07-10 PROBLEM — J43.9 COPD WITH EMPHYSEMA: Status: RESOLVED | Noted: 2021-01-15 | Resolved: 2024-07-10

## 2024-07-10 PROBLEM — J43.8 OTHER EMPHYSEMA: Status: ACTIVE | Noted: 2021-01-15

## 2024-07-10 PROBLEM — Z00.00 MEDICARE ANNUAL WELLNESS VISIT, SUBSEQUENT: Status: ACTIVE | Noted: 2024-07-10

## 2024-07-10 LAB
HCV IGG SERPL QL IA: NON REACTIVE
TSH SERPL DL<=0.005 MIU/L-ACNC: 1.83 UIU/ML (ref 0.45–4.5)

## 2024-07-12 ENCOUNTER — TELEPHONE (OUTPATIENT)
Dept: PHARMACY | Facility: HOSPITAL | Age: 79
End: 2024-07-12

## 2024-07-12 PROBLEM — I48.0 PAROXYSMAL ATRIAL FIBRILLATION: Status: RESOLVED | Noted: 2021-01-15 | Resolved: 2024-07-12

## 2024-07-12 NOTE — TELEPHONE ENCOUNTER
Patient says she has been transitioned to Eliquis. Will call referring provider to confirm and resolve episode of care.    Carol Contreras CPhT   14:43 EDT 7/12/2024    Confirmed with Samantha at Dr. Davis's office that patient has transitioned to Eliquis. Episode of care has been resolved.    Carol Contreras CPhT   14:47 EDT 7/12/2024

## 2024-07-30 ENCOUNTER — OFFICE VISIT (OUTPATIENT)
Dept: FAMILY MEDICINE CLINIC | Facility: CLINIC | Age: 79
End: 2024-07-30
Payer: MEDICARE

## 2024-07-30 VITALS
DIASTOLIC BLOOD PRESSURE: 80 MMHG | WEIGHT: 175 LBS | HEIGHT: 65 IN | BODY MASS INDEX: 29.16 KG/M2 | HEART RATE: 64 BPM | TEMPERATURE: 98.6 F | SYSTOLIC BLOOD PRESSURE: 140 MMHG | OXYGEN SATURATION: 95 %

## 2024-07-30 DIAGNOSIS — R09.81 NASAL CONGESTION: Primary | ICD-10-CM

## 2024-07-30 DIAGNOSIS — J43.9 PULMONARY EMPHYSEMA, UNSPECIFIED EMPHYSEMA TYPE: ICD-10-CM

## 2024-07-30 LAB
EXPIRATION DATE: NORMAL
FLUAV AG UPPER RESP QL IA.RAPID: NOT DETECTED
FLUBV AG UPPER RESP QL IA.RAPID: NOT DETECTED
INTERNAL CONTROL: NORMAL
Lab: NORMAL
SARS-COV-2 AG UPPER RESP QL IA.RAPID: NOT DETECTED

## 2024-07-30 PROCEDURE — 99213 OFFICE O/P EST LOW 20 MIN: CPT | Performed by: PHYSICIAN ASSISTANT

## 2024-07-30 PROCEDURE — 1126F AMNT PAIN NOTED NONE PRSNT: CPT | Performed by: PHYSICIAN ASSISTANT

## 2024-07-30 PROCEDURE — 3077F SYST BP >= 140 MM HG: CPT | Performed by: PHYSICIAN ASSISTANT

## 2024-07-30 PROCEDURE — 3079F DIAST BP 80-89 MM HG: CPT | Performed by: PHYSICIAN ASSISTANT

## 2024-07-30 PROCEDURE — 87428 SARSCOV & INF VIR A&B AG IA: CPT | Performed by: PHYSICIAN ASSISTANT

## 2024-07-30 RX ORDER — BUDESONIDE 0.5 MG/2ML
INHALANT ORAL
Qty: 30 EACH | Refills: 0 | Status: SHIPPED | OUTPATIENT
Start: 2024-07-30 | End: 2024-08-01 | Stop reason: SDUPTHER

## 2024-08-01 RX ORDER — BUDESONIDE 0.5 MG/2ML
INHALANT ORAL
Qty: 30 EACH | Refills: 0 | Status: SHIPPED | OUTPATIENT
Start: 2024-08-01

## 2024-08-03 NOTE — PROGRESS NOTES
"Chief Complaint  Nasal Congestion (Wheezing, coughing congestion started yesterday morning pt states is better today )    Subjective          Marianela Ngo presents to [facility]  History of Present Illness  Patient states she started yesterday with some mild nasal congestion and was wheezing, but state symptoms have shown improvement and today is feeling better. Denies fever. No vomiting or diarrhea. No known exposure to illness.       Objective   Vital Signs:   /80   Pulse 64   Temp 98.6 °F (37 °C)   Ht 165.1 cm (65\")   Wt 79.4 kg (175 lb)   SpO2 95%   BMI 29.12 kg/m²     Body mass index is 29.12 kg/m².    Review of Systems   Constitutional:  Negative for fatigue and fever.   HENT:  Positive for congestion. Negative for sore throat.    Respiratory:  Positive for cough. Negative for shortness of breath.    Cardiovascular:  Negative for chest pain.   Gastrointestinal:  Negative for abdominal pain, diarrhea, nausea and vomiting.   Neurological:  Negative for headache.       Past History:  Medical History: has a past medical history of Allergies, Bradycardia with 51-60 beats per minute, Chronic atrial fibrillation, Current use of long term anticoagulation, DJD (degenerative joint disease), Epistaxis, Essential hypertension, High risk medication use, Mild persistent asthma, uncomplicated, Mixed hyperlipidemia, Obstructive sleep apnea on CPAP, Other fatigue, Paroxysmal atrial fibrillation, Pneumonia (08/2015), Shortness of breath, and Vitamin deficiency.   Surgical History: has a past surgical history that includes Hysterectomy; Replacement total knee bilateral (2013,2014); and Cataract extraction, bilateral.   Family History: family history includes Cancer in her brother and father; Diabetes in her brother; Heart disease in her sister; Hypertension in an other family member.   Social History: reports that she quit smoking about 50 years ago. Her smoking use included " cigarettes. She has never used smokeless tobacco. She reports that she does not drink alcohol and does not use drugs.      Current Outpatient Medications:     albuterol (PROVENTIL) (2.5 MG/3ML) 0.083% nebulizer solution, Use one vial in nebulizer every 4 to 6 hours as needed, Disp: 180 each, Rfl: 5    amLODIPine (NORVASC) 5 MG tablet, TAKE 1 TABLET BY MOUTH DAILY, Disp: 90 tablet, Rfl: 3    apixaban (ELIQUIS) 5 MG tablet tablet, Take 1 tablet by mouth 2 (Two) Times a Day., Disp: 180 tablet, Rfl: 1    atorvastatin (LIPITOR) 20 MG tablet, TAKE 1 TABLET BY MOUTH DAILY, Disp: 90 tablet, Rfl: 3    budesonide (PULMICORT) 0.5 MG/2ML nebulizer solution, Use one vial in nebulizer daily- rinse mouth after use, Disp: 30 each, Rfl: 0    lisinopril-hydrochlorothiazide (PRINZIDE,ZESTORETIC) 20-12.5 MG per tablet, TAKE 1 TABLET BY MOUTH DAILY, Disp: 90 tablet, Rfl: 3    sotalol (BETAPACE) 80 MG tablet, TAKE ONE-HALF TABLET BY MOUTH IN THE MORNING AND 1 WHOLE TABLET  IN THE EVENING, Disp: 135 tablet, Rfl: 3  Allergies: Patient has no known allergies.    Physical Exam  Constitutional:       Appearance: Normal appearance.   HENT:      Right Ear: Tympanic membrane normal.      Left Ear: Tympanic membrane normal.      Mouth/Throat:      Pharynx: Oropharynx is clear.   Eyes:      Conjunctiva/sclera: Conjunctivae normal.      Pupils: Pupils are equal, round, and reactive to light.   Cardiovascular:      Rate and Rhythm: Normal rate and regular rhythm.      Heart sounds: Normal heart sounds.   Pulmonary:      Effort: Pulmonary effort is normal.      Breath sounds: Normal breath sounds.   Neurological:      Mental Status: She is oriented to person, place, and time.   Psychiatric:         Mood and Affect: Mood normal.         Behavior: Behavior normal.             Assessment and Plan   Diagnoses and all orders for this visit:    1. Nasal congestion (Primary)  -     Covid-19 + Flu A&B AG, Veritor  Rapid covid and flu testing negative;  patient states is feeling better today; recommend symptom management and to closely monitor symptoms; if progression of congestion/cough to call and can send in antibiotic if needed; rtc or to ER for any acute worsening of symptoms if needed   2. Pulmonary emphysema, unspecified emphysema type  -     budesonide (PULMICORT) 0.5 MG/2ML nebulizer solution; Use one vial in nebulizer daily- rinse mouth after use  Dispense: 30 each; Refill: 0  She needs refill sent local until she can get prescription from mail order- states has albuterol at home to use prn.   Other orders  -     Discontinue: budesonide (PULMICORT) 0.5 MG/2ML nebulizer solution; Use one vial in nebulizer daily- rinse mouth after use  Dispense: 30 each; Refill: 0            Follow Up   No follow-ups on file.  Patient was given instructions and counseling regarding her condition or for health maintenance advice. Please see specific information pulled into the AVS if appropriate.     Marietta Archibald PA-C

## 2024-08-08 DIAGNOSIS — J43.9 PULMONARY EMPHYSEMA, UNSPECIFIED EMPHYSEMA TYPE: ICD-10-CM

## 2024-08-08 RX ORDER — ALBUTEROL SULFATE 2.5 MG/3ML
SOLUTION RESPIRATORY (INHALATION)
Qty: 180 EACH | Refills: 5 | Status: SHIPPED | OUTPATIENT
Start: 2024-08-08

## 2024-08-08 RX ORDER — BUDESONIDE 0.5 MG/2ML
INHALANT ORAL
Qty: 90 EACH | Refills: 1 | Status: SHIPPED | OUTPATIENT
Start: 2024-08-08

## 2024-09-06 ENCOUNTER — OFFICE VISIT (OUTPATIENT)
Dept: CARDIOLOGY | Facility: CLINIC | Age: 79
End: 2024-09-06
Payer: MEDICARE

## 2024-09-06 VITALS
RESPIRATION RATE: 18 BRPM | HEIGHT: 65 IN | WEIGHT: 178 LBS | OXYGEN SATURATION: 99 % | HEART RATE: 55 BPM | BODY MASS INDEX: 29.66 KG/M2 | DIASTOLIC BLOOD PRESSURE: 80 MMHG | SYSTOLIC BLOOD PRESSURE: 136 MMHG

## 2024-09-06 DIAGNOSIS — I48.0 PAF (PAROXYSMAL ATRIAL FIBRILLATION): ICD-10-CM

## 2024-09-06 DIAGNOSIS — I10 ESSENTIAL HYPERTENSION: Primary | ICD-10-CM

## 2024-09-06 DIAGNOSIS — E78.5 HYPERLIPIDEMIA LDL GOAL <100: ICD-10-CM

## 2024-09-06 DIAGNOSIS — I45.2 BIFASCICULAR BLOCK: ICD-10-CM

## 2024-09-06 PROCEDURE — 1159F MED LIST DOCD IN RCRD: CPT | Performed by: INTERNAL MEDICINE

## 2024-09-06 PROCEDURE — 3079F DIAST BP 80-89 MM HG: CPT | Performed by: INTERNAL MEDICINE

## 2024-09-06 PROCEDURE — 99214 OFFICE O/P EST MOD 30 MIN: CPT | Performed by: INTERNAL MEDICINE

## 2024-09-06 PROCEDURE — 3075F SYST BP GE 130 - 139MM HG: CPT | Performed by: INTERNAL MEDICINE

## 2024-09-06 PROCEDURE — 1160F RVW MEDS BY RX/DR IN RCRD: CPT | Performed by: INTERNAL MEDICINE

## 2024-09-06 PROCEDURE — 93000 ELECTROCARDIOGRAM COMPLETE: CPT | Performed by: INTERNAL MEDICINE

## 2024-09-06 NOTE — PROGRESS NOTES
[facility] [place]  [facility]  [address] [place] [address]  Dept: [phone]  Dept Fax: [phone]    Marianela Ngo  1945    Follow Up Office Visit Note    History of Present Illness:  Marianela Ngo is a 78 y.o. female who presents to the clinic for Follow-up. PAF- she seems doing well, on Sotalol 80.40,  and also Eliquis 5mg bid,  denies any complaints., EKG sinus with HR 49 first degree AV block and also RBBB. No changes on EKG, no fatigue no dizziness, will watch    The following portions of the patient's history were reviewed and updated as appropriate: allergies, current medications, past family history, past medical history, past social history, past surgical history, and problem list.    Medications:  albuterol  amLODIPine  apixaban tablet  atorvastatin  budesonide  lisinopril-hydrochlorothiazide  sotalol    Subjective  No Known Allergies     Past Medical History:   Diagnosis Date    Allergies     Bradycardia with 51-60 beats per minute     Chronic atrial fibrillation     Current use of long term anticoagulation     DJD (degenerative joint disease)     Epistaxis     Essential hypertension     High risk medication use     Mild persistent asthma, uncomplicated     Mixed hyperlipidemia     Obstructive sleep apnea on CPAP     Other fatigue     Paroxysmal atrial fibrillation     Pneumonia 08/2015    Shortness of breath     Vitamin deficiency        Past Surgical History:   Procedure Laterality Date    CATARACT EXTRACTION, BILATERAL      HYSTERECTOMY      PARTIAL    REPLACEMENT TOTAL KNEE BILATERAL  2013,2014       Family History   Problem Relation Age of Onset    Cancer Father     Heart disease Sister     Cancer Brother     Diabetes Brother     Hypertension Other         Social History     Socioeconomic History    Marital status:    Tobacco Use    Smoking status: Former     Current packs/day: 0.00     Types: Cigarettes     Quit date: 1974     Years since  "quittin.7    Smokeless tobacco: Never   Vaping Use    Vaping status: Never Used   Substance and Sexual Activity    Alcohol use: Never    Drug use: Never    Sexual activity: Defer       Review of Systems   Constitutional: Negative.    HENT: Negative.     Respiratory: Negative.     Cardiovascular: Negative.    Endocrine: Negative.    Genitourinary: Negative.    Musculoskeletal: Negative.    Skin: Negative.    Allergic/Immunologic: Negative.    Neurological: Negative.    Hematological: Negative.    Psychiatric/Behavioral: Negative.         Cardiovascular Procedures    ECHO/MUGA:  STRESS TESTS:   CARDIAC CATH:   DEVICES:   HOLTER:   CT/MRI:   VASCULAR:   CARDIOTHORACIC:     Objective  Vitals:    24 1031   BP: 136/80   BP Location: Right arm   Patient Position: Lying   Cuff Size: Adult   Pulse: 55   Resp: 18   SpO2: 99%   Weight: 80.7 kg (178 lb)   Height: 165.1 cm (65\")   PainSc: 0-No pain     Body mass index is 29.62 kg/m².     Physical Exam  Vitals reviewed.   Constitutional:       Appearance: Healthy appearance. Not in distress.   Neck:      Vascular: No JVR. JVD normal.   Pulmonary:      Effort: Pulmonary effort is normal.      Breath sounds: Normal breath sounds. No wheezing. No rhonchi. No rales.   Chest:      Chest wall: Not tender to palpatation.   Cardiovascular:      PMI at left midclavicular line. Normal rate. Regular rhythm. Normal S1. Normal S2.       Murmurs: There is no murmur.      No gallop.  No click. No rub.   Pulses:     Intact distal pulses.   Edema:     Peripheral edema absent.   Abdominal:      General: Bowel sounds are normal.      Palpations: Abdomen is soft.      Tenderness: There is no abdominal tenderness.   Musculoskeletal: Normal range of motion.         General: No tenderness. Skin:     General: Skin is warm and dry.   Neurological:      General: No focal deficit present.      Mental Status: Alert and oriented to person, place and time.        Diagnostic Data    ECG 12 " Lead    Date/Time: 9/6/2024 11:11 AM  Performed by: Amos Davis MD    Authorized by: Amos Davis MD  Comparison: compared with previous ECG from 5/3/2024  Similar to previous ECG  Rhythm: sinus rhythm and sinus bradycardia  Rate: bradycardic  BPM: 49  Conduction: right bundle branch block, bifascicular block and 1st degree AV block  QRS axis: normal    Clinical impression: abnormal EKG        Assessment and Plan  Diagnoses and all orders for this visit:  PAF- asymptomatic on Sotalol 80 mg bid and also Eliquis 5 mg bid, no bleeding    Essential hypertension- BP is 120.80 on Amlodipine 5mg and also lisinopril 2012.5    Hyperlipidemia LDL goal <100- On Lipitor 20 mg,  Bifascicular block,  RBBB and also first degree AV block HR 49, asymptomatic, will watch    Other orders  -     apixaban (ELIQUIS) 5 MG tablet tablet; Take 1 tablet by mouth 2 (Two) Times a Day.         Return in about 6 months (around 3/6/2025) for Recheck with Dr. Davis.    Amos Davis MD  09/06/2024

## 2024-09-20 DIAGNOSIS — J43.9 PULMONARY EMPHYSEMA, UNSPECIFIED EMPHYSEMA TYPE: ICD-10-CM

## 2024-09-20 RX ORDER — ALBUTEROL SULFATE 0.83 MG/ML
SOLUTION RESPIRATORY (INHALATION)
Qty: 180 EACH | Refills: 11 | OUTPATIENT
Start: 2024-09-20

## 2024-09-20 RX ORDER — BUDESONIDE 0.5 MG/2ML
INHALANT ORAL
Qty: 30 EACH | Refills: 11 | OUTPATIENT
Start: 2024-09-20

## 2024-10-25 ENCOUNTER — OFFICE VISIT (OUTPATIENT)
Dept: FAMILY MEDICINE CLINIC | Facility: CLINIC | Age: 79
End: 2024-10-25
Payer: MEDICARE

## 2024-10-25 VITALS
SYSTOLIC BLOOD PRESSURE: 126 MMHG | DIASTOLIC BLOOD PRESSURE: 80 MMHG | WEIGHT: 178.25 LBS | OXYGEN SATURATION: 100 % | RESPIRATION RATE: 17 BRPM | HEART RATE: 54 BPM | BODY MASS INDEX: 29.7 KG/M2 | HEIGHT: 65 IN

## 2024-10-25 DIAGNOSIS — M25.511 ACUTE PAIN OF RIGHT SHOULDER: Primary | ICD-10-CM

## 2024-10-25 PROCEDURE — 1125F AMNT PAIN NOTED PAIN PRSNT: CPT | Performed by: NURSE PRACTITIONER

## 2024-10-25 PROCEDURE — 99214 OFFICE O/P EST MOD 30 MIN: CPT | Performed by: NURSE PRACTITIONER

## 2024-10-25 PROCEDURE — 3074F SYST BP LT 130 MM HG: CPT | Performed by: NURSE PRACTITIONER

## 2024-10-25 PROCEDURE — 1160F RVW MEDS BY RX/DR IN RCRD: CPT | Performed by: NURSE PRACTITIONER

## 2024-10-25 PROCEDURE — 1159F MED LIST DOCD IN RCRD: CPT | Performed by: NURSE PRACTITIONER

## 2024-10-25 PROCEDURE — 3079F DIAST BP 80-89 MM HG: CPT | Performed by: NURSE PRACTITIONER

## 2024-10-25 NOTE — PROGRESS NOTES
"Chief Complaint  Shoulder Pain (Right Shoulder pain 3 weeks ago. She states the pain starts in her shoulder and goes down to her elbow. She has complaint of limited range of motion. )    Subjective          Marianela Ngo presents to [facility]  History of Present Illness  Pt has had right shoulder pain x 3 weeks. She denies known injury.       History of Present Illness      Objective   Vital Signs:   /80 (BP Location: Left arm, Patient Position: Sitting, Cuff Size: Adult)   Pulse 54   Resp 17   Ht 165.1 cm (65\")   Wt 80.9 kg (178 lb 4 oz)   SpO2 100%   BMI 29.66 kg/m²     Body mass index is 29.66 kg/m².    Review of Systems   Constitutional:  Negative for fatigue and fever.   Respiratory:  Negative for shortness of breath.    Cardiovascular:  Negative for chest pain, palpitations and leg swelling.   Musculoskeletal:  Positive for arthralgias.   Neurological:  Negative for syncope.   Psychiatric/Behavioral:  The patient is not nervous/anxious.           Current Outpatient Medications:     albuterol (PROVENTIL) (2.5 MG/3ML) 0.083% nebulizer solution, Use one vial in nebulizer every 4 to 6 hours as needed, Disp: 180 each, Rfl: 5    amLODIPine (NORVASC) 5 MG tablet, TAKE 1 TABLET BY MOUTH DAILY, Disp: 90 tablet, Rfl: 3    apixaban (ELIQUIS) 5 MG tablet tablet, Take 1 tablet by mouth 2 (Two) Times a Day., Disp: 180 tablet, Rfl: 3    atorvastatin (LIPITOR) 20 MG tablet, TAKE 1 TABLET BY MOUTH DAILY, Disp: 90 tablet, Rfl: 3    budesonide (PULMICORT) 0.5 MG/2ML nebulizer solution, Use one vial in nebulizer daily- rinse mouth after use, Disp: 90 each, Rfl: 1    lisinopril-hydrochlorothiazide (PRINZIDE,ZESTORETIC) 20-12.5 MG per tablet, TAKE 1 TABLET BY MOUTH DAILY, Disp: 90 tablet, Rfl: 3    sotalol (BETAPACE) 80 MG tablet, TAKE ONE-HALF TABLET BY MOUTH IN THE MORNING AND 1 WHOLE TABLET  IN THE EVENING, Disp: 135 tablet, Rfl: 3      Allergies: Patient has no known " allergies.    Physical Exam  Constitutional:       Appearance: Normal appearance.   HENT:      Head: Normocephalic.   Eyes:      Conjunctiva/sclera: Conjunctivae normal.      Pupils: Pupils are equal, round, and reactive to light.   Cardiovascular:      Rate and Rhythm: Normal rate and regular rhythm.      Heart sounds: Normal heart sounds.   Pulmonary:      Effort: Pulmonary effort is normal.      Breath sounds: Normal breath sounds.   Abdominal:      Tenderness: There is no abdominal tenderness.   Musculoskeletal:         General: Normal range of motion.      Comments: Tenderness and limited ROM in right shoulder.    Skin:     General: Skin is warm and dry.      Capillary Refill: Capillary refill takes less than 2 seconds.   Neurological:      General: No focal deficit present.      Mental Status: She is alert and oriented to person, place, and time.   Psychiatric:         Mood and Affect: Mood normal.         Behavior: Behavior normal.         Thought Content: Thought content normal.         Judgment: Judgment normal.          Physical Exam         Result Review :                Results            Assessment and Plan    Diagnoses and all orders for this visit:    1. Acute pain of right shoulder (Primary)  Comments:  XRs done. Apply ice to painful areas and ROM stretching. We may try PT if not improving. F/U with ortho for possible injection.  Orders:  -     XR Shoulder 2+ View Right; Future  -     Ambulatory Referral to Orthopedic Surgery        Assessment & Plan                   Follow Up   Return in about 1 week (around 11/1/2024) for if not improving or sooner if symptoms worsen.  Patient was given instructions and counseling regarding her condition or for health maintenance advice. Please see specific information pulled into the AVS if appropriate.     TIFFANI Parekh

## 2024-10-28 ENCOUNTER — TELEPHONE (OUTPATIENT)
Dept: FAMILY MEDICINE CLINIC | Facility: CLINIC | Age: 79
End: 2024-10-28
Payer: MEDICARE

## 2024-10-28 NOTE — TELEPHONE ENCOUNTER
I spoke with pt about XR. She is feeling better. She states she will cancel the appt if she is feeling better.

## 2024-11-20 ENCOUNTER — TELEPHONE (OUTPATIENT)
Dept: FAMILY MEDICINE CLINIC | Facility: CLINIC | Age: 79
End: 2024-11-20
Payer: MEDICARE

## 2024-11-20 NOTE — TELEPHONE ENCOUNTER
Incoming Refill Request      Medication requested (name and dose): ALBUTEROL    Pharmacy where request should be sent: [facility] [phone]    Additional details provided by patient: 2 DAYS LEFT    Best call back number: [phone]    Does the patient have less than a 3 day supply:  [x] Yes  [] No    Philip Black Rep  11/20/24, 12:44 EST

## 2024-11-21 RX ORDER — ALBUTEROL SULFATE 0.83 MG/ML
SOLUTION RESPIRATORY (INHALATION)
Qty: 180 EACH | Refills: 5 | Status: CANCELLED | OUTPATIENT
Start: 2024-11-21

## 2024-11-22 RX ORDER — ALBUTEROL SULFATE 0.83 MG/ML
SOLUTION RESPIRATORY (INHALATION)
Qty: 180 EACH | Refills: 5 | Status: SHIPPED | OUTPATIENT
Start: 2024-11-22 | End: 2024-11-22 | Stop reason: SDUPTHER

## 2024-11-22 RX ORDER — ALBUTEROL SULFATE 0.83 MG/ML
SOLUTION RESPIRATORY (INHALATION)
Qty: 180 EACH | Refills: 5 | Status: SHIPPED | OUTPATIENT
Start: 2024-11-22

## 2024-12-04 ENCOUNTER — TELEPHONE (OUTPATIENT)
Dept: FAMILY MEDICINE CLINIC | Facility: CLINIC | Age: 79
End: 2024-12-04

## 2024-12-10 ENCOUNTER — OFFICE VISIT (OUTPATIENT)
Dept: ORTHOPEDIC SURGERY | Facility: CLINIC | Age: 79
End: 2024-12-10
Payer: MEDICARE

## 2024-12-10 VITALS
SYSTOLIC BLOOD PRESSURE: 126 MMHG | HEIGHT: 65 IN | DIASTOLIC BLOOD PRESSURE: 80 MMHG | WEIGHT: 174 LBS | BODY MASS INDEX: 28.99 KG/M2

## 2024-12-10 DIAGNOSIS — M19.011 ARTHRITIS OF RIGHT GLENOHUMERAL JOINT: Primary | ICD-10-CM

## 2024-12-10 DIAGNOSIS — M75.21 BICEPS TENDINITIS OF RIGHT SHOULDER: ICD-10-CM

## 2024-12-10 RX ORDER — BUPIVACAINE HYDROCHLORIDE 2.5 MG/ML
4 INJECTION, SOLUTION EPIDURAL; INFILTRATION; INTRACAUDAL
Status: COMPLETED | OUTPATIENT
Start: 2024-12-10 | End: 2024-12-10

## 2024-12-10 RX ORDER — LIDOCAINE HYDROCHLORIDE 10 MG/ML
4 INJECTION, SOLUTION EPIDURAL; INFILTRATION; INTRACAUDAL; PERINEURAL
Status: COMPLETED | OUTPATIENT
Start: 2024-12-10 | End: 2024-12-10

## 2024-12-10 RX ORDER — TRIAMCINOLONE ACETONIDE 40 MG/ML
80 INJECTION, SUSPENSION INTRA-ARTICULAR; INTRAMUSCULAR
Status: COMPLETED | OUTPATIENT
Start: 2024-12-10 | End: 2024-12-10

## 2024-12-10 RX ADMIN — BUPIVACAINE HYDROCHLORIDE 4 ML: 2.5 INJECTION, SOLUTION EPIDURAL; INFILTRATION; INTRACAUDAL at 11:18

## 2024-12-10 RX ADMIN — TRIAMCINOLONE ACETONIDE 80 MG: 40 INJECTION, SUSPENSION INTRA-ARTICULAR; INTRAMUSCULAR at 11:18

## 2024-12-10 RX ADMIN — LIDOCAINE HYDROCHLORIDE 4 ML: 10 INJECTION, SOLUTION EPIDURAL; INFILTRATION; INTRACAUDAL; PERINEURAL at 11:18

## 2024-12-10 NOTE — PROGRESS NOTES
Procedure   - Large Joint Arthrocentesis: R subacromial bursa on 12/10/2024 11:18 AM  Indications: pain  Details: 21 G needle, posterior approach  Medications: 4 mL bupivacaine (PF) 0.25 %; 4 mL lidocaine PF 1% 1 %; 80 mg triamcinolone acetonide 40 MG/ML  Outcome: tolerated well, no immediate complications  Procedure, treatment alternatives, risks and benefits explained, specific risks discussed. Consent was given by the patient. Immediately prior to procedure a time out was called to verify the correct patient, procedure, equipment, support staff and site/side marked as required. Patient was prepped and draped in the usual sterile fashion.

## 2024-12-10 NOTE — PROGRESS NOTES
[facility] [place] [facility]opa[facility] Office Visit     Office Visit       Date: 12/10/2024   Patient Name: Marianela Ngo  MRN: [id number]  YOB: 1945    Referring Physician: Elana Guzman APRN     Chief Complaint:   Chief Complaint   Patient presents with    Right Shoulder - Pain       History of Present Illness:   Marianela Ngo is a 79 y.o. female who presents with new problem of: right shoulder pain.  Onset: atraumatic and gradual in nature. The issue has been ongoing for 2 month(s). Pain is a 6/10 on the pain scale. Pain is described as throbbing and stabbing. Associated symptoms include pain. The pain is worse with sleeping; nothing improve the pain. Previous treatments have included: nothing.    Subjective   Review of Systems: Review of Systems   Constitutional:  Negative for chills, fever, unexpected weight gain and unexpected weight loss.   HENT:  Negative for congestion, postnasal drip and rhinorrhea.    Eyes:  Negative for blurred vision.   Respiratory:  Negative for shortness of breath.    Cardiovascular:  Negative for leg swelling.   Gastrointestinal:  Negative for abdominal pain, nausea and vomiting.   Genitourinary:  Negative for difficulty urinating.   Musculoskeletal:  Positive for arthralgias. Negative for gait problem, joint swelling and myalgias.   Skin:  Negative for skin lesions and wound.   Neurological:  Negative for dizziness, weakness, light-headedness and numbness.   Hematological:  Does not bruise/bleed easily.   Psychiatric/Behavioral:  Negative for depressed mood.         I have reviewed the following portions of the patient's history:History of Present Illness and review of systems.    Past Medical History:   Past Medical History:   Diagnosis Date    Allergies     Bradycardia with 51-60 beats per minute     Chronic atrial fibrillation     Current use of long term anticoagulation     DJD (degenerative joint disease)     Epistaxis      Essential hypertension     High risk medication use     Mild persistent asthma, uncomplicated     Mixed hyperlipidemia     Obstructive sleep apnea on CPAP     Other fatigue     Paroxysmal atrial fibrillation     Pneumonia 2015    Shortness of breath     Vitamin deficiency        Past Surgical History:   Past Surgical History:   Procedure Laterality Date    CATARACT EXTRACTION, BILATERAL      HYSTERECTOMY      PARTIAL    REPLACEMENT TOTAL KNEE BILATERAL  ,       Family History:   Family History   Problem Relation Age of Onset    Cancer Father     Heart disease Sister     Cancer Brother     Diabetes Brother     Hypertension Other        Social History:   Social History     Socioeconomic History    Marital status:    Tobacco Use    Smoking status: Former     Current packs/day: 0.00     Types: Cigarettes     Quit date:      Years since quittin.9    Smokeless tobacco: Never   Vaping Use    Vaping status: Never Used   Substance and Sexual Activity    Alcohol use: Never    Drug use: Never    Sexual activity: Defer       Medications:   Current Outpatient Medications:     albuterol (PROVENTIL) (2.5 MG/3ML) 0.083% nebulizer solution, Use one vial in nebulizer every 4 to 6 hours as needed, Disp: 180 each, Rfl: 5    amLODIPine (NORVASC) 5 MG tablet, TAKE 1 TABLET BY MOUTH DAILY, Disp: 90 tablet, Rfl: 3    apixaban (ELIQUIS) 5 MG tablet tablet, Take 1 tablet by mouth 2 (Two) Times a Day., Disp: 180 tablet, Rfl: 3    atorvastatin (LIPITOR) 20 MG tablet, TAKE 1 TABLET BY MOUTH DAILY, Disp: 90 tablet, Rfl: 3    budesonide (PULMICORT) 0.5 MG/2ML nebulizer solution, Use one vial in nebulizer daily- rinse mouth after use, Disp: 90 each, Rfl: 1    lisinopril-hydrochlorothiazide (PRINZIDE,ZESTORETIC) 20-12.5 MG per tablet, TAKE 1 TABLET BY MOUTH DAILY, Disp: 90 tablet, Rfl: 3    sotalol (BETAPACE) 80 MG tablet, TAKE ONE-HALF TABLET BY MOUTH IN THE MORNING AND 1 WHOLE TABLET  IN THE EVENING, Disp: 135 tablet,  "Rfl: 3    Allergies: No Known Allergies    I reviewed the patient's chief complaint, history of present illness, review of systems, past medical history, surgical history, family history, social history, medications and allergy list.     Objective    Vital Signs:   Vitals:    12/10/24 1102   BP: 126/80   Weight: 78.9 kg (174 lb)   Height: 165.1 cm (65\")     Body mass index is 28.96 kg/m².   BMI is >= 25 and <30. (Overweight) The following options were offered after discussion;: exercise counseling/recommendations and nutrition counseling/recommendations      Patient reports that she is a former smoker. She quit smoking in 1974.  She has not resumed smoking since that time.  This behavior was applauded and she was encouraged to continue in smoking cessation.  We will continue to monitor at subsequent visits.    Ortho Exam:  Constitutional: General Appearance: healthy-appearing, NAD, and normal body habitus.   Psychiatric: Mood and Affect: normal mood and affect and active and alert.   Cardiovascular System: Arterial Pulses Right: radial normal. Arterial Pulses Left: radial normal.   C-Spine/Neck: Active Range of Motion: no crepitus or pain elicited on motion and flexion normal, extension normal, rotation normal, and lateral flexion normal.   Shoulders: Inspection Right: no misalignment, erythema, induration, swelling, or warmth and AC prominence normal. Inspection Left: no misalignment, erythema, induration, swelling, or warmth and AC prominence normal. Bony Palpation Right: tenderness of the acromioclavicular joint, the greater tuberosity, and the bicipital groove and no tenderness of the clavicle. Soft Tissue Palpation Right: tenderness of the supraspinatus, the infraspinatus, the glenohumeral joint region, and the lateral cuff insertion. Active Range of Motion Right: limited. Special Tests Right: Hawkin's test positive and empty can sign positive.   exam RIGHT shoulder: forward flexion approximately 70 degrees. " Abduction 70 degrees. Internal rotation SI. Motor testing supraspinatus 4/5  exam LEFT shoulder: forward flexion approximately 140 degrees. Abduction 140 degrees. Internal rotation L1. Motor testing supraspinatus 5/5    Results Review:   Imaging Results (Last 24 Hours)       ** No results found for the last 24 hours. **        I personally reviewed and interpreted radiographs of the right shoulder from 10/25/2024.  No acute fracture or dislocation.  Degenerative changes noted in the glenohumeral joint with small inferior humeral head osteophyte.    Procedures    Assessment / Plan    Assessment/Plan:   Diagnoses and all orders for this visit:    1. Arthritis of right glenohumeral joint (Primary)    2. Biceps tendinitis of right shoulder    Other orders  -     - Large Joint Arthrocentesis: R subacromial bursa    Patient here today with right anterior shoulder pain with reduced range of motion.  Symptoms ongoing for the last several months without any acute mechanism of injury.  Radiographs of the right shoulder show degenerative changes of glenohumeral joint.  She is also significantly flared up the proximal biceps tendon.  Has been applying heat without much relief of symptoms.  Also taking Tylenol.  Discussed treatment options with her from a nonoperative standpoint for the tendinitis and arthritis.  Today in the office, we will inject subacromial bursa with corticosteroid.  She will work on her range of motion.  Follow-up in 2 months.    Previous documentation reviewed: 10/25/2024-office visit-Elana NIXON.    Previous imaging studies reviewed: 10/25/2024-radiographs of the right shoulder.    Previous laboratory results reviewed: 7/9/2024-TSH 1.830.    Follow Up:   No follow-ups on file.      Matty Rangel MD  [facility] [place] [facility]

## 2025-02-18 ENCOUNTER — OFFICE VISIT (OUTPATIENT)
Dept: ORTHOPEDIC SURGERY | Facility: CLINIC | Age: 80
End: 2025-02-18
Payer: MEDICARE

## 2025-02-18 VITALS
DIASTOLIC BLOOD PRESSURE: 84 MMHG | HEIGHT: 65 IN | WEIGHT: 173.94 LBS | SYSTOLIC BLOOD PRESSURE: 128 MMHG | BODY MASS INDEX: 28.98 KG/M2

## 2025-02-18 DIAGNOSIS — M19.011 ARTHRITIS OF RIGHT GLENOHUMERAL JOINT: Primary | ICD-10-CM

## 2025-02-18 NOTE — PROGRESS NOTES
[facility] Orthopaedic Surgery Office Follow Up Visit     Office Follow Up      Date: 02/18/2025   Patient Name: Marianela Ngo  MRN: [id number]  YOB: 1945    Referring Physician: No ref. provider found     Chief Complaint:   Chief Complaint   Patient presents with    Follow-up     2 month f/u right shoulder     History of Present Illness: Marianela Ngo is a 79 y.o. female who returns to clinic today for follow up on right shoulder pain. Her pain is a 1 /10 on the pain scale. Patient has tried the following previous treatments injections: Cortisone right subacromial. She mentions current symptoms of pain . She states that these treatments have Improved.    Subjective     Review of Systems: Review of Systems   Constitutional:  Negative for chills, fever, unexpected weight gain and unexpected weight loss.   HENT:  Negative for congestion, postnasal drip and rhinorrhea.    Eyes:  Negative for blurred vision.   Respiratory:  Negative for shortness of breath.    Cardiovascular:  Negative for leg swelling.   Gastrointestinal:  Negative for abdominal pain, nausea and vomiting.   Genitourinary:  Negative for difficulty urinating.   Musculoskeletal:  Positive for arthralgias. Negative for gait problem, joint swelling and myalgias.   Skin:  Negative for skin lesions and wound.   Neurological:  Negative for dizziness, weakness, light-headedness and numbness.   Hematological:  Does not bruise/bleed easily.   Psychiatric/Behavioral:  Negative for depressed mood.         Medications:   Current Outpatient Medications:     albuterol (PROVENTIL) (2.5 MG/3ML) 0.083% nebulizer solution, Use one vial in nebulizer every 4 to 6 hours as needed, Disp: 180 each, Rfl: 5    amLODIPine (NORVASC) 5 MG tablet, TAKE 1 TABLET BY MOUTH DAILY, Disp: 90 tablet, Rfl: 3    apixaban (ELIQUIS) 5 MG tablet tablet, Take 1 tablet by mouth 2 (Two) Times a Day., Disp: 180 tablet, Rfl: 3    atorvastatin  "(LIPITOR) 20 MG tablet, TAKE 1 TABLET BY MOUTH DAILY, Disp: 90 tablet, Rfl: 3    budesonide (PULMICORT) 0.5 MG/2ML nebulizer solution, Use one vial in nebulizer daily- rinse mouth after use, Disp: 90 each, Rfl: 1    lisinopril-hydrochlorothiazide (PRINZIDE,ZESTORETIC) 20-12.5 MG per tablet, TAKE 1 TABLET BY MOUTH DAILY, Disp: 90 tablet, Rfl: 3    sotalol (BETAPACE) 80 MG tablet, TAKE ONE-HALF TABLET BY MOUTH IN THE MORNING AND 1 WHOLE TABLET  IN THE EVENING, Disp: 135 tablet, Rfl: 3    Allergies: No Known Allergies    I have reviewed and updated the patient's chief complaint, history of present illness, review of systems, past medical history, surgical history, family history, social history, medications and allergy list as appropriate.     Objective      Vital Signs:   Vitals:    02/18/25 1110   BP: 128/84   Weight: 78.9 kg (173 lb 15.1 oz)   Height: 165.1 cm (65\")     Body mass index is 28.95 kg/m².  BMI is >= 25 and <30. (Overweight) The following options were offered after discussion;: exercise counseling/recommendations and nutrition counseling/recommendations      Patient reports that she is a former smoker. She quit smoking in 1974.  She has not resumed smoking since that time.  This behavior was applauded and she was encouraged to continue in smoking cessation.  We will continue to monitor at subsequent visits.    Ortho Exam:  Exam right shoulder: forward flexion approximately 140 degrees. Abduction 140 degrees. Internal rotation L3. Motor testing supraspinatus 5/5.     Results Review:   Imaging Results (Last 24 Hours)       ** No results found for the last 24 hours. **            Procedures    Assessment / Plan      Assessment/Plan:   Diagnoses and all orders for this visit:    1. Arthritis of right glenohumeral joint (Primary)    Follow-up on right shoulder pain from glenohumeral joint arthritis.  Symptoms greatly improved with corticosteroid injection into the subacromial bursa at last visit.  She has " full range of motion and strength today.  Pain is manageable.  Recommend continue with Tylenol arthritis as needed.  Alternate ice and heat.  Follow-up as needed.    Follow Up:   Return if symptoms worsen or fail to improve.      Matty Rangel MD  [facility] [place] [facility]

## 2025-02-24 DIAGNOSIS — I10 ESSENTIAL HYPERTENSION: ICD-10-CM

## 2025-02-24 RX ORDER — AMLODIPINE BESYLATE 5 MG/1
5 TABLET ORAL DAILY
Qty: 90 TABLET | Refills: 3 | Status: SHIPPED | OUTPATIENT
Start: 2025-02-24

## 2025-02-24 RX ORDER — ATORVASTATIN CALCIUM 20 MG/1
20 TABLET, FILM COATED ORAL DAILY
Qty: 90 TABLET | Refills: 3 | Status: SHIPPED | OUTPATIENT
Start: 2025-02-24

## 2025-02-24 RX ORDER — LISINOPRIL AND HYDROCHLOROTHIAZIDE 12.5; 2 MG/1; MG/1
1 TABLET ORAL DAILY
Qty: 90 TABLET | Refills: 3 | Status: SHIPPED | OUTPATIENT
Start: 2025-02-24

## 2025-03-05 ENCOUNTER — APPOINTMENT (OUTPATIENT)
Dept: GENERAL RADIOLOGY | Facility: HOSPITAL | Age: 80
End: 2025-03-05
Payer: MEDICARE

## 2025-03-05 ENCOUNTER — HOSPITAL ENCOUNTER (EMERGENCY)
Facility: HOSPITAL | Age: 80
Discharge: HOME | End: 2025-03-05
Attending: EMERGENCY MEDICINE
Payer: MEDICARE

## 2025-03-05 VITALS
BODY MASS INDEX: 28.82 KG/M2 | TEMPERATURE: 99.7 F | DIASTOLIC BLOOD PRESSURE: 73 MMHG | HEIGHT: 65 IN | SYSTOLIC BLOOD PRESSURE: 106 MMHG | HEART RATE: 83 BPM | OXYGEN SATURATION: 92 % | WEIGHT: 173 LBS | RESPIRATION RATE: 19 BRPM

## 2025-03-05 DIAGNOSIS — Z86.39 HISTORY OF HYPERLIPIDEMIA: ICD-10-CM

## 2025-03-05 DIAGNOSIS — Z79.01 LONG TERM (CURRENT) USE OF ANTICOAGULANTS: ICD-10-CM

## 2025-03-05 DIAGNOSIS — Z86.79 HISTORY OF HYPERTENSION: ICD-10-CM

## 2025-03-05 DIAGNOSIS — I48.20 CHRONIC ATRIAL FIBRILLATION: ICD-10-CM

## 2025-03-05 DIAGNOSIS — Z87.09 HISTORY OF ASTHMA: ICD-10-CM

## 2025-03-05 DIAGNOSIS — J18.9 ATYPICAL PNEUMONIA: Primary | ICD-10-CM

## 2025-03-05 LAB
ALBUMIN SERPL-MCNC: 3.7 G/DL (ref 3.5–5.2)
ALBUMIN/GLOB SERPL: 1.2 G/DL
ALP SERPL-CCNC: 73 U/L (ref 39–117)
ALT SERPL W P-5'-P-CCNC: 10 U/L (ref 1–33)
ANION GAP SERPL CALCULATED.3IONS-SCNC: 13 MMOL/L (ref 5–15)
AST SERPL-CCNC: 18 U/L (ref 1–32)
BASOPHILS # BLD AUTO: 0.04 10*3/MM3 (ref 0–0.2)
BASOPHILS NFR BLD AUTO: 0.2 % (ref 0–1.5)
BILIRUB SERPL-MCNC: 0.5 MG/DL (ref 0–1.2)
BUN SERPL-MCNC: 24 MG/DL (ref 8–23)
BUN/CREAT SERPL: 31.6 (ref 7–25)
CALCIUM SPEC-SCNC: 8.9 MG/DL (ref 8.6–10.5)
CHLORIDE SERPL-SCNC: 102 MMOL/L (ref 98–107)
CO2 SERPL-SCNC: 23 MMOL/L (ref 22–29)
CREAT SERPL-MCNC: 0.76 MG/DL (ref 0.57–1)
DEPRECATED RDW RBC AUTO: 46.6 FL (ref 37–54)
EGFRCR SERPLBLD CKD-EPI 2021: 79.3 ML/MIN/1.73
EOSINOPHIL # BLD AUTO: 0.1 10*3/MM3 (ref 0–0.4)
EOSINOPHIL NFR BLD AUTO: 0.6 % (ref 0.3–6.2)
ERYTHROCYTE [DISTWIDTH] IN BLOOD BY AUTOMATED COUNT: 13.6 % (ref 12.3–15.4)
FLUAV SUBTYP SPEC NAA+PROBE: NOT DETECTED
FLUBV RNA ISLT QL NAA+PROBE: NOT DETECTED
GEN 5 1HR TROPONIN T REFLEX: 16 NG/L
GLOBULIN UR ELPH-MCNC: 3.1 GM/DL
GLUCOSE SERPL-MCNC: 122 MG/DL (ref 65–99)
HCT VFR BLD AUTO: 41.4 % (ref 34–46.6)
HGB BLD-MCNC: 13.4 G/DL (ref 12–15.9)
HOLD SPECIMEN: NORMAL
IMM GRANULOCYTES # BLD AUTO: 0.05 10*3/MM3 (ref 0–0.05)
IMM GRANULOCYTES NFR BLD AUTO: 0.3 % (ref 0–0.5)
LYMPHOCYTES # BLD AUTO: 1.07 10*3/MM3 (ref 0.7–3.1)
LYMPHOCYTES NFR BLD AUTO: 6.6 % (ref 19.6–45.3)
MCH RBC QN AUTO: 29.7 PG (ref 26.6–33)
MCHC RBC AUTO-ENTMCNC: 32.4 G/DL (ref 31.5–35.7)
MCV RBC AUTO: 91.8 FL (ref 79–97)
MONOCYTES # BLD AUTO: 1.45 10*3/MM3 (ref 0.1–0.9)
MONOCYTES NFR BLD AUTO: 9 % (ref 5–12)
NEUTROPHILS NFR BLD AUTO: 13.4 10*3/MM3 (ref 1.7–7)
NEUTROPHILS NFR BLD AUTO: 83.3 % (ref 42.7–76)
NRBC BLD AUTO-RTO: 0 /100 WBC (ref 0–0.2)
NT-PROBNP SERPL-MCNC: 377.3 PG/ML (ref 0–1800)
PLATELET # BLD AUTO: 304 10*3/MM3 (ref 140–450)
PMV BLD AUTO: 10.7 FL (ref 6–12)
POTASSIUM SERPL-SCNC: 3.9 MMOL/L (ref 3.5–5.2)
PROT SERPL-MCNC: 6.8 G/DL (ref 6–8.5)
RBC # BLD AUTO: 4.51 10*6/MM3 (ref 3.77–5.28)
SARS-COV-2 RNA RESP QL NAA+PROBE: NOT DETECTED
SODIUM SERPL-SCNC: 138 MMOL/L (ref 136–145)
TROPONIN T % DELTA: 14
TROPONIN T NUMERIC DELTA: 2 NG/L
TROPONIN T SERPL HS-MCNC: 14 NG/L
WBC NRBC COR # BLD AUTO: 16.11 10*3/MM3 (ref 3.4–10.8)
WHOLE BLOOD HOLD COAG: NORMAL
WHOLE BLOOD HOLD SPECIMEN: NORMAL

## 2025-03-05 PROCEDURE — 94640 AIRWAY INHALATION TREATMENT: CPT

## 2025-03-05 PROCEDURE — 87636 SARSCOV2 & INF A&B AMP PRB: CPT | Performed by: EMERGENCY MEDICINE

## 2025-03-05 PROCEDURE — 96374 THER/PROPH/DIAG INJ IV PUSH: CPT

## 2025-03-05 PROCEDURE — 71045 X-RAY EXAM CHEST 1 VIEW: CPT

## 2025-03-05 PROCEDURE — 25810000003 SODIUM CHLORIDE 0.9 % SOLUTION: Performed by: PHYSICIAN ASSISTANT

## 2025-03-05 PROCEDURE — 85025 COMPLETE CBC W/AUTO DIFF WBC: CPT | Performed by: EMERGENCY MEDICINE

## 2025-03-05 PROCEDURE — 93005 ELECTROCARDIOGRAM TRACING: CPT

## 2025-03-05 PROCEDURE — 84484 ASSAY OF TROPONIN QUANT: CPT | Performed by: EMERGENCY MEDICINE

## 2025-03-05 PROCEDURE — 93005 ELECTROCARDIOGRAM TRACING: CPT | Performed by: EMERGENCY MEDICINE

## 2025-03-05 PROCEDURE — 83880 ASSAY OF NATRIURETIC PEPTIDE: CPT | Performed by: EMERGENCY MEDICINE

## 2025-03-05 PROCEDURE — 80053 COMPREHEN METABOLIC PANEL: CPT | Performed by: EMERGENCY MEDICINE

## 2025-03-05 PROCEDURE — 99284 EMERGENCY DEPT VISIT MOD MDM: CPT

## 2025-03-05 PROCEDURE — 36415 COLL VENOUS BLD VENIPUNCTURE: CPT

## 2025-03-05 PROCEDURE — 25010000002 METHYLPREDNISOLONE PER 125 MG: Performed by: PHYSICIAN ASSISTANT

## 2025-03-05 RX ORDER — AZITHROMYCIN 250 MG/1
500 TABLET, FILM COATED ORAL ONCE
Status: COMPLETED | OUTPATIENT
Start: 2025-03-05 | End: 2025-03-05

## 2025-03-05 RX ORDER — AZITHROMYCIN 250 MG/1
250 TABLET, FILM COATED ORAL DAILY
Qty: 4 TABLET | Refills: 0 | Status: SHIPPED | OUTPATIENT
Start: 2025-03-05 | End: 2025-03-09

## 2025-03-05 RX ORDER — IPRATROPIUM BROMIDE AND ALBUTEROL SULFATE 2.5; .5 MG/3ML; MG/3ML
3 SOLUTION RESPIRATORY (INHALATION) ONCE
Status: COMPLETED | OUTPATIENT
Start: 2025-03-05 | End: 2025-03-05

## 2025-03-05 RX ORDER — METHYLPREDNISOLONE SODIUM SUCCINATE 125 MG/2ML
125 INJECTION, POWDER, LYOPHILIZED, FOR SOLUTION INTRAMUSCULAR; INTRAVENOUS ONCE
Status: COMPLETED | OUTPATIENT
Start: 2025-03-05 | End: 2025-03-05

## 2025-03-05 RX ORDER — WATER 10 ML/10ML
INJECTION INTRAMUSCULAR; INTRAVENOUS; SUBCUTANEOUS
Status: COMPLETED
Start: 2025-03-05 | End: 2025-03-05

## 2025-03-05 RX ORDER — SODIUM CHLORIDE 0.9 % (FLUSH) 0.9 %
10 SYRINGE (ML) INJECTION AS NEEDED
Status: DISCONTINUED | OUTPATIENT
Start: 2025-03-05 | End: 2025-03-06 | Stop reason: HOSPADM

## 2025-03-05 RX ORDER — PREDNISONE 50 MG/1
50 TABLET ORAL DAILY
Qty: 5 TABLET | Refills: 0 | Status: SHIPPED | OUTPATIENT
Start: 2025-03-05 | End: 2025-03-10

## 2025-03-05 RX ADMIN — AMOXICILLIN AND CLAVULANATE POTASSIUM 1 TABLET: 875; 125 TABLET, FILM COATED ORAL at 22:21

## 2025-03-05 RX ADMIN — SODIUM CHLORIDE 500 ML: 9 INJECTION, SOLUTION INTRAVENOUS at 22:21

## 2025-03-05 RX ADMIN — WATER 2 ML: 1 INJECTION INTRAMUSCULAR; INTRAVENOUS; SUBCUTANEOUS at 22:53

## 2025-03-05 RX ADMIN — IPRATROPIUM BROMIDE AND ALBUTEROL SULFATE 3 ML: 2.5; .5 SOLUTION RESPIRATORY (INHALATION) at 21:52

## 2025-03-05 RX ADMIN — METHYLPREDNISOLONE SODIUM SUCCINATE 125 MG: 125 INJECTION INTRAMUSCULAR; INTRAVENOUS at 22:21

## 2025-03-05 RX ADMIN — AZITHROMYCIN 500 MG: 250 TABLET, FILM COATED ORAL at 22:21

## 2025-03-06 ENCOUNTER — TELEPHONE (OUTPATIENT)
Dept: FAMILY MEDICINE CLINIC | Facility: CLINIC | Age: 80
End: 2025-03-06
Payer: MEDICARE

## 2025-03-06 DIAGNOSIS — J43.9 PULMONARY EMPHYSEMA, UNSPECIFIED EMPHYSEMA TYPE: ICD-10-CM

## 2025-03-06 RX ORDER — BUDESONIDE 0.5 MG/2ML
INHALANT ORAL
Qty: 90 EACH | Refills: 1 | Status: SHIPPED | OUTPATIENT
Start: 2025-03-06 | End: 2025-03-06 | Stop reason: SDUPTHER

## 2025-03-06 RX ORDER — BUDESONIDE 0.5 MG/2ML
INHALANT ORAL
Qty: 90 EACH | Refills: 1 | Status: SHIPPED | OUTPATIENT
Start: 2025-03-06

## 2025-03-06 NOTE — ED PROVIDER NOTES
EMERGENCY DEPARTMENT ENCOUNTER    Pt Name: Marianela Ngo  MRN: [id number]  Pt :   1945  Room Number:    Date of encounter:  3/5/2025  PCP: Marietta Archibald PA-C  ED Provider: MCKENZIE Melendez    Historian: Patient and daughter     HPI:  Chief Complaint: Generalized weakness, shortness of breath    Context: Marianela Ngo is a 79 y.o. female who presents to the ED accompanied by her family who reports generalized weakness and shortness of breath. The patient presents with a persistent cough, suspected to be related to asthma. Additionally, there is fatigue and a recent episode of atrial fibrillation. A fever of 101°F was noted after daughter returned from work. The patient experienced nausea en route to the facility, potentially exacerbated by rapid driving. No gastrointestinal symptoms such as diarrhea or constipation are reported. Urination is normal. There is decreased appetite and minimal food intake over the past week, although water consumption remains consistent. A previous visit to urgent care occurred on , where tests for influenza and COVID-19 were negative. Asthma was assessed as not severe, and treatment included albuterol every two hours and a prednisone pack, which did not result in improvement. Coughing produces clear sputum.     HPI     REVIEW OF SYSTEMS  A chief complaint appropriate review of systems was completed and is negative except as noted in the HPI.     PAST MEDICAL HISTORY  Past Medical History:   Diagnosis Date    Allergies     Bradycardia with 51-60 beats per minute     Chronic atrial fibrillation     Current use of long term anticoagulation     DJD (degenerative joint disease)     Epistaxis     Essential hypertension     High risk medication use     Mild persistent asthma, uncomplicated     Mixed hyperlipidemia     Obstructive sleep apnea on CPAP     Other fatigue     Paroxysmal atrial fibrillation     Pneumonia 2015    Shortness of breath     Vitamin  deficiency        PAST SURGICAL HISTORY  Past Surgical History:   Procedure Laterality Date    CATARACT EXTRACTION, BILATERAL      HYSTERECTOMY      PARTIAL    REPLACEMENT TOTAL KNEE BILATERAL  ,       FAMILY HISTORY  Family History   Problem Relation Age of Onset    Cancer Father     Heart disease Sister     Cancer Brother     Diabetes Brother     Hypertension Other        SOCIAL HISTORY  Social History     Socioeconomic History    Marital status:    Tobacco Use    Smoking status: Former     Current packs/day: 0.00     Types: Cigarettes     Quit date:      Years since quittin.2    Smokeless tobacco: Never   Vaping Use    Vaping status: Never Used   Substance and Sexual Activity    Alcohol use: Never    Drug use: Never    Sexual activity: Defer       ALLERGIES  Patient has no known allergies.    PHYSICAL EXAM  Physical Exam  Vitals and nursing note reviewed.   Constitutional:       General: She is not in acute distress.     Appearance: Normal appearance. She is not ill-appearing or toxic-appearing.   HENT:      Head: Normocephalic.      Nose: Nose normal.      Mouth/Throat:      Mouth: Mucous membranes are dry.   Eyes:      Extraocular Movements: Extraocular movements intact.   Cardiovascular:      Rate and Rhythm: Normal rate. Rhythm irregular.   Pulmonary:      Effort: Pulmonary effort is normal. No respiratory distress.      Breath sounds: Wheezing present.   Abdominal:      General: There is no distension.   Musculoskeletal:         General: Normal range of motion.      Cervical back: Normal range of motion.   Skin:     General: Skin is warm and dry.   Neurological:      General: No focal deficit present.      Mental Status: She is alert.       LAB RESULTS  Results for orders placed or performed during the hospital encounter of 25   ECG 12 Lead ED Triage Standing Order; SOA    Collection Time: 25  7:20 PM   Result Value Ref Range    QT Interval 320 ms    QTC Interval 450 ms    Comprehensive Metabolic Panel    Collection Time: 03/05/25  7:57 PM    Specimen: Blood   Result Value Ref Range    Glucose 122 (H) 65 - 99 mg/dL    BUN 24 (H) 8 - 23 mg/dL    Creatinine 0.76 0.57 - 1.00 mg/dL    Sodium 138 136 - 145 mmol/L    Potassium 3.9 3.5 - 5.2 mmol/L    Chloride 102 98 - 107 mmol/L    CO2 23.0 22.0 - 29.0 mmol/L    Calcium 8.9 8.6 - 10.5 mg/dL    Total Protein 6.8 6.0 - 8.5 g/dL    Albumin 3.7 3.5 - 5.2 g/dL    ALT (SGPT) 10 1 - 33 U/L    AST (SGOT) 18 1 - 32 U/L    Alkaline Phosphatase 73 39 - 117 U/L    Total Bilirubin 0.5 0.0 - 1.2 mg/dL    Globulin 3.1 gm/dL    A/G Ratio 1.2 g/dL    BUN/Creatinine Ratio 31.6 (H) 7.0 - 25.0    Anion Gap 13.0 5.0 - 15.0 mmol/L    eGFR 79.3 >60.0 mL/min/1.73   BNP    Collection Time: 03/05/25  7:57 PM    Specimen: Blood   Result Value Ref Range    proBNP 377.3 0.0 - 1,800.0 pg/mL   High Sensitivity Troponin T    Collection Time: 03/05/25  7:57 PM    Specimen: Blood   Result Value Ref Range    HS Troponin T 14 (H) <14 ng/L   CBC Auto Differential    Collection Time: 03/05/25  7:57 PM    Specimen: Blood   Result Value Ref Range    WBC 16.11 (H) 3.40 - 10.80 10*3/mm3    RBC 4.51 3.77 - 5.28 10*6/mm3    Hemoglobin 13.4 12.0 - 15.9 g/dL    Hematocrit 41.4 34.0 - 46.6 %    MCV 91.8 79.0 - 97.0 fL    MCH 29.7 26.6 - 33.0 pg    MCHC 32.4 31.5 - 35.7 g/dL    RDW 13.6 12.3 - 15.4 %    RDW-SD 46.6 37.0 - 54.0 fl    MPV 10.7 6.0 - 12.0 fL    Platelets 304 140 - 450 10*3/mm3    Neutrophil % 83.3 (H) 42.7 - 76.0 %    Lymphocyte % 6.6 (L) 19.6 - 45.3 %    Monocyte % 9.0 5.0 - 12.0 %    Eosinophil % 0.6 0.3 - 6.2 %    Basophil % 0.2 0.0 - 1.5 %    Immature Grans % 0.3 0.0 - 0.5 %    Neutrophils, Absolute 13.40 (H) 1.70 - 7.00 10*3/mm3    Lymphocytes, Absolute 1.07 0.70 - 3.10 10*3/mm3    Monocytes, Absolute 1.45 (H) 0.10 - 0.90 10*3/mm3    Eosinophils, Absolute 0.10 0.00 - 0.40 10*3/mm3    Basophils, Absolute 0.04 0.00 - 0.20 10*3/mm3    Immature Grans, Absolute 0.05 0.00  - 0.05 10*3/mm3    nRBC 0.0 0.0 - 0.2 /100 WBC   Green Top (Gel)    Collection Time: 03/05/25  7:57 PM   Result Value Ref Range    Extra Tube Hold for add-ons.    Lavender Top    Collection Time: 03/05/25  7:57 PM   Result Value Ref Range    Extra Tube hold for add-on    Gold Top - SST    Collection Time: 03/05/25  7:57 PM   Result Value Ref Range    Extra Tube Hold for add-ons.    Gray Top    Collection Time: 03/05/25  7:57 PM   Result Value Ref Range    Extra Tube Hold for add-ons.    Light Blue Top    Collection Time: 03/05/25  7:57 PM   Result Value Ref Range    Extra Tube Hold for add-ons.    COVID-19 and FLU A/B PCR, 1 HR TAT - Swab, Nasopharynx    Collection Time: 03/05/25  7:58 PM    Specimen: Nasopharynx; Swab   Result Value Ref Range    COVID19 Not Detected Not Detected - Ref. Range    Influenza A PCR Not Detected Not Detected    Influenza B PCR Not Detected Not Detected   High Sensitivity Troponin T 1Hr    Collection Time: 03/05/25  9:16 PM    Specimen: Blood   Result Value Ref Range    HS Troponin T 16 (H) <14 ng/L    Troponin T Numeric Delta 2 ng/L    Troponin T % Delta 14 Abnormal if >/= 20%       If labs were ordered, I independently reviewed the results and considered them in treating the patient.    RADIOLOGY  XR Chest 1 View   Final Result   Impression:   Mild prominence of central pulmonary vasculature and mild perihilar interstitial and hazy opacity, which may reflect mild pulmonary edema or viral/atypical infection.            Electronically Signed: Aries Calderon     3/5/2025 9:03 PM EST     Workstation ID: [id number]        [x] Radiologist's Report Reviewed:  I ordered and independently interpreted the above noted radiographic studies.  See radiologist's dictation for official interpretation.      PROCEDURES    Procedures    ECG 12 Lead ED Triage Standing Order; SOA   Preliminary Result   Test Reason : ED Triage Standing Order~   Blood Pressure :   */*   mmHG   Vent. Rate : 119 BPM     Atrial  Rate : 138 BPM      P-R Int :   * ms          QRS Dur : 132 ms       QT Int : 320 ms       P-R-T Axes :   * 113 -20 degrees     QTcB Int : 450 ms      Atrial fibrillation with rapid ventricular response   Right bundle branch block   T wave abnormality, consider lateral ischemia   Abnormal ECG   No previous ECGs available      Referred By: EDMD           Confirmed By:         MEDICATIONS GIVEN IN ER    Medications   sodium chloride 0.9 % flush 10 mL (has no administration in time range)   sodium chloride 0.9 % bolus 500 mL (0 mL Intravenous Stopped 3/5/25 2330)   methylPREDNISolone sodium succinate (SOLU-Medrol) injection 125 mg (125 mg Intravenous Given 3/5/25 2221)   ipratropium-albuterol (DUO-NEB) nebulizer solution 3 mL (3 mL Nebulization Given 3/5/25 2152)   azithromycin (ZITHROMAX) tablet 500 mg (500 mg Oral Given 3/5/25 2221)   amoxicillin-clavulanate (AUGMENTIN) 875-125 MG per tablet 1 tablet (1 tablet Oral Given 3/5/25 2221)   sterile water (preservative free) injection  - ADS Override Pull (2 mL  Given 3/5/25 2253)       MEDICAL DECISION MAKING, PROGRESS, and CONSULTS   Medical Decision Making  79-year-old nontoxic-appearing female with history of asthma and recent exposure to viral URI presents ED for evaluation of generalized weakness and shortness of breath.  Diffuse wheezing present on physical exam.  Labs without acute or emergent abnormalities.  Checks x-ray with findings concerning for pneumonia.  With history of asthma and recent exposure to viral illness along with findings on x-ray will treat for commune, pneumonia.  Patient seen and evaluated with Dr. Stevenson who was in agreement with treatment and disposition plan.  Patient received initial dose of antibiotics and steroids in the ED.  Breathing treatment provided with improvement in diffuse wheezing.  Discharged home with continued symptomatic care, antibiotic treatment and steroid burst.  Discharged with return precautions; follow-up to primary  care.    Problems Addressed:  Atypical pneumonia: complicated acute illness or injury    Amount and/or Complexity of Data Reviewed  Labs: ordered. Decision-making details documented in ED Course.  Radiology: ordered and independent interpretation performed. Decision-making details documented in ED Course.  ECG/medicine tests: ordered and independent interpretation performed. Decision-making details documented in ED Course.    Risk  Prescription drug management.      Discussion below represents my analysis of pertinent findings related to patient's condition, differential diagnosis, treatment plan and final disposition.    Differential diagnosis: Infection,inflammation, ACS, arrhythmia, sepsis, viral syndrome, electrolyte abnormality, respiratory failure, anemia, dehydration, asthma exacerbation and others.    Additional differential diagnosis include but are not limited to:     Additional sources  Discussed/ obtained information from independent historians:   [] Spouse  [] Parent  [x] Family member  [] Friend  [] EMS   [] Other:  External (non-ED) record review:   [] Inpatient record:   [] Office record:   [] Outpatient record:   [] Prior Outpatient labs:   [] Prior Outpatient radiology:   [] Primary Care record:   [] Outside ED record:   [] Other:   Patient's care impacted by:   [] Diabetes  [x] Hypertension  [x] Hyperlipidemia  [] Hypothyroidism   [] Coronary Artery Disease  [] Congestive Heart Failure   [] COPD   [] Cancer   [] Obesity  [] GERD   [] Tobacco Abuse   [] Substance Abuse    [] Anxiety   [] Depression   [x] Other: Trial fibrillation, history of asthma  Care significantly affected by Social Determinants of Health (housing and economic circumstances, unemployment)    [] Yes     [x] No   If yes, Patient's care significantly limited by  Social Determinants of Health including:   [] Inadequate housing   [] Low income   [] Alcoholism and drug addiction in family   [] Problems related to primary support  group   [] Unemployment   [] Problems related to employment   [] Other Social Determinants of Health:     Orders placed during this visit:  Orders Placed This Encounter   Procedures    COVID PRE-OP / PRE-PROCEDURE SCREENING ORDER (NO ISOLATION) - Swab, Nasopharynx    COVID-19 and FLU A/B PCR, 1 HR TAT - Swab, Nasopharynx    XR Chest 1 View    [place] Draw    Comprehensive Metabolic Panel    BNP    High Sensitivity Troponin T    CBC Auto Differential    High Sensitivity Troponin T 1Hr    NPO Diet NPO Type: Strict NPO    Undress & Gown    Continuous Pulse Oximetry    Vital Signs    Oxygen Therapy- Nasal Cannula; Titrate 1-6 LPM Per SpO2; 90 - 95%    ECG 12 Lead ED Triage Standing Order; SOA    Insert Peripheral IV    CBC & Differential    Green Top (Gel)    Lavender Top    Gold Top - SST    Gray Top    Light Blue Top   I considered prescription management  with:   [] Pain medication  [] Antiviral  [x] Antibiotic: Implemented as Prescribed for treatment of community-acquired pneumonia   [] Other:   Rationale:  ED Course:    ED Course as of 03/06/25 0110   Wed Mar 05, 2025   1951 Vitals and Telemetry tracing was reviewed and directly interpreted by myself demonstrating blood pressure 106/73, temperature 99 point degrees Fahrenheit, heart of 102, respirations 19 breaths/min and oxygen saturation 93% on room air [JG]   1951 BP: 106/73 [JG]   1951 Temp: 99.7 °F (37.6 °C) [JG]   1951 Heart Rate: 102 [JG]   1951 Resp: 19 [JG]   1951 SpO2: 93 % [J]   2112 XR Chest 1 View  Imaging of chest personally interpreted by myself with official read provided by radiology demonstrated mild prominence of central pulmonary vasculature and mild perihilar interstitial and hazy opacity, which may reflect mild pulmonary edema or viral/atypical infection. [JG]   2114 ECG 12 Lead ED Triage Standing Order; SOA  EKG personally interpreted by myself in addition to interpretation by attending without evidence of acute ischemic changes; Atrial  fibrillation with rapid ventricular response  and patient with chronic atrial fibrillation anticoagulated on Eliquis [JG]   2137 Workup reviewed with Dr. Lockhart he was in agreement with treatment and disposition plan that included treatment of patient's atypical infection with Augmentin and azithromycin.  He will evaluate patient at bedside prior to discharge disposition. [JG]   2144 I evaluated the patient at the bedside.  Her pulmonary exam shows mild diffuse expiratory wheezing with good air movement bilaterally consistent with patient's known asthma.  No focal rhonchi.  I reviewed her labs including a leukocytosis and her x-ray showing pneumonia.  Her leukocytosis could be from a recent steroid burst from her doctor but also certainly could be infectious related.  Will plan to treat for community-acquired pneumonia with Augmentin and azithromycin, will provide IV fluids, steroids, antibiotics, DuoNeb in the ER. [KB]   4920 Labs studies were reviewed and directly interpreted by myself and demonstrated no significant acute or emergent abnormalities; leukocytosis WBC 16.11 (recent steroid use) [JG]      ED Course User Index  [JG] Justo Dove, PA  [KB] Cam Lockhart MD          DIAGNOSIS  Final diagnoses:   Atypical pneumonia   Chronic atrial fibrillation   Long term (current) use of anticoagulants   History of hypertension   History of hyperlipidemia   History of asthma     DISPOSITION    ED Disposition       ED Disposition   Discharge    Condition   Stable    Comment   --           DISCHARGE    Patient discharged in stable condition.    Reviewed implications of results, diagnosis, meds, responsibility to follow up, warning signs and symptoms of possible worsening, potential complications and reasons to return to ER.    Patient/Family voiced understanding of above instructions.    Discussed plan for discharge, as there is no emergent indication for admission.  Pt/family is agreeable and understands need  for follow up and possible repeat testing.  Pt/family is aware that discharge does not mean that nothing is wrong but that it indicates no emergency is currently present that requires admission and they must continue care with follow-up as given below or with a physician of their choice.     FOLLOW-UP  Marietta Archibald PA-C  [address]  [facility] [id number] [phone]    Call   Call for follow up with primary care    [facility] EMERGENCY DEPARTMENT  [address] [facility] [address] [phone]  Go to   If symptoms worsen         Medication List        New Prescriptions      amoxicillin-clavulanate 875-125 MG per tablet  Commonly known as: AUGMENTIN  Take 1 tablet by mouth 2 (Two) Times a Day for 7 days.     azithromycin 250 MG tablet  Commonly known as: ZITHROMAX  Take 1 tablet by mouth Daily for 4 days.     predniSONE 50 MG tablet  Commonly known as: DELTASONE  Take 1 tablet by mouth Daily for 5 days.               Where to Get Your Medications        These medications were sent to [id number] - [place], [place] - [address] - [phone]  - [phone]   [address] [facility] [address]      Phone: [phone]   amoxicillin-clavulanate 875-125 MG per tablet  azithromycin 250 MG tablet  predniSONE 50 MG tablet        Please note that portions of this document were completed with voice recognition software.        Justo Dove PA  03/06/25 0110

## 2025-03-06 NOTE — DISCHARGE INSTRUCTIONS
Symptomatic care is recommended with Tylenol and/or ibuprofen as needed. Take all medications as prescribed and instructed. Take and complete full course of antibiotics and steroids as prescribed. Follow up with primary care as directed or return to Emergency Department with worsening of symptoms.

## 2025-03-06 NOTE — TELEPHONE ENCOUNTER
Pt's  came in this morning and said the pharmacy had sent over refill request for Budesonide and we had not sent anything in. Per the chart we had not received anything. Fallon went ahead and sent in the script. I called [facility] at the number listed in system [phone] and the gentleman said they had confirmation it was sent to us on 2/28. He said they had not received the script Fallon had sent in. He gave me the correct fax of [phone] to send it to. I then faxed a paper script to the new number to that fax number. I asked the gentleman to fax over the script now and let me see if it comes through. We have never received. I then called Jeanie in [place] but the number in the system [phone] is the [place] location and she told me to call [facility]. [phone]. I called and spoke to Arpita and she confirmed that they did receive both faxes today for script of Pulmicort. She confirmed the correct fax is [phone]. I told her I want to make sure there is not any technical issues going forward. She said she was going to call the [place] [facility] location and see what is going on on their end since we did not receive and they had not received our fax and said she would give me a call back tomorrow. I have placed a ticket to update the phone and fax numbers for these 2 places in her chart in [facility].

## 2025-03-07 ENCOUNTER — OFFICE VISIT (OUTPATIENT)
Dept: CARDIOLOGY | Facility: CLINIC | Age: 80
End: 2025-03-07
Payer: MEDICARE

## 2025-03-07 VITALS
TEMPERATURE: 97 F | WEIGHT: 179 LBS | BODY MASS INDEX: 29.82 KG/M2 | HEART RATE: 58 BPM | DIASTOLIC BLOOD PRESSURE: 68 MMHG | SYSTOLIC BLOOD PRESSURE: 110 MMHG | RESPIRATION RATE: 18 BRPM | HEIGHT: 65 IN | OXYGEN SATURATION: 96 %

## 2025-03-07 DIAGNOSIS — E78.5 HYPERLIPIDEMIA LDL GOAL <100: ICD-10-CM

## 2025-03-07 DIAGNOSIS — I10 ESSENTIAL HYPERTENSION: ICD-10-CM

## 2025-03-07 DIAGNOSIS — I48.0 PAF (PAROXYSMAL ATRIAL FIBRILLATION): ICD-10-CM

## 2025-03-07 DIAGNOSIS — I45.2 BIFASCICULAR BLOCK: Primary | ICD-10-CM

## 2025-03-07 LAB
QT INTERVAL: 320 MS
QTC INTERVAL: 450 MS

## 2025-03-07 RX ORDER — SOTALOL HYDROCHLORIDE 80 MG/1
TABLET ORAL
Qty: 135 TABLET | Refills: 3 | Status: SHIPPED | OUTPATIENT
Start: 2025-03-07

## 2025-03-07 RX ORDER — LISINOPRIL AND HYDROCHLOROTHIAZIDE 12.5; 2 MG/1; MG/1
1 TABLET ORAL DAILY
Qty: 90 TABLET | Refills: 3 | Status: SHIPPED | OUTPATIENT
Start: 2025-03-07

## 2025-03-07 RX ORDER — ATORVASTATIN CALCIUM 20 MG/1
20 TABLET, FILM COATED ORAL DAILY
Qty: 90 TABLET | Refills: 3 | Status: SHIPPED | OUTPATIENT
Start: 2025-03-07

## 2025-03-07 RX ORDER — AMLODIPINE BESYLATE 5 MG/1
5 TABLET ORAL DAILY
Qty: 90 TABLET | Refills: 3 | Status: SHIPPED | OUTPATIENT
Start: 2025-03-07

## 2025-03-07 NOTE — PROGRESS NOTES
[facility] [place]  [facility] CARDIOLOGY  [address] [place] [address]  Dept: [phone]  Dept Fax: [phone]    Marianela Ngo  1945    Follow Up Office Visit Note    History of Present Illness:  Marianela Ngo is a 79 y.o. female who presents to the clinic for Follow-up and Shortness of Breath. PAF- she seems doing well, denies any complaints, on Sotalol 80.40 and Eliquis 5mg bid, QT is 480 but she has been taking  Z pack for recent pneumonia,  , no bleeding will keep same meds    The following portions of the patient's history were reviewed and updated as appropriate: allergies, current medications, past family history, past medical history, past social history, past surgical history, and problem list.    Medications:  albuterol  amLODIPine  amoxicillin-clavulanate  apixaban tablet  atorvastatin  azithromycin  budesonide  lisinopril-hydrochlorothiazide  predniSONE  sotalol    Subjective  No Known Allergies     Past Medical History:   Diagnosis Date   • Allergies    • Bradycardia with 51-60 beats per minute    • Chronic atrial fibrillation    • Current use of long term anticoagulation    • DJD (degenerative joint disease)    • Epistaxis    • Essential hypertension    • High risk medication use    • Mild persistent asthma, uncomplicated    • Mixed hyperlipidemia    • Obstructive sleep apnea on CPAP    • Other fatigue    • Paroxysmal atrial fibrillation    • Pneumonia 08/2015   • Shortness of breath    • Vitamin deficiency        Past Surgical History:   Procedure Laterality Date   • CATARACT EXTRACTION, BILATERAL     • HYSTERECTOMY      PARTIAL   • REPLACEMENT TOTAL KNEE BILATERAL  2013,2014       Family History   Problem Relation Age of Onset   • Cancer Father    • Heart disease Sister    • Cancer Brother    • Diabetes Brother    • Hypertension Other         Social History     Socioeconomic History   • Marital status:    Tobacco Use   • Smoking status: Former      "Current packs/day: 0.00     Types: Cigarettes     Quit date:      Years since quittin.2   • Smokeless tobacco: Never   Vaping Use   • Vaping status: Never Used   Substance and Sexual Activity   • Alcohol use: Never   • Drug use: Never   • Sexual activity: Defer       Review of Systems   Constitutional: Negative.    HENT: Negative.     Respiratory: Negative.     Cardiovascular: Negative.    Endocrine: Negative.    Genitourinary: Negative.    Musculoskeletal: Negative.    Skin: Negative.    Allergic/Immunologic: Negative.    Neurological: Negative.    Hematological: Negative.    Psychiatric/Behavioral: Negative.       Cardiovascular Procedures    ECHO/MUGA:  STRESS TESTS:   CARDIAC CATH:   DEVICES:   HOLTER:   CT/MRI:   VASCULAR:   CARDIOTHORACIC:     Objective  Vitals:    25 1017   BP: 110/68   BP Location: Right arm   Patient Position: Sitting   Cuff Size: Adult   Pulse: 58   Resp: 18   Temp: 97 °F (36.1 °C)   TempSrc: Infrared   SpO2: 96%   Weight: 81.2 kg (179 lb)   Height: 165.1 cm (65\")   PainSc: 0-No pain     Body mass index is 29.79 kg/m².     Physical Exam  Vitals reviewed.   Constitutional:       Appearance: Healthy appearance. Not in distress.   Neck:      Vascular: No JVR. JVD normal.   Pulmonary:      Effort: Pulmonary effort is normal.      Breath sounds: Normal breath sounds. No wheezing. No rhonchi. No rales.   Chest:      Chest wall: Not tender to palpatation.   Cardiovascular:      PMI at left midclavicular line. Normal rate. Regular rhythm. Normal S1. Normal S2.       Murmurs: There is no murmur.      No gallop.  No click. No rub.   Pulses:     Intact distal pulses.   Edema:     Peripheral edema absent.   Abdominal:      General: Bowel sounds are normal.      Palpations: Abdomen is soft.      Tenderness: There is no abdominal tenderness.   Musculoskeletal: Normal range of motion.         General: No tenderness. Skin:     General: Skin is warm and dry.   Neurological:      General: No " focal deficit present.      Mental Status: Alert and oriented to person, place and time.        Diagnostic Data    ECG 12 Lead    Date/Time: 3/7/2025 10:55 AM  Performed by: Amos Davis MD    Authorized by: Amos Davis MD  Comparison: compared with previous ECG from 9/6/2024  Similar to previous ECG  Rhythm: sinus rhythm  Rate: normal  BPM: 53  Conduction: right bundle branch block, bifascicular block and 1st degree AV block  QRS axis: normal    Clinical impression: abnormal EKG        Assessment and Plan  Diagnoses and all orders for this visit:    Bifascicular block- RBBB and also first degree AV block- HR 53    Hyperlipidemia LDL goal <100 On Lipitor 20 mg    PAF (paroxysmal atrial fibrillation)- In sinus on Sotalol 80.40 and Eliquis 5 mg bid, will keep same approach     Essential hypertension- BP is 120.80 on Amlodipine 5mg and also Lisinopril 20,12.5  -     amLODIPine (NORVASC) 5 MG tablet; Take 1 tablet by mouth Daily    Other orders  -     apixaban (ELIQUIS) 5 MG tablet tablet; Take 1 tablet by mouth 2 (Two) Times a Day.  -     atorvastatin (LIPITOR) 20 MG tablet; Take 1 tablet by mouth Daily.  -     lisinopril-hydrochlorothiazide (PRINZIDE,ZESTORETIC) 20-12.5 MG per tablet; Take 1 tablet by mouth Daily.         Return in about 6 months (around 9/7/2025) for Recheck with Dr. Davis.    Amos Davis MD  03/07/2025

## 2025-03-10 NOTE — TELEPHONE ENCOUNTER
We received a fax refill request for Budesonide from [facility] in [place]. I called [facility] in [place] because we had already sent on 3/6 and [facility] confirmed they had received then. I talked w/ Arpita again and she confirmed that [place] had it and should be shipped out today. She said we can disregard the request that we receive right after we have sent a script going forward. I spoke to pt and advised they had script and it should be sent out today.

## 2025-03-14 ENCOUNTER — OFFICE VISIT (OUTPATIENT)
Dept: FAMILY MEDICINE CLINIC | Facility: CLINIC | Age: 80
End: 2025-03-14
Payer: MEDICARE

## 2025-03-14 VITALS
WEIGHT: 190 LBS | BODY MASS INDEX: 31.65 KG/M2 | OXYGEN SATURATION: 97 % | SYSTOLIC BLOOD PRESSURE: 140 MMHG | HEART RATE: 60 BPM | HEIGHT: 65 IN | DIASTOLIC BLOOD PRESSURE: 80 MMHG

## 2025-03-14 DIAGNOSIS — J18.9 PNEUMONIA DUE TO INFECTIOUS ORGANISM, UNSPECIFIED LATERALITY, UNSPECIFIED PART OF LUNG: Primary | ICD-10-CM

## 2025-03-14 DIAGNOSIS — J45.30 MILD PERSISTENT ASTHMA WITHOUT COMPLICATION: ICD-10-CM

## 2025-03-14 PROCEDURE — 99214 OFFICE O/P EST MOD 30 MIN: CPT | Performed by: PHYSICIAN ASSISTANT

## 2025-03-14 PROCEDURE — 3077F SYST BP >= 140 MM HG: CPT | Performed by: PHYSICIAN ASSISTANT

## 2025-03-14 PROCEDURE — 3079F DIAST BP 80-89 MM HG: CPT | Performed by: PHYSICIAN ASSISTANT

## 2025-03-14 PROCEDURE — 1126F AMNT PAIN NOTED NONE PRSNT: CPT | Performed by: PHYSICIAN ASSISTANT

## 2025-03-16 NOTE — PROGRESS NOTES
"Chief Complaint  Er follow up (Pt is here for an ER follow up after pneumonia ) and Fatigue (Pt is here for a follow up from [facility] ER, Pt states she still feels tired but feels okay otherwise)    Subjective          Marianela Ngo presents to [facility]  History of Present Illness  Patient in today for ER f/up. She was diagnosed with pneumonia and has completed outpatient antibiotic therapy along with steroid. She states is feeling better- still with some mild fatigue but improving each day. She has f/up with her cardiologist since ER visit. She currently denies chest pain or shortness of breath.States fever has not returned. She has history of asthma and using maintenance medication.       Objective   Vital Signs:   /80 (BP Location: Left arm)   Pulse 60   Ht 165.1 cm (65\")   Wt 86.2 kg (190 lb)   SpO2 97%   BMI 31.62 kg/m²     Body mass index is 31.62 kg/m².    Review of Systems   Constitutional:  Negative for fever.   HENT:  Negative for congestion and sore throat.    Respiratory:  Negative for cough and shortness of breath.    Cardiovascular:  Negative for chest pain.   Gastrointestinal:  Negative for abdominal pain, diarrhea, nausea and vomiting.   Neurological:  Negative for dizziness and headache.       Past History:  Medical History: has a past medical history of Allergies, Bradycardia with 51-60 beats per minute, Chronic atrial fibrillation, Current use of long term anticoagulation, DJD (degenerative joint disease), Epistaxis, Essential hypertension, High risk medication use, Mild persistent asthma, uncomplicated, Mixed hyperlipidemia, Obstructive sleep apnea on CPAP, Other fatigue, Paroxysmal atrial fibrillation, Pneumonia (08/2015), Shortness of breath, and Vitamin deficiency.   Surgical History: has a past surgical history that includes Hysterectomy; Replacement total knee bilateral (2013,2014); and Cataract extraction, bilateral.   Family History: family " history includes Cancer in her brother and father; Diabetes in her brother; Heart disease in her sister; Hypertension in an other family member.   Social History: reports that she quit smoking about 51 years ago. Her smoking use included cigarettes. She has never used smokeless tobacco. She reports that she does not drink alcohol and does not use drugs.      Current Outpatient Medications:     albuterol (PROVENTIL) (2.5 MG/3ML) 0.083% nebulizer solution, Use one vial in nebulizer every 4 to 6 hours as needed, Disp: 180 each, Rfl: 5    amLODIPine (NORVASC) 5 MG tablet, Take 1 tablet by mouth Daily., Disp: 90 tablet, Rfl: 3    apixaban (ELIQUIS) 5 MG tablet tablet, Take 1 tablet by mouth 2 (Two) Times a Day., Disp: 180 tablet, Rfl: 3    atorvastatin (LIPITOR) 20 MG tablet, Take 1 tablet by mouth Daily., Disp: 90 tablet, Rfl: 3    budesonide (PULMICORT) 0.5 MG/2ML nebulizer solution, Use one vial in nebulizer daily- rinse mouth after use, Disp: 90 each, Rfl: 1    lisinopril-hydrochlorothiazide (PRINZIDE,ZESTORETIC) 20-12.5 MG per tablet, Take 1 tablet by mouth Daily., Disp: 90 tablet, Rfl: 3    sotalol (BETAPACE) 80 MG tablet, TAKE ONE-HALF TABLET BY MOUTH IN THE MORNING AND 1 WHOLE TABLET  IN THE EVENING, Disp: 135 tablet, Rfl: 3  Allergies: Patient has no known allergies.    Physical Exam  Constitutional:       Appearance: Normal appearance.   HENT:      Right Ear: Tympanic membrane normal.      Left Ear: Tympanic membrane normal.      Mouth/Throat:      Pharynx: Oropharynx is clear.   Eyes:      Conjunctiva/sclera: Conjunctivae normal.      Pupils: Pupils are equal, round, and reactive to light.   Cardiovascular:      Rate and Rhythm: Normal rate and regular rhythm.      Heart sounds: Normal heart sounds.   Pulmonary:      Effort: Pulmonary effort is normal.      Breath sounds: Normal breath sounds.   Neurological:      Mental Status: She is oriented to person, place, and time.   Psychiatric:         Mood and  Affect: Mood normal.         Behavior: Behavior normal.             Assessment and Plan   Diagnoses and all orders for this visit:    1. Pneumonia due to infectious organism, unspecified laterality, unspecified part of lung (Primary)  She has completed medication and symptoms have improved. Monitor fatigue symptoms and rtc if not continuing to show improvement each day.   2. Mild persistent asthma without complication  Continue medication.           Follow Up   No follow-ups on file.  Patient was given instructions and counseling regarding her condition or for health maintenance advice. Please see specific information pulled into the AVS if appropriate.     Marietta Archibald PA-C

## 2025-07-07 NOTE — PROGRESS NOTES
Anticoagulation Clinic - Remote Progress Note  mdINR Home Monitor  Testing frequency: weekly    Indication: paroxysmal afib  Referring Provider: Susan [next 4/19/23]  Initial Warfarin Start Date: 2020? 2019?  Goal INR: 2.0-3.0  Current Drug Interactions:    [id number]: 4 (Age ?75, Sex, HTN) [estimated 5/20/21]  Bleed Risk: self reports negative history for GI bleed  Other: DOAC too expensive    Diet: green beans, peas, zucchini, or priyanka salads 2-3x/week (7/6/22)  Alcohol: none  Tobacco: none  OTC Pain Medication: APAP only    INR History:  Date 5/14 5/18 5/25 6/1 6/15 7/6 7/12 7/22 7/28 8/4 8/11 8/18 8/23 8/27   Total WeeklyDose  22.5mg 25mg 25mg 25mg 25mg 25mg 25mg 25mg 25mg 25mg 27.5mg 27.5mg 27.5mg   INR 3.5 1.9 2.3 2.3 2.8 2.7 2.1 2.2 1.9 2.0 1.8 2.3 2.2 1.8   Notes [place] [facility] 1x hold; incr GLV     HM  incr GLV  no GLV incr GLV cefdinir cefdinir     Date 9/1 9/8 9/15 9/22 9/29 10/6 10/13 10/20 10/27 11/3 11/10 11/17 11/24 12/1   Total WeeklyDose 27.5mg 27.5mg 27.5mg 27.5mg 27.5mg 27.5mg 27.5mg 30mg 27.5mg 27.5mg 27.5mg 25mg 27.5mg 27.5mg   INR 2.1 2.3 3.3 2.3 2.2 2.0 1.9 2.6 3.2 2.4 3.4 1.6 2.4 2.3   Notes cefdinir  decr GLV?  keflex   1x incr dose   decr GLV? 1x decr dose;   incr GLV?       Date 12/9 12/15 12/22 12/29 1/5/22 1/12 1/20 1/26 1/31 2/3 2/7 2/10 2/16 2/23   Total WeeklyDose 27.5mg 27.5mg 27.5mg 27.5mg 27.5mg 27.5mg 27.5mg 27.5mg 27.5mg 25mg 25mg 22.5mg 27.5mg 27.5mg   INR 2.5 2.4 2.7 2.5 2.6 2.2 2.5 2.1 3.8 2.6 4.1 2.5 2.7 3.5   Notes call    call   COVID+ dex / azith 1x hold pred 20 BID x5d  1x hold; 1x decr dose;   call call call     Date 3/2 3/9 3/16 3/23 3/30 4/5 4/13 4/20 4/27 5/4 5/11 5/18 5/25 6/1   Total WeeklyDose 25mg 27.5mg 27.5mg 27.5mg 27.5mg 27.5mg 27.5 mg 27.5mg 27.5 mg 27.5 mg 27.5 mg 27.5 mg 27.5 mg 27.5mg   INR 2.5 2.9 2.2 2.8 2.7 2.4 2.5 2.3 2.5 2.8 3.1 2.6 2.5 2.3   Notes 1x decr  call call    call    call Call  apap rec 5/19       Date 6/8 6/15 6/22 6/29 7/6 7/14 7/20  7/27 8/3 8/10 8/17 8/25 8/31 9/7   Total Weekly Dose 27.5 mg 27.5 mg 27.5 mg 27.5 mg 27.5 mg 27.5 mg 27.5 mg 30 mg 27.5 mg 27.5mg 27.5 mg 27.5 mg 27.5 mg 27.5 mg   INR 2.5 2.5 2.7 2.5 2.9 2.4 1.8 2.7 2.8 2.8 2.8 3.1 2.6 3.5   Notes call    call  call 12/71x boost   call Call  Dec GLV  call     Date 9/14 9/21 9/28 10/5 10/12 10/19 10/26 11/2 11/9 11/16 11/23 11/30 12/7   Total WeeklyDose 27.5mg 25 mg 25 mg 25 mg 25 mg  25 mg 25 mg 25 mg 25 mg 25 mg 25 mg 25 mg 25 mg   INR 3.1 2.9 2.5 2.9 2.2 2.4 2.3 2.1 2.3 2.3 2.1 2.2 1.9   Notes 1x hold inc GLV call   Call     call    Call  INC GLV     Date 12/14 12/22 12/28 1/4/23 1/11 1/18 1/25 2/1 2/8 2/16/23 2/22 3/1/23   Total WeeklyDose 27.5 mg 25 mg  20 mg 27.5 mg 27.5 mg 27.5 mg 27.5 mg 27.5 mg 27.5 mg 27.5 mg 27.5mg 27.5 mg    INR 2.8 2.1 1.9 1.9 1.8 2.1 2.2 2.0 2.7 2.5 2.6 2.8   Notes 1x boost  rec 12/15 Call  Call 1x miss call call Call  Call    Call        Date 3/9 3/16 3/22            Total WeeklyDose 27.5 mg 27.5 mg 27.5 mg            INR 2.6  2.6 4.1            Notes  call call              Phone Interview:  Tablet Strength: 5 mg (1/5/22)  Patient Contact Info: [phone] (home) preferred; [phone] (cell)  Estimated OOP cost: [please send to registration if not already done]  Verbal Release Auth: signed 5/25/21 -- May speak w/Femi Ngo, ; May leave voicemail on home phone  Lab Contact Info: [facility]  *Will call once monthly or if INR out of range*    Patient Findings  Positives:  Change in diet/appetite   Negatives:  Signs/symptoms of thrombosis, Signs/symptoms of bleeding, Laboratory test error suspected, Change in health, Change in alcohol use, Change in activity, Upcoming invasive procedure, Emergency department visit, Upcoming dental procedure, Missed doses, Extra doses, Change in medications, Hospital admission, Bruising, Other complaints   Comments:  She is not on any additional medications she is experiencing sore throat and not  feeling well. She has a reduced appetite, fewer green vegetables.       Plan:    1. INR is supratherapeutic today at 4.1 (goal 2.0-3.0).  Instructed Ms. Calvin to reduce warfarin to warfarin 5mg oral daily except warfarin 2.5mg MonWedFri (thurs) until recheck.  She may have a serving of bronanoMRight. Thursdays are typically 5 mg doses.  2. Repeat INR next week, 3/29, as Wednesdays work better.  3. Verbal information provided over the phone. Patient RBV dosing instructions, expresses understanding by teach back, and has no further questions at this time.  4. Marianela Ngo understands the importance of calling the [facility] if she notices any s/sx of bleeding, stroke, or abnormal bruising, if any changes are made to her medications or medication doses (Rx, OTC, herbal), or if any upcoming procedures are scheduled. Marianela Ngo will likewise let us know if any other changes, questions, or concerns arise regarding anticoagulation therapy. she understands the importance of seeking medical attention immediately if she experiences any falls, vehicle accidents, or abnormal bleeding or bruising. Marianela Ngo voiced understanding of this information and confirms that she has the [facility]'s contact information. Otherwise, we will plan to contact the patient once monthly or if her INR is out of range.    Saravanan Greenberg, PharmD  3/22/2023  11:04 EDT           No

## 2025-08-18 ENCOUNTER — OFFICE VISIT (OUTPATIENT)
Dept: FAMILY MEDICINE CLINIC | Facility: CLINIC | Age: 80
End: 2025-08-18
Payer: MEDICARE

## 2025-08-18 VITALS
OXYGEN SATURATION: 95 % | WEIGHT: 192 LBS | BODY MASS INDEX: 31.99 KG/M2 | HEART RATE: 65 BPM | HEIGHT: 65 IN | SYSTOLIC BLOOD PRESSURE: 130 MMHG | DIASTOLIC BLOOD PRESSURE: 80 MMHG

## 2025-08-18 DIAGNOSIS — R21 RASH: Primary | ICD-10-CM

## 2025-08-18 PROCEDURE — 3075F SYST BP GE 130 - 139MM HG: CPT | Performed by: PHYSICIAN ASSISTANT

## 2025-08-18 PROCEDURE — 99213 OFFICE O/P EST LOW 20 MIN: CPT | Performed by: PHYSICIAN ASSISTANT

## 2025-08-18 PROCEDURE — 3079F DIAST BP 80-89 MM HG: CPT | Performed by: PHYSICIAN ASSISTANT

## 2025-08-18 PROCEDURE — 1126F AMNT PAIN NOTED NONE PRSNT: CPT | Performed by: PHYSICIAN ASSISTANT

## 2025-08-18 RX ORDER — TRIAMCINOLONE ACETONIDE 1 MG/G
CREAM TOPICAL
Qty: 15 G | Refills: 0 | Status: SHIPPED | OUTPATIENT
Start: 2025-08-18

## 2025-08-28 ENCOUNTER — OFFICE VISIT (OUTPATIENT)
Dept: FAMILY MEDICINE CLINIC | Facility: CLINIC | Age: 80
End: 2025-08-28
Payer: MEDICARE

## 2025-08-28 VITALS
HEIGHT: 65 IN | BODY MASS INDEX: 32.15 KG/M2 | WEIGHT: 193 LBS | DIASTOLIC BLOOD PRESSURE: 80 MMHG | SYSTOLIC BLOOD PRESSURE: 144 MMHG | HEART RATE: 60 BPM | OXYGEN SATURATION: 96 %

## 2025-08-28 DIAGNOSIS — Z00.00 MEDICARE ANNUAL WELLNESS VISIT, SUBSEQUENT: Primary | ICD-10-CM

## 2025-08-28 DIAGNOSIS — J43.9 PULMONARY EMPHYSEMA, UNSPECIFIED EMPHYSEMA TYPE: ICD-10-CM

## 2025-08-28 DIAGNOSIS — I10 ESSENTIAL HYPERTENSION: ICD-10-CM

## 2025-08-28 DIAGNOSIS — E78.2 MIXED HYPERLIPIDEMIA: ICD-10-CM

## 2025-08-28 DIAGNOSIS — I48.0 PAF (PAROXYSMAL ATRIAL FIBRILLATION): ICD-10-CM

## 2025-08-28 RX ORDER — ALBUTEROL SULFATE 0.83 MG/ML
SOLUTION RESPIRATORY (INHALATION)
Qty: 180 EACH | Refills: 5 | Status: SHIPPED | OUTPATIENT
Start: 2025-08-28 | End: 2025-08-28 | Stop reason: SDUPTHER

## 2025-08-28 RX ORDER — BUDESONIDE 0.5 MG/2ML
INHALANT ORAL
Qty: 90 EACH | Refills: 1 | Status: SHIPPED | OUTPATIENT
Start: 2025-08-28 | End: 2025-08-28 | Stop reason: SDUPTHER

## 2025-08-28 RX ORDER — BUDESONIDE 0.5 MG/2ML
INHALANT ORAL
Qty: 90 EACH | Refills: 1 | Status: SHIPPED | OUTPATIENT
Start: 2025-08-28

## 2025-08-28 RX ORDER — ALBUTEROL SULFATE 0.83 MG/ML
SOLUTION RESPIRATORY (INHALATION)
Qty: 180 EACH | Refills: 5 | Status: SHIPPED | OUTPATIENT
Start: 2025-08-28

## 2025-08-29 LAB
ALBUMIN SERPL-MCNC: 4.1 G/DL (ref 3.8–4.8)
ALP SERPL-CCNC: 83 IU/L (ref 44–121)
ALT SERPL-CCNC: 14 IU/L (ref 0–32)
AST SERPL-CCNC: 19 IU/L (ref 0–40)
BASOPHILS # BLD AUTO: 0 X10E3/UL (ref 0–0.2)
BASOPHILS NFR BLD AUTO: 1 %
BILIRUB SERPL-MCNC: 0.5 MG/DL (ref 0–1.2)
BUN SERPL-MCNC: 20 MG/DL (ref 8–27)
BUN/CREAT SERPL: 26 (ref 12–28)
CALCIUM SERPL-MCNC: 9.1 MG/DL (ref 8.7–10.3)
CHLORIDE SERPL-SCNC: 104 MMOL/L (ref 96–106)
CHOLEST SERPL-MCNC: 128 MG/DL (ref 100–199)
CO2 SERPL-SCNC: 23 MMOL/L (ref 20–29)
CREAT SERPL-MCNC: 0.76 MG/DL (ref 0.57–1)
EGFRCR SERPLBLD CKD-EPI 2021: 79 ML/MIN/1.73
EOSINOPHIL # BLD AUTO: 0.2 X10E3/UL (ref 0–0.4)
EOSINOPHIL NFR BLD AUTO: 5 %
ERYTHROCYTE [DISTWIDTH] IN BLOOD BY AUTOMATED COUNT: 13.1 % (ref 11.7–15.4)
GLOBULIN SER CALC-MCNC: 2.3 G/DL (ref 1.5–4.5)
GLUCOSE SERPL-MCNC: 95 MG/DL (ref 70–99)
HCT VFR BLD AUTO: 36.3 % (ref 34–46.6)
HDLC SERPL-MCNC: 54 MG/DL
HGB BLD-MCNC: 12.2 G/DL (ref 11.1–15.9)
IMM GRANULOCYTES # BLD AUTO: 0 X10E3/UL (ref 0–0.1)
IMM GRANULOCYTES NFR BLD AUTO: 0 %
LDLC SERPL CALC-MCNC: 62 MG/DL (ref 0–99)
LYMPHOCYTES # BLD AUTO: 1.2 X10E3/UL (ref 0.7–3.1)
LYMPHOCYTES NFR BLD AUTO: 33 %
MCH RBC QN AUTO: 31.6 PG (ref 26.6–33)
MCHC RBC AUTO-ENTMCNC: 33.6 G/DL (ref 31.5–35.7)
MCV RBC AUTO: 94 FL (ref 79–97)
MONOCYTES # BLD AUTO: 0.4 X10E3/UL (ref 0.1–0.9)
MONOCYTES NFR BLD AUTO: 11 %
NEUTROPHILS # BLD AUTO: 1.9 X10E3/UL (ref 1.4–7)
NEUTROPHILS NFR BLD AUTO: 50 %
PLATELET # BLD AUTO: 213 X10E3/UL (ref 150–450)
POTASSIUM SERPL-SCNC: 4.2 MMOL/L (ref 3.5–5.2)
PROT SERPL-MCNC: 6.4 G/DL (ref 6–8.5)
RBC # BLD AUTO: 3.86 X10E6/UL (ref 3.77–5.28)
SODIUM SERPL-SCNC: 140 MMOL/L (ref 134–144)
TRIGL SERPL-MCNC: 51 MG/DL (ref 0–149)
TSH SERPL DL<=0.005 MIU/L-ACNC: 1.92 UIU/ML (ref 0.45–4.5)
VLDLC SERPL CALC-MCNC: 12 MG/DL (ref 5–40)
WBC # BLD AUTO: 3.8 X10E3/UL (ref 3.4–10.8)